# Patient Record
Sex: FEMALE | Race: WHITE | NOT HISPANIC OR LATINO | Employment: UNEMPLOYED
[De-identification: names, ages, dates, MRNs, and addresses within clinical notes are randomized per-mention and may not be internally consistent; named-entity substitution may affect disease eponyms.]

---

## 2017-02-23 ENCOUNTER — CHARTING TRANS (OUTPATIENT)
Dept: OTHER | Age: 42
End: 2017-02-23

## 2021-06-28 ENCOUNTER — TELEPHONE (OUTPATIENT)
Dept: NEUROSURGERY | Age: 46
End: 2021-06-28

## 2021-06-28 PROBLEM — Z98.890 HX OF LUMBOSACRAL SPINE SURGERY: Status: ACTIVE | Noted: 2021-06-28

## 2021-06-28 PROBLEM — Z98.890 HX OF CERVICAL SPINE SURGERY: Status: ACTIVE | Noted: 2021-06-28

## 2021-06-28 NOTE — PROGRESS NOTES
New patient here to establish care. SOB/lethargic x months. Patient has had multiple surgeries. Needs back and dental specialist referrals.

## 2021-06-28 NOTE — PROGRESS NOTES
[facility] Family Medicine Office Note  Chief Complaint:   Patient presents with:  New Patient: establish care      HPI:   This is a 50year old female coming in for  Lived in [place] for 4 years now switching PMD because she moved back to [place] Weight as of this encounter: 168 lb 8 oz (76.4 kg). Vital signs reviewed. Appears stated age, well groomed.     Physical Exam   GEN: Not in any acute distress, making good conversation, answering appropriately   SKIN: No pallor, no erythema, no cyanosis, w requested or ordered in this encounter       Health Maintenance:  Annual Physical Never done  Annual Depression Screen Never done  COVID-19 Vaccine(1) Never done  Pap Smear,3 Years Never done  Mammogram Never done    Patient/Caregiver Education: Patient/Ca

## 2021-07-08 ENCOUNTER — OFFICE VISIT (OUTPATIENT)
Dept: NEUROSURGERY | Age: 46
End: 2021-07-08

## 2021-07-08 ENCOUNTER — IMAGING SERVICES (OUTPATIENT)
Dept: GENERAL RADIOLOGY | Age: 46
End: 2021-07-08

## 2021-07-08 VITALS
BODY MASS INDEX: 27.49 KG/M2 | RESPIRATION RATE: 16 BRPM | WEIGHT: 165 LBS | HEIGHT: 65 IN | DIASTOLIC BLOOD PRESSURE: 43 MMHG | HEART RATE: 66 BPM | SYSTOLIC BLOOD PRESSURE: 82 MMHG

## 2021-07-08 DIAGNOSIS — G95.9 MYELOPATHY (CMD): ICD-10-CM

## 2021-07-08 DIAGNOSIS — R26.9 ABNORMALITY OF GAIT: ICD-10-CM

## 2021-07-08 DIAGNOSIS — G89.29 CHRONIC THORACIC BACK PAIN, UNSPECIFIED BACK PAIN LATERALITY: ICD-10-CM

## 2021-07-08 DIAGNOSIS — Z98.1 S/P LUMBAR FUSION: Primary | ICD-10-CM

## 2021-07-08 DIAGNOSIS — M43.22 FUSION OF SPINE OF CERVICAL REGION: ICD-10-CM

## 2021-07-08 DIAGNOSIS — M54.12 CERVICAL RADICULOPATHY: ICD-10-CM

## 2021-07-08 DIAGNOSIS — M54.6 CHRONIC THORACIC BACK PAIN, UNSPECIFIED BACK PAIN LATERALITY: ICD-10-CM

## 2021-07-08 DIAGNOSIS — Z98.1 S/P LUMBAR FUSION: ICD-10-CM

## 2021-07-08 PROCEDURE — 72110 X-RAY EXAM L-2 SPINE 4/>VWS: CPT | Performed by: NURSE PRACTITIONER

## 2021-07-08 PROCEDURE — 72050 X-RAY EXAM NECK SPINE 4/5VWS: CPT | Performed by: NURSE PRACTITIONER

## 2021-07-08 PROCEDURE — 99203 OFFICE O/P NEW LOW 30 MIN: CPT | Performed by: NURSE PRACTITIONER

## 2021-07-09 ENCOUNTER — TELEPHONE (OUTPATIENT)
Dept: NEUROSURGERY | Age: 46
End: 2021-07-09

## 2021-07-14 ENCOUNTER — TELEPHONE (OUTPATIENT)
Dept: NEUROSURGERY | Age: 46
End: 2021-07-14

## 2021-07-15 ENCOUNTER — TELEPHONE (OUTPATIENT)
Dept: FAMILY MEDICINE CLINIC | Facility: CLINIC | Age: 46
End: 2021-07-15

## 2021-07-15 ENCOUNTER — OFFICE VISIT (OUTPATIENT)
Dept: PAIN CLINIC | Facility: CLINIC | Age: 46
End: 2021-07-15
Payer: MEDICAID

## 2021-07-15 VITALS
BODY MASS INDEX: 26.66 KG/M2 | HEIGHT: 65 IN | SYSTOLIC BLOOD PRESSURE: 138 MMHG | WEIGHT: 160 LBS | DIASTOLIC BLOOD PRESSURE: 92 MMHG | HEART RATE: 91 BPM

## 2021-07-15 DIAGNOSIS — M96.1 FAILED BACK SURGICAL SYNDROME: Primary | ICD-10-CM

## 2021-07-15 PROCEDURE — 3080F DIAST BP >= 90 MM HG: CPT | Performed by: ANESTHESIOLOGY

## 2021-07-15 PROCEDURE — 3008F BODY MASS INDEX DOCD: CPT | Performed by: ANESTHESIOLOGY

## 2021-07-15 PROCEDURE — 99204 OFFICE O/P NEW MOD 45 MIN: CPT | Performed by: ANESTHESIOLOGY

## 2021-07-15 PROCEDURE — 3075F SYST BP GE 130 - 139MM HG: CPT | Performed by: ANESTHESIOLOGY

## 2021-07-15 NOTE — PROGRESS NOTES
HPI/Subjective:   Patient ID: Sallie Mustafa is a 50year old female. HPI    History/Other:   Review of Systems  No current outpatient medications on file.      Allergies:  Augmentin [Amoxicil*    ITCHING    Objective:   Physical Exam  Constitutional:

## 2021-07-15 NOTE — TELEPHONE ENCOUNTER
Pt called she went to see Joes Quintero  Today, he only wants to do back injections. Pt does not want to do anymore injections. Pt is wanting to know what Dr. Suzette Malagon recommends to do or who else she could see?  She is still miserable and not getting any relief

## 2021-07-16 ENCOUNTER — TELEPHONE (OUTPATIENT)
Dept: FAMILY MEDICINE CLINIC | Facility: CLINIC | Age: 46
End: 2021-07-16

## 2021-07-16 DIAGNOSIS — M54.9 CHRONIC BILATERAL BACK PAIN, UNSPECIFIED BACK LOCATION: ICD-10-CM

## 2021-07-16 DIAGNOSIS — G89.29 CHRONIC BILATERAL BACK PAIN, UNSPECIFIED BACK LOCATION: ICD-10-CM

## 2021-07-16 DIAGNOSIS — Z98.890 PERSONAL HISTORY OF SPINE SURGERY: Primary | ICD-10-CM

## 2021-07-16 NOTE — TELEPHONE ENCOUNTER
Injections will help with pain relief, but if she would rather not do this, she  should initiate care and follow up with neurosurgery and PT/rehab to see how this pain slowly resolves.  If pain is severe and some relief is needed, to be able to function, sh

## 2021-07-16 NOTE — TELEPHONE ENCOUNTER
Pt advised referral for Dr. Jose Tran placed and authorized. Pt reports she has their phone number. Pt verbalized understanding. Pt wanted to notify Dr. Monteiro Speak that she is supposed to get CT scans and MRI scans done 07/23, but were DENIED.  Pt reports

## 2021-07-16 NOTE — TELEPHONE ENCOUNTER
PT CALLED AND ADV WOULD LIKE A REFERRAL PLACED TO THE PAIN DR THAT SHE USE TO SEE.     DR Juanjose Qiu YOU

## 2021-07-16 NOTE — TELEPHONE ENCOUNTER
Patient advised of Doctor's note below. Patient verbalized understanding. No further questions at this time.

## 2021-07-19 ENCOUNTER — TELEPHONE (OUTPATIENT)
Dept: FAMILY MEDICINE CLINIC | Facility: CLINIC | Age: 46
End: 2021-07-19

## 2021-07-19 ENCOUNTER — TELEPHONE (OUTPATIENT)
Dept: NEUROSURGERY | Age: 46
End: 2021-07-19

## 2021-07-19 NOTE — TELEPHONE ENCOUNTER
Received fax from [facility] regarding request for office notes for Incontinence supplies order    Order signed and office note (06/28/2021) faxed back to [phone]    Copy send to scanning

## 2021-07-19 NOTE — TELEPHONE ENCOUNTER
Office visit 06/28/2021 and Pain management order 07/16/2021  Faxed to Amy Epps [facility]   Fax: [phone]

## 2021-07-19 NOTE — TELEPHONE ENCOUNTER
PT CALLED AND IS GOING TO NEW PAIN CLINIC NEEDS OFFICE NOTES AND ORDER FAXED TO:    [facility]  FAX # [phone]    PLEASE CALL PT IF ANY QUESTIONS

## 2021-07-20 ENCOUNTER — TELEPHONE (OUTPATIENT)
Dept: NEUROSURGERY | Age: 46
End: 2021-07-20

## 2021-07-20 ENCOUNTER — TELEPHONE (OUTPATIENT)
Dept: FAMILY MEDICINE CLINIC | Facility: CLINIC | Age: 46
End: 2021-07-20

## 2021-07-20 DIAGNOSIS — M54.6 CHRONIC THORACIC BACK PAIN, UNSPECIFIED BACK PAIN LATERALITY: ICD-10-CM

## 2021-07-20 DIAGNOSIS — M43.22 FUSION OF SPINE OF CERVICAL REGION: ICD-10-CM

## 2021-07-20 DIAGNOSIS — M54.50 CHRONIC BILATERAL LOW BACK PAIN, UNSPECIFIED WHETHER SCIATICA PRESENT: Primary | ICD-10-CM

## 2021-07-20 DIAGNOSIS — Z98.1 S/P LUMBAR FUSION: ICD-10-CM

## 2021-07-20 DIAGNOSIS — G89.29 CHRONIC THORACIC BACK PAIN, UNSPECIFIED BACK PAIN LATERALITY: ICD-10-CM

## 2021-07-20 DIAGNOSIS — G89.29 CHRONIC BILATERAL LOW BACK PAIN, UNSPECIFIED WHETHER SCIATICA PRESENT: Primary | ICD-10-CM

## 2021-07-20 DIAGNOSIS — M54.12 CERVICAL RADICULOPATHY: Primary | ICD-10-CM

## 2021-07-20 NOTE — TELEPHONE ENCOUNTER
RN attempted to call the pt back twice at the home number    Both times the phone rang once- sounded like it picked up and then hung up    RN  Got ahold of pt on mobile.     Pt states she stephen tto see [facility] in [facility]- neurosurgeon sent over no

## 2021-07-21 NOTE — TELEPHONE ENCOUNTER
Other pain specialists in the area ? Most of my patients with Chronic pain are referred to Dr Paty Payan.

## 2021-07-21 NOTE — TELEPHONE ENCOUNTER
Pt requesting for new referral for [facility]  For Dr. Clarke Contreras    Pt reports she confirmed that their office takes her insurance  Pt reports her neurosurgeon's office to send clinic notes to their office as well.     New order for pain m

## 2021-07-22 NOTE — TELEPHONE ENCOUNTER
Autumn Yu, [address]  [address] [address] [phone]         Patient advised of note above. Patient verbalized understanding. No further questions at this time.

## 2021-07-23 ENCOUNTER — APPOINTMENT (OUTPATIENT)
Dept: MRI IMAGING | Facility: HOSPITAL | Age: 46
End: 2021-07-23
Attending: NURSE PRACTITIONER
Payer: MEDICAID

## 2021-07-23 ENCOUNTER — IMAGING SERVICES (OUTPATIENT)
Dept: OTHER | Age: 46
End: 2021-07-23
Attending: PHYSICIAN ASSISTANT

## 2021-07-23 ENCOUNTER — HOSPITAL ENCOUNTER (OUTPATIENT)
Dept: CT IMAGING | Facility: HOSPITAL | Age: 46
Discharge: HOME | End: 2021-07-23
Attending: NURSE PRACTITIONER
Payer: MEDICAID

## 2021-07-23 DIAGNOSIS — M43.22 FUSION OF SPINE OF CERVICAL REGION: ICD-10-CM

## 2021-07-23 DIAGNOSIS — M54.12 CERVICAL RADICULOPATHY: ICD-10-CM

## 2021-07-23 DIAGNOSIS — G95.9 MYELOPATHY (HCC): ICD-10-CM

## 2021-07-23 DIAGNOSIS — R26.9 ABNORMALITY OF GAIT: ICD-10-CM

## 2021-07-23 PROCEDURE — 72125 CT NECK SPINE W/O DYE: CPT | Performed by: NURSE PRACTITIONER

## 2021-07-26 ENCOUNTER — TELEPHONE (OUTPATIENT)
Dept: PAIN CLINIC | Facility: CLINIC | Age: 46
End: 2021-07-26

## 2021-07-26 NOTE — TELEPHONE ENCOUNTER
Rec'd referral from Dr. Cheyanne Brito for cervical radiculopathy, and imaging reports from [address] 07/08/21. Placed in RN bin for MD review. Thank you.

## 2021-07-28 ENCOUNTER — OFFICE VISIT (OUTPATIENT)
Dept: FAMILY MEDICINE CLINIC | Facility: CLINIC | Age: 46
End: 2021-07-28
Payer: MEDICAID

## 2021-07-28 VITALS
DIASTOLIC BLOOD PRESSURE: 86 MMHG | OXYGEN SATURATION: 100 % | BODY MASS INDEX: 29 KG/M2 | SYSTOLIC BLOOD PRESSURE: 152 MMHG | HEART RATE: 100 BPM | WEIGHT: 175.13 LBS | RESPIRATION RATE: 16 BRPM | TEMPERATURE: 98 F

## 2021-07-28 DIAGNOSIS — M62.830 SPASM OF THORACIC BACK MUSCLE: ICD-10-CM

## 2021-07-28 DIAGNOSIS — Z13.0 SCREENING FOR DEFICIENCY ANEMIA: Primary | ICD-10-CM

## 2021-07-28 DIAGNOSIS — Z87.19 HISTORY OF LIVER FAILURE: ICD-10-CM

## 2021-07-28 DIAGNOSIS — N89.8 VAGINAL IRRITATION: ICD-10-CM

## 2021-07-28 LAB
ALBUMIN SERPL-MCNC: 3.9 G/DL (ref 3.4–5)
ALBUMIN/GLOB SERPL: 0.9 {RATIO} (ref 1–2)
ALP LIVER SERPL-CCNC: 73 U/L
ALT SERPL-CCNC: 22 U/L
ANION GAP SERPL CALC-SCNC: 5 MMOL/L (ref 0–18)
AST SERPL-CCNC: 16 U/L (ref 15–37)
BASOPHILS # BLD AUTO: 0.07 X10(3) UL (ref 0–0.2)
BASOPHILS NFR BLD AUTO: 0.8 %
BILIRUB SERPL-MCNC: 0.3 MG/DL (ref 0.1–2)
BUN BLD-MCNC: 26 MG/DL (ref 7–18)
BUN/CREAT SERPL: 32.1 (ref 10–20)
CALCIUM BLD-MCNC: 8.9 MG/DL (ref 8.5–10.1)
CHLORIDE SERPL-SCNC: 113 MMOL/L (ref 98–112)
CO2 SERPL-SCNC: 24 MMOL/L (ref 21–32)
CREAT BLD-MCNC: 0.81 MG/DL
DEPRECATED RDW RBC AUTO: 55.3 FL (ref 35.1–46.3)
EOSINOPHIL # BLD AUTO: 0.08 X10(3) UL (ref 0–0.7)
EOSINOPHIL NFR BLD AUTO: 0.9 %
ERYTHROCYTE [DISTWIDTH] IN BLOOD BY AUTOMATED COUNT: 21.2 % (ref 11–15)
GLOBULIN PLAS-MCNC: 4.3 G/DL (ref 2.8–4.4)
GLUCOSE BLD-MCNC: 119 MG/DL (ref 70–99)
HCT VFR BLD AUTO: 35.6 %
HGB BLD-MCNC: 10.9 G/DL
IMM GRANULOCYTES # BLD AUTO: 0.02 X10(3) UL (ref 0–1)
IMM GRANULOCYTES NFR BLD: 0.2 %
LYMPHOCYTES # BLD AUTO: 3.14 X10(3) UL (ref 1–4)
LYMPHOCYTES NFR BLD AUTO: 35.7 %
M PROTEIN MFR SERPL ELPH: 8.2 G/DL (ref 6.4–8.2)
MCH RBC QN AUTO: 22.6 PG (ref 26–34)
MCHC RBC AUTO-ENTMCNC: 30.6 G/DL (ref 31–37)
MCV RBC AUTO: 73.7 FL
MONOCYTES # BLD AUTO: 0.47 X10(3) UL (ref 0.1–1)
MONOCYTES NFR BLD AUTO: 5.3 %
NEUTROPHILS # BLD AUTO: 5.01 X10 (3) UL (ref 1.5–7.7)
NEUTROPHILS # BLD AUTO: 5.01 X10(3) UL (ref 1.5–7.7)
NEUTROPHILS NFR BLD AUTO: 57.1 %
OSMOLALITY SERPL CALC.SUM OF ELEC: 300 MOSM/KG (ref 275–295)
PATIENT FASTING Y/N/NP: NO
PLATELET # BLD AUTO: 372 10(3)UL (ref 150–450)
POTASSIUM SERPL-SCNC: 3.8 MMOL/L (ref 3.5–5.1)
RBC # BLD AUTO: 4.83 X10(6)UL
SODIUM SERPL-SCNC: 142 MMOL/L (ref 136–145)
WBC # BLD AUTO: 8.8 X10(3) UL (ref 4–11)

## 2021-07-28 PROCEDURE — 85025 COMPLETE CBC W/AUTO DIFF WBC: CPT | Performed by: FAMILY MEDICINE

## 2021-07-28 PROCEDURE — 99214 OFFICE O/P EST MOD 30 MIN: CPT | Performed by: FAMILY MEDICINE

## 2021-07-28 PROCEDURE — 80053 COMPREHEN METABOLIC PANEL: CPT | Performed by: FAMILY MEDICINE

## 2021-07-28 PROCEDURE — 3077F SYST BP >= 140 MM HG: CPT | Performed by: FAMILY MEDICINE

## 2021-07-28 PROCEDURE — 3079F DIAST BP 80-89 MM HG: CPT | Performed by: FAMILY MEDICINE

## 2021-07-28 RX ORDER — FLUCONAZOLE 150 MG/1
150 TABLET ORAL
Qty: 2 TABLET | Refills: 0 | Status: SHIPPED | OUTPATIENT
Start: 2021-07-28 | End: 2021-08-01

## 2021-07-28 RX ORDER — MELOXICAM 7.5 MG/1
7.5 TABLET ORAL DAILY
Qty: 30 TABLET | Refills: 0 | Status: SHIPPED | OUTPATIENT
Start: 2021-07-28 | End: 2021-08-27

## 2021-07-28 RX ORDER — CYCLOBENZAPRINE HCL 10 MG
10 TABLET ORAL 3 TIMES DAILY PRN
Qty: 21 TABLET | Refills: 0 | Status: SHIPPED | OUTPATIENT
Start: 2021-07-28 | End: 2021-08-25

## 2021-07-28 NOTE — PROGRESS NOTES
Name: Leticia Henry   : 1975   DOS: 2021     Chief complaint: Neck and mid back pain    History of present illness:  Leticia Henry is a 50year old female complaining of pain in the mid back and neck for more than 20 years.   The patient has a day    Vaping Use      Vaping Use: Never used    Alcohol use: Not Currently    Drug use: Never      Review of  other systems:  Constitutional: negative for fever, weight loss and malaise/fatigue  Cardiovascular:  Denies shortness of breath, chest pain, d palpable bilaterally. Phalen’s and Tinnel’s sign is negative. Lhermitte’s test is negative.   Motor Examination:    (R)   (L)  Deltoid:      5    5  Biceps:       5    5  Triceps:      5    5  Wrist Extension:     5    5  Wrist Flexsion:                 5 N   S3:      N                          N    Radiology diagnostic studies: I do not have any MRI to review.     Assessment:  Failed back surgical syndrome  Epidural abscess        Plan: I had long discussion with the patient about her

## 2021-07-28 NOTE — PROGRESS NOTES
Here for f/u appt. Patient states still having pain in right shoulder. Not able to find a pain clinic that takes her insurance. Also patient is having vaginal irritation due to urine incontinence.

## 2021-07-28 NOTE — PROGRESS NOTES
[facility] Family Medicine Office Note  Chief Complaint:   Patient presents with:   Follow - Up      HPI:   This is a 50year old female coming in for follow up on her pain  Pain just at the level of her shoulder blade - worse on the R side (teary Vaping Use      Vaping Use: Never used    Alcohol use: Not Currently    Drug use: Never    Family History:  No family history on file.   Allergies:    Augmentin [Amoxicil*    ITCHING  Current Meds:  Current Outpatient Medications   Medication Sig Dispense R Meloxicam 7.5 MG Oral Tab; Take 1 tablet (7.5 mg total) by mouth daily. Dispense: 30 tablet; Refill: 0  - VENIPUNCTURE    3. History of liver failure  - COMP METABOLIC PANEL (14); Future  - COMP METABOLIC PANEL (14)  - VENIPUNCTURE    4.  Vaginal irritatio symptoms. Patient is to call with any side effects or complications from the treatments as a result of today.      Problem List:  Patient Active Problem List:     Hx of lumbosacral spine surgery     Hx of cervical spine surgery

## 2021-07-30 ENCOUNTER — TELEPHONE (OUTPATIENT)
Dept: FAMILY MEDICINE CLINIC | Facility: CLINIC | Age: 46
End: 2021-07-30

## 2021-07-30 DIAGNOSIS — D64.9 ANEMIA, UNSPECIFIED TYPE: Primary | ICD-10-CM

## 2021-07-30 NOTE — TELEPHONE ENCOUNTER
She should start taking daily Iron supplement 325 mg twice daily and to avoid dairy within 2 hours of taking the Iron supplement.  Sometimes Iron will make you constipated so consider taking an OTC Colace (stool softener) with this, and we should recheck th

## 2021-07-30 NOTE — TELEPHONE ENCOUNTER
Patient called to let MM that she doesn't get menstrual periods anymore.     If you have any questions call her @ [phone]

## 2021-07-30 NOTE — TELEPHONE ENCOUNTER
[facility] sent to pt regarding doctor's note below  Routing to nurse pool for follow-up message read by pt       Recall placed.   Order per verbal.

## 2021-08-02 NOTE — TELEPHONE ENCOUNTER
Jose Jacobson MD  You 24 minutes ago (12:39 PM)     Elliot Heath, the information and she sent is not enough.  I need information from the operation done in [address] [place] [address] Rd also need MRI of thoracic, cervical and lumbar spine.     Message text

## 2021-08-02 NOTE — TELEPHONE ENCOUNTER
Patient states she is not able to get any records from the hospital in [facility] . She stated her  has been trying to get records from them and has not been successful.      Patient stated her Neurosurgeon st Katie Germain kn

## 2021-08-06 ENCOUNTER — TELEPHONE (OUTPATIENT)
Dept: NEUROSURGERY | Age: 46
End: 2021-08-06

## 2021-08-17 ENCOUNTER — OFFICE VISIT (OUTPATIENT)
Dept: PAIN CLINIC | Facility: CLINIC | Age: 46
End: 2021-08-17
Payer: MEDICAID

## 2021-08-17 VITALS
DIASTOLIC BLOOD PRESSURE: 78 MMHG | RESPIRATION RATE: 16 BRPM | WEIGHT: 165 LBS | OXYGEN SATURATION: 99 % | BODY MASS INDEX: 27 KG/M2 | SYSTOLIC BLOOD PRESSURE: 130 MMHG | HEART RATE: 86 BPM

## 2021-08-17 DIAGNOSIS — M54.16 LUMBAR RADICULITIS: Primary | ICD-10-CM

## 2021-08-17 PROCEDURE — 3078F DIAST BP <80 MM HG: CPT | Performed by: ANESTHESIOLOGY

## 2021-08-17 PROCEDURE — 3075F SYST BP GE 130 - 139MM HG: CPT | Performed by: ANESTHESIOLOGY

## 2021-08-17 PROCEDURE — 99215 OFFICE O/P EST HI 40 MIN: CPT | Performed by: ANESTHESIOLOGY

## 2021-08-17 RX ORDER — GABAPENTIN 100 MG/1
100 CAPSULE ORAL 3 TIMES DAILY
Qty: 90 CAPSULE | Refills: 0 | Status: SHIPPED | OUTPATIENT
Start: 2021-08-17 | End: 2021-09-14

## 2021-08-17 RX ORDER — METAXALONE 800 MG/1
800 TABLET ORAL 3 TIMES DAILY
Qty: 90 TABLET | Refills: 0 | Status: SHIPPED | OUTPATIENT
Start: 2021-08-17 | End: 2021-09-23

## 2021-08-17 RX ORDER — MELOXICAM 15 MG/1
15 TABLET ORAL DAILY
Qty: 30 TABLET | Refills: 0 | Status: SHIPPED | OUTPATIENT
Start: 2021-08-17 | End: 2021-09-27

## 2021-08-17 NOTE — PROGRESS NOTES
Patient presents in office today with reported pain in mid back     Current pain level reported = 8/10    Last reported dose of nothing      Narcotic Contract renewal na    Urine Drug screen na

## 2021-08-19 ENCOUNTER — TELEPHONE (OUTPATIENT)
Dept: NEUROLOGY | Facility: CLINIC | Age: 46
End: 2021-08-19

## 2021-08-19 ENCOUNTER — TELEPHONE (OUTPATIENT)
Dept: PAIN CLINIC | Facility: CLINIC | Age: 46
End: 2021-08-19

## 2021-08-19 DIAGNOSIS — M54.16 LUMBAR RADICULITIS: Primary | ICD-10-CM

## 2021-08-19 NOTE — TELEPHONE ENCOUNTER
Pt calling stating that she has not received notification from her pharmacy regarding this medication being ready for pickup. (Metaxalone 800 MG Oral Tab).      After PSR spoke with Reta Bumpers, RN, this medication is not going through the pt's insurance and d

## 2021-08-19 NOTE — TELEPHONE ENCOUNTER
Please have physician complete the [address] [place] [address] note from 08/17/21 so prior authorization can be completed for the MRI SPINE LUMBAR (CPT=72148) that was requested.   Thank you

## 2021-08-20 RX ORDER — FLUCONAZOLE 150 MG/1
150 TABLET ORAL
Qty: 2 TABLET | Refills: 2 | Status: SHIPPED | OUTPATIENT
Start: 2021-08-20 | End: 2021-09-05

## 2021-08-20 NOTE — TELEPHONE ENCOUNTER
Prior authorization request for Metaxalone 800 mg    Patient has diagnosis of: Lumbar radiculitis    Patient's current age: 50    Medication is considered high risk for patients greater than 72years of age.     Patient has tried and failed formulary altern

## 2021-08-24 DIAGNOSIS — M62.830 SPASM OF THORACIC BACK MUSCLE: ICD-10-CM

## 2021-08-24 RX ORDER — MELOXICAM 7.5 MG/1
TABLET ORAL
Qty: 30 TABLET | Refills: 0 | OUTPATIENT
Start: 2021-08-24

## 2021-08-25 ENCOUNTER — PATIENT MESSAGE (OUTPATIENT)
Dept: PAIN CLINIC | Facility: CLINIC | Age: 46
End: 2021-08-25

## 2021-08-25 ENCOUNTER — PATIENT MESSAGE (OUTPATIENT)
Dept: FAMILY MEDICINE CLINIC | Facility: CLINIC | Age: 46
End: 2021-08-25

## 2021-08-25 ENCOUNTER — IMAGING SERVICES (OUTPATIENT)
Dept: OTHER | Age: 46
End: 2021-08-25

## 2021-08-25 ENCOUNTER — HOSPITAL ENCOUNTER (OUTPATIENT)
Dept: MRI IMAGING | Facility: HOSPITAL | Age: 46
Discharge: HOME | End: 2021-08-25
Attending: NURSE PRACTITIONER
Payer: MEDICAID

## 2021-08-25 DIAGNOSIS — G89.29 CHRONIC THORACIC BACK PAIN, UNSPECIFIED BACK PAIN LATERALITY: ICD-10-CM

## 2021-08-25 DIAGNOSIS — M96.1 FAILED BACK SURGICAL SYNDROME: ICD-10-CM

## 2021-08-25 DIAGNOSIS — R26.9 ABNORMALITY OF GAIT: ICD-10-CM

## 2021-08-25 DIAGNOSIS — M54.16 LUMBAR RADICULITIS: Primary | ICD-10-CM

## 2021-08-25 DIAGNOSIS — M54.6 CHRONIC THORACIC BACK PAIN, UNSPECIFIED BACK PAIN LATERALITY: ICD-10-CM

## 2021-08-25 DIAGNOSIS — G95.9 MYELOPATHY (HCC): ICD-10-CM

## 2021-08-25 DIAGNOSIS — M43.22 FUSION OF SPINE OF CERVICAL REGION: ICD-10-CM

## 2021-08-25 DIAGNOSIS — M54.12 CERVICAL RADICULOPATHY: ICD-10-CM

## 2021-08-25 PROCEDURE — 72146 MRI CHEST SPINE W/O DYE: CPT | Performed by: NURSE PRACTITIONER

## 2021-08-25 PROCEDURE — 72141 MRI NECK SPINE W/O DYE: CPT | Performed by: NURSE PRACTITIONER

## 2021-08-25 RX ORDER — SULFAMETHOXAZOLE AND TRIMETHOPRIM 800; 160 MG/1; MG/1
1 TABLET ORAL 2 TIMES DAILY
Qty: 14 TABLET | Refills: 0 | Status: SHIPPED | OUTPATIENT
Start: 2021-08-25 | End: 2021-12-27

## 2021-08-25 RX ORDER — CYCLOBENZAPRINE HCL 10 MG
10 TABLET ORAL 3 TIMES DAILY PRN
Qty: 90 TABLET | Refills: 0 | Status: SHIPPED | OUTPATIENT
Start: 2021-08-25 | End: 2021-09-01

## 2021-08-25 NOTE — TELEPHONE ENCOUNTER
From: Thomas Agee  To: Malena Beltran MD  Sent: 8/25/2021 12:48 PM CDT  Subject: Prescription Question    I just received a denial letter for the [facility] due to not meeting the criteria based on the info giving by Dr Paty Payan so now what do I do?

## 2021-08-25 NOTE — TELEPHONE ENCOUNTER
Patient has tried cyclobenzaprine 10 MG Oral Tab TID and per Perzo message is willing to try it.      Rx pending provider approval.

## 2021-08-25 NOTE — TELEPHONE ENCOUNTER
From: Panchito Howard  To: Georgie Baugh MD  Sent: 8/25/2021 10:26 AM CDT  Subject: Prescription Question    I still think I need antibiotic for the burning plus now I have like blister forming.

## 2021-08-25 NOTE — TELEPHONE ENCOUNTER
D/W Dr Matias Primrose in Dr Ferrara Overall absence. Dr Macho Friedman recommendation is to prescribe an alternative muscle relaxer. Noted that pt has tried cyclobenzaprine in the past. Msg sent to determine efficacy.

## 2021-08-30 NOTE — PROGRESS NOTES
Name: Arnold Grijalva   : 1975   DOS: 2021     Chief complaint: Neck and mid back pain    History of present illness:  Arnold Grijalva is a 48year old female complaining of pain in the mid back and neck for more than 20 years.   Guillermina Refill   • Metaxalone 800 MG Oral Tab Take 1 tablet (800 mg total) by mouth 3 (three) times daily. 90 tablet 0   • Meloxicam 15 MG Oral Tab Take 1 tablet (15 mg total) by mouth daily.  30 tablet 0   • gabapentin 100 MG Oral Cap Take 1 capsule (100 mg total) (74.8 kg)   LMP 02/11/2014   SpO2 99%   BMI 27.46 kg/m²    The patient is awake, alert, oriented and corporative. She has a normal affect. The patient ambulates with normal gait. HEENT: No gross lesion noted. PEERL. No icterus. Neck and mid back: Supple. Flexion:    5    5   Knee Extension:   5    5   Knee Flexion:    5    5   Ant.  Tibialis:    5    5  Extensor Hallucis Longus:   5    5  Peroneals:     5    5  Gastrocsoleus:     5    5    Deep Tendon Reflexes:             (R)   (L)   Patella:    2   2   An

## 2021-08-31 NOTE — TELEPHONE ENCOUNTER
Please have provider look over his LOV as the provider ordered an MRI Lumbar CPT 72404 and his note states he  recommend the patient to go for MRI of cervical and thoracic spine.   Pleae advise

## 2021-09-07 ENCOUNTER — TELEPHONE (OUTPATIENT)
Dept: SURGERY | Facility: CLINIC | Age: 46
End: 2021-09-07

## 2021-09-07 NOTE — TELEPHONE ENCOUNTER
MRI Lumbar (76516) denial reason:    Patient has not completed six weeks of treatment directed by provider that failed to improve patients symptoms. Also there is no records of a follow-up (in person, by phone or messaging) with provider after completing tr

## 2021-09-08 NOTE — TELEPHONE ENCOUNTER
Spoke with Carmen and scheduled a peer to peer with Dr Nathalie Jay and Dr Nathan Blanchard on 9/9/21 at 9:15  Pasha Alvarado notes and denial info on providers desk for review.

## 2021-09-09 NOTE — TELEPHONE ENCOUNTER
Updated referral. Called patient and left a detailed message including centralized scheduling phone #

## 2021-09-14 DIAGNOSIS — M54.16 LUMBAR RADICULITIS: Primary | ICD-10-CM

## 2021-09-14 RX ORDER — GABAPENTIN 100 MG/1
CAPSULE ORAL
Qty: 90 CAPSULE | Refills: 0 | Status: SHIPPED | OUTPATIENT
Start: 2021-09-14 | End: 2021-09-27 | Stop reason: ALTCHOICE

## 2021-09-14 NOTE — TELEPHONE ENCOUNTER
Medication: gabapentin 100 MG Oral Cap    Date of last refill: 08/17/21      Last office visit: 08/17/21  Due back to clinic per last office note:  na  Date next office visit scheduled:  none      Last OV note recommendation: Assessment:  Failed back surgi

## 2021-09-17 ENCOUNTER — HOSPITAL ENCOUNTER (OUTPATIENT)
Dept: MRI IMAGING | Age: 46
Discharge: HOME | End: 2021-09-17
Attending: ANESTHESIOLOGY
Payer: MEDICAID

## 2021-09-17 DIAGNOSIS — M54.16 LUMBAR RADICULITIS: ICD-10-CM

## 2021-09-18 ENCOUNTER — HOSPITAL ENCOUNTER (OUTPATIENT)
Dept: MRI IMAGING | Age: 46
Discharge: HOME | End: 2021-09-18
Attending: ANESTHESIOLOGY
Payer: MEDICAID

## 2021-09-18 ENCOUNTER — IMAGING SERVICES (OUTPATIENT)
Dept: OTHER | Age: 46
End: 2021-09-18
Attending: NEUROLOGICAL SURGERY

## 2021-09-18 PROCEDURE — 72148 MRI LUMBAR SPINE W/O DYE: CPT | Performed by: ANESTHESIOLOGY

## 2021-09-20 ENCOUNTER — TELEPHONE (OUTPATIENT)
Dept: NEUROSURGERY | Age: 46
End: 2021-09-20

## 2021-09-23 ENCOUNTER — TELEPHONE (OUTPATIENT)
Dept: NEUROSURGERY | Age: 46
End: 2021-09-23

## 2021-09-23 ENCOUNTER — OFFICE VISIT (OUTPATIENT)
Dept: PAIN CLINIC | Facility: CLINIC | Age: 46
End: 2021-09-23
Payer: MEDICAID

## 2021-09-23 VITALS
WEIGHT: 170 LBS | OXYGEN SATURATION: 100 % | BODY MASS INDEX: 28 KG/M2 | SYSTOLIC BLOOD PRESSURE: 120 MMHG | RESPIRATION RATE: 16 BRPM | HEART RATE: 101 BPM | DIASTOLIC BLOOD PRESSURE: 82 MMHG

## 2021-09-23 DIAGNOSIS — M96.1 FAILED BACK SURGICAL SYNDROME: Primary | ICD-10-CM

## 2021-09-23 PROCEDURE — 99215 OFFICE O/P EST HI 40 MIN: CPT | Performed by: ANESTHESIOLOGY

## 2021-09-23 PROCEDURE — 3079F DIAST BP 80-89 MM HG: CPT | Performed by: ANESTHESIOLOGY

## 2021-09-23 PROCEDURE — 3074F SYST BP LT 130 MM HG: CPT | Performed by: ANESTHESIOLOGY

## 2021-09-23 RX ORDER — GABAPENTIN 300 MG/1
300 CAPSULE ORAL 3 TIMES DAILY
Qty: 90 CAPSULE | Refills: 0 | Status: SHIPPED | OUTPATIENT
Start: 2021-09-23 | End: 2021-10-21

## 2021-09-23 RX ORDER — BACLOFEN 10 MG/1
10 TABLET ORAL 3 TIMES DAILY
Qty: 90 TABLET | Refills: 0 | Status: SHIPPED | OUTPATIENT
Start: 2021-09-23 | End: 2021-10-21

## 2021-09-23 NOTE — PROGRESS NOTES
Patient presents in office today with reported pain in mid back and low back     Current pain level reported = 8/10    Last reported dose of nothing      Narcotic Contract renewal na    Urine Drug screen na

## 2021-09-27 ENCOUNTER — OFFICE VISIT (OUTPATIENT)
Dept: FAMILY MEDICINE CLINIC | Facility: CLINIC | Age: 46
End: 2021-09-27
Payer: MEDICAID

## 2021-09-27 VITALS
RESPIRATION RATE: 18 BRPM | BODY MASS INDEX: 31 KG/M2 | TEMPERATURE: 98 F | DIASTOLIC BLOOD PRESSURE: 90 MMHG | OXYGEN SATURATION: 100 % | WEIGHT: 188.38 LBS | HEART RATE: 102 BPM | SYSTOLIC BLOOD PRESSURE: 142 MMHG

## 2021-09-27 DIAGNOSIS — Z12.31 SCREENING MAMMOGRAM, ENCOUNTER FOR: ICD-10-CM

## 2021-09-27 DIAGNOSIS — R26.89 POOR BALANCE: Primary | ICD-10-CM

## 2021-09-27 DIAGNOSIS — M54.16 LUMBAR RADICULOPATHY: ICD-10-CM

## 2021-09-27 DIAGNOSIS — R79.9 ELEVATED BUN: ICD-10-CM

## 2021-09-27 DIAGNOSIS — D64.9 ANEMIA, UNSPECIFIED TYPE: ICD-10-CM

## 2021-09-27 DIAGNOSIS — Z13.29 SCREENING FOR THYROID DISORDER: ICD-10-CM

## 2021-09-27 DIAGNOSIS — R29.6 FREQUENT FALLS: ICD-10-CM

## 2021-09-27 DIAGNOSIS — N64.52 NIPPLE DISCHARGE IN FEMALE: ICD-10-CM

## 2021-09-27 LAB
ALBUMIN SERPL-MCNC: 3.8 G/DL (ref 3.4–5)
ALBUMIN/GLOB SERPL: 0.8 {RATIO} (ref 1–2)
ALP LIVER SERPL-CCNC: 75 U/L
ALT SERPL-CCNC: 21 U/L
ANION GAP SERPL CALC-SCNC: 6 MMOL/L (ref 0–18)
AST SERPL-CCNC: 15 U/L (ref 15–37)
BASOPHILS # BLD AUTO: 0.08 X10(3) UL (ref 0–0.2)
BASOPHILS NFR BLD AUTO: 1.3 %
BILIRUB SERPL-MCNC: 0.3 MG/DL (ref 0.1–2)
BUN BLD-MCNC: 19 MG/DL (ref 7–18)
CALCIUM BLD-MCNC: 9.2 MG/DL (ref 8.5–10.1)
CHLORIDE SERPL-SCNC: 111 MMOL/L (ref 98–112)
CO2 SERPL-SCNC: 23 MMOL/L (ref 21–32)
CREAT BLD-MCNC: 0.7 MG/DL
DEPRECATED HBV CORE AB SER IA-ACNC: 5.4 NG/ML
EOSINOPHIL # BLD AUTO: 0.15 X10(3) UL (ref 0–0.7)
EOSINOPHIL NFR BLD AUTO: 2.4 %
ERYTHROCYTE [DISTWIDTH] IN BLOOD BY AUTOMATED COUNT: 18.1 %
FOLATE SERPL-MCNC: 10.1 NG/ML (ref 8.7–?)
GLOBULIN PLAS-MCNC: 4.6 G/DL (ref 2.8–4.4)
GLUCOSE BLD-MCNC: 84 MG/DL (ref 70–99)
HCT VFR BLD AUTO: 39 %
HGB BLD-MCNC: 12.2 G/DL
IMM GRANULOCYTES # BLD AUTO: 0.01 X10(3) UL (ref 0–1)
IMM GRANULOCYTES NFR BLD: 0.2 %
IRON SATURATION: 6 %
IRON SERPL-MCNC: 40 UG/DL
LYMPHOCYTES # BLD AUTO: 2.24 X10(3) UL (ref 1–4)
LYMPHOCYTES NFR BLD AUTO: 35.7 %
MCH RBC QN AUTO: 23.8 PG (ref 26–34)
MCHC RBC AUTO-ENTMCNC: 31.3 G/DL (ref 31–37)
MCV RBC AUTO: 76.2 FL
MONOCYTES # BLD AUTO: 0.48 X10(3) UL (ref 0.1–1)
MONOCYTES NFR BLD AUTO: 7.7 %
NEUTROPHILS # BLD AUTO: 3.31 X10 (3) UL (ref 1.5–7.7)
NEUTROPHILS # BLD AUTO: 3.31 X10(3) UL (ref 1.5–7.7)
NEUTROPHILS NFR BLD AUTO: 52.7 %
OSMOLALITY SERPL CALC.SUM OF ELEC: 291 MOSM/KG (ref 275–295)
PATIENT FASTING Y/N/NP: NO
PLATELET # BLD AUTO: 368 10(3)UL (ref 150–450)
POTASSIUM SERPL-SCNC: 3.9 MMOL/L (ref 3.5–5.1)
PROLACTIN SERPL-MCNC: 6.4 NG/ML
PROT SERPL-MCNC: 8.4 G/DL (ref 6.4–8.2)
RBC # BLD AUTO: 5.12 X10(6)UL
SODIUM SERPL-SCNC: 140 MMOL/L (ref 136–145)
TOTAL IRON BINDING CAPACITY: 617 UG/DL (ref 240–450)
TRANSFERRIN SERPL-MCNC: 414 MG/DL (ref 200–360)
TSI SER-ACNC: 1.84 MIU/ML (ref 0.36–3.74)
VIT B12 SERPL-MCNC: 449 PG/ML (ref 193–986)
WBC # BLD AUTO: 6.3 X10(3) UL (ref 4–11)

## 2021-09-27 PROCEDURE — 85025 COMPLETE CBC W/AUTO DIFF WBC: CPT | Performed by: FAMILY MEDICINE

## 2021-09-27 PROCEDURE — 84443 ASSAY THYROID STIM HORMONE: CPT | Performed by: FAMILY MEDICINE

## 2021-09-27 PROCEDURE — 3080F DIAST BP >= 90 MM HG: CPT | Performed by: FAMILY MEDICINE

## 2021-09-27 PROCEDURE — 82746 ASSAY OF FOLIC ACID SERUM: CPT | Performed by: FAMILY MEDICINE

## 2021-09-27 PROCEDURE — 83540 ASSAY OF IRON: CPT | Performed by: FAMILY MEDICINE

## 2021-09-27 PROCEDURE — 82728 ASSAY OF FERRITIN: CPT | Performed by: FAMILY MEDICINE

## 2021-09-27 PROCEDURE — 99214 OFFICE O/P EST MOD 30 MIN: CPT | Performed by: FAMILY MEDICINE

## 2021-09-27 PROCEDURE — 84146 ASSAY OF PROLACTIN: CPT | Performed by: FAMILY MEDICINE

## 2021-09-27 PROCEDURE — 80053 COMPREHEN METABOLIC PANEL: CPT | Performed by: FAMILY MEDICINE

## 2021-09-27 PROCEDURE — 82607 VITAMIN B-12: CPT | Performed by: FAMILY MEDICINE

## 2021-09-27 PROCEDURE — 3077F SYST BP >= 140 MM HG: CPT | Performed by: FAMILY MEDICINE

## 2021-09-27 PROCEDURE — 83550 IRON BINDING TEST: CPT | Performed by: FAMILY MEDICINE

## 2021-09-27 RX ORDER — CYCLOBENZAPRINE HYDROCHLORIDE 7.5 MG/1
7.5 TABLET, FILM COATED ORAL 3 TIMES DAILY PRN
Qty: 45 TABLET | Refills: 0 | Status: SHIPPED | OUTPATIENT
Start: 2021-09-27 | End: 2021-10-12

## 2021-09-27 RX ORDER — MELOXICAM 15 MG/1
15 TABLET ORAL DAILY
Qty: 30 TABLET | Refills: 0 | Status: SHIPPED | OUTPATIENT
Start: 2021-09-27 | End: 2021-10-25

## 2021-09-27 RX ORDER — CYCLOBENZAPRINE HYDROCHLORIDE 7.5 MG/1
7.5 TABLET, FILM COATED ORAL 3 TIMES DAILY PRN
COMMUNITY
End: 2021-09-27

## 2021-09-27 NOTE — PROGRESS NOTES
[facility] Family Medicine Office Note  Chief Complaint:   Patient presents with: Follow - Up: Needs blood drawn. Discuss upcoming back surgery.  Also would like to discuss blood leaking from breast.       HPI:   This is a 48year old female comi Arthritis    • Back pain    • DDD (degenerative disc disease), cervical     c2-c7 fusions   • DDD (degenerative disc disease), lumbar     L4-L5   • DDD (degenerative disc disease), lumbosacral     S1-S2   • Fibromyalgia    • Urine incontinence      Past Beckham (36.5 °C) (Temporal)   Resp 18   Wt 188 lb 6 oz (85.4 kg)   LMP 02/11/2014   SpO2 100%   BMI 31.35 kg/m²  Estimated body mass index is 31.35 kg/m² as calculated from the following:    Height as of 7/15/21: 5' 5\" (1.651 m).     Weight as of this encounter: DME WALKER MISC  - DME - EXTERNAL   - Refilled Cyclobenzaprine 7.5 mg three times per day as needed for breakthrough spasms   - Meloxicam 15 mg once daily     7.  Frequent falls  - DME - EXTERNAL     8. Screening for thyroid disorder      Elevated BUN and B Never done  Pneumococcal Vaccine: Birth to 64yrs(1 of 2 - PPSV23) Never done  Tobacco Cessation Counseling 2 Years Never done  Annual Depression Screen Never done  COVID-19 Vaccine(1) Never done  Pap Smear,3 Years Never done  Mammogram Never done  Colonosc

## 2021-09-27 NOTE — PATIENT INSTRUCTIONS
Elevated BUN and BUN/Cr ratio     Retesting CMP   If still elevated BUN and BUN/Cr may considering renal consult       Poor balance / frequent falls - needs cane but this is not enough, concerned about the winter when it snows     Walker order placed   D

## 2021-09-27 NOTE — PROGRESS NOTES
Name: Jonah Brito   : 1975   DOS: 2021     Chief complaint: Neck and mid back pain    History of present illness:  Jonah Brito is a 48year old female complaining of pain in the mid back and neck for more than 20 years.   Guillermina Dispense Refill   • baclofen 10 MG Oral Tab Take 1 tablet (10 mg total) by mouth 3 (three) times daily. 90 tablet 0   • gabapentin 300 MG Oral Cap Take 1 capsule (300 mg total) by mouth 3 (three) times daily.  90 capsule 0   • GABAPENTIN 100 MG Oral Cap ONELIA better, compression of  the head makes the pain worse. Spurling’s test is negative. [place] maneuver is normal. Radial pulsation is palpable bilaterally. Phalen’s and Tinnel’s sign is negative. Lhermitte’s test is negative.   Motor Examination:    (R)   (L N     N   L3:      N               N   L4:      N                          N   L5:      N                          N    S1:      N                          N   S3:      N                          N    Radiology diagnostic studies: MRI of lumbar, thoracic a surgeon. I also recommended her to take baclofen 10 mg p.o. 3 times daily and gabapentin 300 mg p.o. 3 times daily. . Thank you very much for referring the patient to me. Aidan Ivan M.D.   Pain Management

## 2021-09-27 NOTE — TELEPHONE ENCOUNTER
LOV 09/27/2021    Last refill on 09/23/2021, for #90 tabs, with 0 refills  (by Dr. Garcia)  baclofen 10 MG Oral Tab    Last refill on 09/23/2021, for #90 caps, with 0 refills (by Dr. Garcia)  gabapentin 300 MG Oral Cap    Last refill on 08/17/2021, for #30

## 2021-09-30 ENCOUNTER — TELEPHONE (OUTPATIENT)
Dept: FAMILY MEDICINE CLINIC | Facility: CLINIC | Age: 46
End: 2021-09-30

## 2021-09-30 DIAGNOSIS — M96.1 FAILED BACK SURGICAL SYNDROME: ICD-10-CM

## 2021-09-30 DIAGNOSIS — M54.16 LUMBAR RADICULITIS: ICD-10-CM

## 2021-09-30 RX ORDER — CYCLOBENZAPRINE HCL 10 MG
TABLET ORAL
Qty: 90 TABLET | Refills: 0 | OUTPATIENT
Start: 2021-09-30

## 2021-09-30 NOTE — TELEPHONE ENCOUNTER
Medication: Cyclobenzaprine HCl 7.5 MG Oral Tab  Duplicate order Dr. Leopold Nodal has filled medication.

## 2021-09-30 NOTE — TELEPHONE ENCOUNTER
----- Message from [facility], RN sent at 7/30/2021  4:41 PM CDT -----  Regarding: recall  Due for 2-3 month CBC recheck

## 2021-10-06 ENCOUNTER — OFFICE VISIT (OUTPATIENT)
Dept: NEUROSURGERY | Age: 46
End: 2021-10-06

## 2021-10-06 ENCOUNTER — IMAGING SERVICES (OUTPATIENT)
Dept: GENERAL RADIOLOGY | Age: 46
End: 2021-10-06
Attending: NEUROLOGICAL SURGERY

## 2021-10-06 ENCOUNTER — TELEPHONE (OUTPATIENT)
Dept: NEUROSURGERY | Age: 46
End: 2021-10-06

## 2021-10-06 ENCOUNTER — TELEPHONE (OUTPATIENT)
Dept: FAMILY MEDICINE CLINIC | Facility: CLINIC | Age: 46
End: 2021-10-06

## 2021-10-06 VITALS
DIASTOLIC BLOOD PRESSURE: 107 MMHG | HEART RATE: 99 BPM | WEIGHT: 165 LBS | BODY MASS INDEX: 27.49 KG/M2 | RESPIRATION RATE: 16 BRPM | TEMPERATURE: 97.3 F | HEIGHT: 65 IN | SYSTOLIC BLOOD PRESSURE: 162 MMHG

## 2021-10-06 DIAGNOSIS — M54.16 LUMBAR RADICULOPATHY: ICD-10-CM

## 2021-10-06 DIAGNOSIS — M54.12 CERVICAL RADICULOPATHY: Primary | ICD-10-CM

## 2021-10-06 DIAGNOSIS — M54.12 CERVICAL RADICULOPATHY: ICD-10-CM

## 2021-10-06 PROCEDURE — 99213 OFFICE O/P EST LOW 20 MIN: CPT | Performed by: NEUROLOGICAL SURGERY

## 2021-10-06 PROCEDURE — 77073 BONE LENGTH STUDIES: CPT | Performed by: NEUROLOGICAL SURGERY

## 2021-10-06 PROCEDURE — 72082 X-RAY EXAM ENTIRE SPI 2/3 VW: CPT | Performed by: NEUROLOGICAL SURGERY

## 2021-10-06 RX ORDER — MELOXICAM 15 MG/1
15 TABLET ORAL DAILY
Status: ON HOLD | COMMUNITY
Start: 2021-09-27 | End: 2022-05-06 | Stop reason: HOSPADM

## 2021-10-06 RX ORDER — CYCLOBENZAPRINE HYDROCHLORIDE 7.5 MG/1
7.5 TABLET, FILM COATED ORAL
COMMUNITY
Start: 2021-09-27 | End: 2021-11-02 | Stop reason: DRUGHIGH

## 2021-10-06 RX ORDER — GABAPENTIN 300 MG/1
300 CAPSULE ORAL 3 TIMES DAILY
COMMUNITY
Start: 2021-09-23 | End: 2022-05-20 | Stop reason: SDUPTHER

## 2021-10-06 RX ORDER — BACLOFEN 10 MG/1
10 TABLET ORAL 3 TIMES DAILY
COMMUNITY
Start: 2021-09-23

## 2021-10-06 RX ORDER — PRENATAL VIT 27,CALC/IRON/FA 60 MG-1 MG
1 TABLET ORAL DAILY
COMMUNITY
Start: 2021-09-28 | End: 2022-01-05 | Stop reason: ALTCHOICE

## 2021-10-06 RX ORDER — FLUCONAZOLE 150 MG/1
TABLET ORAL
COMMUNITY
Start: 2021-08-20 | End: 2021-11-02 | Stop reason: ALTCHOICE

## 2021-10-06 NOTE — TELEPHONE ENCOUNTER
Received medical records request from [address] [place] [address].  They are requesting records from 06/25/20 to present including medial history, psychiatric history, clinical findings, laboratory findings, imaging reports, treatment prescribed a

## 2021-10-07 ENCOUNTER — TELEPHONE (OUTPATIENT)
Dept: NEUROSURGERY | Age: 46
End: 2021-10-07

## 2021-10-07 DIAGNOSIS — M43.22 FUSION OF SPINE OF CERVICAL REGION: ICD-10-CM

## 2021-10-07 DIAGNOSIS — Z98.1 S/P LUMBAR SPINAL FUSION: Primary | ICD-10-CM

## 2021-10-12 ENCOUNTER — HOSPITAL ENCOUNTER (OUTPATIENT)
Dept: CT IMAGING | Facility: HOSPITAL | Age: 46
Discharge: HOME | End: 2021-10-12
Attending: NEUROLOGICAL SURGERY
Payer: MEDICAID

## 2021-10-12 ENCOUNTER — IMAGING SERVICES (OUTPATIENT)
Dept: OTHER | Age: 46
End: 2021-10-12

## 2021-10-12 DIAGNOSIS — M54.16 LUMBAR RADICULOPATHY: ICD-10-CM

## 2021-10-12 PROCEDURE — 72131 CT LUMBAR SPINE W/O DYE: CPT | Performed by: NEUROLOGICAL SURGERY

## 2021-10-14 ENCOUNTER — TELEPHONE (OUTPATIENT)
Dept: NEUROSURGERY | Age: 46
End: 2021-10-14

## 2021-10-19 ENCOUNTER — TELEPHONE (OUTPATIENT)
Dept: FAMILY MEDICINE CLINIC | Facility: CLINIC | Age: 46
End: 2021-10-19

## 2021-10-19 ENCOUNTER — IMAGING SERVICES (OUTPATIENT)
Dept: GENERAL RADIOLOGY | Age: 46
End: 2021-10-19
Attending: NEUROLOGICAL SURGERY

## 2021-10-19 ENCOUNTER — IMAGING SERVICES (OUTPATIENT)
Dept: GENERAL RADIOLOGY | Age: 46
End: 2021-10-19
Attending: NURSE PRACTITIONER

## 2021-10-19 ENCOUNTER — PREP FOR CASE (OUTPATIENT)
Dept: NEUROSURGERY | Age: 46
End: 2021-10-19

## 2021-10-19 ENCOUNTER — OFFICE VISIT (OUTPATIENT)
Dept: NEUROSURGERY | Age: 46
End: 2021-10-19

## 2021-10-19 VITALS
HEART RATE: 94 BPM | HEIGHT: 65 IN | SYSTOLIC BLOOD PRESSURE: 136 MMHG | BODY MASS INDEX: 27.49 KG/M2 | RESPIRATION RATE: 16 BRPM | WEIGHT: 165 LBS | DIASTOLIC BLOOD PRESSURE: 91 MMHG | TEMPERATURE: 97.2 F

## 2021-10-19 DIAGNOSIS — M81.0 OSTEOPOROSIS, UNSPECIFIED OSTEOPOROSIS TYPE, UNSPECIFIED PATHOLOGICAL FRACTURE PRESENCE: ICD-10-CM

## 2021-10-19 DIAGNOSIS — G62.9 NEUROPATHY: ICD-10-CM

## 2021-10-19 DIAGNOSIS — M43.22 FUSION OF SPINE OF CERVICAL REGION: ICD-10-CM

## 2021-10-19 DIAGNOSIS — R53.1 WEAKNESS: ICD-10-CM

## 2021-10-19 DIAGNOSIS — M40.209 KYPHOSIS, UNSPECIFIED KYPHOSIS TYPE, UNSPECIFIED SPINAL REGION: Primary | ICD-10-CM

## 2021-10-19 DIAGNOSIS — Z98.1 S/P LUMBAR SPINAL FUSION: ICD-10-CM

## 2021-10-19 DIAGNOSIS — M40.209 KYPHOSIS, UNSPECIFIED KYPHOSIS TYPE, UNSPECIFIED SPINAL REGION: ICD-10-CM

## 2021-10-19 DIAGNOSIS — M40.30 FLAT BACK: ICD-10-CM

## 2021-10-19 PROCEDURE — 72082 X-RAY EXAM ENTIRE SPI 2/3 VW: CPT | Performed by: NURSE PRACTITIONER

## 2021-10-19 PROCEDURE — 99214 OFFICE O/P EST MOD 30 MIN: CPT | Performed by: NEUROLOGICAL SURGERY

## 2021-10-20 ENCOUNTER — TELEPHONE (OUTPATIENT)
Dept: NEUROSURGERY | Age: 46
End: 2021-10-20

## 2021-10-20 DIAGNOSIS — Z01.818 PRE-OP EXAM: Primary | ICD-10-CM

## 2021-10-20 NOTE — TELEPHONE ENCOUNTER
Patient advised of Doctor's note below. Patient verbalized understanding. Pt reports her Surgery scheduled for 01/14/2022 and 01/20/2022    Was advised to complete Pre-op physical between dates 12/20-27/2021    Future appt made.   Future Appointments

## 2021-10-20 NOTE — TELEPHONE ENCOUNTER
Patient is having spinal surgery at some point and patient will be under anesthesia bfor a long time and therefore patient needs cardiac clearance. Not sure of the date of surgery but want to get it done by the end of the year. TY.   Would like to see so

## 2021-10-20 NOTE — TELEPHONE ENCOUNTER
Please call patient and discuss the need for ? Preop clearance - if she is having surgery - spinal surgeons will request so within 30 days of surgery (inform her of this being the protocol).  However, if she has some cardiac history in the past, we will nee

## 2021-10-21 DIAGNOSIS — M96.1 FAILED BACK SURGICAL SYNDROME: ICD-10-CM

## 2021-10-21 DIAGNOSIS — M54.16 LUMBAR RADICULITIS: Primary | ICD-10-CM

## 2021-10-21 RX ORDER — BACLOFEN 10 MG/1
TABLET ORAL
Qty: 90 TABLET | Refills: 0 | Status: SHIPPED | OUTPATIENT
Start: 2021-10-21 | End: 2021-11-16

## 2021-10-21 RX ORDER — GABAPENTIN 300 MG/1
CAPSULE ORAL
Qty: 90 CAPSULE | Refills: 0 | Status: SHIPPED | OUTPATIENT
Start: 2021-10-21 | End: 2021-11-16

## 2021-10-21 NOTE — TELEPHONE ENCOUNTER
Medication: baclofen 10 MG Oral Tab    Date of last refill: 09/23/21        Medication: gabapentin 300 MG Oral Cap    Date of last refill: 09/23/21    Last office visit: 09/23/21  Due back to clinic per last office note:  na  Date next office visit schedul

## 2021-10-25 RX ORDER — MELOXICAM 15 MG/1
TABLET ORAL
Qty: 30 TABLET | Refills: 0 | Status: SHIPPED | OUTPATIENT
Start: 2021-10-25 | End: 2021-11-20

## 2021-10-26 ENCOUNTER — OFFICE VISIT (OUTPATIENT)
Dept: PAIN CLINIC | Facility: CLINIC | Age: 46
End: 2021-10-26
Payer: MEDICAID

## 2021-10-26 VITALS
DIASTOLIC BLOOD PRESSURE: 90 MMHG | BODY MASS INDEX: 28 KG/M2 | OXYGEN SATURATION: 97 % | WEIGHT: 170 LBS | SYSTOLIC BLOOD PRESSURE: 120 MMHG | RESPIRATION RATE: 16 BRPM | HEART RATE: 104 BPM

## 2021-10-26 DIAGNOSIS — M96.1 FAILED BACK SURGICAL SYNDROME: Primary | ICD-10-CM

## 2021-10-26 PROCEDURE — 99213 OFFICE O/P EST LOW 20 MIN: CPT | Performed by: ANESTHESIOLOGY

## 2021-10-26 PROCEDURE — 3080F DIAST BP >= 90 MM HG: CPT | Performed by: ANESTHESIOLOGY

## 2021-10-26 PROCEDURE — 3074F SYST BP LT 130 MM HG: CPT | Performed by: ANESTHESIOLOGY

## 2021-10-26 NOTE — PROGRESS NOTES
Patient presents in office today with reported pain in low back radiating down bilateral hips.     Current pain level reported = 9/10    Last reported dose of gabapentin, baclofen this AM      Narcotic Contract renewal na    Urine Drug screen na

## 2021-10-27 ENCOUNTER — E-ADVICE (OUTPATIENT)
Dept: NEUROSURGERY | Age: 46
End: 2021-10-27

## 2021-10-27 DIAGNOSIS — I82.4Y9 DEEP VEIN THROMBOSIS (DVT) OF PROXIMAL LOWER EXTREMITY, UNSPECIFIED CHRONICITY, UNSPECIFIED LATERALITY (CMD): Primary | ICD-10-CM

## 2021-10-29 ENCOUNTER — TELEPHONE (OUTPATIENT)
Dept: NEUROSURGERY | Age: 46
End: 2021-10-29

## 2021-10-29 ENCOUNTER — OFFICE VISIT (OUTPATIENT)
Dept: FAMILY MEDICINE CLINIC | Facility: CLINIC | Age: 46
End: 2021-10-29
Payer: MEDICAID

## 2021-10-29 VITALS
TEMPERATURE: 98 F | HEART RATE: 97 BPM | OXYGEN SATURATION: 99 % | RESPIRATION RATE: 16 BRPM | WEIGHT: 198.13 LBS | BODY MASS INDEX: 33 KG/M2 | SYSTOLIC BLOOD PRESSURE: 136 MMHG | DIASTOLIC BLOOD PRESSURE: 86 MMHG

## 2021-10-29 DIAGNOSIS — F41.9 ANXIETY: ICD-10-CM

## 2021-10-29 DIAGNOSIS — H91.8X1 OTHER SPECIFIED HEARING LOSS OF RIGHT EAR, UNSPECIFIED HEARING STATUS ON CONTRALATERAL SIDE: Primary | ICD-10-CM

## 2021-10-29 DIAGNOSIS — M40.209 KYPHOSIS, UNSPECIFIED KYPHOSIS TYPE, UNSPECIFIED SPINAL REGION: Primary | ICD-10-CM

## 2021-10-29 DIAGNOSIS — Z98.1 S/P LUMBAR SPINAL FUSION: ICD-10-CM

## 2021-10-29 PROCEDURE — 99213 OFFICE O/P EST LOW 20 MIN: CPT | Performed by: FAMILY MEDICINE

## 2021-10-29 PROCEDURE — 3075F SYST BP GE 130 - 139MM HG: CPT | Performed by: FAMILY MEDICINE

## 2021-10-29 PROCEDURE — 3079F DIAST BP 80-89 MM HG: CPT | Performed by: FAMILY MEDICINE

## 2021-10-29 RX ORDER — CYCLOBENZAPRINE HYDROCHLORIDE 7.5 MG/1
7.5 TABLET, FILM COATED ORAL 3 TIMES DAILY PRN
COMMUNITY
End: 2021-11-01

## 2021-10-29 RX ORDER — METHYLPREDNISOLONE 4 MG/1
TABLET ORAL
Qty: 21 EACH | Refills: 0 | Status: SHIPPED | OUTPATIENT
Start: 2021-10-29 | End: 2021-11-04

## 2021-10-29 RX ORDER — DIAZEPAM 5 MG/1
5 TABLET ORAL DAILY PRN
Qty: 2 TABLET | Refills: 0 | Status: SHIPPED | OUTPATIENT
Start: 2021-10-29 | End: 2021-10-31

## 2021-10-29 NOTE — PROGRESS NOTES
[facility] Family Medicine Office Note  Chief Complaint:   Patient presents with:  Ear Problem: Right ear hearing loss x 3 weeks. No pain. HPI:   This is a 48year old female coming in with complaints of R hearing loss X 3 weeks.  No pain or Aunt      Allergies:    Amoxicillin-Pot Cla*    ITCHING, HIVES  Augmentin [Amoclan]     SWELLING  Current Meds:  Current Outpatient Medications   Medication Sig Dispense Refill   • Cyclobenzaprine HCl 7.5 MG Oral Tab Take 7.5 mg by mouth 3 (three) times da supple  LUNGS: No tachypnea   CV:  No tachycardia   ABD: not distended  NEURO: Alert and oriented to person place and time  GAIT: Normal  EXTREMITIES: Normal ROM   PSYCH: Mood and affect normal         ASSESSMENT AND PLAN:   1.  Other specified hearing loss learning. Medical education done. Outcome: Patient verbalizes understanding. Patient is notified to call with any questions, complications, allergies, or worsening or changing symptoms.   Patient is to call with any side effects or complications from the

## 2021-11-01 ENCOUNTER — PATIENT MESSAGE (OUTPATIENT)
Dept: FAMILY MEDICINE CLINIC | Facility: CLINIC | Age: 46
End: 2021-11-01

## 2021-11-01 ENCOUNTER — HOSPITAL ENCOUNTER (OUTPATIENT)
Dept: BONE DENSITY | Age: 46
Discharge: HOME | End: 2021-11-01
Attending: NEUROLOGICAL SURGERY
Payer: MEDICAID

## 2021-11-01 ENCOUNTER — APPOINTMENT (OUTPATIENT)
Dept: MRI IMAGING | Age: 46
End: 2021-11-01
Attending: NEUROLOGICAL SURGERY
Payer: MEDICAID

## 2021-11-01 DIAGNOSIS — M81.0 OSTEOPOROSIS: ICD-10-CM

## 2021-11-01 PROCEDURE — 77080 DXA BONE DENSITY AXIAL: CPT | Performed by: NEUROLOGICAL SURGERY

## 2021-11-01 RX ORDER — CYCLOBENZAPRINE HCL 10 MG
10 TABLET ORAL 3 TIMES DAILY PRN
Qty: 90 TABLET | Refills: 0 | Status: SHIPPED | OUTPATIENT
Start: 2021-11-01 | End: 2021-12-03

## 2021-11-01 NOTE — TELEPHONE ENCOUNTER
From: Xu Valdivia  To: Georgie Orellana MD  Sent: 11/1/2021 12:39 PM CDT  Subject: Meds    I must have forgotten to bring up the cyclobenziprine 10mg. I'm suppose to take 3x daily and I was getting the 7.5 mg and only getting 45 tabs.  I'

## 2021-11-01 NOTE — TELEPHONE ENCOUNTER
Georgie Orlando MD [place], [place] Nurse 20 minutes ago (3:36 PM)     Please pend Flexeril 10 mg three times per day PRN pain / spasm  X 30 days       Patient is also on baclofen  Per MM, find out if patient is taking baclofen and if so is i

## 2021-11-02 ENCOUNTER — TELEPHONE (OUTPATIENT)
Dept: PREADMISSION TESTING | Age: 46
End: 2021-11-02

## 2021-11-02 VITALS — HEIGHT: 65 IN | WEIGHT: 190 LBS | BODY MASS INDEX: 31.65 KG/M2

## 2021-11-02 DIAGNOSIS — Z01.812 PRE-PROCEDURAL LABORATORY EXAMINATION: Primary | ICD-10-CM

## 2021-11-02 RX ORDER — DOCUSATE SODIUM 250 MG
100 CAPSULE ORAL PRN
COMMUNITY

## 2021-11-02 RX ORDER — CYCLOBENZAPRINE HCL 10 MG
10 TABLET ORAL 3 TIMES DAILY PRN
COMMUNITY

## 2021-11-02 RX ORDER — DIAZEPAM 5 MG/1
5 TABLET ORAL
COMMUNITY
Start: 2021-10-29 | End: 2022-01-05 | Stop reason: ALTCHOICE

## 2021-11-02 ASSESSMENT — ACTIVITIES OF DAILY LIVING (ADL)
SENSORY_SUPPORT_DEVICES: DENTURES
ADL_SCORE: 12
ADL_BEFORE_ADMISSION: INDEPENDENT

## 2021-11-02 NOTE — PROGRESS NOTES
Name: Sunita Merida   : 1975   DOS: 10/26/2021     Follow-up visit note:  Sunita Merida is a 48year old female complaining of pain in the mid back and neck for more than 20 years. Patient was seen by me for a consultation.   During t lumbosacral     S1-S2   • Fibromyalgia    • Urine incontinence       Current Outpatient Medications   Medication Sig Dispense Refill   • MELOXICAM 15 MG Oral Tab TAKE 1 TABLET(15 MG) BY MOUTH DAILY 30 tablet 0   • GABAPENTIN 300 MG Oral Cap TAKE 1 CAPSULE( lesion noted. PEERL. No icterus. Neck and mid back: Supple. No thyromegaly or lymphadenopathy. Severe tenderness over the cervical and thoracic paravertebral and trapezius muscles.  Flexion of the neck makes the pain better, extention and lateral rotation Reflexes:             (R)   (L)   Patella:    2   2   Ankle:    1   1  Pathological Reflexes:              (R)   (L)   Babinski:               N                          N   [place]:    N    N   Ankle Clonus:    N                          N  Sensory Examin neural foraminal stenosis. Assessment:  Failed back surgical syndrome  Epidural abscess        Plan: I had long discussion with the patient about her management. The patient is waiting to go for lumbar and cervical fusion with her spine surgeon.   For

## 2021-11-03 ENCOUNTER — TELEPHONE (OUTPATIENT)
Dept: FAMILY MEDICINE CLINIC | Facility: CLINIC | Age: 46
End: 2021-11-03

## 2021-11-03 ENCOUNTER — APPOINTMENT (OUTPATIENT)
Dept: LAB | Age: 46
End: 2021-11-03

## 2021-11-03 DIAGNOSIS — Z01.810 ENCOUNTER FOR PRE-OPERATIVE CARDIOVASCULAR CLEARANCE: Primary | ICD-10-CM

## 2021-11-03 NOTE — TELEPHONE ENCOUNTER
[facility] sent to pt regarding note below  Routing to nurse pool for follow-up message read by pt

## 2021-11-03 NOTE — TELEPHONE ENCOUNTER
Patient called requesting a call back from nurse. RE: Sooner presurgical appt and other questions about extended EKG.     Please call her back # [phone]

## 2021-11-03 NOTE — TELEPHONE ENCOUNTER
DME order placed on 09/27/2021 - for walker with seat  Attempted to call [facility] - CC#[phone] (from order) - no answer    Searched [facility] locations via [address] [employer] [facility] [place] location not found    [address]   [address],

## 2021-11-03 NOTE — TELEPHONE ENCOUNTER
Pt she has been trying to get a hold of [facility] about her walker with a seat for about 3 weeks now . She states when she did get a hold of them they said they have no orders from the dr and would not accept the # on the order that she has.  Pt is wanting to

## 2021-11-04 NOTE — TELEPHONE ENCOUNTER
Pt reports her surgery is scheduled for 01/14/2022 and 01/20/2022    Pt with future appt for pre-op on 12/20/2021 w/ Dr. Aaliyah Cameron  Pt reports she is not requesting for sooner appt    She was advised by surgeon that she needs cardiac clearance for surgery

## 2021-11-04 NOTE — TELEPHONE ENCOUNTER
[facility] sent to pt regarding cardiology referral  Routing to nurse pool for follow-up message read by pt

## 2021-11-05 ENCOUNTER — ANESTHESIA (OUTPATIENT)
Dept: GENERAL RADIOLOGY | Age: 46
End: 2021-11-05

## 2021-11-05 ENCOUNTER — HOSPITAL ENCOUNTER (OUTPATIENT)
Dept: GENERAL RADIOLOGY | Age: 46
Discharge: HOME | End: 2021-11-05
Attending: NEUROLOGICAL SURGERY

## 2021-11-05 ENCOUNTER — APPOINTMENT (OUTPATIENT)
Dept: CT IMAGING | Age: 46
End: 2021-11-05
Attending: NURSE PRACTITIONER

## 2021-11-05 ENCOUNTER — HOSPITAL ENCOUNTER (OUTPATIENT)
Dept: CT IMAGING | Age: 46
Discharge: HOME | End: 2021-11-05
Attending: NURSE PRACTITIONER

## 2021-11-05 ENCOUNTER — ANESTHESIA EVENT (OUTPATIENT)
Dept: GENERAL RADIOLOGY | Age: 46
End: 2021-11-05

## 2021-11-05 VITALS
RESPIRATION RATE: 20 BRPM | HEART RATE: 76 BPM | HEIGHT: 65 IN | BODY MASS INDEX: 32.73 KG/M2 | TEMPERATURE: 97.2 F | OXYGEN SATURATION: 98 % | DIASTOLIC BLOOD PRESSURE: 94 MMHG | WEIGHT: 196.43 LBS | SYSTOLIC BLOOD PRESSURE: 133 MMHG

## 2021-11-05 VITALS
OXYGEN SATURATION: 99 % | RESPIRATION RATE: 20 BRPM | HEART RATE: 80 BPM | SYSTOLIC BLOOD PRESSURE: 141 MMHG | DIASTOLIC BLOOD PRESSURE: 77 MMHG

## 2021-11-05 DIAGNOSIS — R53.1 WEAKNESS: ICD-10-CM

## 2021-11-05 DIAGNOSIS — M40.209 KYPHOSIS, UNSPECIFIED KYPHOSIS TYPE, UNSPECIFIED SPINAL REGION: ICD-10-CM

## 2021-11-05 DIAGNOSIS — Z98.1 S/P LUMBAR SPINAL FUSION: ICD-10-CM

## 2021-11-05 DIAGNOSIS — M54.16 LUMBAR RADICULOPATHY: ICD-10-CM

## 2021-11-05 PROCEDURE — 72129 CT CHEST SPINE W/DYE: CPT

## 2021-11-05 PROCEDURE — G1004 CDSM NDSC: HCPCS

## 2021-11-05 PROCEDURE — 10002800 HB RX 250 W HCPCS

## 2021-11-05 PROCEDURE — 10004451 HB PACU RECOVERY 1ST 30 MINUTES

## 2021-11-05 PROCEDURE — 62303 MYELOGRAPHY LUMBAR INJECTION: CPT

## 2021-11-05 PROCEDURE — 62305 MYELOGRAPHY LUMBAR INJECTION: CPT

## 2021-11-05 PROCEDURE — 10002807 HB RX 258

## 2021-11-05 PROCEDURE — 72132 CT LUMBAR SPINE W/DYE: CPT

## 2021-11-05 PROCEDURE — 13000001 HB PHASE II RECOVERY EA 30 MINUTES

## 2021-11-05 PROCEDURE — 10002805 HB CONTRAST AGENT: Performed by: NEUROLOGICAL SURGERY

## 2021-11-05 PROCEDURE — 62304 MYELOGRAPHY LUMBAR INJECTION: CPT

## 2021-11-05 PROCEDURE — 72126 CT NECK SPINE W/DYE: CPT

## 2021-11-05 PROCEDURE — 10004452 HB PACU ADDL 30 MINUTES

## 2021-11-05 RX ORDER — NALOXONE HCL 0.4 MG/ML
0.2 VIAL (ML) INJECTION EVERY 5 MIN PRN
Status: DISCONTINUED | OUTPATIENT
Start: 2021-11-05 | End: 2021-11-05 | Stop reason: HOSPADM

## 2021-11-05 RX ORDER — HYDRALAZINE HYDROCHLORIDE 20 MG/ML
5 INJECTION INTRAMUSCULAR; INTRAVENOUS EVERY 10 MIN PRN
Status: DISCONTINUED | OUTPATIENT
Start: 2021-11-05 | End: 2021-11-05 | Stop reason: HOSPADM

## 2021-11-05 RX ORDER — SODIUM CHLORIDE, SODIUM LACTATE, POTASSIUM CHLORIDE, CALCIUM CHLORIDE 600; 310; 30; 20 MG/100ML; MG/100ML; MG/100ML; MG/100ML
INJECTION, SOLUTION INTRAVENOUS CONTINUOUS
Status: DISCONTINUED | OUTPATIENT
Start: 2021-11-05 | End: 2021-11-05 | Stop reason: HOSPADM

## 2021-11-05 RX ORDER — METOCLOPRAMIDE HYDROCHLORIDE 5 MG/ML
5 INJECTION INTRAMUSCULAR; INTRAVENOUS EVERY 6 HOURS PRN
Status: DISCONTINUED | OUTPATIENT
Start: 2021-11-05 | End: 2021-11-05 | Stop reason: HOSPADM

## 2021-11-05 RX ORDER — ONDANSETRON 2 MG/ML
4 INJECTION INTRAMUSCULAR; INTRAVENOUS ONCE
Status: DISCONTINUED | OUTPATIENT
Start: 2021-11-05 | End: 2021-11-05 | Stop reason: HOSPADM

## 2021-11-05 RX ORDER — 0.9 % SODIUM CHLORIDE 0.9 %
2 VIAL (ML) INJECTION EVERY 12 HOURS SCHEDULED
Status: DISCONTINUED | OUTPATIENT
Start: 2021-11-05 | End: 2021-11-07 | Stop reason: HOSPADM

## 2021-11-05 RX ORDER — PROPOFOL 10 MG/ML
INJECTION, EMULSION INTRAVENOUS PRN
Status: DISCONTINUED | OUTPATIENT
Start: 2021-11-05 | End: 2021-11-05

## 2021-11-05 RX ORDER — DIPHENHYDRAMINE HYDROCHLORIDE 50 MG/ML
25 INJECTION INTRAMUSCULAR; INTRAVENOUS
Status: DISCONTINUED | OUTPATIENT
Start: 2021-11-05 | End: 2021-11-05 | Stop reason: HOSPADM

## 2021-11-05 RX ORDER — ALBUTEROL SULFATE 2.5 MG/3ML
5 SOLUTION RESPIRATORY (INHALATION) ONCE
Status: DISCONTINUED | OUTPATIENT
Start: 2021-11-05 | End: 2021-11-05 | Stop reason: HOSPADM

## 2021-11-05 RX ORDER — PROCHLORPERAZINE EDISYLATE 5 MG/ML
5 INJECTION INTRAMUSCULAR; INTRAVENOUS EVERY 4 HOURS PRN
Status: DISCONTINUED | OUTPATIENT
Start: 2021-11-05 | End: 2021-11-05 | Stop reason: HOSPADM

## 2021-11-05 RX ORDER — MIDAZOLAM HYDROCHLORIDE 1 MG/ML
INJECTION, SOLUTION INTRAMUSCULAR; INTRAVENOUS PRN
Status: DISCONTINUED | OUTPATIENT
Start: 2021-11-05 | End: 2021-11-05

## 2021-11-05 RX ORDER — SODIUM CHLORIDE, SODIUM LACTATE, POTASSIUM CHLORIDE, CALCIUM CHLORIDE 600; 310; 30; 20 MG/100ML; MG/100ML; MG/100ML; MG/100ML
INJECTION, SOLUTION INTRAVENOUS CONTINUOUS PRN
Status: DISCONTINUED | OUTPATIENT
Start: 2021-11-05 | End: 2021-11-05

## 2021-11-05 RX ADMIN — FENTANYL CITRATE 50 MCG: 50 INJECTION INTRAMUSCULAR; INTRAVENOUS at 12:04

## 2021-11-05 RX ADMIN — FENTANYL CITRATE 50 MCG: 50 INJECTION INTRAMUSCULAR; INTRAVENOUS at 12:29

## 2021-11-05 RX ADMIN — MIDAZOLAM HYDROCHLORIDE 2 MG: 1 INJECTION, SOLUTION INTRAMUSCULAR; INTRAVENOUS at 10:31

## 2021-11-05 RX ADMIN — SODIUM CHLORIDE, POTASSIUM CHLORIDE, SODIUM LACTATE AND CALCIUM CHLORIDE: 600; 310; 30; 20 INJECTION, SOLUTION INTRAVENOUS at 10:23

## 2021-11-05 RX ADMIN — FENTANYL CITRATE 50 MCG: 50 INJECTION INTRAMUSCULAR; INTRAVENOUS at 12:20

## 2021-11-05 RX ADMIN — MIDAZOLAM HYDROCHLORIDE 1 MG: 1 INJECTION, SOLUTION INTRAMUSCULAR; INTRAVENOUS at 10:37

## 2021-11-05 RX ADMIN — FENTANYL CITRATE 50 MCG: 50 INJECTION INTRAMUSCULAR; INTRAVENOUS at 10:36

## 2021-11-05 RX ADMIN — FENTANYL CITRATE 50 MCG: 50 INJECTION INTRAMUSCULAR; INTRAVENOUS at 11:45

## 2021-11-05 RX ADMIN — FENTANYL CITRATE 50 MCG: 50 INJECTION INTRAMUSCULAR; INTRAVENOUS at 10:31

## 2021-11-05 RX ADMIN — IOHEXOL 50 ML: 300 INJECTION, SOLUTION INTRAVENOUS at 11:24

## 2021-11-05 RX ADMIN — SODIUM CHLORIDE, SODIUM LACTATE, POTASSIUM CHLORIDE, AND CALCIUM CHLORIDE: .6; .31; .03; .02 INJECTION, SOLUTION INTRAVENOUS at 10:09

## 2021-11-05 RX ADMIN — MIDAZOLAM HYDROCHLORIDE 1 MG: 1 INJECTION, SOLUTION INTRAMUSCULAR; INTRAVENOUS at 10:41

## 2021-11-05 RX ADMIN — PROPOFOL 50 MCG/KG/MIN: 10 INJECTION, EMULSION INTRAVENOUS at 10:32

## 2021-11-05 RX ADMIN — PROPOFOL 20 MG: 10 INJECTION, EMULSION INTRAVENOUS at 10:32

## 2021-11-05 ASSESSMENT — PAIN DESCRIPTION - PAIN TYPE
TYPE: CHRONIC PAIN

## 2021-11-05 ASSESSMENT — PAIN SCALES - GENERAL
PAINLEVEL_OUTOF10: 9
PAINLEVEL_OUTOF10: 10
PAINLEVEL_OUTOF10: 8
PAINLEVEL_OUTOF10: 7
PAINLEVEL_OUTOF10: 9
PAINLEVEL_OUTOF10: 8
PAINLEVEL_OUTOF10: 7
PAINLEVEL_OUTOF10: 10
PAINLEVEL_OUTOF10: 10

## 2021-11-08 ENCOUNTER — PATIENT MESSAGE (OUTPATIENT)
Dept: FAMILY MEDICINE CLINIC | Facility: CLINIC | Age: 46
End: 2021-11-08

## 2021-11-08 NOTE — TELEPHONE ENCOUNTER
From: Geetha SIMMONS  To: Danitza Damon  Sent: 11/3/2021 11:09 AM CDT  Subject: Tarun Rivas,    I attempted to call the [facility] in [place], using the phone number provided on the order.     There was no answer, so I searched [address] location

## 2021-11-13 DIAGNOSIS — M54.16 LUMBAR RADICULITIS: ICD-10-CM

## 2021-11-15 ENCOUNTER — PATIENT MESSAGE (OUTPATIENT)
Dept: FAMILY MEDICINE CLINIC | Facility: CLINIC | Age: 46
End: 2021-11-15

## 2021-11-15 DIAGNOSIS — M54.16 LUMBAR RADICULITIS: ICD-10-CM

## 2021-11-15 DIAGNOSIS — M96.1 FAILED BACK SURGICAL SYNDROME: ICD-10-CM

## 2021-11-15 RX ORDER — GABAPENTIN 100 MG/1
CAPSULE ORAL
Qty: 90 CAPSULE | Refills: 0 | Status: SHIPPED | OUTPATIENT
Start: 2021-11-15 | End: 2021-11-15

## 2021-11-15 NOTE — TELEPHONE ENCOUNTER
Medication: GABAPENTIN 300 MG Oral Cap    Date of last refill: 10/21/21      Last office visit: 10/26/21  Due back to clinic per last office note:  na  Date next office visit scheduled:  na          Last OV note recommendation:   Assessment:  Failed back s

## 2021-11-15 NOTE — TELEPHONE ENCOUNTER
From: Mardel Market  To: Georgie Engel MD  Sent: 11/15/2021 12:25 PM CST  Subject: Medication    I think you sent refil on Gabapentin 100mg 3x daily. I take three 300mg tabs. So I cancelled the 100mg.   I also never got an answer on my 2

## 2021-11-16 DIAGNOSIS — M96.1 FAILED BACK SURGICAL SYNDROME: ICD-10-CM

## 2021-11-16 DIAGNOSIS — M54.16 LUMBAR RADICULITIS: ICD-10-CM

## 2021-11-16 RX ORDER — BACLOFEN 10 MG/1
TABLET ORAL
Qty: 90 TABLET | Refills: 0 | Status: SHIPPED | OUTPATIENT
Start: 2021-11-16 | End: 2021-12-20

## 2021-11-16 RX ORDER — GABAPENTIN 300 MG/1
300 CAPSULE ORAL 3 TIMES DAILY
Qty: 90 CAPSULE | Refills: 0 | Status: SHIPPED | OUTPATIENT
Start: 2021-11-16 | End: 2021-12-16

## 2021-11-16 NOTE — TELEPHONE ENCOUNTER
Medication: BACLOFEN 10 MG Oral Tab    Date of last refill: 10/21/21    Last office visit: 10/26/21  Due back to clinic per last office note:  na  Date next office visit scheduled:  na        Last OV note recommendation: Assessment:  Failed back surgical s

## 2021-11-17 ENCOUNTER — APPOINTMENT (OUTPATIENT)
Dept: NEUROLOGY | Age: 46
End: 2021-11-17

## 2021-11-18 NOTE — TELEPHONE ENCOUNTER
Please refer to Patient Message Encounter 11/15/2021  [facility] - Last read by Claire Velasco at 8:41 AM on 11/16/2021.

## 2021-11-19 ENCOUNTER — PREP FOR CASE (OUTPATIENT)
Dept: NEUROSURGERY | Age: 46
End: 2021-11-19

## 2021-11-19 DIAGNOSIS — M40.209 KYPHOSIS, UNSPECIFIED KYPHOSIS TYPE, UNSPECIFIED SPINAL REGION: Primary | ICD-10-CM

## 2021-11-20 RX ORDER — MELOXICAM 15 MG/1
TABLET ORAL
Qty: 30 TABLET | Refills: 0 | Status: SHIPPED | OUTPATIENT
Start: 2021-11-20 | End: 2021-12-26

## 2021-11-20 NOTE — TELEPHONE ENCOUNTER
Routing to provider per protocol. MELOXICAM 15 MG Oral Tab  Last refilled on 10/25/21 for #30  with 0 rf. Last labs 9/27/21. Last seen on 10/29/21.      Future Appointments   Date Time Provider Fitz Villanueva   12/20/2021 11:00 AM Zoya Parker

## 2021-11-29 ENCOUNTER — PATIENT MESSAGE (OUTPATIENT)
Dept: FAMILY MEDICINE CLINIC | Facility: CLINIC | Age: 46
End: 2021-11-29

## 2021-11-29 NOTE — TELEPHONE ENCOUNTER
Called [phone] - inquired fax # to send order to    09/27/2021 DME - external Order faxed to [address] [place] [address] at fax #[phone]

## 2021-11-29 NOTE — TELEPHONE ENCOUNTER
From: Sunita Merida  To: Georgie Huerta MD  Sent: 11/29/2021 11:59 AM CST  Subject: Oral [place] with a Celester Reddish Dr Paty Metzger    My insurance found a place to get a walker it's call [address] [place] [address]  [phone].    Thank You

## 2021-11-29 NOTE — TELEPHONE ENCOUNTER
DiliaGeorgie Foley MD [place], [place] Nurse 51 minutes ago (1:29 PM)     Please send Rx for walker with [address] [phone].

## 2021-11-29 NOTE — TELEPHONE ENCOUNTER
[facility] sent to pt regarding note below  Routing to nurse pool for follow-up message read by pt    Placed - Notify me if not read by: 12/04/2021

## 2021-12-02 ENCOUNTER — PATIENT MESSAGE (OUTPATIENT)
Dept: FAMILY MEDICINE CLINIC | Facility: CLINIC | Age: 46
End: 2021-12-02

## 2021-12-03 ENCOUNTER — TELEPHONE (OUTPATIENT)
Dept: NEUROLOGY | Age: 46
End: 2021-12-03

## 2021-12-03 RX ORDER — CYCLOBENZAPRINE HCL 10 MG
10 TABLET ORAL 3 TIMES DAILY PRN
Qty: 90 TABLET | Refills: 0 | Status: SHIPPED | OUTPATIENT
Start: 2021-12-03 | End: 2022-01-03

## 2021-12-03 NOTE — TELEPHONE ENCOUNTER
Georgie Kemp MD [place], [place] Nurse 2 minutes ago (8:17 AM)     Please pend / send for me ! Thank you. 30 day supply is ok.        Order per verbal

## 2021-12-03 NOTE — TELEPHONE ENCOUNTER
From: Juan J Carter  To: Georgie Caceres MD  Sent: 12/2/2021 5:48 PM CST  Subject: Meds    I need a refil on the 10mg cyclobenziprine please.  I thought it was on auto refil but is not and now I'm out and can't sleep without it due to spas

## 2021-12-09 ENCOUNTER — APPOINTMENT (OUTPATIENT)
Dept: NEUROLOGY | Age: 46
End: 2021-12-09
Attending: NURSE PRACTITIONER

## 2021-12-09 ENCOUNTER — TELEPHONE (OUTPATIENT)
Dept: NEUROSURGERY | Age: 46
End: 2021-12-09

## 2021-12-15 ENCOUNTER — TELEPHONE (OUTPATIENT)
Dept: NEUROLOGY | Age: 46
End: 2021-12-15

## 2021-12-16 ENCOUNTER — OFFICE VISIT (OUTPATIENT)
Dept: NEUROLOGY | Age: 46
End: 2021-12-16

## 2021-12-16 VITALS
HEIGHT: 65 IN | HEART RATE: 92 BPM | DIASTOLIC BLOOD PRESSURE: 93 MMHG | BODY MASS INDEX: 33.2 KG/M2 | SYSTOLIC BLOOD PRESSURE: 146 MMHG

## 2021-12-16 DIAGNOSIS — M54.16 LUMBAR RADICULITIS: ICD-10-CM

## 2021-12-16 DIAGNOSIS — M54.12 CERVICAL RADICULOPATHY: Primary | ICD-10-CM

## 2021-12-16 DIAGNOSIS — G56.21 ULNAR NEUROPATHY OF RIGHT UPPER EXTREMITY: ICD-10-CM

## 2021-12-16 DIAGNOSIS — M96.1 FAILED BACK SURGICAL SYNDROME: ICD-10-CM

## 2021-12-16 DIAGNOSIS — M54.16 LUMBAR RADICULOPATHY: ICD-10-CM

## 2021-12-16 PROCEDURE — 95913 NRV CNDJ TEST 13/> STUDIES: CPT | Performed by: PSYCHIATRY & NEUROLOGY

## 2021-12-16 PROCEDURE — 95886 MUSC TEST DONE W/N TEST COMP: CPT | Performed by: PSYCHIATRY & NEUROLOGY

## 2021-12-16 RX ORDER — GABAPENTIN 300 MG/1
CAPSULE ORAL
Qty: 90 CAPSULE | Refills: 0 | Status: SHIPPED | OUTPATIENT
Start: 2021-12-16 | End: 2022-01-14

## 2021-12-17 ENCOUNTER — TELEPHONE (OUTPATIENT)
Dept: FAMILY MEDICINE CLINIC | Facility: CLINIC | Age: 46
End: 2021-12-17

## 2021-12-17 NOTE — TELEPHONE ENCOUNTER
Pt has an apt on Monday 12/20 for pre op pt would like to double check  has received all the paperwork and lab requests before apt     Pt call back # [phone]    Thank you

## 2021-12-17 NOTE — TELEPHONE ENCOUNTER
Spoke with patient, advised patient we did not receive pre op forms or lab orders. Patient is calling Cardiologist to have pre op forms faxed to office. Also patient would like our office to fax DME order for walker to [phone]. Order faxed.

## 2021-12-18 DIAGNOSIS — M54.16 LUMBAR RADICULITIS: ICD-10-CM

## 2021-12-18 DIAGNOSIS — M96.1 FAILED BACK SURGICAL SYNDROME: ICD-10-CM

## 2021-12-20 ENCOUNTER — TELEPHONE (OUTPATIENT)
Dept: NEUROSURGERY | Age: 46
End: 2021-12-20

## 2021-12-20 ENCOUNTER — OFFICE VISIT (OUTPATIENT)
Dept: FAMILY MEDICINE CLINIC | Facility: CLINIC | Age: 46
End: 2021-12-20
Payer: MEDICAID

## 2021-12-20 ENCOUNTER — HOSPITAL ENCOUNTER (OUTPATIENT)
Dept: GENERAL RADIOLOGY | Age: 46
Discharge: HOME | End: 2021-12-20
Attending: FAMILY MEDICINE
Payer: MEDICAID

## 2021-12-20 VITALS
WEIGHT: 205 LBS | SYSTOLIC BLOOD PRESSURE: 148 MMHG | HEIGHT: 65 IN | DIASTOLIC BLOOD PRESSURE: 96 MMHG | BODY MASS INDEX: 34.16 KG/M2 | TEMPERATURE: 98 F | OXYGEN SATURATION: 98 % | RESPIRATION RATE: 16 BRPM | HEART RATE: 90 BPM

## 2021-12-20 DIAGNOSIS — F17.200 SMOKER: ICD-10-CM

## 2021-12-20 DIAGNOSIS — I10 UNCONTROLLED HYPERTENSION: ICD-10-CM

## 2021-12-20 DIAGNOSIS — Z01.818 PREOPERATIVE EXAMINATION: Primary | ICD-10-CM

## 2021-12-20 DIAGNOSIS — Z01.818 PREOPERATIVE EXAMINATION: ICD-10-CM

## 2021-12-20 PROCEDURE — 3077F SYST BP >= 140 MM HG: CPT | Performed by: FAMILY MEDICINE

## 2021-12-20 PROCEDURE — 3008F BODY MASS INDEX DOCD: CPT | Performed by: FAMILY MEDICINE

## 2021-12-20 PROCEDURE — 3080F DIAST BP >= 90 MM HG: CPT | Performed by: FAMILY MEDICINE

## 2021-12-20 PROCEDURE — 71046 X-RAY EXAM CHEST 2 VIEWS: CPT | Performed by: FAMILY MEDICINE

## 2021-12-20 PROCEDURE — 99214 OFFICE O/P EST MOD 30 MIN: CPT | Performed by: FAMILY MEDICINE

## 2021-12-20 RX ORDER — BACLOFEN 10 MG/1
TABLET ORAL
Qty: 90 TABLET | Refills: 0 | Status: SHIPPED | OUTPATIENT
Start: 2021-12-20 | End: 2022-01-24

## 2021-12-20 RX ORDER — PSEUDOEPHEDRINE HCL 30 MG
100 TABLET ORAL 2 TIMES DAILY
COMMUNITY

## 2021-12-20 NOTE — PROGRESS NOTES
Sulema Méndez is a 48year old female who presents for a pre-operative physical exam.     Patient is to have spinal surgeries (2 successive surgeries)  - cervical and lumbar fusions to be done in Jan 2022     HPI:   Reason for surgery: Degenerative space of cervical spine    • Arthritis    • Back pain    • DDD (degenerative disc disease), cervical     c2-c7 fusions   • DDD (degenerative disc disease), lumbar     L4-L5   • DDD (degenerative disc disease), lumbosacral     S1-S2   • Fibromyalgia    • Ur kg/m²   GENERAL: well developed, well nourished,in no apparent distress  SKIN: no rashes,no suspicious lesions  HEENT: atraumatic, normocephalic,ear canals clear, normal TMs, nares patent and clear, throat is clear without lesions or inflammation, denture CHEST (CPT=71046); Future  -     CBC WITH DIFFERENTIAL WITH PLATELET; Future  -     COMP METABOLIC PANEL (14);  Future  -     PROTHROMBIN TIME (PT); Future  -     VITAMIN D, 25-HYDROXY; Future  -     URINALYSIS, ROUTINE; Future  -     NICOTINE METABOLITE, U assessment.

## 2021-12-20 NOTE — TELEPHONE ENCOUNTER
Medication: BACLOFEN 10 MG Oral Tab    Date of last refill: 11/16/21      Last office visit: 10/26/21  Due back to clinic per last office note:  NA  Date next office visit scheduled:  none      Last OV note recommendation: Assessment:  Failed back surgical

## 2021-12-22 ENCOUNTER — HOSPITAL ENCOUNTER (OUTPATIENT)
Dept: MAMMOGRAPHY | Age: 46
Discharge: HOME | End: 2021-12-22
Attending: FAMILY MEDICINE
Payer: MEDICAID

## 2021-12-22 DIAGNOSIS — N64.52 NIPPLE DISCHARGE IN FEMALE: ICD-10-CM

## 2021-12-22 DIAGNOSIS — Z12.31 SCREENING MAMMOGRAM, ENCOUNTER FOR: ICD-10-CM

## 2021-12-22 PROCEDURE — 77067 SCR MAMMO BI INCL CAD: CPT | Performed by: FAMILY MEDICINE

## 2021-12-22 PROCEDURE — 77063 BREAST TOMOSYNTHESIS BI: CPT | Performed by: FAMILY MEDICINE

## 2021-12-23 ENCOUNTER — TELEPHONE (OUTPATIENT)
Dept: NEUROSURGERY | Age: 46
End: 2021-12-23

## 2021-12-23 ENCOUNTER — LAB ENCOUNTER (OUTPATIENT)
Dept: LAB | Facility: HOSPITAL | Age: 46
End: 2021-12-23
Attending: FAMILY MEDICINE
Payer: MEDICAID

## 2021-12-23 PROCEDURE — 80053 COMPREHEN METABOLIC PANEL: CPT | Performed by: FAMILY MEDICINE

## 2021-12-23 PROCEDURE — 36415 COLL VENOUS BLD VENIPUNCTURE: CPT | Performed by: FAMILY MEDICINE

## 2021-12-23 PROCEDURE — 85610 PROTHROMBIN TIME: CPT | Performed by: FAMILY MEDICINE

## 2021-12-23 PROCEDURE — 85025 COMPLETE CBC W/AUTO DIFF WBC: CPT | Performed by: FAMILY MEDICINE

## 2021-12-23 PROCEDURE — 82306 VITAMIN D 25 HYDROXY: CPT | Performed by: FAMILY MEDICINE

## 2021-12-26 RX ORDER — MELOXICAM 15 MG/1
TABLET ORAL
Qty: 30 TABLET | Refills: 0 | Status: SHIPPED | OUTPATIENT
Start: 2021-12-26 | End: 2022-01-24

## 2021-12-27 ENCOUNTER — PATIENT MESSAGE (OUTPATIENT)
Dept: FAMILY MEDICINE CLINIC | Facility: CLINIC | Age: 46
End: 2021-12-27

## 2021-12-27 ENCOUNTER — HOSPITAL ENCOUNTER (OUTPATIENT)
Dept: ULTRASOUND IMAGING | Age: 46
Discharge: HOME | End: 2021-12-27
Attending: FAMILY MEDICINE
Payer: MEDICAID

## 2021-12-27 ENCOUNTER — TELEPHONE (OUTPATIENT)
Dept: FAMILY MEDICINE CLINIC | Facility: CLINIC | Age: 46
End: 2021-12-27

## 2021-12-27 ENCOUNTER — HOSPITAL ENCOUNTER (OUTPATIENT)
Dept: MAMMOGRAPHY | Age: 46
Discharge: HOME | End: 2021-12-27
Attending: FAMILY MEDICINE
Payer: MEDICAID

## 2021-12-27 DIAGNOSIS — R92.2 INCONCLUSIVE MAMMOGRAM: ICD-10-CM

## 2021-12-27 DIAGNOSIS — N63.0 BREAST NODULE: Primary | ICD-10-CM

## 2021-12-27 DIAGNOSIS — R92.8 ABNORMAL MAMMOGRAM OF RIGHT BREAST: ICD-10-CM

## 2021-12-27 PROCEDURE — 76642 ULTRASOUND BREAST LIMITED: CPT | Performed by: FAMILY MEDICINE

## 2021-12-27 PROCEDURE — 77061 BREAST TOMOSYNTHESIS UNI: CPT | Performed by: FAMILY MEDICINE

## 2021-12-27 PROCEDURE — 77065 DX MAMMO INCL CAD UNI: CPT | Performed by: FAMILY MEDICINE

## 2021-12-27 RX ORDER — SULFAMETHOXAZOLE AND TRIMETHOPRIM 800; 160 MG/1; MG/1
1 TABLET ORAL 2 TIMES DAILY
Qty: 14 TABLET | Refills: 0 | Status: SHIPPED | OUTPATIENT
Start: 2021-12-27 | End: 2022-01-03

## 2021-12-27 RX ORDER — FLUCONAZOLE 150 MG/1
150 TABLET ORAL
Qty: 2 TABLET | Refills: 2 | Status: CANCELLED | OUTPATIENT
Start: 2021-12-27 | End: 2022-01-12

## 2021-12-27 NOTE — TELEPHONE ENCOUNTER
From: Estefany Siegel  To: Georgie Kunz MD  Sent: 12/27/2021 11:55 AM CST  Subject: Antibiotic    I need the antibiotic you gave me for rash due to the pull ups please.

## 2021-12-27 NOTE — TELEPHONE ENCOUNTER
Spoke with the pt about the lab results and the recommendations  Pt states that MM had her start a prenatal vitamin with iron because of her low iron levels.   Advised that the covering provider is recommending that she see GI for the iron- she states t

## 2021-12-27 NOTE — IMAGING NOTE
Julio César Rowell is recommended for an ultrasound guided biopsy of the right breast x 1 site  by Dr.Stromberg.     History obtained:    FINDINGS:         Mammographic findings: Tristin Guzman is a well-circumscribed nodule at the 9 o'clock position in the right breas Jovany Newman MD on 12/27/2021 at 2:01 PM           Medications and allergies reviewed: the following allergies were reported-     Amoxicillin-pot ClavulanateITCHING, HIVES   Augmentin [Amoclan]SWELLING    Current Outpatient Medications   Medication Sig Dispense

## 2021-12-27 NOTE — TELEPHONE ENCOUNTER
Spoke with Dr. Lali Cox and he gave verbal order for US guided breast biopsy of right breast for abnormal mammogram    Checked with the referral department and CPT 44184 doesn't require a PA- pt can schedule

## 2021-12-27 NOTE — TELEPHONE ENCOUNTER
Pt had additional views and the recommmendation is for     Impression   CONCLUSION: Jennifer Medina is a 9 mm tall hypoechoic nodule at the 0430 hours right retroareolar position with angular margins.  Ultrasound-guided biopsy of this nodule is recommended for furt

## 2021-12-27 NOTE — TELEPHONE ENCOUNTER
Spoke with the pt about this medication request and she tells me that it is an abx and the last time she recevied it was August 2021  It was for a rash that she gets from sitting in a pull up with urine next to her skin.  She states that sometimes it flares

## 2021-12-27 NOTE — TELEPHONE ENCOUNTER
----- Message from Serenity Nuñez MD sent at 12/27/2021 10:53 AM CST -----  Please let patient know that the sugar, electrolyte, liver, kidney, and Vit D tests were fine. Her white blood cell count is fine.  Her coagulation study is also normal.  She is a b

## 2022-01-03 ENCOUNTER — PATIENT MESSAGE (OUTPATIENT)
Dept: FAMILY MEDICINE CLINIC | Facility: CLINIC | Age: 47
End: 2022-01-03

## 2022-01-03 RX ORDER — CYCLOBENZAPRINE HCL 10 MG
10 TABLET ORAL 3 TIMES DAILY PRN
Qty: 90 TABLET | Refills: 0 | Status: SHIPPED | OUTPATIENT
Start: 2022-01-03 | End: 2022-02-02

## 2022-01-03 NOTE — TELEPHONE ENCOUNTER
From: Naomie Ramirez  To: Georgie Camacho MD  Sent: 1/3/2022 7:53 AM CST  Subject: Meds    Can you please refill my 10 mg cyclobenzaprin as I am out please?

## 2022-01-05 ENCOUNTER — TELEPHONE (OUTPATIENT)
Dept: PREADMISSION TESTING | Age: 47
End: 2022-01-05

## 2022-01-05 VITALS — BODY MASS INDEX: 34.16 KG/M2 | HEIGHT: 65 IN | WEIGHT: 205 LBS

## 2022-01-05 RX ORDER — OMEPRAZOLE 20 MG/1
20 CAPSULE, DELAYED RELEASE ORAL PRN
COMMUNITY

## 2022-01-05 ASSESSMENT — ACTIVITIES OF DAILY LIVING (ADL)
ADL_BEFORE_ADMISSION: INDEPENDENT
SENSORY_SUPPORT_DEVICES: EYEGLASSES;DENTURES
ADL_SCORE: 12
MOBILITY_ASSIST_DEVICES: CANE;FOUR WHEEL WALKER
ADL_BEFORE_ADMISSION: INDEPENDENT
MOBILITY_ASSIST_DEVICES: CANE;STANDARD WALKER
ADL_SCORE: 12
SENSORY_SUPPORT_DEVICES: EYEGLASSES;DENTURES

## 2022-01-07 ENCOUNTER — TELEPHONE (OUTPATIENT)
Dept: NEUROSURGERY | Age: 47
End: 2022-01-07

## 2022-01-07 ENCOUNTER — TELEPHONE (OUTPATIENT)
Dept: FAMILY MEDICINE CLINIC | Facility: CLINIC | Age: 47
End: 2022-01-07

## 2022-01-07 ENCOUNTER — EXTERNAL RECORD (OUTPATIENT)
Dept: HEALTH INFORMATION MANAGEMENT | Facility: OTHER | Age: 47
End: 2022-01-07

## 2022-01-07 PROBLEM — F17.200 TOBACCO DEPENDENCE SYNDROME: Status: ACTIVE | Noted: 2022-01-07

## 2022-01-07 PROBLEM — G62.9 NEUROPATHY: Status: ACTIVE | Noted: 2022-01-07

## 2022-01-07 PROBLEM — F11.20 OPIOID DEPENDENCE (HCC): Status: ACTIVE | Noted: 2022-01-07

## 2022-01-07 PROBLEM — N31.9 NEUROGENIC BLADDER: Status: ACTIVE | Noted: 2022-01-07

## 2022-01-07 PROBLEM — K21.9 GASTROESOPHAGEAL REFLUX DISEASE: Status: ACTIVE | Noted: 2022-01-07

## 2022-01-07 NOTE — TELEPHONE ENCOUNTER
Received fax from [facility] regarding incontinence order form    Dr. Oswald Tejeda to review and sign    Need to fax back to #[phone]

## 2022-01-10 ENCOUNTER — APPOINTMENT (OUTPATIENT)
Dept: INTERVENTIONAL RADIOLOGY/VASCULAR | Age: 47
End: 2022-01-10
Attending: NURSE PRACTITIONER

## 2022-01-10 ENCOUNTER — APPOINTMENT (OUTPATIENT)
Dept: PREADMISSION TESTING | Age: 47
End: 2022-01-10
Attending: NURSE PRACTITIONER

## 2022-01-10 LAB
APPEARANCE: CLEAR
BILIRUBIN: NEGATIVE
COLOR: YELLOW
COTININE, URINE: 4 NG/ML
GLUCOSE: NEGATIVE
KETONES: NEGATIVE
LEUKOCYTE ESTERASE: NEGATIVE
NICOTINE, URINE: 6 NG/ML
NITRITE: NEGATIVE
OCCULT BLOOD: NEGATIVE
PH: 7.5 (ref 5–8)
PROTEIN: NEGATIVE
SPECIFIC GRAVITY: 1.01 (ref 1–1.03)

## 2022-01-14 DIAGNOSIS — M96.1 FAILED BACK SURGICAL SYNDROME: ICD-10-CM

## 2022-01-14 DIAGNOSIS — M54.16 LUMBAR RADICULITIS: ICD-10-CM

## 2022-01-14 RX ORDER — GABAPENTIN 300 MG/1
CAPSULE ORAL
Qty: 90 CAPSULE | Refills: 0 | Status: SHIPPED | OUTPATIENT
Start: 2022-01-14

## 2022-01-14 NOTE — TELEPHONE ENCOUNTER
Last refilled 12/16/21 for #90 with 0 RF  LOV with MM 12/20/21  Future surgery date 1/20/22  Protocol: none

## 2022-01-23 ENCOUNTER — TELEPHONE (OUTPATIENT)
Dept: PAIN CLINIC | Facility: CLINIC | Age: 47
End: 2022-01-23

## 2022-01-23 DIAGNOSIS — M54.16 LUMBAR RADICULITIS: ICD-10-CM

## 2022-01-23 DIAGNOSIS — M96.1 FAILED BACK SURGICAL SYNDROME: ICD-10-CM

## 2022-01-24 ENCOUNTER — TELEPHONE (OUTPATIENT)
Dept: NEUROSURGERY | Age: 47
End: 2022-01-24

## 2022-01-24 RX ORDER — BACLOFEN 10 MG/1
TABLET ORAL
Qty: 90 TABLET | Refills: 0 | Status: SHIPPED | OUTPATIENT
Start: 2022-01-24 | End: 2022-01-26

## 2022-01-24 RX ORDER — MELOXICAM 15 MG/1
TABLET ORAL
Qty: 30 TABLET | Refills: 0 | Status: SHIPPED | OUTPATIENT
Start: 2022-01-24

## 2022-01-24 NOTE — TELEPHONE ENCOUNTER
Medication: BACLOFEN 10 MG Oral Tab    Date of last refill: 12/20/21      Last office visit: 10/26/21  Due back to clinic per last office note:  na  Date next office visit scheduled:  none      Last OV note recommendation: Assessment:  Failed back surgical

## 2022-01-24 NOTE — TELEPHONE ENCOUNTER
Have her schedule for further refills. It looks like, per last note, she was going to be following with her surgeon, but there is nothing indicating what became of this. In addition, she is also on flexeril through PCP for muscle spasming.

## 2022-01-25 ENCOUNTER — TELEPHONE (OUTPATIENT)
Dept: FAMILY MEDICINE CLINIC | Facility: CLINIC | Age: 47
End: 2022-01-25

## 2022-01-25 ENCOUNTER — TELEPHONE (OUTPATIENT)
Dept: NEUROSURGERY | Age: 47
End: 2022-01-25

## 2022-01-25 NOTE — TELEPHONE ENCOUNTER
Patient states the past couple of weeks it has becoming harder to swallow. A couple nights ago at dinner she couldn't breathe and had to throw up. She tried to eat hot dogs today and couldn't swallow.   When talked to the surgeons office yesterday, was to

## 2022-01-25 NOTE — TELEPHONE ENCOUNTER
Contacted Alfreda to cxl script for baclofen. Spoke to Rose who VU and states Rx will be deleted. Advised that pt informed our office that she is seeing a different provider, no further refills should be sent to this office.

## 2022-01-25 NOTE — TELEPHONE ENCOUNTER
Patient called requesting an appt asap. RE: Patient is having a hard time swallowing and is chocking again.     Please call her back # [phone]

## 2022-01-25 NOTE — TELEPHONE ENCOUNTER
Pt advised that she is not seeing Dr. ALANIS IRENE anymore and her refill should have went through a different doctor.

## 2022-01-26 ENCOUNTER — TELEPHONE (OUTPATIENT)
Dept: FAMILY MEDICINE CLINIC | Facility: CLINIC | Age: 47
End: 2022-01-26

## 2022-01-26 ENCOUNTER — OFFICE VISIT (OUTPATIENT)
Dept: FAMILY MEDICINE CLINIC | Facility: CLINIC | Age: 47
End: 2022-01-26
Payer: MEDICAID

## 2022-01-26 ENCOUNTER — TELEPHONE (OUTPATIENT)
Dept: NEUROSURGERY | Age: 47
End: 2022-01-26

## 2022-01-26 VITALS
OXYGEN SATURATION: 98 % | TEMPERATURE: 99 F | BODY MASS INDEX: 35 KG/M2 | SYSTOLIC BLOOD PRESSURE: 122 MMHG | HEART RATE: 94 BPM | DIASTOLIC BLOOD PRESSURE: 90 MMHG | WEIGHT: 212 LBS

## 2022-01-26 DIAGNOSIS — K21.9 GASTROESOPHAGEAL REFLUX DISEASE, UNSPECIFIED WHETHER ESOPHAGITIS PRESENT: ICD-10-CM

## 2022-01-26 DIAGNOSIS — M96.1 FAILED BACK SURGICAL SYNDROME: ICD-10-CM

## 2022-01-26 DIAGNOSIS — N31.9 NEUROGENIC BLADDER: ICD-10-CM

## 2022-01-26 DIAGNOSIS — N60.01 BREAST CYST, RIGHT: ICD-10-CM

## 2022-01-26 DIAGNOSIS — R13.10 DYSPHAGIA, UNSPECIFIED TYPE: Primary | ICD-10-CM

## 2022-01-26 DIAGNOSIS — Z76.0 MEDICATION REFILL: ICD-10-CM

## 2022-01-26 DIAGNOSIS — M54.16 LUMBAR RADICULITIS: ICD-10-CM

## 2022-01-26 PROCEDURE — 99214 OFFICE O/P EST MOD 30 MIN: CPT | Performed by: FAMILY MEDICINE

## 2022-01-26 PROCEDURE — 3080F DIAST BP >= 90 MM HG: CPT | Performed by: FAMILY MEDICINE

## 2022-01-26 PROCEDURE — 3074F SYST BP LT 130 MM HG: CPT | Performed by: FAMILY MEDICINE

## 2022-01-26 RX ORDER — BACLOFEN 10 MG/1
10 TABLET ORAL 3 TIMES DAILY
Qty: 90 TABLET | Refills: 0 | Status: SHIPPED | OUTPATIENT
Start: 2022-01-26

## 2022-01-26 NOTE — PATIENT INSTRUCTIONS
Avoid spicy foods, avoid caffeine, mints, chocolate, alcohol, tobacco, chewing gum. Avoid Ibuprofen, Advil, Motrin, Aleve, Aspirin. May use Tums or Maalox or other antacids for discomfort.     Elevate head of the bed 45°  Eat at least 2-3 hours prior

## 2022-01-26 NOTE — PROGRESS NOTES
[facility] Family [facility] Office Note  Chief Complaint:   Patient presents with:  Throat Problem: having problem swallowing  Refill Request: baclofen      HPI:   This is a 48year old female coming in for  Pills and fluids will go down easy, but ([other]) Son    • Anxiety Mother    • Anxiety Maternal Aunt    • Depression Maternal Aunt      Allergies:    Amoxicillin-Pot Cla*    ITCHING, HIVES  Augmentin [Amoclan]     SWELLING  Current Meds:  Current Outpatient Medications   Medication Sig Dispense Dysphagia, unspecified type  - GASTRO - INTERNAL    2. Gastroesophageal reflux disease, unspecified whether esophagitis present  - GASTRO - INTERNAL  No acute symptoms at this time.      Avoid spicy foods, avoid caffeine, mints, chocolate, alcohol, tobacco, 0    Patient verbalized understanding and agreed with the plan. Patient was given the opportunity to ask questions. All questions were addressed, and patient voices no further concerns. Patient is in agreement with plan.  If the patient has any questions, t unspecified     Brachial neuritis or radiculitis NOS     Tachycardia, unspecified     Displacement of cervical intervertebral disc without myelopathy     Disorders of bursae and tendons in shoulder region, unspecified     Degeneration of lumbar or lumbosac

## 2022-01-26 NOTE — TELEPHONE ENCOUNTER
PT CALLED AND ADV WAS SEEN EARLIER TODAY AND WAS GIVEN REFERRAL TO SEE DR RAFA MICHELE DR.     PT CALLED AND ADV NOT TAKING PTS INS    LOOKING FOR RECOMMENDATIONS      PLEASE ADV    THANK YOU

## 2022-01-27 ENCOUNTER — TELEPHONE (OUTPATIENT)
Dept: NEUROSURGERY | Age: 47
End: 2022-01-27

## 2022-01-27 RX ORDER — CEFDINIR 300 MG/1
300 CAPSULE ORAL 2 TIMES DAILY
Qty: 20 CAPSULE | Refills: 0 | Status: SHIPPED | OUTPATIENT
Start: 2022-01-27 | End: 2022-02-06

## 2022-01-27 NOTE — TELEPHONE ENCOUNTER
[facility] sent to pt - advised pt to call insurance for list of covered GI physicians  Routing to nurse pool for follow-up message read by pt and for pt response  (Placed - Notify me if not read by: 01/28/22)

## 2022-01-28 ENCOUNTER — TELEPHONE (OUTPATIENT)
Dept: FAMILY MEDICINE CLINIC | Facility: CLINIC | Age: 47
End: 2022-01-28

## 2022-01-28 ENCOUNTER — PATIENT MESSAGE (OUTPATIENT)
Dept: FAMILY MEDICINE CLINIC | Facility: CLINIC | Age: 47
End: 2022-01-28

## 2022-01-28 ENCOUNTER — TELEPHONE (OUTPATIENT)
Dept: NEUROSURGERY | Age: 47
End: 2022-01-28

## 2022-01-28 DIAGNOSIS — K21.9 GASTROESOPHAGEAL REFLUX DISEASE, UNSPECIFIED WHETHER ESOPHAGITIS PRESENT: ICD-10-CM

## 2022-01-28 DIAGNOSIS — Z01.818 PRE-OP TESTING: Primary | ICD-10-CM

## 2022-01-28 DIAGNOSIS — R13.10 DYSPHAGIA, UNSPECIFIED TYPE: Primary | ICD-10-CM

## 2022-01-28 NOTE — TELEPHONE ENCOUNTER
From: [facility]  Sent: 1/28/2022 3:46 PM CST  To: Claudia Aldana Clinical Staff  Subject: referral    Surgery now is scheduled for 4-25 / 4-28 so My appt for pre op has to be within 30 days so need to cancel the appt with Dr Suzan Tomas in Feb.  Brandon Sprague

## 2022-01-28 NOTE — TELEPHONE ENCOUNTER
Patient called stating that her surgery got cancel for now, still looking for a plastic surgeon.     GI doctor that takes her insurance is:    Amy Rocha MD  [address] [place] [address], [place], [address]  Phone: [phone]    Please place order and

## 2022-01-28 NOTE — TELEPHONE ENCOUNTER
Received paperwork from [employer]     \"Saarh Benjamin\"    Pt to schedule appt with Dr. Patricia Fong to discuss  Video visit okay or in person

## 2022-01-28 NOTE — TELEPHONE ENCOUNTER
Pt requesting appt for disability paperwork    Future Appointments   Date Time Provider Fitz Villanueva   2/2/2022 12:40 PM Georgie Loving MD [facility] EMG Carmin Opitz

## 2022-01-28 NOTE — TELEPHONE ENCOUNTER
Referral placed   And left message for the pt that the referral has been placed and that Dr. Linh Berrios can see the referral- advised to call back if questions

## 2022-01-28 NOTE — TELEPHONE ENCOUNTER
Pt called - reports back Surgery dates: 3/21 and 3/25    Pt reports she will need IVC filter a week before surgery dates - requesting appt mid-end of February    Pt reports she Needs blood work again - to repeat all blood work - okay to be within 90 days o

## 2022-01-29 NOTE — TELEPHONE ENCOUNTER
From: Aarti Santizo  Sent: 1/28/2022 10:14 PM CST  To: Sheng Hollis Clinical Staff  Subject: referral    I can do the labs in Feb they said my surgery is in March and I will have four appts and two other procedures in the first two weeks of March s

## 2022-01-31 ENCOUNTER — TELEPHONE (OUTPATIENT)
Dept: FAMILY MEDICINE CLINIC | Facility: CLINIC | Age: 47
End: 2022-01-31

## 2022-01-31 NOTE — TELEPHONE ENCOUNTER
Patient called requesting her GI orders be fax to:    [facility] GI department     Fax # [phone]    Her insurance doesn't cover prior GI doctors.     Please advise # [phone]

## 2022-01-31 NOTE — TELEPHONE ENCOUNTER
Per telephone message from [facility] gastro on 1/31/22. There was a question on whether she is to have a colonoscopy and EGD to be done at the same time? Also a referral to be placed for [facility]. Forward to Dr. Suzan Tomas, please advise.

## 2022-02-01 NOTE — TELEPHONE ENCOUNTER
LOV: 1/26/22   Last Refill: 1/3/22 #90 0 RF    Future Appointments   Date Time Provider Fitz Kay   2/2/2022  4:40 PM Georgie Alfaro MD [facility] EMG Joe Mejia   2/8/2022 10:30 AM PF US RM1 PF [facility]   4/4/2022 11:20 AM Howard Niño MD [facility] Joe Mejia

## 2022-02-01 NOTE — TELEPHONE ENCOUNTER
PT CALLED AND HAS VV TOMORROW.  HAS ENOUGH MEDS FOR TODAY, BUT NOT ENOUGH FOR TOMORROW - NEEDS REFILLS OF     cyclobenzaprine 10 MG Oral Tab    SPENCER MARK 34 & 52      THANK YOU

## 2022-02-02 ENCOUNTER — TELEMEDICINE (OUTPATIENT)
Dept: FAMILY MEDICINE CLINIC | Facility: CLINIC | Age: 47
End: 2022-02-02
Payer: MEDICAID

## 2022-02-02 DIAGNOSIS — G89.4 CHRONIC PAIN ASSOCIATED WITH SIGNIFICANT PSYCHOSOCIAL DYSFUNCTION: ICD-10-CM

## 2022-02-02 DIAGNOSIS — M54.9 CHRONIC NECK AND BACK PAIN: ICD-10-CM

## 2022-02-02 DIAGNOSIS — G62.9 NEUROPATHY: ICD-10-CM

## 2022-02-02 DIAGNOSIS — G89.29 CHRONIC NECK AND BACK PAIN: ICD-10-CM

## 2022-02-02 DIAGNOSIS — M54.2 CHRONIC NECK AND BACK PAIN: ICD-10-CM

## 2022-02-02 DIAGNOSIS — Z02.71 ISSUE OF MEDICAL CERTIFICATE FOR DISABILITY EXAMINATION: Primary | ICD-10-CM

## 2022-02-02 DIAGNOSIS — N31.9 NEUROGENIC BLADDER: ICD-10-CM

## 2022-02-02 PROCEDURE — 99213 OFFICE O/P EST LOW 20 MIN: CPT | Performed by: FAMILY MEDICINE

## 2022-02-02 RX ORDER — CYCLOBENZAPRINE HCL 10 MG
10 TABLET ORAL 3 TIMES DAILY PRN
Qty: 90 TABLET | Refills: 0 | Status: SHIPPED | OUTPATIENT
Start: 2022-02-02 | End: 2022-03-02

## 2022-02-03 ENCOUNTER — PATIENT MESSAGE (OUTPATIENT)
Dept: FAMILY MEDICINE CLINIC | Facility: CLINIC | Age: 47
End: 2022-02-03

## 2022-02-03 NOTE — TELEPHONE ENCOUNTER
Left message for [facility] at [facility] for nurse to call back and discuss.

## 2022-02-03 NOTE — TELEPHONE ENCOUNTER
From: Vickie Bermudez  To: Georgie Mendes MD  Sent: 2/3/2022 2:35 PM CST  Subject: Luis Munger    Dr. Patricia Fong   Can you please just mail my Devora Winters paperwork in the envelope that was provided asa please. Thank You for taking the time to complete it with me.

## 2022-02-03 NOTE — TELEPHONE ENCOUNTER
She will only need an EGD at this time. Not yet due for her colonoscopy considering she is not yet 48, also no FH of colon cancer or personal history of colon polyps (please confirm this with the patient).  Hx of neurogenic bladder, and at risk of UTIs, and she is likely not a great candidate for colon prep at this time. ~ MM

## 2022-02-04 NOTE — TELEPHONE ENCOUNTER
Linsey Briscoe from [facility] returning call states pt has not being seen in their office at all they have no records of pt     Call back # [phone]    Thank you

## 2022-02-04 NOTE — TELEPHONE ENCOUNTER
LVM for Jannie Perez at [facility] advising pt is only needing EGD done at this time and asked if pt will need a referral to be seen.

## 2022-02-04 NOTE — TELEPHONE ENCOUNTER
Jannie Perez from [facility] returning call asking  to place referral for them GI department and pt will have to call them and schedule new pt consult     Jannie Perez stated will call pt and advised her of this information     Call back # [phone]    Thank you

## 2022-02-07 ENCOUNTER — APPOINTMENT (OUTPATIENT)
Dept: NEUROSURGERY | Age: 47
End: 2022-02-07

## 2022-02-07 NOTE — TELEPHONE ENCOUNTER
[facility] sent to suzie MATIAS paperwork faxed to [phone]    Copy send to scanning

## 2022-02-08 ENCOUNTER — HOSPITAL ENCOUNTER (OUTPATIENT)
Dept: ULTRASOUND IMAGING | Age: 47
Discharge: HOME | End: 2022-02-08
Attending: FAMILY MEDICINE
Payer: MEDICAID

## 2022-02-08 ENCOUNTER — HOSPITAL ENCOUNTER (OUTPATIENT)
Dept: MAMMOGRAPHY | Age: 47
Discharge: HOME | End: 2022-02-08
Attending: FAMILY MEDICINE
Payer: MEDICAID

## 2022-02-08 ENCOUNTER — MED REC SCAN ONLY (OUTPATIENT)
Dept: FAMILY MEDICINE CLINIC | Facility: CLINIC | Age: 47
End: 2022-02-08

## 2022-02-08 DIAGNOSIS — R92.8 ABNORMAL MAMMOGRAM OF RIGHT BREAST: ICD-10-CM

## 2022-02-08 DIAGNOSIS — N63.0 BREAST NODULE: ICD-10-CM

## 2022-02-08 PROCEDURE — 77065 DX MAMMO INCL CAD UNI: CPT | Performed by: FAMILY MEDICINE

## 2022-02-08 PROCEDURE — 88305 TISSUE EXAM BY PATHOLOGIST: CPT | Performed by: FAMILY MEDICINE

## 2022-02-08 PROCEDURE — 19083 BX BREAST 1ST LESION US IMAG: CPT | Performed by: FAMILY MEDICINE

## 2022-02-08 NOTE — IMAGING NOTE
Pt arrived ambulatory with father and A&O x 4. Procedure reviewed, questions answered and consent obtained. Assisted Dr Karoline Damon with u/s guided right breast biopsy. Pressure held on the biopsy site x 10 minutes, bleeding controlled and no hematoma palpated and steri strip applied. Post mammogram completed and pt applied sports bra and ice pack placed at biopsy site. Discharge instructions reviewed and written copy provided. Pt verbalized understanding instructions and denies further questions. Pt left ambulatory from the breast imaging department.

## 2022-02-09 ENCOUNTER — TELEPHONE (OUTPATIENT)
Dept: MAMMOGRAPHY | Facility: HOSPITAL | Age: 47
End: 2022-02-09

## 2022-02-10 ENCOUNTER — TELEPHONE (OUTPATIENT)
Dept: FAMILY MEDICINE CLINIC | Facility: CLINIC | Age: 47
End: 2022-02-10

## 2022-02-10 NOTE — TELEPHONE ENCOUNTER
----- Message from Georgie Fu MD sent at 2/9/2022  6:35 PM CST -----  Breast intraductal papilloma noted, and this is benign, however considering the nipple discharge that she has been having she will benefit from seeing a breast surgeon for the same. Place referral for her to see Dr Tim Tapia.

## 2022-02-10 NOTE — IMAGING NOTE
0: Spoke with Vi Thakkar post ultrasound guided right breast biopsy. Name and date of birth verified by Ms. Isaak Connelly. Introduced myself as breast care coordinator. Reinforced post biopsy care and instruction. Ms. Isaak Connelly denies any issues with biopsy site- bleeding, drainage, redness, tenderness. Pathology results and recommendations discussed as follows:   Final Diagnosis:   A. Ultrasound-guided core biopsy, breast, right, 430:   -Portion of intraductal papilloma measuring 8 mm (0.8 cm). Electronically signed by Tiffany Nolen MD on 2/9/2022 at 1541     Concordance pending. Recommendation: surgical consult    Per EMR note 2-09-22: Georgie Garcia MD referring to Dr. eSrgio Sprague. Ms. Isaak Connelly instructed to make an appointment with Dr. Sergio Sprague. Dr. Gopal Pritchard office phone number provided. Ms. Isaak Connelly encouraged to follow-up with Dr. Oswald Tejeda with questions/concerns. Vi Thakkar verbalized understanding and agreement to the above.

## 2022-02-14 ENCOUNTER — PATIENT MESSAGE (OUTPATIENT)
Dept: FAMILY MEDICINE CLINIC | Facility: CLINIC | Age: 47
End: 2022-02-14

## 2022-02-14 RX ORDER — GABAPENTIN 300 MG/1
300 CAPSULE ORAL 3 TIMES DAILY
Qty: 270 CAPSULE | Refills: 0 | Status: SHIPPED | OUTPATIENT
Start: 2022-02-14 | End: 2022-03-10

## 2022-02-21 ENCOUNTER — TELEPHONE (OUTPATIENT)
Dept: NEUROSURGERY | Age: 47
End: 2022-02-21

## 2022-02-22 ENCOUNTER — PATIENT MESSAGE (OUTPATIENT)
Dept: FAMILY MEDICINE CLINIC | Facility: CLINIC | Age: 47
End: 2022-02-22

## 2022-02-22 RX ORDER — BACLOFEN 10 MG/1
10 TABLET ORAL 3 TIMES DAILY
Qty: 90 TABLET | Refills: 0 | Status: SHIPPED | OUTPATIENT
Start: 2022-02-22 | End: 2022-03-17

## 2022-02-22 RX ORDER — MELOXICAM 15 MG/1
15 TABLET ORAL DAILY
Qty: 30 TABLET | Refills: 0 | Status: SHIPPED | OUTPATIENT
Start: 2022-02-22 | End: 2022-03-17

## 2022-02-22 NOTE — TELEPHONE ENCOUNTER
Routing to provider per protocol. Meloxicam last refilled on 1/24/22 for # 30 with 0 rf. Baclofen last refilled on 1/26/22 for # 90 with 0 rf. Last labs 12/23/21. Last seen on 2/2/22 via Telemedicine. Future Appointments   Date Time Provider Fitz Kay   3/21/2022 10:30 AM Josafat Hawkins MD [facility] EMG Surg/Onc   4/4/2022 11:20 AM Aki Goodman MD [facility] EMG [facility]        Thank you.

## 2022-02-22 NOTE — TELEPHONE ENCOUNTER
From: Andrea Horne  To: Georgie Whitehead MD  Sent: 2/22/2022 12:13 PM CST  Subject: Meds    I need my 15mg Meloxicam refill and also 10 mg Baclofin at [address] [place] [address] please

## 2022-03-02 ENCOUNTER — PATIENT MESSAGE (OUTPATIENT)
Dept: FAMILY MEDICINE CLINIC | Facility: CLINIC | Age: 47
End: 2022-03-02

## 2022-03-02 RX ORDER — CYCLOBENZAPRINE HCL 10 MG
10 TABLET ORAL 3 TIMES DAILY PRN
Qty: 90 TABLET | Refills: 0 | Status: SHIPPED | OUTPATIENT
Start: 2022-03-02 | End: 2022-03-17

## 2022-03-02 NOTE — TELEPHONE ENCOUNTER
From: Gabrielle Goss  To: Georgie Perry MD  Sent: 3/2/2022 8:29 AM CST  Subject: Meds    Can I please get my cyclobenzaprin filled.

## 2022-03-10 RX ORDER — GABAPENTIN 300 MG/1
300 CAPSULE ORAL 3 TIMES DAILY
Qty: 270 CAPSULE | Refills: 0 | Status: SHIPPED | OUTPATIENT
Start: 2022-03-10 | End: 2022-06-08

## 2022-03-10 NOTE — TELEPHONE ENCOUNTER
LOV: 02/02/22 Telemed  Last Refill: 2/14/22  #270 0 RF    Future Appointments   Date Time Provider Fitz Kay   3/21/2022 10:30 AM Khadra Tillman MD [facility] EMG Surg/Onc   4/4/2022 11:20 AM Carlos Alvarenga MD [facility] Mela Travis

## 2022-03-14 ENCOUNTER — PATIENT MESSAGE (OUTPATIENT)
Dept: FAMILY MEDICINE CLINIC | Facility: CLINIC | Age: 47
End: 2022-03-14

## 2022-03-14 ENCOUNTER — TELEPHONE (OUTPATIENT)
Dept: NEUROSURGERY | Age: 47
End: 2022-03-14

## 2022-03-14 RX ORDER — CEFDINIR 300 MG/1
300 CAPSULE ORAL 2 TIMES DAILY
Qty: 20 CAPSULE | Refills: 0 | Status: SHIPPED | OUTPATIENT
Start: 2022-03-14 | End: 2022-03-24

## 2022-03-14 NOTE — TELEPHONE ENCOUNTER
From: Chris Cox  To: Georgie Velez MD  Sent: 3/14/2022 3:16 PM CDT  Subject: Meds    Can I please get a refill on my 300mg cefdinir.

## 2022-03-17 RX ORDER — MELOXICAM 15 MG/1
15 TABLET ORAL DAILY
Qty: 30 TABLET | Refills: 0 | Status: SHIPPED | OUTPATIENT
Start: 2022-03-17

## 2022-03-17 RX ORDER — CYCLOBENZAPRINE HCL 10 MG
10 TABLET ORAL 3 TIMES DAILY PRN
Qty: 90 TABLET | Refills: 0 | Status: SHIPPED | OUTPATIENT
Start: 2022-03-17 | End: 2022-04-16

## 2022-03-17 RX ORDER — BACLOFEN 10 MG/1
10 TABLET ORAL 3 TIMES DAILY
Qty: 90 TABLET | Refills: 0 | Status: SHIPPED | OUTPATIENT
Start: 2022-03-17

## 2022-03-17 NOTE — TELEPHONE ENCOUNTER
LOV: 02/02/22  Last Refill:   Meloxicam #30 0   Baclofen #90 0   Clycobenzaprine #90 0 RF    Future Appointments   Date Time Provider Fitz Villanueva   3/21/2022 10:30 AM Aleta Velasquez MD [facility] EMG Surg/Onc   4/4/2022 11:20 AM Edna Cruz MD [facility] EMG Sadiq Jean Baptiste

## 2022-03-30 ENCOUNTER — PATIENT MESSAGE (OUTPATIENT)
Dept: FAMILY MEDICINE CLINIC | Facility: CLINIC | Age: 47
End: 2022-03-30

## 2022-03-30 NOTE — TELEPHONE ENCOUNTER
PT EVALUATION     02/02/18 0910   Pain Assessment   Pain Assessment 0-10   Pain Score Worst Possible Pain  (R LE/hip area)   Home Living   Type of [address] Two level;Stairs to enter with rails  (7 stairs to enter and 7 stairs inside home)   [address] [facility] [employer],[address]   Additional Comments patient independent prior to fall without assistive device, patient s/p fall, given roller walker in ED and using roller walker for only day or so prior to admission   Prior Function   Level of [place] Independent with ADLs and functional mobility   Lives With [address] in the last 6 months 1 to 4   Comments patient independent prrior to fall, living alone and handling all household tasks   Restrictions/Precautions   RLE [place] Bearing Per Order WBAT   Other Precautions Chair Alarm; Bed Alarm; Fall Risk;Pain;THR   General   Additional Pertinent History chart reviewed, patient s/p fall with R hip fracture, now s/p bipolar hemiarthroplasty R Hip with precautions   Family/Caregiver Present No   Cognition   Overall Cognitive Status WFL  (poor historian at times with time frames)   Arousal/Participation Cooperative   Orientation Level Oriented to person;Oriented to place;Oriented to situation   Following Commands Follows one step commands with increased time or repetition   Comments patient distracted by situation and pain, required repetitive instructions at times for task completion   RLE Assessment   RLE Assessment (ROM limited with precautions, strength 3/3-)   LLE Assessment   LLE Assessment (ROM WFL, strength 3+/4-)   Coordination   Movements are Fluid and Coordinated 0   Bed Mobility   Supine to Sit 2  Maximal assistance   Additional items Assist x 2   Transfers   Sit to Stand 2  Maximal assistance   Additional items Assist x 2   Stand to Sit 2  Maximal assistance   Additional items Assist x 2   Ambulation/Elevation   Gait Assistance 2  Maximal assist Please schedule appointment for patient after she is back.  TY Additional items Assist x 2   Assistive Device Rolling walker   Distance 3-5 step from bed to chair with verbal cuing and assist to maintain R knee extension, knee buckling at times   Balance   Static Sitting Fair -   Dynamic Sitting Poor -   Static Standing Poor -   Dynamic Standing Poor -   Ambulatory Poor -   Activity Tolerance   Activity Tolerance Patient limited by fatigue;Patient limited by pain;Treatment limited secondary to medical complications (Comment)  (patient in ICU)   Nurse Made Aware yes   Assessment   Prognosis Good   Problem List Decreased strength;Decreased range of motion;Decreased endurance; Impaired balance;Decreased mobility; Decreased coordination;Pain;Orthopedic restrictions   Assessment Patient seen for Physical Therapy evaluation  Patient admitted with Closed displaced fracture of right femoral neck ([facility] [address] )  Comorbidities affecting patient's physical performance include: fall, gait dysfunction, pain, CHF,CKD, CABG, MI,   Personal factors affecting patient at time of initial evaluation include: lives in two story house, stairs to enter home, inability to ambulate household distances, inability to navigate community distances, inability to navigate level surfaces without external assistance, inability to perform dynamic tasks in community, limited home support, positive fall history, inability to perform physical activity, inability to perform ADLS and inability to perform IADLS   Prior to admission, patient was independent with functional mobility without assistive device, independent with ADLS, independent with IADLS, living in a multi-level home, ambulating household distance and ambulating community distances    Please find objective findings from Physical Therapy assessment regarding body systems outlined above with impairments and limitations including weakness, decreased ROM, impaired balance, decreased endurance, impaired coordination, gait deviations, pain, decreased activity tolerance, decreased functional mobility tolerance and fall risk  The Barthel Index was used as a functional outcome tool presenting with a score of 25 today indicating marked limitations of functional mobility and ADLS  Patient's clinical presentation is currently unstable/unpredictable as seen in patient's presentation of vital sign response, changing level of pain, varying levels of cognitive performance, increased fall risk, new onset of impairment of functional mobility, decreased endurance and new onset of weakness  Pt would benefit from continued Physical Therapy treatment to address deficits as defined above and maximize level of functional mobility  As demonstrated by objective findings, the assigned level of complexity for this evaluation is high  Goals   Patient Goals decrease pain   STG Expiration Date (1 to 7 days)   Short Term Goal #1 transfers and gait with roller walker with mod assist of 2   Short Term Goal #2 gait endurance to 30 feet, strength RLE 3+/5   LTG Expiration Date (8 to 14 days)   Long Term Goal #1 transfers and gait with roller walker with min assist of 1   Long Term Goal #2 gait endurance to 80 feet, strenght RLE 3+/4-   Plan   Treatment/Interventions ADL retraining;Functional transfer training;LE strengthening/ROM; Therapeutic exercise; Endurance training;Patient/family training;Equipment eval/education; Bed mobility;Gait training   PT Frequency Once a day   Recommendation   Recommendation (STR)   Barthel Index   Feeding 5   Bathing 0   Grooming Score 0   Dressing Score 0   Bladder Score 0   Bowels Score 10   Toilet Use Score 5   Transfers (Bed/Chair) Score 5   Mobility (Level Surface) Score 0   Stairs Score 0   Barthel Index Score 25

## 2022-03-30 NOTE — TELEPHONE ENCOUNTER
Future Appointments   Date Time Provider Fitz Villanueva   4/14/2022 12:40 PM Georgie Gay MD [facility]

## 2022-03-30 NOTE — TELEPHONE ENCOUNTER
From: [facility]  To: Cuauhtemoc Caceres MD  Sent: 3/30/2022 11:35 AM CDT  Subject: Pre surgical appt    I'm visiting my Aunt before my surgeries due to her having stage 4 lung cancer and my uncle has just had his leg amputated and I will not be in town til April 13th so I need another pre surgery appointment please.

## 2022-04-14 ENCOUNTER — OFFICE VISIT (OUTPATIENT)
Dept: FAMILY MEDICINE CLINIC | Facility: CLINIC | Age: 47
End: 2022-04-14
Payer: MEDICAID

## 2022-04-14 VITALS
RESPIRATION RATE: 18 BRPM | BODY MASS INDEX: 36.76 KG/M2 | WEIGHT: 218 LBS | SYSTOLIC BLOOD PRESSURE: 120 MMHG | HEART RATE: 103 BPM | HEIGHT: 64.75 IN | OXYGEN SATURATION: 99 % | DIASTOLIC BLOOD PRESSURE: 86 MMHG | TEMPERATURE: 97 F

## 2022-04-14 DIAGNOSIS — M54.16 LUMBAR RADICULOPATHY: ICD-10-CM

## 2022-04-14 DIAGNOSIS — F17.200 TOBACCO DEPENDENCE SYNDROME: ICD-10-CM

## 2022-04-14 DIAGNOSIS — M47.812 CERVICAL SPONDYLOSIS WITHOUT MYELOPATHY: ICD-10-CM

## 2022-04-14 DIAGNOSIS — Z01.818 PREOPERATIVE CLEARANCE: Primary | ICD-10-CM

## 2022-04-14 DIAGNOSIS — D64.89 ANEMIA DUE TO OTHER CAUSE, NOT CLASSIFIED: ICD-10-CM

## 2022-04-14 PROCEDURE — 99214 OFFICE O/P EST MOD 30 MIN: CPT | Performed by: FAMILY MEDICINE

## 2022-04-14 PROCEDURE — 3074F SYST BP LT 130 MM HG: CPT | Performed by: FAMILY MEDICINE

## 2022-04-14 PROCEDURE — 3008F BODY MASS INDEX DOCD: CPT | Performed by: FAMILY MEDICINE

## 2022-04-14 PROCEDURE — 3079F DIAST BP 80-89 MM HG: CPT | Performed by: FAMILY MEDICINE

## 2022-04-14 RX ORDER — POLYETHYLENE GLYCOL 3350, SODIUM CHLORIDE, SODIUM BICARBONATE, POTASSIUM CHLORIDE 420; 11.2; 5.72; 1.48 G/4L; G/4L; G/4L; G/4L
4 POWDER, FOR SOLUTION ORAL ONCE
COMMUNITY
Start: 2022-03-04

## 2022-04-14 ASSESSMENT — ACTIVITIES OF DAILY LIVING (ADL)
ADL_SCORE: 12
SENSORY_SUPPORT_DEVICES: DENTURES;EYEGLASSES
ADL_SHORT_OF_BREATH: NO
ADL_BEFORE_ADMISSION: INDEPENDENT
NEEDS_ASSIST: NO

## 2022-04-15 ENCOUNTER — TELEPHONE (OUTPATIENT)
Dept: PREADMISSION TESTING | Age: 47
End: 2022-04-15

## 2022-04-15 ENCOUNTER — ANESTHESIA EVENT (OUTPATIENT)
Dept: SURGERY | Age: 47
DRG: 303 | End: 2022-04-15

## 2022-04-15 VITALS — WEIGHT: 200 LBS | BODY MASS INDEX: 34.15 KG/M2 | HEIGHT: 64 IN

## 2022-04-15 DIAGNOSIS — Z01.812 PRE-PROCEDURAL LABORATORY EXAMINATION: Primary | ICD-10-CM

## 2022-04-15 ASSESSMENT — ACTIVITIES OF DAILY LIVING (ADL)
ADL_BEFORE_ADMISSION: INDEPENDENT
SENSORY_SUPPORT_DEVICES: EYEGLASSES;DENTURES
ADL_SCORE: 12

## 2022-04-16 ENCOUNTER — LAB SERVICES (OUTPATIENT)
Dept: LAB | Age: 47
End: 2022-04-16

## 2022-04-16 DIAGNOSIS — Z01.812 PRE-PROCEDURAL LABORATORY EXAMINATION: ICD-10-CM

## 2022-04-16 LAB
SARS-COV-2 RNA RESP QL NAA+PROBE: NOT DETECTED
SERVICE CMNT-IMP: NORMAL
SERVICE CMNT-IMP: NORMAL

## 2022-04-16 PROCEDURE — U0003 INFECTIOUS AGENT DETECTION BY NUCLEIC ACID (DNA OR RNA); SEVERE ACUTE RESPIRATORY SYNDROME CORONAVIRUS 2 (SARS-COV-2) (CORONAVIRUS DISEASE [COVID-19]), AMPLIFIED PROBE TECHNIQUE, MAKING USE OF HIGH THROUGHPUT TECHNOLOGIES AS DESCRIBED BY CMS-2020-01-R: HCPCS | Performed by: RADIOLOGY

## 2022-04-16 PROCEDURE — U0005 INFEC AGEN DETEC AMPLI PROBE: HCPCS | Performed by: RADIOLOGY

## 2022-04-19 ENCOUNTER — HOSPITAL ENCOUNTER (OUTPATIENT)
Dept: PREADMISSION TESTING | Age: 47
Discharge: HOME | End: 2022-04-19
Attending: NEUROLOGICAL SURGERY

## 2022-04-19 ENCOUNTER — HOSPITAL ENCOUNTER (OUTPATIENT)
Dept: INTERVENTIONAL RADIOLOGY/VASCULAR | Age: 47
Discharge: HOME | End: 2022-04-19
Attending: NURSE PRACTITIONER

## 2022-04-19 VITALS
BODY MASS INDEX: 36.19 KG/M2 | SYSTOLIC BLOOD PRESSURE: 118 MMHG | WEIGHT: 212 LBS | OXYGEN SATURATION: 100 % | HEIGHT: 64 IN | TEMPERATURE: 97 F | RESPIRATION RATE: 18 BRPM | HEART RATE: 82 BPM | DIASTOLIC BLOOD PRESSURE: 65 MMHG

## 2022-04-19 DIAGNOSIS — I82.A19 ACUTE DEEP VEIN THROMBOSIS (DVT) OF AXILLARY VEIN, UNSPECIFIED LATERALITY (CMD): ICD-10-CM

## 2022-04-19 LAB
ANION GAP SERPL CALC-SCNC: 9 MMOL/L (ref 10–20)
APTT PPP: 27 SEC (ref 22–30)
BUN SERPL-MCNC: 20 MG/DL (ref 6–20)
BUN/CREAT SERPL: 29 (ref 7–25)
CALCIUM SERPL-MCNC: 9.1 MG/DL (ref 8.4–10.2)
CHLORIDE SERPL-SCNC: 106 MMOL/L (ref 98–107)
CO2 SERPL-SCNC: 27 MMOL/L (ref 21–32)
CREAT SERPL-MCNC: 0.7 MG/DL (ref 0.51–0.95)
FASTING DURATION TIME PATIENT: ABNORMAL H
GFR SERPLBLD BASED ON 1.73 SQ M-ARVRAT: >90 ML/MIN
GLUCOSE SERPL-MCNC: 91 MG/DL (ref 70–99)
INR PPP: 1
POTASSIUM SERPL-SCNC: 4 MMOL/L (ref 3.4–5.1)
PROTHROMBIN TIME: 10.4 SEC (ref 9.7–11.8)
SODIUM SERPL-SCNC: 138 MMOL/L (ref 135–145)

## 2022-04-19 PROCEDURE — C1769 GUIDE WIRE: HCPCS

## 2022-04-19 PROCEDURE — 85610 PROTHROMBIN TIME: CPT | Performed by: RADIOLOGY

## 2022-04-19 PROCEDURE — 80048 BASIC METABOLIC PNL TOTAL CA: CPT | Performed by: RADIOLOGY

## 2022-04-19 PROCEDURE — 99152 MOD SED SAME PHYS/QHP 5/>YRS: CPT

## 2022-04-19 PROCEDURE — 37191 INS ENDOVAS VENA CAVA FILTR: CPT

## 2022-04-19 PROCEDURE — C1894 INTRO/SHEATH, NON-LASER: HCPCS

## 2022-04-19 PROCEDURE — 10002801 HB RX 250 W/O HCPCS: Performed by: RADIOLOGY

## 2022-04-19 PROCEDURE — 10002800 HB RX 250 W HCPCS: Performed by: RADIOLOGY

## 2022-04-19 PROCEDURE — 10006027 HB SUPPLY 278

## 2022-04-19 PROCEDURE — 13000001 HB PHASE II RECOVERY EA 30 MINUTES

## 2022-04-19 PROCEDURE — C1880 VENA CAVA FILTER: HCPCS

## 2022-04-19 PROCEDURE — 85730 THROMBOPLASTIN TIME PARTIAL: CPT | Performed by: RADIOLOGY

## 2022-04-19 PROCEDURE — 10006023 HB SUPPLY 272

## 2022-04-19 RX ORDER — 0.9 % SODIUM CHLORIDE 0.9 %
2 VIAL (ML) INJECTION EVERY 12 HOURS SCHEDULED
Status: DISCONTINUED | OUTPATIENT
Start: 2022-04-19 | End: 2022-04-22 | Stop reason: HOSPADM

## 2022-04-19 RX ORDER — MIDAZOLAM HYDROCHLORIDE 1 MG/ML
INJECTION, SOLUTION INTRAMUSCULAR; INTRAVENOUS PRN
Status: COMPLETED | OUTPATIENT
Start: 2022-04-19 | End: 2022-04-19

## 2022-04-19 RX ADMIN — MIDAZOLAM HYDROCHLORIDE 1 MG: 1 INJECTION, SOLUTION INTRAMUSCULAR; INTRAVENOUS at 12:04

## 2022-04-19 RX ADMIN — FENTANYL CITRATE 50 MCG: 50 INJECTION, SOLUTION INTRAMUSCULAR; INTRAVENOUS at 11:56

## 2022-04-19 RX ADMIN — MIDAZOLAM HYDROCHLORIDE 1 MG: 1 INJECTION, SOLUTION INTRAMUSCULAR; INTRAVENOUS at 11:57

## 2022-04-19 RX ADMIN — FENTANYL CITRATE 50 MCG: 50 INJECTION, SOLUTION INTRAMUSCULAR; INTRAVENOUS at 12:04

## 2022-04-19 ASSESSMENT — PAIN SCALES - GENERAL
PAINLEVEL_OUTOF10: 0

## 2022-04-20 ENCOUNTER — MED REC SCAN ONLY (OUTPATIENT)
Dept: FAMILY MEDICINE CLINIC | Facility: CLINIC | Age: 47
End: 2022-04-20

## 2022-04-20 DIAGNOSIS — M40.209 KYPHOSIS, UNSPECIFIED KYPHOSIS TYPE, UNSPECIFIED SPINAL REGION: Primary | ICD-10-CM

## 2022-04-21 ENCOUNTER — TELEPHONE (OUTPATIENT)
Dept: NEUROSURGERY | Age: 47
End: 2022-04-21

## 2022-04-21 LAB
ABSOLUTE BASOPHILS: 78 CELLS/UL (ref 0–200)
ABSOLUTE EOSINOPHILS: 98 CELLS/UL (ref 15–500)
ABSOLUTE LYMPHOCYTES: 1021 CELLS/UL (ref 850–3900)
ABSOLUTE MONOCYTES: 566 CELLS/UL (ref 200–950)
ABSOLUTE NEUTROPHILS: 4739 CELLS/UL (ref 1500–7800)
ALBUMIN/GLOBULIN RATIO: 1.4 (CALC) (ref 1–2.5)
ALBUMIN: 4.3 G/DL (ref 3.6–5.1)
ALKALINE PHOSPHATASE: 75 U/L (ref 31–125)
ALT: 15 U/L (ref 6–29)
AST: 16 U/L (ref 10–35)
BASOPHILS: 1.2 %
BILIRUBIN, TOTAL: 0.5 MG/DL (ref 0.2–1.2)
BILIRUBIN: NEGATIVE
BUN: 22 MG/DL (ref 7–25)
CALCIUM: 9.7 MG/DL (ref 8.6–10.2)
CARBON DIOXIDE: 23 MMOL/L (ref 20–32)
CHLORIDE: 106 MMOL/L (ref 98–110)
COLOR: YELLOW
CREATININE: 0.71 MG/DL (ref 0.5–1.1)
EGFR IF AFRICN AM: 117 ML/MIN/1.73M2
EGFR IF NONAFRICN AM: 101 ML/MIN/1.73M2
EOSINOPHILS: 1.5 %
GLOBULIN: 3.1 G/DL (CALC) (ref 1.9–3.7)
GLUCOSE: 98 MG/DL (ref 65–99)
GLUCOSE: NEGATIVE
HEMATOCRIT: 33.5 % (ref 35–45)
HEMOGLOBIN: 10.4 G/DL (ref 11.7–15.5)
INR: 1
KETONES: NEGATIVE
LEUKOCYTE ESTERASE: NEGATIVE
LYMPHOCYTES: 15.7 %
MCH: 22 PG (ref 27–33)
MCHC: 31 G/DL (ref 32–36)
MCV: 70.8 FL (ref 80–100)
MONOCYTES: 8.7 %
MPV: 10.1 FL (ref 7.5–12.5)
NEUTROPHILS: 72.9 %
NITRITE: NEGATIVE
OCCULT BLOOD: NEGATIVE
PLATELET COUNT: 293 THOUSAND/UL (ref 140–400)
POTASSIUM: 4.1 MMOL/L (ref 3.5–5.3)
PROTEIN, TOTAL: 7.4 G/DL (ref 6.1–8.1)
PROTEIN: NEGATIVE
PT: 9.8 SEC (ref 9–11.5)
RDW: 17.2 % (ref 11–15)
RED BLOOD CELL COUNT: 4.73 MILLION/UL (ref 3.8–5.1)
SODIUM: 138 MMOL/L (ref 135–146)
SPECIFIC GRAVITY: 1.01 (ref 1–1.03)
WHITE BLOOD CELL COUNT: 6.5 THOUSAND/UL (ref 3.8–10.8)

## 2022-04-23 ENCOUNTER — LAB SERVICES (OUTPATIENT)
Dept: LAB | Age: 47
End: 2022-04-23

## 2022-04-23 DIAGNOSIS — Z01.812 PRE-PROCEDURAL LABORATORY EXAMINATION: ICD-10-CM

## 2022-04-23 LAB
SARS-COV-2 RNA RESP QL NAA+PROBE: NOT DETECTED
SERVICE CMNT-IMP: NORMAL
SERVICE CMNT-IMP: NORMAL

## 2022-04-23 PROCEDURE — U0003 INFECTIOUS AGENT DETECTION BY NUCLEIC ACID (DNA OR RNA); SEVERE ACUTE RESPIRATORY SYNDROME CORONAVIRUS 2 (SARS-COV-2) (CORONAVIRUS DISEASE [COVID-19]), AMPLIFIED PROBE TECHNIQUE, MAKING USE OF HIGH THROUGHPUT TECHNOLOGIES AS DESCRIBED BY CMS-2020-01-R: HCPCS | Performed by: RADIOLOGY

## 2022-04-23 PROCEDURE — U0005 INFEC AGEN DETEC AMPLI PROBE: HCPCS | Performed by: RADIOLOGY

## 2022-04-25 ENCOUNTER — APPOINTMENT (OUTPATIENT)
Dept: GENERAL RADIOLOGY | Age: 47
DRG: 303 | End: 2022-04-25
Attending: ANESTHESIOLOGY

## 2022-04-25 ENCOUNTER — APPOINTMENT (OUTPATIENT)
Dept: GENERAL RADIOLOGY | Age: 47
DRG: 303 | End: 2022-04-25
Attending: NEUROLOGICAL SURGERY

## 2022-04-25 ENCOUNTER — ANESTHESIA (OUTPATIENT)
Dept: SURGERY | Age: 47
DRG: 303 | End: 2022-04-25

## 2022-04-25 ENCOUNTER — HOSPITAL ENCOUNTER (INPATIENT)
Age: 47
LOS: 12 days | Discharge: NURSING, REHAB OR LONG-TERM CARE | DRG: 303 | End: 2022-05-07
Attending: NEUROLOGICAL SURGERY | Admitting: INTERNAL MEDICINE

## 2022-04-25 ENCOUNTER — TELEPHONE (OUTPATIENT)
Dept: FAMILY MEDICINE CLINIC | Facility: CLINIC | Age: 47
End: 2022-04-25

## 2022-04-25 DIAGNOSIS — Z74.09 IMPAIRED MOBILITY AND ACTIVITIES OF DAILY LIVING: ICD-10-CM

## 2022-04-25 DIAGNOSIS — M54.16 LUMBAR RADICULOPATHY: ICD-10-CM

## 2022-04-25 DIAGNOSIS — G89.18 ACUTE POST-OPERATIVE PAIN: ICD-10-CM

## 2022-04-25 DIAGNOSIS — Z78.9 IMPAIRED MOBILITY AND ACTIVITIES OF DAILY LIVING: ICD-10-CM

## 2022-04-25 DIAGNOSIS — M40.209 KYPHOSIS, UNSPECIFIED KYPHOSIS TYPE, UNSPECIFIED SPINAL REGION: ICD-10-CM

## 2022-04-25 DIAGNOSIS — M54.9 MECHANICAL BACK PAIN: ICD-10-CM

## 2022-04-25 DIAGNOSIS — M48.061 SPINAL STENOSIS, LUMBAR REGION, WITHOUT NEUROGENIC CLAUDICATION: ICD-10-CM

## 2022-04-25 DIAGNOSIS — M43.9 ACQUIRED DEFORMITY OF SPINE: ICD-10-CM

## 2022-04-25 LAB
ABO + RH BLD: NORMAL
ANION GAP SERPL CALC-SCNC: 9 MMOL/L (ref 10–20)
APTT PPP: 25 SEC (ref 22–30)
BASE EXCESS / DEFICIT, ARTERIAL - RESPIRATORY: -7 MMOL/L (ref -2–3)
BASE EXCESS / DEFICIT, ARTERIAL - RESPIRATORY: -7 MMOL/L (ref -2–3)
BASE EXCESS / DEFICIT, ARTERIAL - RESPIRATORY: -9 MMOL/L (ref -2–3)
BASOPHILS # BLD: 0 K/MCL (ref 0–0.3)
BASOPHILS NFR BLD: 0 %
BDY SITE: ABNORMAL
BLD GP AB SCN SERPL QL GEL: NEGATIVE
BLOOD EXPIRATION DATE: NORMAL
BODY TEMPERATURE: 35.4 DEGREES
BODY TEMPERATURE: 35.5 DEGREES
BODY TEMPERATURE: 35.7 DEGREES
BUN SERPL-MCNC: 18 MG/DL (ref 6–20)
BUN/CREAT SERPL: 29 (ref 7–25)
CA-I BLD-SCNC: 1.04 MMOL/L (ref 1.15–1.29)
CA-I BLD-SCNC: 1.05 MMOL/L (ref 1.15–1.29)
CA-I BLD-SCNC: 1.09 MMOL/L (ref 1.15–1.29)
CALCIUM SERPL-MCNC: 7.1 MG/DL (ref 8.4–10.2)
CHLORIDE BLD-SCNC: 112 MMOL/L (ref 98–107)
CHLORIDE BLD-SCNC: 112 MMOL/L (ref 98–107)
CHLORIDE BLD-SCNC: 115 MMOL/L (ref 98–107)
CHLORIDE SERPL-SCNC: 113 MMOL/L (ref 98–107)
CO2 SERPL-SCNC: 19 MMOL/L (ref 21–32)
COHGB MFR BLDV: 1.9 % (ref 1.5–15)
COHGB MFR BLDV: 2.6 % (ref 1.5–15)
COHGB MFR BLDV: 2.7 % (ref 1.5–15)
CREAT SERPL-MCNC: 0.62 MG/DL (ref 0.51–0.95)
CROSSMATCH RESULT: NORMAL
DEPRECATED RDW RBC: 49 FL (ref 39–50)
DISPENSE STATUS: NORMAL
EOSINOPHIL # BLD: 0 K/MCL (ref 0–0.5)
EOSINOPHIL NFR BLD: 0 %
ERYTHROCYTE [DISTWIDTH] IN BLOOD: 18 % (ref 11–15)
FASTING DURATION TIME PATIENT: ABNORMAL H
GFR SERPLBLD BASED ON 1.73 SQ M-ARVRAT: >90 ML/MIN
GLUCOSE BLD-MCNC: 106 MG/DL (ref 65–99)
GLUCOSE BLD-MCNC: 114 MG/DL (ref 65–99)
GLUCOSE BLD-MCNC: 140 MG/DL (ref 65–99)
GLUCOSE BLDC GLUCOMTR-MCNC: 152 MG/DL (ref 70–99)
GLUCOSE SERPL-MCNC: 184 MG/DL (ref 70–99)
HCO3 BLDA-SCNC: 17 MMOL/L (ref 22–28)
HCO3 BLDA-SCNC: 19 MMOL/L (ref 22–28)
HCO3 BLDA-SCNC: 20 MMOL/L (ref 22–28)
HCT VFR BLD CALC: 23 % (ref 36–46.5)
HCT VFR BLD CALC: 25 % (ref 36–46.5)
HCT VFR BLD CALC: 26 % (ref 36–46.5)
HCT VFR BLD CALC: 28.2 % (ref 36–46.5)
HGB BLD-MCNC: 7.7 G/DL (ref 12–15.5)
HGB BLD-MCNC: 8.3 G/DL (ref 12–15.5)
HGB BLD-MCNC: 8.6 G/DL (ref 12–15.5)
HGB BLD-MCNC: 8.9 G/DL (ref 12–15.5)
IMM GRANULOCYTES # BLD AUTO: 0.2 K/MCL (ref 0–0.2)
IMM GRANULOCYTES # BLD: 1 %
INR PPP: 1
ISBT BLOOD TYPE: 600
ISSUE DATE/TIME: NORMAL
LYMPHOCYTES # BLD: 1 K/MCL (ref 1–4.8)
LYMPHOCYTES NFR BLD: 8 %
MCH RBC QN AUTO: 23.6 PG (ref 26–34)
MCHC RBC AUTO-ENTMCNC: 31.6 G/DL (ref 32–36.5)
MCV RBC AUTO: 74.8 FL (ref 78–100)
METHGB MFR BLDMV: 0 %
METHGB MFR BLDMV: 0 %
METHGB MFR BLDMV: 0.2 %
MONOCYTES # BLD: 0.3 K/MCL (ref 0.3–0.9)
MONOCYTES NFR BLD: 2 %
NEUTROPHILS # BLD: 10.9 K/MCL (ref 1.8–7.7)
NEUTROPHILS NFR BLD: 89 %
NRBC BLD MANUAL-RTO: 0 /100 WBC
OXYHGB MFR BLDA: 96.3 % (ref 94–98)
OXYHGB MFR BLDA: 96.9 % (ref 94–98)
OXYHGB MFR BLDA: 97 % (ref 94–98)
PCO2 BLDA: 33 MM HG (ref 32–45)
PCO2 BLDA: 37 MM HG (ref 32–45)
PCO2 BLDA: 41 MM HG (ref 32–45)
PH BLDA: 7.28 UNITS (ref 7.35–7.45)
PH BLDA: 7.31 UNITS (ref 7.35–7.45)
PH BLDA: 7.32 UNITS (ref 7.35–7.45)
PLATELET # BLD AUTO: 185 K/MCL (ref 140–450)
PO2 BLDA: 152 MM HG (ref 83–108)
PO2 BLDA: 154 MM HG (ref 83–108)
PO2 BLDA: 167 MM HG (ref 83–108)
POTASSIUM BLD-SCNC: 4 MMOL/L (ref 3.4–5.1)
POTASSIUM BLD-SCNC: 4.2 MMOL/L (ref 3.4–5.1)
POTASSIUM BLD-SCNC: 4.3 MMOL/L (ref 3.4–5.1)
POTASSIUM SERPL-SCNC: 4.4 MMOL/L (ref 3.4–5.1)
PRODUCT CODE: NORMAL
PRODUCT DESCRIPTION: NORMAL
PRODUCT ID: NORMAL
PROTHROMBIN TIME: 11.1 SEC (ref 9.7–11.8)
RBC # BLD: 3.77 MIL/MCL (ref 4–5.2)
SAO2 % BLDA: 100 % (ref 95–99)
SAO2 % BLDA: 100 % (ref 95–99)
SAO2 % BLDA: 11 % (ref 15–23)
SAO2 % BLDA: 12 % (ref 15–23)
SAO2 % BLDA: 12 % (ref 15–23)
SAO2 % BLDA: 98 % (ref 95–99)
SERVICE CMNT-IMP: 177 MG/DL (ref 190–425)
SODIUM BLD-SCNC: 136 MMOL/L (ref 135–145)
SODIUM BLD-SCNC: 136 MMOL/L (ref 135–145)
SODIUM BLD-SCNC: 137 MMOL/L (ref 135–145)
SODIUM SERPL-SCNC: 137 MMOL/L (ref 135–145)
TYPE AND SCREEN EXPIRATION DATE: NORMAL
UNIT BLOOD TYPE: NORMAL
UNIT NUMBER: NORMAL
WBC # BLD: 12.3 K/MCL (ref 4.2–11)

## 2022-04-25 PROCEDURE — 22849 REINSERT SPINAL FIXATION: CPT | Performed by: NEUROLOGICAL SURGERY

## 2022-04-25 PROCEDURE — 10002803 HB RX 637: Performed by: NURSE PRACTITIONER

## 2022-04-25 PROCEDURE — 22844 INSERT SPINE FIXATION DEVICE: CPT | Performed by: NEUROLOGICAL SURGERY

## 2022-04-25 PROCEDURE — 10004451 HB PACU RECOVERY 1ST 30 MINUTES: Performed by: NEUROLOGICAL SURGERY

## 2022-04-25 PROCEDURE — 00NY0ZZ RELEASE LUMBAR SPINAL CORD, OPEN APPROACH: ICD-10-PCS | Performed by: NEUROLOGICAL SURGERY

## 2022-04-25 PROCEDURE — 10002807 HB RX 258: Performed by: NURSE PRACTITIONER

## 2022-04-25 PROCEDURE — 72192 CT PELVIS W/O DYE: CPT

## 2022-04-25 PROCEDURE — 83050 HGB METHEMOGLOBIN QUAN: CPT

## 2022-04-25 PROCEDURE — 13000113 HB NEURO MAJOR COMPLEX CASE EA ADD MINUTE: Performed by: NEUROLOGICAL SURGERY

## 2022-04-25 PROCEDURE — 13000112 HB NEURO MAJOR COMPLEX CASE S/U + 1ST 15 MIN: Performed by: NEUROLOGICAL SURGERY

## 2022-04-25 PROCEDURE — 13000003 HB ANESTHESIA  GENERAL EA ADD MINUTE: Performed by: NEUROLOGICAL SURGERY

## 2022-04-25 PROCEDURE — P9045 ALBUMIN (HUMAN), 5%, 250 ML: HCPCS | Performed by: ANESTHESIOLOGY

## 2022-04-25 PROCEDURE — 22216 INCIS ADDL SPINE SEGMENT: CPT | Performed by: NEUROLOGICAL SURGERY

## 2022-04-25 PROCEDURE — 85610 PROTHROMBIN TIME: CPT | Performed by: NURSE PRACTITIONER

## 2022-04-25 PROCEDURE — 82330 ASSAY OF CALCIUM: CPT

## 2022-04-25 PROCEDURE — 85384 FIBRINOGEN ACTIVITY: CPT | Performed by: NURSE PRACTITIONER

## 2022-04-25 PROCEDURE — 10002801 HB RX 250 W/O HCPCS: Performed by: NURSE PRACTITIONER

## 2022-04-25 PROCEDURE — 88300 SURGICAL PATH GROSS: CPT | Performed by: NEUROLOGICAL SURGERY

## 2022-04-25 PROCEDURE — 63048 LAM FACETEC &FORAMOT EA ADDL: CPT | Performed by: NEUROLOGICAL SURGERY

## 2022-04-25 PROCEDURE — 72125 CT NECK SPINE W/O DYE: CPT

## 2022-04-25 PROCEDURE — 72100 X-RAY EXAM L-S SPINE 2/3 VWS: CPT

## 2022-04-25 PROCEDURE — 10004651 HB RX, NO CHARGE ITEM: Performed by: NURSE PRACTITIONER

## 2022-04-25 PROCEDURE — 84295 ASSAY OF SERUM SODIUM: CPT

## 2022-04-25 PROCEDURE — 0PH304Z INSERTION OF INTERNAL FIXATION DEVICE INTO CERVICAL VERTEBRA, OPEN APPROACH: ICD-10-PCS | Performed by: NEUROLOGICAL SURGERY

## 2022-04-25 PROCEDURE — 10002800 HB RX 250 W HCPCS: Performed by: ANESTHESIOLOGY

## 2022-04-25 PROCEDURE — 10002807 HB RX 258: Performed by: ANESTHESIOLOGY

## 2022-04-25 PROCEDURE — 72070 X-RAY EXAM THORAC SPINE 2VWS: CPT

## 2022-04-25 PROCEDURE — 10004452 HB PACU ADDL 30 MINUTES: Performed by: NEUROLOGICAL SURGERY

## 2022-04-25 PROCEDURE — 10002800 HB RX 250 W HCPCS: Performed by: NURSE PRACTITIONER

## 2022-04-25 PROCEDURE — 72040 X-RAY EXAM NECK SPINE 2-3 VW: CPT

## 2022-04-25 PROCEDURE — C1713 ANCHOR/SCREW BN/BN,TIS/BN: HCPCS | Performed by: NEUROLOGICAL SURGERY

## 2022-04-25 PROCEDURE — 85730 THROMBOPLASTIN TIME PARTIAL: CPT | Performed by: NURSE PRACTITIONER

## 2022-04-25 PROCEDURE — 22214 INCIS 1 VERTEBRAL SEG LUMBAR: CPT | Performed by: NEUROLOGICAL SURGERY

## 2022-04-25 PROCEDURE — 61783 SCAN PROC SPINAL: CPT | Performed by: NEUROLOGICAL SURGERY

## 2022-04-25 PROCEDURE — 10002803 HB RX 637

## 2022-04-25 PROCEDURE — 10002801 HB RX 250 W/O HCPCS: Performed by: ANESTHESIOLOGY

## 2022-04-25 PROCEDURE — 85018 HEMOGLOBIN: CPT

## 2022-04-25 PROCEDURE — 0QH104Z INSERTION OF INTERNAL FIXATION DEVICE INTO SACRUM, OPEN APPROACH: ICD-10-PCS | Performed by: NEUROLOGICAL SURGERY

## 2022-04-25 PROCEDURE — 22212 INCIS 1 VERTEBRAL SEG THORAC: CPT | Performed by: NEUROLOGICAL SURGERY

## 2022-04-25 PROCEDURE — 0QP004Z REMOVAL OF INTERNAL FIXATION DEVICE FROM LUMBAR VERTEBRA, OPEN APPROACH: ICD-10-PCS | Performed by: NEUROLOGICAL SURGERY

## 2022-04-25 PROCEDURE — 71045 X-RAY EXAM CHEST 1 VIEW: CPT

## 2022-04-25 PROCEDURE — 10006023 HB SUPPLY 272: Performed by: NEUROLOGICAL SURGERY

## 2022-04-25 PROCEDURE — 0PS404Z REPOSITION THORACIC VERTEBRA WITH INTERNAL FIXATION DEVICE, OPEN APPROACH: ICD-10-PCS | Performed by: NEUROLOGICAL SURGERY

## 2022-04-25 PROCEDURE — 82947 ASSAY GLUCOSE BLOOD QUANT: CPT

## 2022-04-25 PROCEDURE — 72170 X-RAY EXAM OF PELVIS: CPT

## 2022-04-25 PROCEDURE — 22848 INSERT PELV FIXATION DEVICE: CPT | Performed by: NEUROLOGICAL SURGERY

## 2022-04-25 PROCEDURE — 63046 LAM FACETEC & FORAMOT THRC: CPT | Performed by: NEUROLOGICAL SURGERY

## 2022-04-25 PROCEDURE — 76000 FLUOROSCOPY <1 HR PHYS/QHP: CPT

## 2022-04-25 PROCEDURE — 10002800 HB RX 250 W HCPCS: Performed by: NEUROLOGICAL SURGERY

## 2022-04-25 PROCEDURE — 0PH404Z INSERTION OF INTERNAL FIXATION DEVICE INTO THORACIC VERTEBRA, OPEN APPROACH: ICD-10-PCS | Performed by: NEUROLOGICAL SURGERY

## 2022-04-25 PROCEDURE — 0QP104Z REMOVAL OF INTERNAL FIXATION DEVICE FROM SACRUM, OPEN APPROACH: ICD-10-PCS | Performed by: NEUROLOGICAL SURGERY

## 2022-04-25 PROCEDURE — 0QS004Z REPOSITION LUMBAR VERTEBRA WITH INTERNAL FIXATION DEVICE, OPEN APPROACH: ICD-10-PCS | Performed by: NEUROLOGICAL SURGERY

## 2022-04-25 PROCEDURE — 86923 COMPATIBILITY TEST ELECTRIC: CPT

## 2022-04-25 PROCEDURE — 13000059 HB CELL SAVER

## 2022-04-25 PROCEDURE — 22830 EXPLORATION OF SPINAL FUSION: CPT | Performed by: NEUROLOGICAL SURGERY

## 2022-04-25 PROCEDURE — 36415 COLL VENOUS BLD VENIPUNCTURE: CPT | Performed by: NEUROLOGICAL SURGERY

## 2022-04-25 PROCEDURE — 13000002 HB ANESTHESIA  GENERAL  S/U + 1ST 15 MIN: Performed by: NEUROLOGICAL SURGERY

## 2022-04-25 PROCEDURE — 86901 BLOOD TYPING SEROLOGIC RH(D): CPT | Performed by: NURSE PRACTITIONER

## 2022-04-25 PROCEDURE — 99291 CRITICAL CARE FIRST HOUR: CPT

## 2022-04-25 PROCEDURE — 72131 CT LUMBAR SPINE W/O DYE: CPT

## 2022-04-25 PROCEDURE — 72128 CT CHEST SPINE W/O DYE: CPT

## 2022-04-25 PROCEDURE — 13003289 HB OXYGEN THERAPY DAILY

## 2022-04-25 PROCEDURE — 10006027 HB SUPPLY 278: Performed by: NEUROLOGICAL SURGERY

## 2022-04-25 PROCEDURE — 00NX0ZZ RELEASE THORACIC SPINAL CORD, OPEN APPROACH: ICD-10-PCS | Performed by: NEUROLOGICAL SURGERY

## 2022-04-25 PROCEDURE — 82435 ASSAY OF BLOOD CHLORIDE: CPT

## 2022-04-25 PROCEDURE — 80048 BASIC METABOLIC PNL TOTAL CA: CPT | Performed by: NURSE PRACTITIONER

## 2022-04-25 PROCEDURE — 10000008 HB ROOM CHARGE ICU OR CCU

## 2022-04-25 PROCEDURE — P9016 RBC LEUKOCYTES REDUCED: HCPCS

## 2022-04-25 PROCEDURE — 85025 COMPLETE CBC W/AUTO DIFF WBC: CPT | Performed by: NURSE PRACTITIONER

## 2022-04-25 PROCEDURE — 0QH004Z INSERTION OF INTERNAL FIXATION DEVICE INTO LUMBAR VERTEBRA, OPEN APPROACH: ICD-10-PCS | Performed by: NEUROLOGICAL SURGERY

## 2022-04-25 PROCEDURE — 10002801 HB RX 250 W/O HCPCS: Performed by: NEUROLOGICAL SURGERY

## 2022-04-25 DEVICE — SCREW SET BRK OFF REDUCTION TI NS SPINE 5.56 MM ROD: Type: IMPLANTABLE DEVICE | Site: BACK | Status: FUNCTIONAL

## 2022-04-25 DEVICE — IMPLANTABLE DEVICE: Type: IMPLANTABLE DEVICE | Site: BACK | Status: FUNCTIONAL

## 2022-04-25 DEVICE — SCREW SET INFINITY M6 NS SPINE OCCIPITOCERVICAL UPR THOR LF: Type: IMPLANTABLE DEVICE | Site: BACK | Status: FUNCTIONAL

## 2022-04-25 DEVICE — SCREW BN 4MM 30MM INFINITY MULTIAXIAL NS SPINE LF: Type: IMPLANTABLE DEVICE | Site: BACK | Status: FUNCTIONAL

## 2022-04-25 DEVICE — SCREW BN 6.5MM 45MM MULTIAXIAL STRL SPINE 5.56MM ROD: Type: IMPLANTABLE DEVICE | Site: BACK | Status: FUNCTIONAL

## 2022-04-25 DEVICE — SCREW BN 5.5MM 40MM SOLERA CD HZN MULTIAXIAL COCR NS SPINE: Type: IMPLANTABLE DEVICE | Site: BACK | Status: FUNCTIONAL

## 2022-04-25 DEVICE — SCREW BN 7.5MM 45MM MULTIAXIAL REDUCTION SPINE 5.56MM ROD: Type: IMPLANTABLE DEVICE | Site: BACK | Status: FUNCTIONAL

## 2022-04-25 DEVICE — SCREW BN 6.5MM 45MM CD HZN SOLERA MULTIAXIAL TI COCR NS: Type: IMPLANTABLE DEVICE | Site: BACK | Status: FUNCTIONAL

## 2022-04-25 DEVICE — SCREW BN 4MM 12MM INFINITY MULTIAXIAL NS SPINE LF: Type: IMPLANTABLE DEVICE | Site: BACK | Status: FUNCTIONAL

## 2022-04-25 DEVICE — SCREW SET CD HZN BRK OFF TI NS SPINE 5.5 MM ROD: Type: IMPLANTABLE DEVICE | Site: BACK | Status: FUNCTIONAL

## 2022-04-25 DEVICE — SCREW BN 4MM 14MM INFINITY MULTIAXIAL NS SPINE LF: Type: IMPLANTABLE DEVICE | Site: BACK | Status: FUNCTIONAL

## 2022-04-25 RX ORDER — ASCORBIC ACID 500 MG
1000 TABLET ORAL
Status: DISCONTINUED | OUTPATIENT
Start: 2022-04-26 | End: 2022-05-07 | Stop reason: HOSPADM

## 2022-04-25 RX ORDER — HYDROMORPHONE HCL IN 0.9% NACL 0.2 MG/ML
PLASTIC BAG, INJECTION (ML) INTRAVENOUS CONTINUOUS
Status: DISCONTINUED | OUTPATIENT
Start: 2022-04-25 | End: 2022-05-02 | Stop reason: ALTCHOICE

## 2022-04-25 RX ORDER — ACETAMINOPHEN 500 MG
1000 TABLET ORAL
Status: COMPLETED | OUTPATIENT
Start: 2022-04-25 | End: 2022-04-25

## 2022-04-25 RX ORDER — VANCOMYCIN HYDROCHLORIDE 1 G/20ML
INJECTION, POWDER, LYOPHILIZED, FOR SOLUTION INTRAVENOUS PRN
Status: DISCONTINUED | OUTPATIENT
Start: 2022-04-25 | End: 2022-04-25 | Stop reason: HOSPADM

## 2022-04-25 RX ORDER — PROPOFOL 10 MG/ML
INJECTION, EMULSION INTRAVENOUS PRN
Status: DISCONTINUED | OUTPATIENT
Start: 2022-04-25 | End: 2022-04-25

## 2022-04-25 RX ORDER — BUPIVACAINE HYDROCHLORIDE AND EPINEPHRINE 2.5; 5 MG/ML; UG/ML
INJECTION, SOLUTION EPIDURAL; INFILTRATION; INTRACAUDAL; PERINEURAL PRN
Status: DISCONTINUED | OUTPATIENT
Start: 2022-04-25 | End: 2022-04-25 | Stop reason: HOSPADM

## 2022-04-25 RX ORDER — MIDAZOLAM HYDROCHLORIDE 2 MG/2ML
INJECTION, SOLUTION INTRAMUSCULAR; INTRAVENOUS
Status: DISPENSED
Start: 2022-04-25 | End: 2022-04-25

## 2022-04-25 RX ORDER — LIDOCAINE HYDROCHLORIDE 10 MG/ML
5-10 INJECTION, SOLUTION INFILTRATION; PERINEURAL PRN
Status: DISCONTINUED | OUTPATIENT
Start: 2022-04-25 | End: 2022-04-25 | Stop reason: HOSPADM

## 2022-04-25 RX ORDER — AMOXICILLIN 250 MG
2 CAPSULE ORAL 2 TIMES DAILY
Status: DISCONTINUED | OUTPATIENT
Start: 2022-04-25 | End: 2022-05-07 | Stop reason: HOSPADM

## 2022-04-25 RX ORDER — HYDRALAZINE HYDROCHLORIDE 20 MG/ML
5 INJECTION INTRAMUSCULAR; INTRAVENOUS EVERY 10 MIN PRN
Status: DISCONTINUED | OUTPATIENT
Start: 2022-04-25 | End: 2022-04-25 | Stop reason: HOSPADM

## 2022-04-25 RX ORDER — ONDANSETRON 2 MG/ML
4 INJECTION INTRAMUSCULAR; INTRAVENOUS EVERY 12 HOURS PRN
Status: DISCONTINUED | OUTPATIENT
Start: 2022-04-25 | End: 2022-05-07 | Stop reason: HOSPADM

## 2022-04-25 RX ORDER — NOREPINEPHRINE BITARTRATE 0.03 MG/ML
0-30 INJECTION, SOLUTION INTRAVENOUS CONTINUOUS
Status: DISCONTINUED | OUTPATIENT
Start: 2022-04-25 | End: 2022-04-29

## 2022-04-25 RX ORDER — GABAPENTIN 300 MG/1
300 CAPSULE ORAL EVERY 8 HOURS SCHEDULED
Status: DISCONTINUED | OUTPATIENT
Start: 2022-04-25 | End: 2022-05-07 | Stop reason: HOSPADM

## 2022-04-25 RX ORDER — SODIUM CHLORIDE 9 MG/ML
INJECTION, SOLUTION INTRAVENOUS CONTINUOUS
Status: DISCONTINUED | OUTPATIENT
Start: 2022-04-25 | End: 2022-04-25 | Stop reason: HOSPADM

## 2022-04-25 RX ORDER — DIAZEPAM 5 MG/1
5 TABLET ORAL EVERY 8 HOURS PRN
Status: DISCONTINUED | OUTPATIENT
Start: 2022-04-25 | End: 2022-04-28

## 2022-04-25 RX ORDER — DIPHENHYDRAMINE HYDROCHLORIDE 50 MG/ML
25 INJECTION INTRAMUSCULAR; INTRAVENOUS PRN
Status: DISCONTINUED | OUTPATIENT
Start: 2022-04-25 | End: 2022-04-25 | Stop reason: HOSPADM

## 2022-04-25 RX ORDER — NALOXONE HCL 0.4 MG/ML
0.1 VIAL (ML) INJECTION PRN
Status: DISCONTINUED | OUTPATIENT
Start: 2022-04-25 | End: 2022-05-07 | Stop reason: HOSPADM

## 2022-04-25 RX ORDER — FAMOTIDINE 20 MG/1
20 TABLET, FILM COATED ORAL
Status: COMPLETED | OUTPATIENT
Start: 2022-04-25 | End: 2022-04-25

## 2022-04-25 RX ORDER — HYDROCODONE BITARTRATE AND ACETAMINOPHEN 5; 325 MG/1; MG/1
1 TABLET ORAL EVERY 4 HOURS PRN
Status: DISCONTINUED | OUTPATIENT
Start: 2022-04-25 | End: 2022-04-25

## 2022-04-25 RX ORDER — NALBUPHINE HYDROCHLORIDE 10 MG/ML
2.5 INJECTION, SOLUTION INTRAMUSCULAR; INTRAVENOUS; SUBCUTANEOUS EVERY 8 HOURS PRN
Status: DISCONTINUED | OUTPATIENT
Start: 2022-04-25 | End: 2022-05-07 | Stop reason: HOSPADM

## 2022-04-25 RX ORDER — SODIUM CHLORIDE, SODIUM LACTATE, POTASSIUM CHLORIDE, CALCIUM CHLORIDE 600; 310; 30; 20 MG/100ML; MG/100ML; MG/100ML; MG/100ML
INJECTION, SOLUTION INTRAVENOUS CONTINUOUS
Status: DISCONTINUED | OUTPATIENT
Start: 2022-04-25 | End: 2022-04-25 | Stop reason: HOSPADM

## 2022-04-25 RX ORDER — BACITRACIN ZINC 500 [USP'U]/G
OINTMENT TOPICAL DAILY
Status: DISCONTINUED | OUTPATIENT
Start: 2022-04-25 | End: 2022-04-28

## 2022-04-25 RX ORDER — ALBUMIN, HUMAN INJ 5% 5 %
SOLUTION INTRAVENOUS CONTINUOUS PRN
Status: DISCONTINUED | OUTPATIENT
Start: 2022-04-25 | End: 2022-04-25

## 2022-04-25 RX ORDER — MIDAZOLAM HYDROCHLORIDE 1 MG/ML
INJECTION, SOLUTION INTRAMUSCULAR; INTRAVENOUS PRN
Status: DISCONTINUED | OUTPATIENT
Start: 2022-04-25 | End: 2022-04-25

## 2022-04-25 RX ORDER — 0.9 % SODIUM CHLORIDE 0.9 %
2 VIAL (ML) INJECTION EVERY 12 HOURS SCHEDULED
Status: DISCONTINUED | OUTPATIENT
Start: 2022-04-25 | End: 2022-04-25 | Stop reason: HOSPADM

## 2022-04-25 RX ORDER — SODIUM CHLORIDE, SODIUM LACTATE, POTASSIUM CHLORIDE, CALCIUM CHLORIDE 600; 310; 30; 20 MG/100ML; MG/100ML; MG/100ML; MG/100ML
INJECTION, SOLUTION INTRAVENOUS CONTINUOUS PRN
Status: DISCONTINUED | OUTPATIENT
Start: 2022-04-25 | End: 2022-04-25

## 2022-04-25 RX ORDER — ONDANSETRON 2 MG/ML
INJECTION INTRAMUSCULAR; INTRAVENOUS PRN
Status: DISCONTINUED | OUTPATIENT
Start: 2022-04-25 | End: 2022-04-25

## 2022-04-25 RX ORDER — SODIUM CHLORIDE 9 MG/ML
INJECTION, SOLUTION INTRAVENOUS CONTINUOUS
Status: DISCONTINUED | OUTPATIENT
Start: 2022-04-25 | End: 2022-04-30

## 2022-04-25 RX ORDER — ALBUTEROL SULFATE 2.5 MG/3ML
5 SOLUTION RESPIRATORY (INHALATION) ONCE
Status: DISCONTINUED | OUTPATIENT
Start: 2022-04-25 | End: 2022-04-25 | Stop reason: HOSPADM

## 2022-04-25 RX ORDER — ENOXAPARIN SODIUM 100 MG/ML
40 INJECTION SUBCUTANEOUS DAILY
Status: COMPLETED | OUTPATIENT
Start: 2022-04-26 | End: 2022-04-27

## 2022-04-25 RX ORDER — DEXTROSE MONOHYDRATE 25 G/50ML
25 INJECTION, SOLUTION INTRAVENOUS PRN
Status: DISCONTINUED | OUTPATIENT
Start: 2022-04-25 | End: 2022-04-25 | Stop reason: HOSPADM

## 2022-04-25 RX ORDER — SODIUM CHLORIDE 9 MG/ML
INJECTION, SOLUTION INTRAVENOUS CONTINUOUS PRN
Status: DISCONTINUED | OUTPATIENT
Start: 2022-04-25 | End: 2022-04-25

## 2022-04-25 RX ORDER — MAGNESIUM HYDROXIDE/ALUMINUM HYDROXICE/SIMETHICONE 120; 1200; 1200 MG/30ML; MG/30ML; MG/30ML
30 SUSPENSION ORAL EVERY 4 HOURS PRN
Status: DISCONTINUED | OUTPATIENT
Start: 2022-04-25 | End: 2022-05-07 | Stop reason: HOSPADM

## 2022-04-25 RX ORDER — GLYCOPYRROLATE 0.2 MG/ML
INJECTION, SOLUTION INTRAMUSCULAR; INTRAVENOUS PRN
Status: DISCONTINUED | OUTPATIENT
Start: 2022-04-25 | End: 2022-04-25

## 2022-04-25 RX ORDER — ACETAMINOPHEN 325 MG/1
650 TABLET ORAL EVERY 8 HOURS PRN
Status: DISCONTINUED | OUTPATIENT
Start: 2022-04-25 | End: 2022-05-07 | Stop reason: HOSPADM

## 2022-04-25 RX ORDER — HYDROCODONE BITARTRATE AND ACETAMINOPHEN 10; 325 MG/1; MG/1
1 TABLET ORAL EVERY 4 HOURS PRN
Status: DISCONTINUED | OUTPATIENT
Start: 2022-04-25 | End: 2022-04-25

## 2022-04-25 RX ORDER — 0.9 % SODIUM CHLORIDE 0.9 %
2 VIAL (ML) INJECTION EVERY 12 HOURS SCHEDULED
Status: DISCONTINUED | OUTPATIENT
Start: 2022-04-25 | End: 2022-05-07 | Stop reason: HOSPADM

## 2022-04-25 RX ORDER — HUMAN INSULIN 100 [IU]/ML
INJECTION, SOLUTION SUBCUTANEOUS
Status: DISCONTINUED | OUTPATIENT
Start: 2022-04-25 | End: 2022-04-25 | Stop reason: HOSPADM

## 2022-04-25 RX ORDER — BISACODYL 10 MG
10 SUPPOSITORY, RECTAL RECTAL DAILY PRN
Status: DISCONTINUED | OUTPATIENT
Start: 2022-04-25 | End: 2022-05-04

## 2022-04-25 RX ORDER — CELECOXIB 200 MG/1
200 CAPSULE ORAL
Status: COMPLETED | OUTPATIENT
Start: 2022-04-25 | End: 2022-04-25

## 2022-04-25 RX ORDER — KETAMINE HYDROCHLORIDE 50 MG/ML
INJECTION, SOLUTION, CONCENTRATE INTRAMUSCULAR; INTRAVENOUS PRN
Status: DISCONTINUED | OUTPATIENT
Start: 2022-04-25 | End: 2022-04-25

## 2022-04-25 RX ORDER — ROCURONIUM BROMIDE 10 MG/ML
INJECTION, SOLUTION INTRAVENOUS PRN
Status: DISCONTINUED | OUTPATIENT
Start: 2022-04-25 | End: 2022-04-25

## 2022-04-25 RX ORDER — DEXTROSE MONOHYDRATE 50 MG/ML
INJECTION, SOLUTION INTRAVENOUS CONTINUOUS PRN
Status: DISCONTINUED | OUTPATIENT
Start: 2022-04-25 | End: 2022-04-28

## 2022-04-25 RX ORDER — NEOSTIGMINE METHYLSULFATE 4 MG/4 ML
SYRINGE (ML) INTRAVENOUS PRN
Status: DISCONTINUED | OUTPATIENT
Start: 2022-04-25 | End: 2022-04-25

## 2022-04-25 RX ORDER — GABAPENTIN 300 MG/1
300 CAPSULE ORAL
Status: DISCONTINUED | OUTPATIENT
Start: 2022-04-25 | End: 2022-04-25 | Stop reason: HOSPADM

## 2022-04-25 RX ORDER — CHLORHEXIDINE GLUCONATE ORAL RINSE 1.2 MG/ML
15 SOLUTION DENTAL
Status: COMPLETED | OUTPATIENT
Start: 2022-04-25 | End: 2022-04-25

## 2022-04-25 RX ORDER — KETAMINE HCL IN NACL, ISO-OSM 100MG/10ML
SYRINGE (ML) INJECTION PRN
Status: DISCONTINUED | OUTPATIENT
Start: 2022-04-25 | End: 2022-04-25

## 2022-04-25 RX ORDER — PROCHLORPERAZINE EDISYLATE 5 MG/ML
5 INJECTION INTRAMUSCULAR; INTRAVENOUS
Status: DISCONTINUED | OUTPATIENT
Start: 2022-04-25 | End: 2022-04-25 | Stop reason: HOSPADM

## 2022-04-25 RX ORDER — NALOXONE HCL 0.4 MG/ML
0.2 VIAL (ML) INJECTION EVERY 5 MIN PRN
Status: DISCONTINUED | OUTPATIENT
Start: 2022-04-25 | End: 2022-04-25 | Stop reason: HOSPADM

## 2022-04-25 RX ORDER — ONDANSETRON 2 MG/ML
4 INJECTION INTRAMUSCULAR; INTRAVENOUS PRN
Status: DISCONTINUED | OUTPATIENT
Start: 2022-04-25 | End: 2022-04-25 | Stop reason: HOSPADM

## 2022-04-25 RX ORDER — ZINC SULFATE 50(220)MG
220 CAPSULE ORAL
Status: DISCONTINUED | OUTPATIENT
Start: 2022-04-25 | End: 2022-05-07 | Stop reason: HOSPADM

## 2022-04-25 RX ORDER — DEXAMETHASONE SODIUM PHOSPHATE 4 MG/ML
INJECTION, SOLUTION INTRA-ARTICULAR; INTRALESIONAL; INTRAMUSCULAR; INTRAVENOUS; SOFT TISSUE PRN
Status: DISCONTINUED | OUTPATIENT
Start: 2022-04-25 | End: 2022-04-25

## 2022-04-25 RX ADMIN — SODIUM CHLORIDE: 9 INJECTION, SOLUTION INTRAVENOUS at 10:31

## 2022-04-25 RX ADMIN — SENNOSIDES AND DOCUSATE SODIUM 2 TABLET: 50; 8.6 TABLET ORAL at 22:05

## 2022-04-25 RX ADMIN — TRANEXAMIC ACID 3000 MG: 1 INJECTION, SOLUTION INTRAVENOUS at 08:49

## 2022-04-25 RX ADMIN — CHLORHEXIDINE GLUCONATE 15 ML: 1.2 RINSE ORAL at 06:00

## 2022-04-25 RX ADMIN — Medication 10 MG: at 13:25

## 2022-04-25 RX ADMIN — REMIFENTANIL HYDROCHLORIDE 0.2 MCG/KG/MIN: 1 INJECTION, POWDER, LYOPHILIZED, FOR SOLUTION INTRAVENOUS at 07:51

## 2022-04-25 RX ADMIN — CELECOXIB 200 MG: 200 CAPSULE ORAL at 06:00

## 2022-04-25 RX ADMIN — ALBUMIN HUMAN: 0.05 INJECTION, SOLUTION INTRAVENOUS at 12:11

## 2022-04-25 RX ADMIN — GLYCOPYRROLATE 0.6 MG: 0.2 INJECTION, SOLUTION INTRAMUSCULAR; INTRAVENOUS at 11:40

## 2022-04-25 RX ADMIN — PROPOFOL 75 MCG/KG/MIN: 10 INJECTION, EMULSION INTRAVENOUS at 08:01

## 2022-04-25 RX ADMIN — FENTANYL CITRATE 100 MCG: 50 INJECTION, SOLUTION INTRAMUSCULAR; INTRAVENOUS at 07:00

## 2022-04-25 RX ADMIN — DEXAMETHASONE SODIUM PHOSPHATE 10 MG: 4 INJECTION, SOLUTION INTRAMUSCULAR; INTRAVENOUS at 12:32

## 2022-04-25 RX ADMIN — VANCOMYCIN HYDROCHLORIDE 1000 MG: 1 INJECTION, POWDER, LYOPHILIZED, FOR SOLUTION INTRAVENOUS at 22:59

## 2022-04-25 RX ADMIN — Medication 10 MG: at 11:45

## 2022-04-25 RX ADMIN — ROCURONIUM BROMIDE 50 MG: 10 INJECTION INTRAVENOUS at 09:27

## 2022-04-25 RX ADMIN — PROPOFOL 100 MG: 10 INJECTION, EMULSION INTRAVENOUS at 17:24

## 2022-04-25 RX ADMIN — SODIUM CHLORIDE: 9 INJECTION, SOLUTION INTRAVENOUS at 18:55

## 2022-04-25 RX ADMIN — HYDROMORPHONE HYDROCHLORIDE 1 MG: 1 INJECTION, SOLUTION INTRAMUSCULAR; INTRAVENOUS; SUBCUTANEOUS at 16:39

## 2022-04-25 RX ADMIN — HYDROMORPHONE HYDROCHLORIDE 1 MG: 1 INJECTION, SOLUTION INTRAMUSCULAR; INTRAVENOUS; SUBCUTANEOUS at 16:52

## 2022-04-25 RX ADMIN — FAMOTIDINE 20 MG: 20 TABLET, FILM COATED ORAL at 06:00

## 2022-04-25 RX ADMIN — HYDROMORPHONE HYDROCHLORIDE 0.2 MG: 1 INJECTION, SOLUTION INTRAMUSCULAR; INTRAVENOUS; SUBCUTANEOUS at 20:32

## 2022-04-25 RX ADMIN — BACITRACIN ZINC: 500 OINTMENT TOPICAL at 22:04

## 2022-04-25 RX ADMIN — ACETAMINOPHEN 1000 MG: 500 TABLET ORAL at 06:00

## 2022-04-25 RX ADMIN — ALBUMIN HUMAN: 0.05 INJECTION, SOLUTION INTRAVENOUS at 15:29

## 2022-04-25 RX ADMIN — Medication 10 MG: at 12:45

## 2022-04-25 RX ADMIN — Medication 4 MG: at 11:40

## 2022-04-25 RX ADMIN — PROPOFOL 200 MG: 10 INJECTION, EMULSION INTRAVENOUS at 08:01

## 2022-04-25 RX ADMIN — TRANEXAMIC ACID 10 MG/KG/HR: 1 INJECTION, SOLUTION INTRAVENOUS at 09:03

## 2022-04-25 RX ADMIN — Medication 10 MG: at 10:11

## 2022-04-25 RX ADMIN — Medication 50 MG: at 09:20

## 2022-04-25 RX ADMIN — ONDANSETRON 4 MG: 2 INJECTION INTRAMUSCULAR; INTRAVENOUS at 17:16

## 2022-04-25 RX ADMIN — Medication 25 MCG: at 22:04

## 2022-04-25 RX ADMIN — Medication 10 MG: at 15:00

## 2022-04-25 RX ADMIN — ZINC SULFATE 220 MG (50 MG) CAPSULE 220 MG: CAPSULE at 22:04

## 2022-04-25 RX ADMIN — ROCURONIUM BROMIDE 20 MG: 10 INJECTION INTRAVENOUS at 10:21

## 2022-04-25 RX ADMIN — SODIUM CHLORIDE, PRESERVATIVE FREE 2 ML: 5 INJECTION INTRAVENOUS at 22:51

## 2022-04-25 RX ADMIN — Medication: at 22:25

## 2022-04-25 RX ADMIN — VANCOMYCIN HYDROCHLORIDE 1500 MG: 10 INJECTION, POWDER, LYOPHILIZED, FOR SOLUTION INTRAVENOUS at 06:47

## 2022-04-25 RX ADMIN — HYDROCODONE BITARTRATE AND ACETAMINOPHEN 1 TABLET: 10; 325 TABLET ORAL at 20:26

## 2022-04-25 RX ADMIN — GABAPENTIN 300 MG: 300 CAPSULE ORAL at 22:04

## 2022-04-25 RX ADMIN — PHENYLEPHRINE HYDROCHLORIDE 20 MCG/MIN: 10 INJECTION, SOLUTION INTRAMUSCULAR; INTRAVENOUS; SUBCUTANEOUS at 08:06

## 2022-04-25 RX ADMIN — DEXAMETHASONE SODIUM PHOSPHATE 8 MG: 4 INJECTION, SOLUTION INTRAMUSCULAR; INTRAVENOUS at 09:01

## 2022-04-25 RX ADMIN — SODIUM CHLORIDE: 9 INJECTION, SOLUTION INTRAVENOUS at 22:52

## 2022-04-25 RX ADMIN — SODIUM CHLORIDE, POTASSIUM CHLORIDE, SODIUM LACTATE AND CALCIUM CHLORIDE: 600; 310; 30; 20 INJECTION, SOLUTION INTRAVENOUS at 07:51

## 2022-04-25 RX ADMIN — ALBUMIN HUMAN: 0.05 INJECTION, SOLUTION INTRAVENOUS at 11:58

## 2022-04-25 RX ADMIN — MIDAZOLAM HYDROCHLORIDE 2 MG: 1 INJECTION, SOLUTION INTRAMUSCULAR; INTRAVENOUS at 07:00

## 2022-04-25 SDOH — SOCIAL STABILITY: SOCIAL INSECURITY: RISK FACTORS: NEUROLOGICAL DISEASE

## 2022-04-25 ASSESSMENT — PAIN SCALES - GENERAL
PAINLEVEL_OUTOF10: 7
PAINLEVEL_OUTOF10: 10
PAINLEVEL_OUTOF10: 0
PAINLEVEL_OUTOF10: 10
PAINLEVEL_OUTOF10: 8
PAINLEVEL_OUTOF10: 0
PAINLEVEL_OUTOF10: 8

## 2022-04-25 NOTE — TELEPHONE ENCOUNTER
----- Message from Georgie Whitehead MD sent at 4/25/2022 11:13 AM CDT -----  Labs and urine reviewed - Preop note addended - please fax over preop to surgeon office.  Thank you ~ MM

## 2022-04-25 NOTE — TELEPHONE ENCOUNTER
Labs completed 04/20/2022 and pre-op office visit note 04/14/2022    Faxed to Dr. Steve Morelos office [facility]#[phone]    [facility] sent to pt regarding note above

## 2022-04-26 ENCOUNTER — APPOINTMENT (OUTPATIENT)
Dept: GENERAL RADIOLOGY | Age: 47
DRG: 303 | End: 2022-04-26
Attending: FAMILY MEDICINE

## 2022-04-26 LAB
ANION GAP SERPL CALC-SCNC: 10 MMOL/L (ref 10–20)
ASR DISCLAIMER: NORMAL
BASOPHILS # BLD: 0 K/MCL (ref 0–0.3)
BASOPHILS NFR BLD: 0 %
BUN SERPL-MCNC: 13 MG/DL (ref 6–20)
BUN/CREAT SERPL: 23 (ref 7–25)
CALCIUM SERPL-MCNC: 7.8 MG/DL (ref 8.4–10.2)
CASE RPRT: NORMAL
CHLORIDE SERPL-SCNC: 113 MMOL/L (ref 98–107)
CLINICAL INFO: NORMAL
CO2 SERPL-SCNC: 21 MMOL/L (ref 21–32)
CREAT SERPL-MCNC: 0.56 MG/DL (ref 0.51–0.95)
DEPRECATED RDW RBC: 47.4 FL (ref 39–50)
EOSINOPHIL # BLD: 0 K/MCL (ref 0–0.5)
EOSINOPHIL NFR BLD: 0 %
ERYTHROCYTE [DISTWIDTH] IN BLOOD: 17.7 % (ref 11–15)
FASTING DURATION TIME PATIENT: ABNORMAL H
GFR SERPLBLD BASED ON 1.73 SQ M-ARVRAT: >90 ML/MIN
GLUCOSE SERPL-MCNC: 128 MG/DL (ref 70–99)
HCT VFR BLD CALC: 28.3 % (ref 36–46.5)
HGB BLD-MCNC: 8.9 G/DL (ref 12–15.5)
IMM GRANULOCYTES # BLD AUTO: 0.1 K/MCL (ref 0–0.2)
IMM GRANULOCYTES # BLD: 1 %
LYMPHOCYTES # BLD: 0.9 K/MCL (ref 1–4.8)
LYMPHOCYTES NFR BLD: 6 %
MCH RBC QN AUTO: 23.5 PG (ref 26–34)
MCHC RBC AUTO-ENTMCNC: 31.4 G/DL (ref 32–36.5)
MCV RBC AUTO: 74.7 FL (ref 78–100)
MONOCYTES # BLD: 0.9 K/MCL (ref 0.3–0.9)
MONOCYTES NFR BLD: 6 %
NEUTROPHILS # BLD: 13.8 K/MCL (ref 1.8–7.7)
NEUTROPHILS NFR BLD: 87 %
NRBC BLD MANUAL-RTO: 0 /100 WBC
PATH REPORT.FINAL DX SPEC: NORMAL
PATH REPORT.GROSS SPEC: NORMAL
PLATELET # BLD AUTO: 178 K/MCL (ref 140–450)
POTASSIUM SERPL-SCNC: 4.1 MMOL/L (ref 3.4–5.1)
RBC # BLD: 3.79 MIL/MCL (ref 4–5.2)
SODIUM SERPL-SCNC: 140 MMOL/L (ref 135–145)
WBC # BLD: 15.8 K/MCL (ref 4.2–11)

## 2022-04-26 PROCEDURE — 10004651 HB RX, NO CHARGE ITEM: Performed by: NURSE PRACTITIONER

## 2022-04-26 PROCEDURE — 10000008 HB ROOM CHARGE ICU OR CCU

## 2022-04-26 PROCEDURE — 13003289 HB OXYGEN THERAPY DAILY

## 2022-04-26 PROCEDURE — 97530 THERAPEUTIC ACTIVITIES: CPT

## 2022-04-26 PROCEDURE — 10002800 HB RX 250 W HCPCS: Performed by: NURSE PRACTITIONER

## 2022-04-26 PROCEDURE — 74018 RADEX ABDOMEN 1 VIEW: CPT

## 2022-04-26 PROCEDURE — 10002803 HB RX 637: Performed by: NURSE PRACTITIONER

## 2022-04-26 PROCEDURE — 85025 COMPLETE CBC W/AUTO DIFF WBC: CPT | Performed by: NURSE PRACTITIONER

## 2022-04-26 PROCEDURE — 80048 BASIC METABOLIC PNL TOTAL CA: CPT | Performed by: NURSE PRACTITIONER

## 2022-04-26 PROCEDURE — 97162 PT EVAL MOD COMPLEX 30 MIN: CPT

## 2022-04-26 PROCEDURE — 99254 IP/OBS CNSLTJ NEW/EST MOD 60: CPT | Performed by: FAMILY MEDICINE

## 2022-04-26 PROCEDURE — 99024 POSTOP FOLLOW-UP VISIT: CPT | Performed by: NURSE PRACTITIONER

## 2022-04-26 PROCEDURE — 10002803 HB RX 637: Performed by: FAMILY MEDICINE

## 2022-04-26 PROCEDURE — 99291 CRITICAL CARE FIRST HOUR: CPT | Performed by: SURGERY

## 2022-04-26 PROCEDURE — 10002016 HB COUNTER INCENTIVE SPIROMETRY

## 2022-04-26 PROCEDURE — 97166 OT EVAL MOD COMPLEX 45 MIN: CPT

## 2022-04-26 PROCEDURE — 10002807 HB RX 258: Performed by: NURSE PRACTITIONER

## 2022-04-26 PROCEDURE — 97535 SELF CARE MNGMENT TRAINING: CPT

## 2022-04-26 PROCEDURE — 10002801 HB RX 250 W/O HCPCS: Performed by: ANESTHESIOLOGY

## 2022-04-26 RX ORDER — POLYETHYLENE GLYCOL 3350 17 G/17G
17 POWDER, FOR SOLUTION ORAL DAILY
Status: DISCONTINUED | OUTPATIENT
Start: 2022-04-26 | End: 2022-05-02

## 2022-04-26 RX ADMIN — ACETAMINOPHEN 650 MG: 325 TABLET ORAL at 09:56

## 2022-04-26 RX ADMIN — Medication 1 MCG/MIN: at 23:42

## 2022-04-26 RX ADMIN — GABAPENTIN 300 MG: 300 CAPSULE ORAL at 05:17

## 2022-04-26 RX ADMIN — SODIUM CHLORIDE, PRESERVATIVE FREE 2 ML: 5 INJECTION INTRAVENOUS at 20:29

## 2022-04-26 RX ADMIN — SENNOSIDES AND DOCUSATE SODIUM 2 TABLET: 50; 8.6 TABLET ORAL at 20:27

## 2022-04-26 RX ADMIN — OXYCODONE HYDROCHLORIDE AND ACETAMINOPHEN 1000 MG: 500 TABLET ORAL at 12:45

## 2022-04-26 RX ADMIN — OXYCODONE HYDROCHLORIDE AND ACETAMINOPHEN 1000 MG: 500 TABLET ORAL at 08:28

## 2022-04-26 RX ADMIN — Medication: at 17:51

## 2022-04-26 RX ADMIN — GABAPENTIN 300 MG: 300 CAPSULE ORAL at 20:27

## 2022-04-26 RX ADMIN — Medication: at 06:38

## 2022-04-26 RX ADMIN — POLYETHYLENE GLYCOL (3350) 17 G: 17 POWDER, FOR SOLUTION ORAL at 12:45

## 2022-04-26 RX ADMIN — DIAZEPAM 5 MG: 5 TABLET ORAL at 23:39

## 2022-04-26 RX ADMIN — ZINC SULFATE 220 MG (50 MG) CAPSULE 220 MG: CAPSULE at 08:28

## 2022-04-26 RX ADMIN — DIAZEPAM 5 MG: 5 TABLET ORAL at 14:32

## 2022-04-26 RX ADMIN — SODIUM CHLORIDE: 9 INJECTION, SOLUTION INTRAVENOUS at 22:15

## 2022-04-26 RX ADMIN — SENNOSIDES AND DOCUSATE SODIUM 2 TABLET: 50; 8.6 TABLET ORAL at 08:28

## 2022-04-26 RX ADMIN — ACETAMINOPHEN 650 MG: 325 TABLET ORAL at 01:13

## 2022-04-26 RX ADMIN — GABAPENTIN 300 MG: 300 CAPSULE ORAL at 12:45

## 2022-04-26 RX ADMIN — SODIUM CHLORIDE, PRESERVATIVE FREE 2 ML: 5 INJECTION INTRAVENOUS at 08:30

## 2022-04-26 RX ADMIN — OXYCODONE HYDROCHLORIDE AND ACETAMINOPHEN 1000 MG: 500 TABLET ORAL at 17:00

## 2022-04-26 RX ADMIN — Medication 25 MCG: at 08:28

## 2022-04-26 RX ADMIN — BACITRACIN ZINC: 500 OINTMENT TOPICAL at 08:29

## 2022-04-26 RX ADMIN — ENOXAPARIN SODIUM 40 MG: 40 INJECTION SUBCUTANEOUS at 05:18

## 2022-04-26 ASSESSMENT — ACTIVITIES OF DAILY LIVING (ADL)
CHRONIC_PAIN_PRESENT: YES, CHRONIC
EATING: INDEPENDENT
ADL_BEFORE_ADMISSION: INDEPENDENT
ADL_SCORE: 12
HOME_MANAGEMENT_TIME_ENTRY: 15
RECENT_DECLINE_ADL: NO
ADL_SHORT_OF_BREATH: NO
PRIOR_ADL: INDEPENDENT
PRIOR_ADL: MODIFIED INDEPENDENT
DESCRIBE HOW PAIN IMPACTS YOUR LIFE: MOBILITY;PHYSICAL ACTIVITY

## 2022-04-26 ASSESSMENT — LIFESTYLE VARIABLES
ALCOHOL_USE_STATUS: NO OR LOW RISK WITH VALIDATED TOOL
HOW OFTEN DO YOU HAVE A DRINK CONTAINING ALCOHOL: NEVER
HOW OFTEN DO YOU HAVE 6 OR MORE DRINKS ON ONE OCCASION: NEVER

## 2022-04-26 ASSESSMENT — PAIN SCALES - GENERAL
PAINLEVEL_OUTOF10: 5
PAINLEVEL_OUTOF10: 7
PAINLEVEL_OUTOF10: 8
PAINLEVEL_OUTOF10: 7
PAINLEVEL_OUTOF10: 7
PAINLEVEL_OUTOF10: 4
PAINLEVEL_OUTOF10: 8
PAINLEVEL_OUTOF10: 7
PAINLEVEL_OUTOF10: 8
PAINLEVEL_OUTOF10: 6
PAINLEVEL_OUTOF10: 5
PAINLEVEL_OUTOF10: 6
PAINLEVEL_OUTOF10: 6
PAINLEVEL_OUTOF10: 4

## 2022-04-26 ASSESSMENT — COGNITIVE AND FUNCTIONAL STATUS - GENERAL
DAILY_ACTIVITY_RAW_SCORE: 14
REMEMBERING TO TAKE MEDICATION: A LITTLE
DAILY_ACTIVITY_CONVERTED_SCORE: 33.39
ARE YOU BLIND OR DO YOU HAVE SERIOUS DIFFICULTY SEEING, EVEN WHEN WEARING GLASSES: NO
HELP NEEDED FOR PERSONAL GROOMING: A LITTLE
APPLIED_COGNITIVE_RAW_SCORE: 18
HELP NEEDED FOR TOILETING: A LOT
HELP NEEDED DRESSING REGULAR LOWER BODY CLOTHING: A LOT
BASIC_MOBILITY_RAW_SCORE: 14
REMEMBERING WHERE THINGS ARE: A LITTLE
APPLIED_COGNITIVE_CONVERTED_SCORE: 38.07
TAKING CARE OF COMPLICATED TASKS: A LOT
REMEMBERING 5 ERRANDS WITH NO LIST: A LOT
HELP NEEDED DRESSING REGULAR UPPER BODY CLOTHING: A LOT
ARE YOU DEAF OR DO YOU HAVE SERIOUS DIFFICULTY  HEARING: NO
HELP NEEDED FOR BATHING: A LOT
BASIC_MOBILITY_CONVERTED_SCORE: 35.55

## 2022-04-26 ASSESSMENT — ENCOUNTER SYMPTOMS: PAIN SEVERITY NOW: 3

## 2022-04-27 ENCOUNTER — ANESTHESIA EVENT (OUTPATIENT)
Dept: SURGERY | Age: 47
DRG: 303 | End: 2022-04-27

## 2022-04-27 LAB
ANION GAP SERPL CALC-SCNC: 8 MMOL/L (ref 10–20)
BASOPHILS # BLD: 0.1 K/MCL (ref 0–0.3)
BASOPHILS NFR BLD: 0 %
BLOOD EXPIRATION DATE: NORMAL
BUN SERPL-MCNC: 13 MG/DL (ref 6–20)
BUN/CREAT SERPL: 25 (ref 7–25)
CALCIUM SERPL-MCNC: 7.7 MG/DL (ref 8.4–10.2)
CHLORIDE SERPL-SCNC: 111 MMOL/L (ref 98–107)
CO2 SERPL-SCNC: 24 MMOL/L (ref 21–32)
CREAT SERPL-MCNC: 0.52 MG/DL (ref 0.51–0.95)
CROSSMATCH RESULT: NORMAL
CRP SERPL-MCNC: 14.7 MG/DL
DEPRECATED RDW RBC: 50.7 FL (ref 39–50)
DISPENSE STATUS: NORMAL
EOSINOPHIL # BLD: 0 K/MCL (ref 0–0.5)
EOSINOPHIL NFR BLD: 0 %
ERYTHROCYTE [DISTWIDTH] IN BLOOD: 18.6 % (ref 11–15)
FASTING DURATION TIME PATIENT: ABNORMAL H
GFR SERPLBLD BASED ON 1.73 SQ M-ARVRAT: >90 ML/MIN
GLUCOSE SERPL-MCNC: 124 MG/DL (ref 70–99)
HCT VFR BLD CALC: 25.7 % (ref 36–46.5)
HCT VFR BLD CALC: 29.2 % (ref 36–46.5)
HGB BLD-MCNC: 7.8 G/DL (ref 12–15.5)
HGB BLD-MCNC: 9 G/DL (ref 12–15.5)
IMM GRANULOCYTES # BLD AUTO: 0.1 K/MCL (ref 0–0.2)
IMM GRANULOCYTES # BLD: 1 %
ISBT BLOOD TYPE: 600
ISSUE DATE/TIME: NORMAL
LYMPHOCYTES # BLD: 2.3 K/MCL (ref 1–4.8)
LYMPHOCYTES NFR BLD: 15 %
MCH RBC QN AUTO: 23.1 PG (ref 26–34)
MCHC RBC AUTO-ENTMCNC: 30.4 G/DL (ref 32–36.5)
MCV RBC AUTO: 76 FL (ref 78–100)
MONOCYTES # BLD: 1.1 K/MCL (ref 0.3–0.9)
MONOCYTES NFR BLD: 7 %
MRSA DNA SPEC QL NAA+PROBE: NOT DETECTED
NEUTROPHILS # BLD: 12.1 K/MCL (ref 1.8–7.7)
NEUTROPHILS NFR BLD: 77 %
NRBC BLD MANUAL-RTO: 0 /100 WBC
PLATELET # BLD AUTO: 194 K/MCL (ref 140–450)
POTASSIUM SERPL-SCNC: 3.7 MMOL/L (ref 3.4–5.1)
PROCALCITONIN SERPL IA-MCNC: <0.05 NG/ML
PRODUCT CODE: NORMAL
PRODUCT DESCRIPTION: NORMAL
PRODUCT ID: NORMAL
RBC # BLD: 3.38 MIL/MCL (ref 4–5.2)
S AUREUS DNA SPEC QL NAA+PROBE: NOT DETECTED
SARS-COV-2 RNA RESP QL NAA+PROBE: NOT DETECTED
SERVICE CMNT-IMP: NORMAL
SERVICE CMNT-IMP: NORMAL
SODIUM SERPL-SCNC: 139 MMOL/L (ref 135–145)
UNIT BLOOD TYPE: NORMAL
UNIT NUMBER: NORMAL
WBC # BLD: 15.7 K/MCL (ref 4.2–11)

## 2022-04-27 PROCEDURE — 85025 COMPLETE CBC W/AUTO DIFF WBC: CPT | Performed by: NURSE PRACTITIONER

## 2022-04-27 PROCEDURE — 10002807 HB RX 258: Performed by: NURSE PRACTITIONER

## 2022-04-27 PROCEDURE — 10004651 HB RX, NO CHARGE ITEM: Performed by: NURSE PRACTITIONER

## 2022-04-27 PROCEDURE — 10002800 HB RX 250 W HCPCS: Performed by: NURSE PRACTITIONER

## 2022-04-27 PROCEDURE — 99233 SBSQ HOSP IP/OBS HIGH 50: CPT | Performed by: FAMILY MEDICINE

## 2022-04-27 PROCEDURE — 10002803 HB RX 637: Performed by: FAMILY MEDICINE

## 2022-04-27 PROCEDURE — 87635 SARS-COV-2 COVID-19 AMP PRB: CPT | Performed by: NEUROLOGICAL SURGERY

## 2022-04-27 PROCEDURE — 10002803 HB RX 637: Performed by: NURSE PRACTITIONER

## 2022-04-27 PROCEDURE — 10000008 HB ROOM CHARGE ICU OR CCU

## 2022-04-27 PROCEDURE — 84145 PROCALCITONIN (PCT): CPT | Performed by: FAMILY MEDICINE

## 2022-04-27 PROCEDURE — 85018 HEMOGLOBIN: CPT | Performed by: PHYSICIAN ASSISTANT

## 2022-04-27 PROCEDURE — 80048 BASIC METABOLIC PNL TOTAL CA: CPT | Performed by: SURGERY

## 2022-04-27 PROCEDURE — 87640 STAPH A DNA AMP PROBE: CPT | Performed by: NURSE PRACTITIONER

## 2022-04-27 PROCEDURE — 99291 CRITICAL CARE FIRST HOUR: CPT

## 2022-04-27 PROCEDURE — P9016 RBC LEUKOCYTES REDUCED: HCPCS

## 2022-04-27 PROCEDURE — 10002803 HB RX 637: Performed by: PHYSICIAN ASSISTANT

## 2022-04-27 PROCEDURE — 86140 C-REACTIVE PROTEIN: CPT | Performed by: FAMILY MEDICINE

## 2022-04-27 PROCEDURE — 10002803 HB RX 637

## 2022-04-27 RX ORDER — DIPHENHYDRAMINE HCL 25 MG
50 CAPSULE ORAL EVERY 6 HOURS PRN
Status: DISCONTINUED | OUTPATIENT
Start: 2022-04-27 | End: 2022-05-07 | Stop reason: HOSPADM

## 2022-04-27 RX ORDER — CELECOXIB 200 MG/1
200 CAPSULE ORAL
Status: COMPLETED | OUTPATIENT
Start: 2022-04-27 | End: 2022-04-28

## 2022-04-27 RX ORDER — DICYCLOMINE HYDROCHLORIDE 10 MG/1
10 CAPSULE ORAL 3 TIMES DAILY PRN
Status: DISCONTINUED | OUTPATIENT
Start: 2022-04-27 | End: 2022-04-30

## 2022-04-27 RX ORDER — ACETAMINOPHEN 500 MG
1000 TABLET ORAL
Status: COMPLETED | OUTPATIENT
Start: 2022-04-27 | End: 2022-04-28

## 2022-04-27 RX ORDER — HYDROCODONE BITARTRATE AND ACETAMINOPHEN 10; 325 MG/1; MG/1
1 TABLET ORAL EVERY 4 HOURS PRN
Status: DISPENSED | OUTPATIENT
Start: 2022-04-27 | End: 2022-04-28

## 2022-04-27 RX ORDER — SODIUM CHLORIDE 9 MG/ML
INJECTION, SOLUTION INTRAVENOUS CONTINUOUS PRN
Status: DISCONTINUED | OUTPATIENT
Start: 2022-04-27 | End: 2022-05-07 | Stop reason: HOSPADM

## 2022-04-27 RX ORDER — CHLORHEXIDINE GLUCONATE ORAL RINSE 1.2 MG/ML
15 SOLUTION DENTAL
Status: DISCONTINUED | OUTPATIENT
Start: 2022-04-27 | End: 2022-04-28 | Stop reason: HOSPADM

## 2022-04-27 RX ORDER — POTASSIUM CHLORIDE 20 MEQ/1
40 TABLET, EXTENDED RELEASE ORAL ONCE
Status: COMPLETED | OUTPATIENT
Start: 2022-04-27 | End: 2022-04-27

## 2022-04-27 RX ADMIN — SODIUM CHLORIDE, PRESERVATIVE FREE 2 ML: 5 INJECTION INTRAVENOUS at 09:56

## 2022-04-27 RX ADMIN — SENNOSIDES AND DOCUSATE SODIUM 2 TABLET: 50; 8.6 TABLET ORAL at 20:01

## 2022-04-27 RX ADMIN — GABAPENTIN 300 MG: 300 CAPSULE ORAL at 20:01

## 2022-04-27 RX ADMIN — DIAZEPAM 5 MG: 5 TABLET ORAL at 07:38

## 2022-04-27 RX ADMIN — GABAPENTIN 300 MG: 300 CAPSULE ORAL at 14:00

## 2022-04-27 RX ADMIN — OXYCODONE HYDROCHLORIDE AND ACETAMINOPHEN 1000 MG: 500 TABLET ORAL at 09:05

## 2022-04-27 RX ADMIN — BACITRACIN ZINC 1 EACH: 500 OINTMENT TOPICAL at 09:06

## 2022-04-27 RX ADMIN — DIAZEPAM 5 MG: 5 TABLET ORAL at 15:38

## 2022-04-27 RX ADMIN — ENOXAPARIN SODIUM 40 MG: 40 INJECTION SUBCUTANEOUS at 10:44

## 2022-04-27 RX ADMIN — OXYCODONE HYDROCHLORIDE AND ACETAMINOPHEN 1000 MG: 500 TABLET ORAL at 18:10

## 2022-04-27 RX ADMIN — HYDROCODONE BITARTRATE AND ACETAMINOPHEN 1 TABLET: 10; 325 TABLET ORAL at 10:48

## 2022-04-27 RX ADMIN — SODIUM CHLORIDE 500 ML: 9 INJECTION, SOLUTION INTRAVENOUS at 03:45

## 2022-04-27 RX ADMIN — SODIUM CHLORIDE 80 ML/HR: 9 INJECTION, SOLUTION INTRAVENOUS at 18:55

## 2022-04-27 RX ADMIN — DIAZEPAM 5 MG: 5 TABLET ORAL at 22:31

## 2022-04-27 RX ADMIN — Medication: at 18:39

## 2022-04-27 RX ADMIN — Medication 25 MCG: at 09:05

## 2022-04-27 RX ADMIN — Medication: at 05:59

## 2022-04-27 RX ADMIN — GABAPENTIN 300 MG: 300 CAPSULE ORAL at 05:14

## 2022-04-27 RX ADMIN — ACETAMINOPHEN 650 MG: 325 TABLET ORAL at 03:55

## 2022-04-27 RX ADMIN — DICYCLOMINE HYDROCHLORIDE 10 MG: 10 CAPSULE ORAL at 18:54

## 2022-04-27 RX ADMIN — HYDROCODONE BITARTRATE AND ACETAMINOPHEN 1 TABLET: 10; 325 TABLET ORAL at 20:01

## 2022-04-27 RX ADMIN — ZINC SULFATE 220 MG (50 MG) CAPSULE 220 MG: CAPSULE at 09:04

## 2022-04-27 RX ADMIN — DIPHENHYDRAMINE HCL 50 MG: 25 CAPSULE ORAL at 23:21

## 2022-04-27 RX ADMIN — Medication: at 12:12

## 2022-04-27 RX ADMIN — POLYETHYLENE GLYCOL (3350) 17 G: 17 POWDER, FOR SOLUTION ORAL at 09:05

## 2022-04-27 RX ADMIN — OXYCODONE HYDROCHLORIDE AND ACETAMINOPHEN 1000 MG: 500 TABLET ORAL at 12:27

## 2022-04-27 RX ADMIN — HYDROCODONE BITARTRATE AND ACETAMINOPHEN 1 TABLET: 10; 325 TABLET ORAL at 23:21

## 2022-04-27 RX ADMIN — HYDROCODONE BITARTRATE AND ACETAMINOPHEN 1 TABLET: 10; 325 TABLET ORAL at 15:38

## 2022-04-27 RX ADMIN — POTASSIUM CHLORIDE 40 MEQ: 1500 TABLET, EXTENDED RELEASE ORAL at 09:05

## 2022-04-27 RX ADMIN — SENNOSIDES AND DOCUSATE SODIUM 2 TABLET: 50; 8.6 TABLET ORAL at 09:04

## 2022-04-27 ASSESSMENT — PAIN SCALES - GENERAL
PAINLEVEL_OUTOF10: 6
PAINLEVEL_OUTOF10: 8
PAINLEVEL_OUTOF10: 10
PAINLEVEL_OUTOF10: 8
PAINLEVEL_OUTOF10: 6
PAINLEVEL_OUTOF10: 8
PAINLEVEL_OUTOF10: 10
PAINLEVEL_OUTOF10: 7
PAINLEVEL_OUTOF10: 8
PAINLEVEL_OUTOF10: 8

## 2022-04-27 ASSESSMENT — PAIN DESCRIPTION - PAIN TYPE: TYPE: ACUTE PAIN

## 2022-04-28 ENCOUNTER — ANESTHESIA (OUTPATIENT)
Dept: SURGERY | Age: 47
DRG: 303 | End: 2022-04-28

## 2022-04-28 ENCOUNTER — APPOINTMENT (OUTPATIENT)
Dept: GENERAL RADIOLOGY | Age: 47
DRG: 303 | End: 2022-04-28
Attending: NURSE PRACTITIONER

## 2022-04-28 ENCOUNTER — APPOINTMENT (OUTPATIENT)
Dept: GENERAL RADIOLOGY | Age: 47
DRG: 303 | End: 2022-04-28
Attending: NEUROLOGICAL SURGERY

## 2022-04-28 LAB
ANION GAP SERPL CALC-SCNC: 8 MMOL/L (ref 10–20)
ANION GAP SERPL CALC-SCNC: 9 MMOL/L (ref 10–20)
APTT PPP: 26 SEC (ref 22–30)
BASE EXCESS / DEFICIT, ARTERIAL - RESPIRATORY: -2 MMOL/L (ref -2–3)
BASE EXCESS / DEFICIT, ARTERIAL - RESPIRATORY: -3 MMOL/L (ref -2–3)
BASE EXCESS / DEFICIT, ARTERIAL - RESPIRATORY: -4 MMOL/L (ref -2–3)
BASOPHILS # BLD: 0 K/MCL (ref 0–0.3)
BASOPHILS # BLD: 0 K/MCL (ref 0–0.3)
BASOPHILS NFR BLD: 0 %
BASOPHILS NFR BLD: 0 %
BDY SITE: ABNORMAL
BLOOD EXPIRATION DATE: NORMAL
BODY TEMPERATURE: 36 DEGREES
BODY TEMPERATURE: 36.2 DEGREES
BODY TEMPERATURE: 37.4 DEGREES
BUN SERPL-MCNC: 9 MG/DL (ref 6–20)
BUN SERPL-MCNC: 9 MG/DL (ref 6–20)
BUN/CREAT SERPL: 18 (ref 7–25)
BUN/CREAT SERPL: 19 (ref 7–25)
CA-I BLD-SCNC: 1.06 MMOL/L (ref 1.15–1.29)
CA-I BLD-SCNC: 1.15 MMOL/L (ref 1.15–1.29)
CA-I BLD-SCNC: 1.15 MMOL/L (ref 1.15–1.29)
CALCIUM SERPL-MCNC: 7.7 MG/DL (ref 8.4–10.2)
CALCIUM SERPL-MCNC: 7.9 MG/DL (ref 8.4–10.2)
CHLORIDE BLD-SCNC: 109 MMOL/L (ref 98–107)
CHLORIDE BLD-SCNC: 112 MMOL/L (ref 98–107)
CHLORIDE BLD-SCNC: 114 MMOL/L (ref 98–107)
CHLORIDE SERPL-SCNC: 108 MMOL/L (ref 98–107)
CHLORIDE SERPL-SCNC: 111 MMOL/L (ref 98–107)
CO2 SERPL-SCNC: 24 MMOL/L (ref 21–32)
CO2 SERPL-SCNC: 25 MMOL/L (ref 21–32)
COHGB MFR BLDV: 1 % (ref 1.5–15)
COHGB MFR BLDV: 1.4 % (ref 1.5–15)
COHGB MFR BLDV: 1.4 % (ref 1.5–15)
CREAT SERPL-MCNC: 0.47 MG/DL (ref 0.51–0.95)
CREAT SERPL-MCNC: 0.49 MG/DL (ref 0.51–0.95)
CROSSMATCH RESULT: NORMAL
CROSSMATCH RESULT: NORMAL
DEPRECATED RDW RBC: 48.5 FL (ref 39–50)
DEPRECATED RDW RBC: 48.9 FL (ref 39–50)
DEPRECATED RDW RBC: 54.2 FL (ref 39–50)
DISPENSE STATUS: NORMAL
EOSINOPHIL # BLD: 0 K/MCL (ref 0–0.5)
EOSINOPHIL # BLD: 0.1 K/MCL (ref 0–0.5)
EOSINOPHIL NFR BLD: 0 %
EOSINOPHIL NFR BLD: 1 %
ERYTHROCYTE [DISTWIDTH] IN BLOOD: 18 % (ref 11–15)
ERYTHROCYTE [DISTWIDTH] IN BLOOD: 18.1 % (ref 11–15)
ERYTHROCYTE [DISTWIDTH] IN BLOOD: 18.7 % (ref 11–15)
FASTING DURATION TIME PATIENT: ABNORMAL H
FASTING DURATION TIME PATIENT: ABNORMAL H
GFR SERPLBLD BASED ON 1.73 SQ M-ARVRAT: >90 ML/MIN
GFR SERPLBLD BASED ON 1.73 SQ M-ARVRAT: >90 ML/MIN
GLUCOSE BLD-MCNC: 101 MG/DL (ref 65–99)
GLUCOSE BLD-MCNC: 119 MG/DL (ref 65–99)
GLUCOSE BLD-MCNC: 121 MG/DL (ref 65–99)
GLUCOSE SERPL-MCNC: 118 MG/DL (ref 70–99)
GLUCOSE SERPL-MCNC: 148 MG/DL (ref 70–99)
HCO3 BLDA-SCNC: 22 MMOL/L (ref 22–28)
HCO3 BLDA-SCNC: 23 MMOL/L (ref 22–28)
HCO3 BLDA-SCNC: 24 MMOL/L (ref 22–28)
HCT VFR BLD CALC: 23.6 % (ref 36–46.5)
HCT VFR BLD CALC: 24 % (ref 36–46.5)
HCT VFR BLD CALC: 27 % (ref 36–46.5)
HCT VFR BLD CALC: 27 % (ref 36–46.5)
HCT VFR BLD CALC: 29 % (ref 36–46.5)
HCT VFR BLD CALC: 32.2 % (ref 36–46.5)
HGB BLD-MCNC: 10.4 G/DL (ref 12–15.5)
HGB BLD-MCNC: 7.5 G/DL (ref 12–15.5)
HGB BLD-MCNC: 8.1 G/DL (ref 12–15.5)
HGB BLD-MCNC: 8.5 G/DL (ref 12–15.5)
HGB BLD-MCNC: 9.1 G/DL (ref 12–15.5)
HGB BLD-MCNC: 9.5 G/DL (ref 12–15.5)
IMM GRANULOCYTES # BLD AUTO: 0.1 K/MCL (ref 0–0.2)
IMM GRANULOCYTES # BLD AUTO: 0.2 K/MCL (ref 0–0.2)
IMM GRANULOCYTES # BLD: 1 %
IMM GRANULOCYTES # BLD: 2 %
INR PPP: 1
INR PPP: 1
ISBT BLOOD TYPE: 2800
ISBT BLOOD TYPE: 6200
ISBT BLOOD TYPE: 6200
ISSUE DATE/TIME: NORMAL
LYMPHOCYTES # BLD: 0.4 K/MCL (ref 1–4.8)
LYMPHOCYTES # BLD: 1.6 K/MCL (ref 1–4.8)
LYMPHOCYTES NFR BLD: 13 %
LYMPHOCYTES NFR BLD: 3 %
MAGNESIUM SERPL-MCNC: 2 MG/DL (ref 1.7–2.4)
MCH RBC QN AUTO: 24 PG (ref 26–34)
MCH RBC QN AUTO: 24.1 PG (ref 26–34)
MCH RBC QN AUTO: 25.8 PG (ref 26–34)
MCHC RBC AUTO-ENTMCNC: 31.5 G/DL (ref 32–36.5)
MCHC RBC AUTO-ENTMCNC: 31.8 G/DL (ref 32–36.5)
MCHC RBC AUTO-ENTMCNC: 32.3 G/DL (ref 32–36.5)
MCV RBC AUTO: 75.6 FL (ref 78–100)
MCV RBC AUTO: 76.5 FL (ref 78–100)
MCV RBC AUTO: 79.9 FL (ref 78–100)
METHGB MFR BLDMV: 1 %
METHGB MFR BLDMV: 1.3 %
METHGB MFR BLDMV: 1.5 %
MONOCYTES # BLD: 0.4 K/MCL (ref 0.3–0.9)
MONOCYTES # BLD: 1 K/MCL (ref 0.3–0.9)
MONOCYTES NFR BLD: 4 %
MONOCYTES NFR BLD: 8 %
NEUTROPHILS # BLD: 10.4 K/MCL (ref 1.8–7.7)
NEUTROPHILS # BLD: 9.3 K/MCL (ref 1.8–7.7)
NEUTROPHILS NFR BLD: 77 %
NEUTROPHILS NFR BLD: 91 %
NRBC BLD MANUAL-RTO: 0 /100 WBC
OXYHGB MFR BLDA: 96 % (ref 94–98)
OXYHGB MFR BLDA: 96.1 % (ref 94–98)
OXYHGB MFR BLDA: 96.7 % (ref 94–98)
PCO2 BLDA: 36 MM HG (ref 32–45)
PCO2 BLDA: 42 MM HG (ref 32–45)
PCO2 BLDA: 42 MM HG (ref 32–45)
PH BLDA: 7.35 UNITS (ref 7.35–7.45)
PH BLDA: 7.36 UNITS (ref 7.35–7.45)
PH BLDA: 7.37 UNITS (ref 7.35–7.45)
PHOSPHATE SERPL-MCNC: 3.7 MG/DL (ref 2.4–4.7)
PLATELET # BLD AUTO: 146 K/MCL (ref 140–450)
PLATELET # BLD AUTO: 193 K/MCL (ref 140–450)
PLATELET # BLD AUTO: 205 K/MCL (ref 140–450)
PO2 BLDA: 175 MM HG (ref 83–108)
PO2 BLDA: 187 MM HG (ref 83–108)
PO2 BLDA: 325 MM HG (ref 83–108)
POTASSIUM BLD-SCNC: 3.3 MMOL/L (ref 3.4–5.1)
POTASSIUM BLD-SCNC: 3.4 MMOL/L (ref 3.4–5.1)
POTASSIUM BLD-SCNC: 3.9 MMOL/L (ref 3.4–5.1)
POTASSIUM SERPL-SCNC: 3.6 MMOL/L (ref 3.4–5.1)
POTASSIUM SERPL-SCNC: 4 MMOL/L (ref 3.4–5.1)
PRODUCT CODE: NORMAL
PRODUCT DESCRIPTION: NORMAL
PRODUCT ID: NORMAL
PROTHROMBIN TIME: 10.6 SEC (ref 9.7–11.8)
PROTHROMBIN TIME: 10.9 SEC (ref 9.7–11.8)
RBC # BLD: 3.12 MIL/MCL (ref 4–5.2)
RBC # BLD: 3.53 MIL/MCL (ref 4–5.2)
RBC # BLD: 4.03 MIL/MCL (ref 4–5.2)
SAO2 % BLDA: 12 % (ref 15–23)
SAO2 % BLDA: 13 % (ref 15–23)
SAO2 % BLDA: 13 % (ref 15–23)
SAO2 % BLDA: 99 % (ref 95–99)
SERVICE CMNT-IMP: 483 MG/DL (ref 190–425)
SERVICE CMNT-IMP: 500 MG/DL (ref 190–425)
SODIUM BLD-SCNC: 132 MMOL/L (ref 135–145)
SODIUM BLD-SCNC: 136 MMOL/L (ref 135–145)
SODIUM BLD-SCNC: 137 MMOL/L (ref 135–145)
SODIUM SERPL-SCNC: 137 MMOL/L (ref 135–145)
SODIUM SERPL-SCNC: 140 MMOL/L (ref 135–145)
UNIT BLOOD TYPE: NORMAL
UNIT NUMBER: NORMAL
WBC # BLD: 10.6 K/MCL (ref 4.2–11)
WBC # BLD: 11.4 K/MCL (ref 4.2–11)
WBC # BLD: 12.1 K/MCL (ref 4.2–11)

## 2022-04-28 PROCEDURE — 86923 COMPATIBILITY TEST ELECTRIC: CPT

## 2022-04-28 PROCEDURE — 82435 ASSAY OF BLOOD CHLORIDE: CPT

## 2022-04-28 PROCEDURE — 10002807 HB RX 258: Performed by: NURSE ANESTHETIST, CERTIFIED REGISTERED

## 2022-04-28 PROCEDURE — C1762 CONN TISS, HUMAN(INC FASCIA): HCPCS | Performed by: NEUROLOGICAL SURGERY

## 2022-04-28 PROCEDURE — 36620 INSERTION CATHETER ARTERY: CPT | Performed by: ANESTHESIOLOGY

## 2022-04-28 PROCEDURE — 22848 INSERT PELV FIXATION DEVICE: CPT | Performed by: NEUROLOGICAL SURGERY

## 2022-04-28 PROCEDURE — 76000 FLUOROSCOPY <1 HR PHYS/QHP: CPT

## 2022-04-28 PROCEDURE — 10002801 HB RX 250 W/O HCPCS: Performed by: NURSE ANESTHETIST, CERTIFIED REGISTERED

## 2022-04-28 PROCEDURE — 22206 INCIS SPINE 3 COLUMN THORAC: CPT | Performed by: NEUROLOGICAL SURGERY

## 2022-04-28 PROCEDURE — 10004451 HB PACU RECOVERY 1ST 30 MINUTES: Performed by: NEUROLOGICAL SURGERY

## 2022-04-28 PROCEDURE — 84295 ASSAY OF SERUM SODIUM: CPT

## 2022-04-28 PROCEDURE — 0PP404Z REMOVAL OF INTERNAL FIXATION DEVICE FROM THORACIC VERTEBRA, OPEN APPROACH: ICD-10-PCS | Performed by: NEUROLOGICAL SURGERY

## 2022-04-28 PROCEDURE — 10002807 HB RX 258: Performed by: NURSE PRACTITIONER

## 2022-04-28 PROCEDURE — 72100 X-RAY EXAM L-S SPINE 2/3 VWS: CPT

## 2022-04-28 PROCEDURE — 99233 SBSQ HOSP IP/OBS HIGH 50: CPT | Performed by: FAMILY MEDICINE

## 2022-04-28 PROCEDURE — 0SG3071 FUSION OF LUMBOSACRAL JOINT WITH AUTOLOGOUS TISSUE SUBSTITUTE, POSTERIOR APPROACH, POSTERIOR COLUMN, OPEN APPROACH: ICD-10-PCS | Performed by: NEUROLOGICAL SURGERY

## 2022-04-28 PROCEDURE — 85027 COMPLETE CBC AUTOMATED: CPT | Performed by: NEUROLOGICAL SURGERY

## 2022-04-28 PROCEDURE — 0QP104Z REMOVAL OF INTERNAL FIXATION DEVICE FROM SACRUM, OPEN APPROACH: ICD-10-PCS | Performed by: NEUROLOGICAL SURGERY

## 2022-04-28 PROCEDURE — 10000008 HB ROOM CHARGE ICU OR CCU

## 2022-04-28 PROCEDURE — 72070 X-RAY EXAM THORAC SPINE 2VWS: CPT

## 2022-04-28 PROCEDURE — P9059 PLASMA, FRZ BETWEEN 8-24HOUR: HCPCS

## 2022-04-28 PROCEDURE — 13000002 HB ANESTHESIA  GENERAL  S/U + 1ST 15 MIN: Performed by: NEUROLOGICAL SURGERY

## 2022-04-28 PROCEDURE — 99291 CRITICAL CARE FIRST HOUR: CPT | Performed by: SURGERY

## 2022-04-28 PROCEDURE — 0PS404Z REPOSITION THORACIC VERTEBRA WITH INTERNAL FIXATION DEVICE, OPEN APPROACH: ICD-10-PCS | Performed by: NEUROLOGICAL SURGERY

## 2022-04-28 PROCEDURE — 85610 PROTHROMBIN TIME: CPT | Performed by: NURSE PRACTITIONER

## 2022-04-28 PROCEDURE — 10002801 HB RX 250 W/O HCPCS: Performed by: ANESTHESIOLOGY

## 2022-04-28 PROCEDURE — 85384 FIBRINOGEN ACTIVITY: CPT | Performed by: NURSE PRACTITIONER

## 2022-04-28 PROCEDURE — 85610 PROTHROMBIN TIME: CPT | Performed by: NEUROLOGICAL SURGERY

## 2022-04-28 PROCEDURE — 82947 ASSAY GLUCOSE BLOOD QUANT: CPT

## 2022-04-28 PROCEDURE — 10002803 HB RX 637: Performed by: NURSE PRACTITIONER

## 2022-04-28 PROCEDURE — 22842 INSERT SPINE FIXATION DEVICE: CPT | Performed by: NEUROLOGICAL SURGERY

## 2022-04-28 PROCEDURE — 82330 ASSAY OF CALCIUM: CPT

## 2022-04-28 PROCEDURE — 72125 CT NECK SPINE W/O DYE: CPT

## 2022-04-28 PROCEDURE — 83735 ASSAY OF MAGNESIUM: CPT | Performed by: NURSE PRACTITIONER

## 2022-04-28 PROCEDURE — 84100 ASSAY OF PHOSPHORUS: CPT | Performed by: NURSE PRACTITIONER

## 2022-04-28 PROCEDURE — P9016 RBC LEUKOCYTES REDUCED: HCPCS

## 2022-04-28 PROCEDURE — 10002800 HB RX 250 W HCPCS: Performed by: PHYSICIAN ASSISTANT

## 2022-04-28 PROCEDURE — 10004651 HB RX, NO CHARGE ITEM: Performed by: NURSE PRACTITIONER

## 2022-04-28 PROCEDURE — 10004452 HB PACU ADDL 30 MINUTES: Performed by: NEUROLOGICAL SURGERY

## 2022-04-28 PROCEDURE — 85730 THROMBOPLASTIN TIME PARTIAL: CPT | Performed by: NURSE PRACTITIONER

## 2022-04-28 PROCEDURE — 85018 HEMOGLOBIN: CPT

## 2022-04-28 PROCEDURE — 99233 SBSQ HOSP IP/OBS HIGH 50: CPT | Performed by: NURSE PRACTITIONER

## 2022-04-28 PROCEDURE — 85384 FIBRINOGEN ACTIVITY: CPT | Performed by: NURSE ANESTHETIST, CERTIFIED REGISTERED

## 2022-04-28 PROCEDURE — 85025 COMPLETE CBC W/AUTO DIFF WBC: CPT | Performed by: NURSE PRACTITIONER

## 2022-04-28 PROCEDURE — 10002800 HB RX 250 W HCPCS: Performed by: NURSE PRACTITIONER

## 2022-04-28 PROCEDURE — 13003289 HB OXYGEN THERAPY DAILY

## 2022-04-28 PROCEDURE — 10006027 HB SUPPLY 278: Performed by: NEUROLOGICAL SURGERY

## 2022-04-28 PROCEDURE — 13000112 HB NEURO MAJOR COMPLEX CASE S/U + 1ST 15 MIN: Performed by: NEUROLOGICAL SURGERY

## 2022-04-28 PROCEDURE — 0RG8071 FUSION OF 8 OR MORE THORACIC VERTEBRAL JOINTS WITH AUTOLOGOUS TISSUE SUBSTITUTE, POSTERIOR APPROACH, POSTERIOR COLUMN, OPEN APPROACH: ICD-10-PCS | Performed by: NEUROLOGICAL SURGERY

## 2022-04-28 PROCEDURE — 72082 X-RAY EXAM ENTIRE SPI 2/3 VW: CPT

## 2022-04-28 PROCEDURE — 10004651 HB RX, NO CHARGE ITEM: Performed by: PHYSICIAN ASSISTANT

## 2022-04-28 PROCEDURE — 13000113 HB NEURO MAJOR COMPLEX CASE EA ADD MINUTE: Performed by: NEUROLOGICAL SURGERY

## 2022-04-28 PROCEDURE — 10002800 HB RX 250 W HCPCS: Performed by: NURSE ANESTHETIST, CERTIFIED REGISTERED

## 2022-04-28 PROCEDURE — 10002803 HB RX 637: Performed by: PHYSICIAN ASSISTANT

## 2022-04-28 PROCEDURE — 72131 CT LUMBAR SPINE W/O DYE: CPT

## 2022-04-28 PROCEDURE — 71045 X-RAY EXAM CHEST 1 VIEW: CPT

## 2022-04-28 PROCEDURE — 72128 CT CHEST SPINE W/O DYE: CPT

## 2022-04-28 PROCEDURE — C1713 ANCHOR/SCREW BN/BN,TIS/BN: HCPCS | Performed by: NEUROLOGICAL SURGERY

## 2022-04-28 PROCEDURE — 80048 BASIC METABOLIC PNL TOTAL CA: CPT | Performed by: NURSE PRACTITIONER

## 2022-04-28 PROCEDURE — 0QP004Z REMOVAL OF INTERNAL FIXATION DEVICE FROM LUMBAR VERTEBRA, OPEN APPROACH: ICD-10-PCS | Performed by: NEUROLOGICAL SURGERY

## 2022-04-28 PROCEDURE — 10006023 HB SUPPLY 272: Performed by: NEUROLOGICAL SURGERY

## 2022-04-28 PROCEDURE — 0RG2071 FUSION OF 2 OR MORE CERVICAL VERTEBRAL JOINTS WITH AUTOLOGOUS TISSUE SUBSTITUTE, POSTERIOR APPROACH, POSTERIOR COLUMN, OPEN APPROACH: ICD-10-PCS | Performed by: NEUROLOGICAL SURGERY

## 2022-04-28 PROCEDURE — 80048 BASIC METABOLIC PNL TOTAL CA: CPT

## 2022-04-28 PROCEDURE — 10002800 HB RX 250 W HCPCS: Performed by: NEUROLOGICAL SURGERY

## 2022-04-28 PROCEDURE — 83050 HGB METHEMOGLOBIN QUAN: CPT

## 2022-04-28 PROCEDURE — 10002800 HB RX 250 W HCPCS: Performed by: ANESTHESIOLOGY

## 2022-04-28 PROCEDURE — 72040 X-RAY EXAM NECK SPINE 2-3 VW: CPT

## 2022-04-28 PROCEDURE — 0RG4071 FUSION OF CERVICOTHORACIC VERTEBRAL JOINT WITH AUTOLOGOUS TISSUE SUBSTITUTE, POSTERIOR APPROACH, POSTERIOR COLUMN, OPEN APPROACH: ICD-10-PCS | Performed by: NEUROLOGICAL SURGERY

## 2022-04-28 PROCEDURE — 0RB90ZZ EXCISION OF THORACIC VERTEBRAL DISC, OPEN APPROACH: ICD-10-PCS | Performed by: NEUROLOGICAL SURGERY

## 2022-04-28 PROCEDURE — 0PP304Z REMOVAL OF INTERNAL FIXATION DEVICE FROM CERVICAL VERTEBRA, OPEN APPROACH: ICD-10-PCS | Performed by: NEUROLOGICAL SURGERY

## 2022-04-28 PROCEDURE — 61783 SCAN PROC SPINAL: CPT | Performed by: NEUROLOGICAL SURGERY

## 2022-04-28 PROCEDURE — 13000003 HB ANESTHESIA  GENERAL EA ADD MINUTE: Performed by: NEUROLOGICAL SURGERY

## 2022-04-28 PROCEDURE — 0SG1071 FUSION OF 2 OR MORE LUMBAR VERTEBRAL JOINTS WITH AUTOLOGOUS TISSUE SUBSTITUTE, POSTERIOR APPROACH, POSTERIOR COLUMN, OPEN APPROACH: ICD-10-PCS | Performed by: NEUROLOGICAL SURGERY

## 2022-04-28 PROCEDURE — 22804 ARTHRD PST DFRM 13+ VRT SGM: CPT | Performed by: NEUROLOGICAL SURGERY

## 2022-04-28 PROCEDURE — 13000059 HB CELL SAVER

## 2022-04-28 PROCEDURE — 10002801 HB RX 250 W/O HCPCS: Performed by: NEUROLOGICAL SURGERY

## 2022-04-28 PROCEDURE — 99292 CRITICAL CARE ADDL 30 MIN: CPT | Performed by: SURGERY

## 2022-04-28 DEVICE — GRAFT READIGRAFT 30CC 1-4MM CANC ALLOGRAFT FRZN CHIP CRSH BN: Type: IMPLANTABLE DEVICE | Site: BACK | Status: FUNCTIONAL

## 2022-04-28 DEVICE — SCREW BN 8.5MM 80MM CD HZN MULTIAXIAL CANNULATED NS SPINE LF: Type: IMPLANTABLE DEVICE | Site: BACK | Status: FUNCTIONAL

## 2022-04-28 DEVICE — GRAFT 30ML 1-4MM CANC ALLOGRAFT FRZDR CHP BN: Type: IMPLANTABLE DEVICE | Site: BACK | Status: FUNCTIONAL

## 2022-04-28 DEVICE — SET TISS CLSR DUPLOJECT TISSEEL PREFL SYRINGE 10ML: Type: IMPLANTABLE DEVICE | Site: BACK | Status: FUNCTIONAL

## 2022-04-28 DEVICE — ROD SPNL 5.5MM 500MM STRGT LONGITUDE 2 COCRMO NS: Type: IMPLANTABLE DEVICE | Site: BACK | Status: FUNCTIONAL

## 2022-04-28 DEVICE — IMPLANTABLE DEVICE: Type: IMPLANTABLE DEVICE | Site: BACK | Status: FUNCTIONAL

## 2022-04-28 DEVICE — SCREW SET .25IN CD HZN STD 32 THRD BRK OFF CAP TI SPINE: Type: IMPLANTABLE DEVICE | Site: BACK | Status: FUNCTIONAL

## 2022-04-28 DEVICE — GRAFT 30ML 1-4MM CANC FRZN CHP BN: Type: IMPLANTABLE DEVICE | Site: BACK | Status: FUNCTIONAL

## 2022-04-28 DEVICE — GRAFT INFS 8ML 26MM LG 18MM RHBMP-2 BOVINE CLGN ABS VIAL: Type: IMPLANTABLE DEVICE | Site: BACK | Status: FUNCTIONAL

## 2022-04-28 DEVICE — CONNECTOR ROD 20MM 5MM 6-6MM CD HZN SPINE LAT CLSD NS: Type: IMPLANTABLE DEVICE | Site: BACK | Status: FUNCTIONAL

## 2022-04-28 DEVICE — SCREW BN 6.5MM 45MM CD HZN SOLERA MULTIAXIAL REDUCTION TI: Type: IMPLANTABLE DEVICE | Site: BACK | Status: FUNCTIONAL

## 2022-04-28 DEVICE — CONNECTOR ROD 25MM 5MM 6-6MM CD HZN SPINE LAT CLSD NS: Type: IMPLANTABLE DEVICE | Site: BACK | Status: FUNCTIONAL

## 2022-04-28 DEVICE — SCREW BN 6.5MM 50MM SOLERA CD HZN REDUCTION MULTIAXIAL COCR: Type: IMPLANTABLE DEVICE | Site: BACK | Status: FUNCTIONAL

## 2022-04-28 DEVICE — SCREW BN 7.5MM 50MM MULTIAXIAL REDUCTION COCR SPINE 5.56MM: Type: IMPLANTABLE DEVICE | Site: BACK | Status: FUNCTIONAL

## 2022-04-28 DEVICE — DBM T45010 10CC PASTE GRAFTON PLUS
Type: IMPLANTABLE DEVICE | Site: BACK | Status: FUNCTIONAL
Brand: GRAFTON®AND GRAFTON PLUS®DEMINERALIZED BONE MATRIX (DBM)

## 2022-04-28 RX ORDER — MIDAZOLAM HYDROCHLORIDE 1 MG/ML
INJECTION, SOLUTION INTRAMUSCULAR; INTRAVENOUS PRN
Status: DISCONTINUED | OUTPATIENT
Start: 2022-04-28 | End: 2022-04-28

## 2022-04-28 RX ORDER — HYDROCODONE BITARTRATE AND ACETAMINOPHEN 10; 325 MG/1; MG/1
1 TABLET ORAL EVERY 4 HOURS PRN
Status: DISCONTINUED | OUTPATIENT
Start: 2022-04-28 | End: 2022-04-29

## 2022-04-28 RX ORDER — DIAZEPAM 5 MG/1
5 TABLET ORAL EVERY 6 HOURS PRN
Status: DISCONTINUED | OUTPATIENT
Start: 2022-04-29 | End: 2022-04-29

## 2022-04-28 RX ORDER — BUPIVACAINE HYDROCHLORIDE AND EPINEPHRINE 2.5; 5 MG/ML; UG/ML
INJECTION, SOLUTION EPIDURAL; INFILTRATION; INTRACAUDAL; PERINEURAL PRN
Status: DISCONTINUED | OUTPATIENT
Start: 2022-04-28 | End: 2022-04-28 | Stop reason: HOSPADM

## 2022-04-28 RX ORDER — BISACODYL 10 MG
10 SUPPOSITORY, RECTAL RECTAL DAILY PRN
Status: DISCONTINUED | OUTPATIENT
Start: 2022-04-28 | End: 2022-04-30

## 2022-04-28 RX ORDER — HYDROCODONE BITARTRATE AND ACETAMINOPHEN 10; 325 MG/1; MG/1
1 TABLET ORAL EVERY 4 HOURS PRN
Status: DISCONTINUED | OUTPATIENT
Start: 2022-04-28 | End: 2022-05-01

## 2022-04-28 RX ORDER — HYDROCODONE BITARTRATE AND ACETAMINOPHEN 5; 325 MG/1; MG/1
1 TABLET ORAL EVERY 4 HOURS PRN
Status: DISCONTINUED | OUTPATIENT
Start: 2022-04-28 | End: 2022-05-01

## 2022-04-28 RX ORDER — ONDANSETRON 2 MG/ML
4 INJECTION INTRAMUSCULAR; INTRAVENOUS 2 TIMES DAILY PRN
Status: DISCONTINUED | OUTPATIENT
Start: 2022-04-28 | End: 2022-04-28 | Stop reason: HOSPADM

## 2022-04-28 RX ORDER — METOCLOPRAMIDE HYDROCHLORIDE 5 MG/ML
5 INJECTION INTRAMUSCULAR; INTRAVENOUS EVERY 6 HOURS PRN
Status: DISCONTINUED | OUTPATIENT
Start: 2022-04-28 | End: 2022-04-28 | Stop reason: HOSPADM

## 2022-04-28 RX ORDER — ENOXAPARIN SODIUM 100 MG/ML
30 INJECTION SUBCUTANEOUS EVERY 12 HOURS SCHEDULED
Status: DISCONTINUED | OUTPATIENT
Start: 2022-04-29 | End: 2022-05-07 | Stop reason: HOSPADM

## 2022-04-28 RX ORDER — LIDOCAINE HYDROCHLORIDE 20 MG/ML
INJECTION, SOLUTION INFILTRATION; PERINEURAL PRN
Status: DISCONTINUED | OUTPATIENT
Start: 2022-04-28 | End: 2022-04-28

## 2022-04-28 RX ORDER — HYDRALAZINE HYDROCHLORIDE 20 MG/ML
5 INJECTION INTRAMUSCULAR; INTRAVENOUS EVERY 10 MIN PRN
Status: DISCONTINUED | OUTPATIENT
Start: 2022-04-28 | End: 2022-04-28 | Stop reason: HOSPADM

## 2022-04-28 RX ORDER — ONDANSETRON 2 MG/ML
INJECTION INTRAMUSCULAR; INTRAVENOUS PRN
Status: DISCONTINUED | OUTPATIENT
Start: 2022-04-28 | End: 2022-04-28

## 2022-04-28 RX ORDER — AMOXICILLIN 250 MG
2 CAPSULE ORAL 2 TIMES DAILY PRN
Status: DISCONTINUED | OUTPATIENT
Start: 2022-04-28 | End: 2022-04-30

## 2022-04-28 RX ORDER — PROPOFOL 10 MG/ML
INJECTION, EMULSION INTRAVENOUS PRN
Status: DISCONTINUED | OUTPATIENT
Start: 2022-04-28 | End: 2022-04-28

## 2022-04-28 RX ORDER — DEXAMETHASONE SODIUM PHOSPHATE 4 MG/ML
INJECTION, SOLUTION INTRA-ARTICULAR; INTRALESIONAL; INTRAMUSCULAR; INTRAVENOUS; SOFT TISSUE PRN
Status: DISCONTINUED | OUTPATIENT
Start: 2022-04-28 | End: 2022-04-28

## 2022-04-28 RX ORDER — ONDANSETRON 2 MG/ML
4 INJECTION INTRAMUSCULAR; INTRAVENOUS EVERY 12 HOURS PRN
Status: DISCONTINUED | OUTPATIENT
Start: 2022-04-28 | End: 2022-05-07 | Stop reason: HOSPADM

## 2022-04-28 RX ORDER — VANCOMYCIN HYDROCHLORIDE 1 G/20ML
INJECTION, POWDER, LYOPHILIZED, FOR SOLUTION INTRAVENOUS PRN
Status: DISCONTINUED | OUTPATIENT
Start: 2022-04-28 | End: 2022-04-28 | Stop reason: HOSPADM

## 2022-04-28 RX ORDER — CALCIUM CHLORIDE 100 MG/ML
INJECTION INTRAVENOUS; INTRAVENTRICULAR PRN
Status: DISCONTINUED | OUTPATIENT
Start: 2022-04-28 | End: 2022-04-28

## 2022-04-28 RX ORDER — ONDANSETRON 4 MG/1
4 TABLET, ORALLY DISINTEGRATING ORAL EVERY 12 HOURS PRN
Status: DISCONTINUED | OUTPATIENT
Start: 2022-04-28 | End: 2022-04-29

## 2022-04-28 RX ORDER — FAMOTIDINE 20 MG/1
20 TABLET, FILM COATED ORAL EVERY 12 HOURS SCHEDULED
Status: DISCONTINUED | OUTPATIENT
Start: 2022-04-28 | End: 2022-04-29

## 2022-04-28 RX ORDER — PROCHLORPERAZINE EDISYLATE 5 MG/ML
5 INJECTION INTRAMUSCULAR; INTRAVENOUS EVERY 4 HOURS PRN
Status: DISCONTINUED | OUTPATIENT
Start: 2022-04-28 | End: 2022-04-28 | Stop reason: HOSPADM

## 2022-04-28 RX ORDER — SODIUM CHLORIDE, SODIUM LACTATE, POTASSIUM CHLORIDE, CALCIUM CHLORIDE 600; 310; 30; 20 MG/100ML; MG/100ML; MG/100ML; MG/100ML
INJECTION, SOLUTION INTRAVENOUS CONTINUOUS PRN
Status: DISCONTINUED | OUTPATIENT
Start: 2022-04-28 | End: 2022-04-28

## 2022-04-28 RX ORDER — SODIUM CHLORIDE, SODIUM LACTATE, POTASSIUM CHLORIDE, CALCIUM CHLORIDE 600; 310; 30; 20 MG/100ML; MG/100ML; MG/100ML; MG/100ML
INJECTION, SOLUTION INTRAVENOUS CONTINUOUS
Status: DISCONTINUED | OUTPATIENT
Start: 2022-04-28 | End: 2022-04-30

## 2022-04-28 RX ORDER — NOREPINEPHRINE BITARTRATE 0.03 MG/ML
5 INJECTION, SOLUTION INTRAVENOUS CONTINUOUS
Status: DISCONTINUED | OUTPATIENT
Start: 2022-04-28 | End: 2022-04-28

## 2022-04-28 RX ORDER — POLYETHYLENE GLYCOL 3350 17 G/17G
17 POWDER, FOR SOLUTION ORAL DAILY PRN
Status: DISCONTINUED | OUTPATIENT
Start: 2022-04-28 | End: 2022-04-30

## 2022-04-28 RX ORDER — ACETAMINOPHEN 500 MG
1000 TABLET ORAL EVERY 8 HOURS PRN
Status: DISCONTINUED | OUTPATIENT
Start: 2022-04-28 | End: 2022-04-30

## 2022-04-28 RX ORDER — ACETAMINOPHEN 10 MG/ML
1000 INJECTION, SOLUTION INTRAVENOUS ONCE
Status: DISCONTINUED | OUTPATIENT
Start: 2022-04-28 | End: 2022-04-28 | Stop reason: HOSPADM

## 2022-04-28 RX ADMIN — VANCOMYCIN HYDROCHLORIDE 1500 MG: 10 INJECTION, POWDER, LYOPHILIZED, FOR SOLUTION INTRAVENOUS at 07:50

## 2022-04-28 RX ADMIN — PHENYLEPHRINE HYDROCHLORIDE 15 MCG/MIN: 10 INJECTION INTRAVENOUS at 07:42

## 2022-04-28 RX ADMIN — SODIUM CHLORIDE, PRESERVATIVE FREE 2 ML: 5 INJECTION INTRAVENOUS at 19:49

## 2022-04-28 RX ADMIN — FAMOTIDINE 20 MG: 20 TABLET ORAL at 19:48

## 2022-04-28 RX ADMIN — MIDAZOLAM HYDROCHLORIDE 1 MG: 1 INJECTION, SOLUTION INTRAMUSCULAR; INTRAVENOUS at 07:34

## 2022-04-28 RX ADMIN — GABAPENTIN 300 MG: 300 CAPSULE ORAL at 05:41

## 2022-04-28 RX ADMIN — REMIFENTANIL HYDROCHLORIDE 0.08 MCG/KG/MIN: 1 INJECTION, POWDER, LYOPHILIZED, FOR SOLUTION INTRAVENOUS at 08:01

## 2022-04-28 RX ADMIN — PROPOFOL 100 MCG/KG/MIN: 10 INJECTION, EMULSION INTRAVENOUS at 08:34

## 2022-04-28 RX ADMIN — PROPOFOL 50 MCG/KG/MIN: 10 INJECTION, EMULSION INTRAVENOUS at 08:08

## 2022-04-28 RX ADMIN — LIDOCAINE HYDROCHLORIDE 5 ML: 20 INJECTION, SOLUTION INFILTRATION; PERINEURAL at 07:36

## 2022-04-28 RX ADMIN — ACETAMINOPHEN 1000 MG: 500 TABLET ORAL at 05:41

## 2022-04-28 RX ADMIN — FENTANYL CITRATE 50 MCG: 50 INJECTION INTRAMUSCULAR; INTRAVENOUS at 18:44

## 2022-04-28 RX ADMIN — GABAPENTIN 300 MG: 300 CAPSULE ORAL at 19:49

## 2022-04-28 RX ADMIN — PROPOFOL 75 MCG/KG/MIN: 10 INJECTION, EMULSION INTRAVENOUS at 08:01

## 2022-04-28 RX ADMIN — HYDROCODONE BITARTRATE AND ACETAMINOPHEN 1 TABLET: 10; 325 TABLET ORAL at 04:04

## 2022-04-28 RX ADMIN — SODIUM CHLORIDE, POTASSIUM CHLORIDE, SODIUM LACTATE AND CALCIUM CHLORIDE: 600; 310; 30; 20 INJECTION, SOLUTION INTRAVENOUS at 07:20

## 2022-04-28 RX ADMIN — VANCOMYCIN HYDROCHLORIDE 1000 MG: 1 INJECTION, POWDER, LYOPHILIZED, FOR SOLUTION INTRAVENOUS at 21:19

## 2022-04-28 RX ADMIN — REMIFENTANIL HYDROCHLORIDE 0.08 MCG/KG/MIN: 1 INJECTION, POWDER, LYOPHILIZED, FOR SOLUTION INTRAVENOUS at 07:58

## 2022-04-28 RX ADMIN — FENTANYL CITRATE 50 MCG: 50 INJECTION INTRAMUSCULAR; INTRAVENOUS at 18:13

## 2022-04-28 RX ADMIN — Medication 5 MCG/MIN: at 07:55

## 2022-04-28 RX ADMIN — DEXAMETHASONE SODIUM PHOSPHATE 6 MG: 4 INJECTION, SOLUTION INTRAMUSCULAR; INTRAVENOUS at 12:31

## 2022-04-28 RX ADMIN — Medication 100 MG: at 07:36

## 2022-04-28 RX ADMIN — PROPOFOL 150 MG: 10 INJECTION, EMULSION INTRAVENOUS at 07:36

## 2022-04-28 RX ADMIN — REMIFENTANIL HYDROCHLORIDE 0.18 MCG/KG/MIN: 1 INJECTION, POWDER, LYOPHILIZED, FOR SOLUTION INTRAVENOUS at 08:34

## 2022-04-28 RX ADMIN — SODIUM CHLORIDE, POTASSIUM CHLORIDE, SODIUM LACTATE AND CALCIUM CHLORIDE: 600; 310; 30; 20 INJECTION, SOLUTION INTRAVENOUS at 18:29

## 2022-04-28 RX ADMIN — Medication 1 MCG/MIN: at 20:34

## 2022-04-28 RX ADMIN — HYDROMORPHONE HYDROCHLORIDE 1 MG: 1 INJECTION, SOLUTION INTRAMUSCULAR; INTRAVENOUS; SUBCUTANEOUS at 15:33

## 2022-04-28 RX ADMIN — ONDANSETRON 4 MG: 2 INJECTION INTRAMUSCULAR; INTRAVENOUS at 19:44

## 2022-04-28 RX ADMIN — SENNOSIDES AND DOCUSATE SODIUM 2 TABLET: 50; 8.6 TABLET ORAL at 19:48

## 2022-04-28 RX ADMIN — MIDAZOLAM HYDROCHLORIDE 1 MG: 1 INJECTION, SOLUTION INTRAMUSCULAR; INTRAVENOUS at 08:32

## 2022-04-28 RX ADMIN — HYDROCODONE BITARTRATE AND ACETAMINOPHEN 1 TABLET: 10; 325 TABLET ORAL at 23:38

## 2022-04-28 RX ADMIN — DIAZEPAM 5 MG: 5 TABLET ORAL at 19:49

## 2022-04-28 RX ADMIN — ONDANSETRON 4 MG: 2 INJECTION INTRAMUSCULAR; INTRAVENOUS at 15:00

## 2022-04-28 RX ADMIN — FENTANYL CITRATE 50 MCG: 50 INJECTION, SOLUTION INTRAMUSCULAR; INTRAVENOUS at 07:36

## 2022-04-28 RX ADMIN — CALCIUM CHLORIDE 0.5 G: 100 INJECTION INTRAVENOUS; INTRAVENTRICULAR at 14:15

## 2022-04-28 RX ADMIN — REMIFENTANIL HYDROCHLORIDE 0.05 MCG/KG/MIN: 1 INJECTION, POWDER, LYOPHILIZED, FOR SOLUTION INTRAVENOUS at 08:08

## 2022-04-28 RX ADMIN — DEXAMETHASONE SODIUM PHOSPHATE 4 MG: 4 INJECTION, SOLUTION INTRAMUSCULAR; INTRAVENOUS at 08:49

## 2022-04-28 RX ADMIN — PROPOFOL 75 MCG/KG/MIN: 10 INJECTION, EMULSION INTRAVENOUS at 07:58

## 2022-04-28 RX ADMIN — CELECOXIB 200 MG: 200 CAPSULE ORAL at 05:41

## 2022-04-28 RX ADMIN — Medication: at 19:54

## 2022-04-28 RX ADMIN — PROPOFOL 50 MG: 10 INJECTION, EMULSION INTRAVENOUS at 07:50

## 2022-04-28 RX ADMIN — HYDROCODONE BITARTRATE AND ACETAMINOPHEN 1 TABLET: 10; 325 TABLET ORAL at 19:48

## 2022-04-28 RX ADMIN — Medication: at 02:24

## 2022-04-28 ASSESSMENT — COGNITIVE AND FUNCTIONAL STATUS - GENERAL
BECAUSE OF A PHYSICAL, MENTAL, OR EMOTIONAL CONDITION, DO YOU HAVE DIFFICULTY DOING ERRANDS ALONE: NO
BECAUSE OF A PHYSICAL, MENTAL, OR EMOTIONAL CONDITION, DO YOU HAVE SERIOUS DIFFICULTY CONCENTRATING, REMEMBERING OR MAKING DECISIONS: NO
DO YOU HAVE SERIOUS DIFFICULTY WALKING OR CLIMBING STAIRS: YES
DO YOU HAVE DIFFICULTY DRESSING OR BATHING: YES

## 2022-04-28 ASSESSMENT — PAIN SCALES - GENERAL
PAINLEVEL_OUTOF10: 0
PAINLEVEL_OUTOF10: 6
PAINLEVEL_OUTOF10: 7
PAINLEVEL_OUTOF10: 7
PAINLEVEL_OUTOF10: 8
PAINLEVEL_OUTOF10: 4

## 2022-04-29 LAB
ANION GAP SERPL CALC-SCNC: 8 MMOL/L (ref 10–20)
BASOPHILS # BLD: 0 K/MCL (ref 0–0.3)
BASOPHILS NFR BLD: 0 %
BUN SERPL-MCNC: 6 MG/DL (ref 6–20)
BUN/CREAT SERPL: 12 (ref 7–25)
CALCIUM SERPL-MCNC: 7.8 MG/DL (ref 8.4–10.2)
CHLORIDE SERPL-SCNC: 108 MMOL/L (ref 98–107)
CO2 SERPL-SCNC: 28 MMOL/L (ref 21–32)
CREAT SERPL-MCNC: 0.51 MG/DL (ref 0.51–0.95)
DEPRECATED RDW RBC: 51.2 FL (ref 39–50)
EOSINOPHIL # BLD: 0 K/MCL (ref 0–0.5)
EOSINOPHIL NFR BLD: 0 %
ERYTHROCYTE [DISTWIDTH] IN BLOOD: 18.2 % (ref 11–15)
FASTING DURATION TIME PATIENT: ABNORMAL H
GFR SERPLBLD BASED ON 1.73 SQ M-ARVRAT: >90 ML/MIN
GLUCOSE SERPL-MCNC: 136 MG/DL (ref 70–99)
HCT VFR BLD CALC: 28.4 % (ref 36–46.5)
HGB BLD-MCNC: 9.3 G/DL (ref 12–15.5)
IMM GRANULOCYTES # BLD AUTO: 0.2 K/MCL (ref 0–0.2)
IMM GRANULOCYTES # BLD: 2 %
LYMPHOCYTES # BLD: 0.7 K/MCL (ref 1–4.8)
LYMPHOCYTES NFR BLD: 5 %
MAGNESIUM SERPL-MCNC: 2.1 MG/DL (ref 1.7–2.4)
MCH RBC QN AUTO: 25.8 PG (ref 26–34)
MCHC RBC AUTO-ENTMCNC: 32.7 G/DL (ref 32–36.5)
MCV RBC AUTO: 78.7 FL (ref 78–100)
MONOCYTES # BLD: 0.9 K/MCL (ref 0.3–0.9)
MONOCYTES NFR BLD: 7 %
NEUTROPHILS # BLD: 11.3 K/MCL (ref 1.8–7.7)
NEUTROPHILS NFR BLD: 86 %
NRBC BLD MANUAL-RTO: 0 /100 WBC
PHOSPHATE SERPL-MCNC: 3.8 MG/DL (ref 2.4–4.7)
PLATELET # BLD AUTO: 179 K/MCL (ref 140–450)
POTASSIUM SERPL-SCNC: 4 MMOL/L (ref 3.4–5.1)
RBC # BLD: 3.61 MIL/MCL (ref 4–5.2)
SODIUM SERPL-SCNC: 140 MMOL/L (ref 135–145)
WBC # BLD: 13.1 K/MCL (ref 4.2–11)

## 2022-04-29 PROCEDURE — 10002807 HB RX 258: Performed by: NURSE PRACTITIONER

## 2022-04-29 PROCEDURE — 83735 ASSAY OF MAGNESIUM: CPT | Performed by: NURSE PRACTITIONER

## 2022-04-29 PROCEDURE — 13003289 HB OXYGEN THERAPY DAILY

## 2022-04-29 PROCEDURE — 10002803 HB RX 637: Performed by: FAMILY MEDICINE

## 2022-04-29 PROCEDURE — 80048 BASIC METABOLIC PNL TOTAL CA: CPT | Performed by: NURSE PRACTITIONER

## 2022-04-29 PROCEDURE — 10002803 HB RX 637: Performed by: NURSE PRACTITIONER

## 2022-04-29 PROCEDURE — 99291 CRITICAL CARE FIRST HOUR: CPT | Performed by: SURGERY

## 2022-04-29 PROCEDURE — 10004651 HB RX, NO CHARGE ITEM: Performed by: NURSE PRACTITIONER

## 2022-04-29 PROCEDURE — 10002800 HB RX 250 W HCPCS: Performed by: NEUROLOGICAL SURGERY

## 2022-04-29 PROCEDURE — 99291 CRITICAL CARE FIRST HOUR: CPT | Performed by: FAMILY MEDICINE

## 2022-04-29 PROCEDURE — 99024 POSTOP FOLLOW-UP VISIT: CPT | Performed by: PHYSICIAN ASSISTANT

## 2022-04-29 PROCEDURE — 10002807 HB RX 258: Performed by: NEUROLOGICAL SURGERY

## 2022-04-29 PROCEDURE — 10002803 HB RX 637: Performed by: SURGERY

## 2022-04-29 PROCEDURE — 10002800 HB RX 250 W HCPCS: Performed by: NURSE PRACTITIONER

## 2022-04-29 PROCEDURE — 10002803 HB RX 637: Performed by: PHYSICIAN ASSISTANT

## 2022-04-29 PROCEDURE — 10006031 HB ROOM CHARGE TELEMETRY

## 2022-04-29 PROCEDURE — 85025 COMPLETE CBC W/AUTO DIFF WBC: CPT | Performed by: NURSE PRACTITIONER

## 2022-04-29 PROCEDURE — 84100 ASSAY OF PHOSPHORUS: CPT | Performed by: NURSE PRACTITIONER

## 2022-04-29 RX ORDER — CYCLOBENZAPRINE HCL 5 MG
10 TABLET ORAL 3 TIMES DAILY PRN
Status: DISCONTINUED | OUTPATIENT
Start: 2022-04-29 | End: 2022-05-07

## 2022-04-29 RX ORDER — PANTOPRAZOLE SODIUM 40 MG/1
40 TABLET, DELAYED RELEASE ORAL
Status: DISCONTINUED | OUTPATIENT
Start: 2022-04-29 | End: 2022-05-01

## 2022-04-29 RX ORDER — BACLOFEN 10 MG/1
10 TABLET ORAL 3 TIMES DAILY
Status: DISCONTINUED | OUTPATIENT
Start: 2022-04-29 | End: 2022-05-07 | Stop reason: HOSPADM

## 2022-04-29 RX ADMIN — ZINC SULFATE 220 MG (50 MG) CAPSULE 220 MG: CAPSULE at 08:32

## 2022-04-29 RX ADMIN — GABAPENTIN 300 MG: 300 CAPSULE ORAL at 21:07

## 2022-04-29 RX ADMIN — PANTOPRAZOLE SODIUM 40 MG: 40 TABLET, DELAYED RELEASE ORAL at 11:33

## 2022-04-29 RX ADMIN — OXYCODONE HYDROCHLORIDE AND ACETAMINOPHEN 1000 MG: 500 TABLET ORAL at 08:31

## 2022-04-29 RX ADMIN — FAMOTIDINE 20 MG: 20 TABLET ORAL at 08:32

## 2022-04-29 RX ADMIN — Medication: at 01:42

## 2022-04-29 RX ADMIN — SENNOSIDES AND DOCUSATE SODIUM 2 TABLET: 50; 8.6 TABLET ORAL at 08:31

## 2022-04-29 RX ADMIN — SODIUM CHLORIDE, PRESERVATIVE FREE 2 ML: 5 INJECTION INTRAVENOUS at 20:23

## 2022-04-29 RX ADMIN — VANCOMYCIN HYDROCHLORIDE 1000 MG: 1 INJECTION, POWDER, LYOPHILIZED, FOR SOLUTION INTRAVENOUS at 11:31

## 2022-04-29 RX ADMIN — BACLOFEN 10 MG: 10 TABLET ORAL at 20:13

## 2022-04-29 RX ADMIN — BACLOFEN 10 MG: 10 TABLET ORAL at 14:09

## 2022-04-29 RX ADMIN — DIAZEPAM 5 MG: 5 TABLET ORAL at 00:06

## 2022-04-29 RX ADMIN — VANCOMYCIN HYDROCHLORIDE 1000 MG: 1 INJECTION, POWDER, LYOPHILIZED, FOR SOLUTION INTRAVENOUS at 21:38

## 2022-04-29 RX ADMIN — GABAPENTIN 300 MG: 300 CAPSULE ORAL at 14:10

## 2022-04-29 RX ADMIN — Medication: at 21:03

## 2022-04-29 RX ADMIN — DIAZEPAM 5 MG: 5 TABLET ORAL at 06:00

## 2022-04-29 RX ADMIN — HYDROCODONE BITARTRATE AND ACETAMINOPHEN 1 TABLET: 10; 325 TABLET ORAL at 04:21

## 2022-04-29 RX ADMIN — Medication: at 11:35

## 2022-04-29 RX ADMIN — CYCLOBENZAPRINE HYDROCHLORIDE 10 MG: 5 TABLET, FILM COATED ORAL at 20:19

## 2022-04-29 RX ADMIN — OXYCODONE HYDROCHLORIDE AND ACETAMINOPHEN 1000 MG: 500 TABLET ORAL at 14:10

## 2022-04-29 RX ADMIN — HYDROCODONE BITARTRATE AND ACETAMINOPHEN 1 TABLET: 10; 325 TABLET ORAL at 09:39

## 2022-04-29 RX ADMIN — OXYCODONE HYDROCHLORIDE AND ACETAMINOPHEN 1000 MG: 500 TABLET ORAL at 15:22

## 2022-04-29 RX ADMIN — HYDROCODONE BITARTRATE AND ACETAMINOPHEN 1 TABLET: 10; 325 TABLET ORAL at 20:19

## 2022-04-29 RX ADMIN — SODIUM CHLORIDE, POTASSIUM CHLORIDE, SODIUM LACTATE AND CALCIUM CHLORIDE: 600; 310; 30; 20 INJECTION, SOLUTION INTRAVENOUS at 15:21

## 2022-04-29 RX ADMIN — SODIUM CHLORIDE, PRESERVATIVE FREE 2 ML: 5 INJECTION INTRAVENOUS at 08:33

## 2022-04-29 RX ADMIN — GABAPENTIN 300 MG: 300 CAPSULE ORAL at 05:35

## 2022-04-29 RX ADMIN — Medication 25 MCG: at 08:31

## 2022-04-29 RX ADMIN — DICYCLOMINE HYDROCHLORIDE 10 MG: 10 CAPSULE ORAL at 04:21

## 2022-04-29 RX ADMIN — SENNOSIDES AND DOCUSATE SODIUM 2 TABLET: 50; 8.6 TABLET ORAL at 20:13

## 2022-04-29 RX ADMIN — ENOXAPARIN SODIUM 30 MG: 30 INJECTION SUBCUTANEOUS at 15:23

## 2022-04-29 ASSESSMENT — PAIN SCALES - GENERAL
PAINLEVEL_OUTOF10: 9
PAINLEVEL_OUTOF10: 8
PAINLEVEL_OUTOF10: 8
PAINLEVEL_OUTOF10: 10
PAINLEVEL_OUTOF10: 8
PAINLEVEL_OUTOF10: 7
PAINLEVEL_OUTOF10: 9

## 2022-04-30 PROBLEM — Z78.9 IMPAIRED MOBILITY AND ACTIVITIES OF DAILY LIVING: Status: ACTIVE | Noted: 2022-04-30

## 2022-04-30 PROBLEM — G89.18 ACUTE POST-OPERATIVE PAIN: Status: ACTIVE | Noted: 2022-04-30

## 2022-04-30 PROBLEM — D62 ACUTE BLOOD LOSS ANEMIA: Status: ACTIVE | Noted: 2022-04-30

## 2022-04-30 PROBLEM — Z74.09 IMPAIRED MOBILITY AND ACTIVITIES OF DAILY LIVING: Status: ACTIVE | Noted: 2022-04-30

## 2022-04-30 LAB
BASOPHILS # BLD: 0 K/MCL (ref 0–0.3)
BASOPHILS NFR BLD: 0 %
BUN SERPL-MCNC: 13 MG/DL (ref 6–20)
BUN/CREAT SERPL: 27 (ref 7–25)
CREAT SERPL-MCNC: 0.48 MG/DL (ref 0.51–0.95)
DEPRECATED RDW RBC: 54.6 FL (ref 39–50)
EOSINOPHIL # BLD: 0.1 K/MCL (ref 0–0.5)
EOSINOPHIL NFR BLD: 1 %
ERYTHROCYTE [DISTWIDTH] IN BLOOD: 19.9 % (ref 11–15)
GFR SERPLBLD BASED ON 1.73 SQ M-ARVRAT: >90 ML/MIN
GLUCOSE BLDC GLUCOMTR-MCNC: 112 MG/DL (ref 70–99)
GLUCOSE BLDC GLUCOMTR-MCNC: 113 MG/DL (ref 70–99)
GLUCOSE BLDC GLUCOMTR-MCNC: 133 MG/DL (ref 70–99)
HCT VFR BLD CALC: 24.6 % (ref 36–46.5)
HCT VFR BLD CALC: 26.6 % (ref 36–46.5)
HCT VFR BLD CALC: 27 % (ref 36–46.5)
HGB BLD-MCNC: 8 G/DL (ref 12–15.5)
HGB BLD-MCNC: 8.4 G/DL (ref 12–15.5)
HGB BLD-MCNC: 8.6 G/DL (ref 12–15.5)
LYMPHOCYTES # BLD: 2.7 K/MCL (ref 1–4.8)
LYMPHOCYTES NFR BLD: 25 %
MCH RBC QN AUTO: 25.4 PG (ref 26–34)
MCHC RBC AUTO-ENTMCNC: 31.9 G/DL (ref 32–36.5)
MCV RBC AUTO: 79.9 FL (ref 78–100)
MONOCYTES # BLD: 0.9 K/MCL (ref 0.3–0.9)
MONOCYTES NFR BLD: 8 %
NEUTROPHILS # BLD: 7.1 K/MCL (ref 1.8–7.7)
NEUTS SEG NFR BLD: 66 %
NRBC BLD MANUAL-RTO: 1 /100 WBC
OVALOCYTES BLD QL SMEAR: NORMAL
PLAT MORPH BLD: NORMAL
PLATELET # BLD AUTO: 213 K/MCL (ref 140–450)
POLYCHROMASIA BLD QL SMEAR: NORMAL
RBC # BLD: 3.38 MIL/MCL (ref 4–5.2)
VANCOMYCIN TROUGH SERPL-MCNC: 5.8 MCG/ML (ref 10–20)
WBC # BLD: 10.7 K/MCL (ref 4.2–11)
WBC MORPH BLD: NORMAL

## 2022-04-30 PROCEDURE — 84520 ASSAY OF UREA NITROGEN: CPT | Performed by: NEUROLOGICAL SURGERY

## 2022-04-30 PROCEDURE — 97535 SELF CARE MNGMENT TRAINING: CPT | Performed by: OCCUPATIONAL THERAPIST

## 2022-04-30 PROCEDURE — 85018 HEMOGLOBIN: CPT | Performed by: PHYSICIAN ASSISTANT

## 2022-04-30 PROCEDURE — 97168 OT RE-EVAL EST PLAN CARE: CPT | Performed by: OCCUPATIONAL THERAPIST

## 2022-04-30 PROCEDURE — 85027 COMPLETE CBC AUTOMATED: CPT | Performed by: NURSE PRACTITIONER

## 2022-04-30 PROCEDURE — 99024 POSTOP FOLLOW-UP VISIT: CPT | Performed by: PHYSICIAN ASSISTANT

## 2022-04-30 PROCEDURE — 10002807 HB RX 258: Performed by: PHYSICIAN ASSISTANT

## 2022-04-30 PROCEDURE — 10006031 HB ROOM CHARGE TELEMETRY

## 2022-04-30 PROCEDURE — 10002803 HB RX 637: Performed by: FAMILY MEDICINE

## 2022-04-30 PROCEDURE — 10002807 HB RX 258: Performed by: NURSE PRACTITIONER

## 2022-04-30 PROCEDURE — 80202 ASSAY OF VANCOMYCIN: CPT | Performed by: NEUROLOGICAL SURGERY

## 2022-04-30 PROCEDURE — 10002800 HB RX 250 W HCPCS: Performed by: NEUROLOGICAL SURGERY

## 2022-04-30 PROCEDURE — 10002800 HB RX 250 W HCPCS: Performed by: PHYSICIAN ASSISTANT

## 2022-04-30 PROCEDURE — 10004651 HB RX, NO CHARGE ITEM: Performed by: NURSE PRACTITIONER

## 2022-04-30 PROCEDURE — 10002800 HB RX 250 W HCPCS: Performed by: NURSE PRACTITIONER

## 2022-04-30 PROCEDURE — 10002803 HB RX 637: Performed by: NURSE PRACTITIONER

## 2022-04-30 PROCEDURE — 10002803 HB RX 637: Performed by: SURGERY

## 2022-04-30 PROCEDURE — 97162 PT EVAL MOD COMPLEX 30 MIN: CPT

## 2022-04-30 PROCEDURE — 99233 SBSQ HOSP IP/OBS HIGH 50: CPT | Performed by: FAMILY MEDICINE

## 2022-04-30 PROCEDURE — 97530 THERAPEUTIC ACTIVITIES: CPT | Performed by: OCCUPATIONAL THERAPIST

## 2022-04-30 PROCEDURE — 10002807 HB RX 258: Performed by: NEUROLOGICAL SURGERY

## 2022-04-30 PROCEDURE — 97530 THERAPEUTIC ACTIVITIES: CPT

## 2022-04-30 PROCEDURE — 13003289 HB OXYGEN THERAPY DAILY

## 2022-04-30 RX ORDER — DICYCLOMINE HYDROCHLORIDE 10 MG/1
20 CAPSULE ORAL
Status: DISCONTINUED | OUTPATIENT
Start: 2022-04-30 | End: 2022-05-07 | Stop reason: HOSPADM

## 2022-04-30 RX ADMIN — POLYETHYLENE GLYCOL (3350) 17 G: 17 POWDER, FOR SOLUTION ORAL at 10:07

## 2022-04-30 RX ADMIN — Medication: at 08:15

## 2022-04-30 RX ADMIN — GABAPENTIN 300 MG: 300 CAPSULE ORAL at 13:01

## 2022-04-30 RX ADMIN — BACLOFEN 10 MG: 10 TABLET ORAL at 20:34

## 2022-04-30 RX ADMIN — HYDROCODONE BITARTRATE AND ACETAMINOPHEN 1 TABLET: 10; 325 TABLET ORAL at 13:02

## 2022-04-30 RX ADMIN — ENOXAPARIN SODIUM 30 MG: 30 INJECTION SUBCUTANEOUS at 20:35

## 2022-04-30 RX ADMIN — Medication: at 19:27

## 2022-04-30 RX ADMIN — HYDROCODONE BITARTRATE AND ACETAMINOPHEN 1 TABLET: 10; 325 TABLET ORAL at 08:22

## 2022-04-30 RX ADMIN — HYDROCODONE BITARTRATE AND ACETAMINOPHEN 1 TABLET: 5; 325 TABLET ORAL at 04:40

## 2022-04-30 RX ADMIN — SODIUM CHLORIDE, PRESERVATIVE FREE 2 ML: 5 INJECTION INTRAVENOUS at 20:37

## 2022-04-30 RX ADMIN — OXYCODONE HYDROCHLORIDE AND ACETAMINOPHEN 1000 MG: 500 TABLET ORAL at 19:10

## 2022-04-30 RX ADMIN — SENNOSIDES AND DOCUSATE SODIUM 2 TABLET: 50; 8.6 TABLET ORAL at 10:07

## 2022-04-30 RX ADMIN — ZINC SULFATE 220 MG (50 MG) CAPSULE 220 MG: CAPSULE at 08:22

## 2022-04-30 RX ADMIN — Medication 25 MCG: at 08:21

## 2022-04-30 RX ADMIN — SODIUM CHLORIDE, PRESERVATIVE FREE 2 ML: 5 INJECTION INTRAVENOUS at 07:54

## 2022-04-30 RX ADMIN — PANTOPRAZOLE SODIUM 40 MG: 40 TABLET, DELAYED RELEASE ORAL at 06:34

## 2022-04-30 RX ADMIN — OXYCODONE HYDROCHLORIDE AND ACETAMINOPHEN 1000 MG: 500 TABLET ORAL at 13:05

## 2022-04-30 RX ADMIN — BACLOFEN 10 MG: 10 TABLET ORAL at 13:01

## 2022-04-30 RX ADMIN — OXYCODONE HYDROCHLORIDE AND ACETAMINOPHEN 1000 MG: 500 TABLET ORAL at 08:21

## 2022-04-30 RX ADMIN — VANCOMYCIN HYDROCHLORIDE 1000 MG: 1 INJECTION, POWDER, LYOPHILIZED, FOR SOLUTION INTRAVENOUS at 14:28

## 2022-04-30 RX ADMIN — GABAPENTIN 300 MG: 300 CAPSULE ORAL at 06:34

## 2022-04-30 RX ADMIN — VANCOMYCIN HYDROCHLORIDE 1000 MG: 1 INJECTION, POWDER, LYOPHILIZED, FOR SOLUTION INTRAVENOUS at 22:13

## 2022-04-30 RX ADMIN — SODIUM CHLORIDE 25 ML: 9 INJECTION, SOLUTION INTRAVENOUS at 10:38

## 2022-04-30 RX ADMIN — BACLOFEN 10 MG: 10 TABLET ORAL at 08:21

## 2022-04-30 RX ADMIN — SODIUM CHLORIDE 200 ML: 9 INJECTION, SOLUTION INTRAVENOUS at 14:26

## 2022-04-30 RX ADMIN — ENOXAPARIN SODIUM 30 MG: 30 INJECTION SUBCUTANEOUS at 08:21

## 2022-04-30 RX ADMIN — SENNOSIDES AND DOCUSATE SODIUM 2 TABLET: 50; 8.6 TABLET ORAL at 20:34

## 2022-04-30 RX ADMIN — CYCLOBENZAPRINE HYDROCHLORIDE 10 MG: 5 TABLET, FILM COATED ORAL at 10:06

## 2022-04-30 RX ADMIN — CYCLOBENZAPRINE HYDROCHLORIDE 10 MG: 5 TABLET, FILM COATED ORAL at 23:36

## 2022-04-30 RX ADMIN — HYDROCODONE BITARTRATE AND ACETAMINOPHEN 1 TABLET: 10; 325 TABLET ORAL at 19:10

## 2022-04-30 RX ADMIN — DICYCLOMINE HYDROCHLORIDE 20 MG: 10 CAPSULE ORAL at 19:10

## 2022-04-30 ASSESSMENT — PAIN SCALES - GENERAL
PAINLEVEL_OUTOF10: 9
PAINLEVEL_OUTOF10: 10
PAINLEVEL_OUTOF10: 9
PAINLEVEL_OUTOF10: 9
PAINLEVEL_OUTOF10: 8
PAINLEVEL_OUTOF10: 10
PAINLEVEL_OUTOF10: 9
PAINLEVEL_OUTOF10: 10
PAINLEVEL_OUTOF10: 9
PAINLEVEL_OUTOF10: 10

## 2022-04-30 ASSESSMENT — COGNITIVE AND FUNCTIONAL STATUS - GENERAL
APPLIED_COGNITIVE_CONVERTED_SCORE: 38.07
HELP NEEDED DRESSING REGULAR UPPER BODY CLOTHING: A LOT
BASIC_MOBILITY_RAW_SCORE: 6
HELP NEEDED DRESSING REGULAR LOWER BODY CLOTHING: TOTAL
REMEMBERING TO TAKE MEDICATION: A LITTLE
TAKING CARE OF COMPLICATED TASKS: A LOT
HELP NEEDED FOR BATHING: TOTAL
APPLIED_COGNITIVE_RAW_SCORE: 18
DAILY_ACTIVITY_CONVERTED_SCORE: 29.04
DAILY_ACTIVITY_RAW_SCORE: 11
HELP NEEDED FOR TOILETING: TOTAL
HELP NEEDED FOR PERSONAL GROOMING: A LITTLE
BASIC_MOBILITY_CONVERTED_SCORE: 16.59
REMEMBERING WHERE THINGS ARE: A LITTLE
REMEMBERING 5 ERRANDS WITH NO LIST: A LOT

## 2022-04-30 ASSESSMENT — ENCOUNTER SYMPTOMS: PAIN SEVERITY NOW: 9

## 2022-04-30 ASSESSMENT — ACTIVITIES OF DAILY LIVING (ADL)
HOME_MANAGEMENT_TIME_ENTRY: 8
PRIOR_ADL: INDEPENDENT
EATING: INDEPENDENT

## 2022-05-01 ENCOUNTER — APPOINTMENT (OUTPATIENT)
Dept: CT IMAGING | Age: 47
DRG: 303 | End: 2022-05-01
Attending: FAMILY MEDICINE

## 2022-05-01 PROBLEM — K59.03 THERAPEUTIC OPIOID INDUCED CONSTIPATION: Status: ACTIVE | Noted: 2022-05-01

## 2022-05-01 PROBLEM — Z91.89 AT RISK FOR INADEQUATE PAIN CONTROL: Status: ACTIVE | Noted: 2022-05-01

## 2022-05-01 PROBLEM — Z91.89 AT HIGH RISK FOR CONSTIPATION: Status: ACTIVE | Noted: 2022-05-01

## 2022-05-01 PROBLEM — T40.2X5A THERAPEUTIC OPIOID INDUCED CONSTIPATION: Status: ACTIVE | Noted: 2022-05-01

## 2022-05-01 LAB
BASOPHILS # BLD: 0.1 K/MCL (ref 0–0.3)
BASOPHILS NFR BLD: 1 %
DEPRECATED RDW RBC: 58.3 FL (ref 39–50)
EOSINOPHIL # BLD: 0.3 K/MCL (ref 0–0.5)
EOSINOPHIL NFR BLD: 3 %
ERYTHROCYTE [DISTWIDTH] IN BLOOD: 20.2 % (ref 11–15)
HCT VFR BLD CALC: 27.3 % (ref 36–46.5)
HCT VFR BLD CALC: 27.5 % (ref 36–46.5)
HGB BLD-MCNC: 8.7 G/DL (ref 12–15.5)
HGB BLD-MCNC: 8.8 G/DL (ref 12–15.5)
IMM GRANULOCYTES # BLD AUTO: 0.3 K/MCL (ref 0–0.2)
IMM GRANULOCYTES # BLD: 3 %
LYMPHOCYTES # BLD: 1.2 K/MCL (ref 1–4.8)
LYMPHOCYTES NFR BLD: 13 %
MCH RBC QN AUTO: 25.7 PG (ref 26–34)
MCHC RBC AUTO-ENTMCNC: 31.9 G/DL (ref 32–36.5)
MCV RBC AUTO: 80.5 FL (ref 78–100)
MONOCYTES # BLD: 0.7 K/MCL (ref 0.3–0.9)
MONOCYTES NFR BLD: 8 %
NEUTROPHILS # BLD: 7 K/MCL (ref 1.8–7.7)
NEUTROPHILS NFR BLD: 72 %
NRBC BLD MANUAL-RTO: 1 /100 WBC
PLATELET # BLD AUTO: 207 K/MCL (ref 140–450)
RBC # BLD: 3.39 MIL/MCL (ref 4–5.2)
WBC # BLD: 9.5 K/MCL (ref 4.2–11)

## 2022-05-01 PROCEDURE — 10002803 HB RX 637: Performed by: NURSE PRACTITIONER

## 2022-05-01 PROCEDURE — 10002800 HB RX 250 W HCPCS: Performed by: NURSE PRACTITIONER

## 2022-05-01 PROCEDURE — 10002800 HB RX 250 W HCPCS: Performed by: PHYSICIAN ASSISTANT

## 2022-05-01 PROCEDURE — 10002807 HB RX 258: Performed by: PHYSICIAN ASSISTANT

## 2022-05-01 PROCEDURE — 85025 COMPLETE CBC W/AUTO DIFF WBC: CPT | Performed by: NURSE PRACTITIONER

## 2022-05-01 PROCEDURE — 74176 CT ABD & PELVIS W/O CONTRAST: CPT

## 2022-05-01 PROCEDURE — 10002803 HB RX 637: Performed by: FAMILY MEDICINE

## 2022-05-01 PROCEDURE — 99233 SBSQ HOSP IP/OBS HIGH 50: CPT | Performed by: FAMILY MEDICINE

## 2022-05-01 PROCEDURE — 13003289 HB OXYGEN THERAPY DAILY

## 2022-05-01 PROCEDURE — 99024 POSTOP FOLLOW-UP VISIT: CPT | Performed by: PHYSICIAN ASSISTANT

## 2022-05-01 PROCEDURE — G1004 CDSM NDSC: HCPCS

## 2022-05-01 PROCEDURE — 10002805 HB CONTRAST AGENT: Performed by: FAMILY MEDICINE

## 2022-05-01 PROCEDURE — 99223 1ST HOSP IP/OBS HIGH 75: CPT | Performed by: SPECIALIST

## 2022-05-01 PROCEDURE — 10004651 HB RX, NO CHARGE ITEM: Performed by: NURSE PRACTITIONER

## 2022-05-01 PROCEDURE — 10002803 HB RX 637: Performed by: PHYSICIAN ASSISTANT

## 2022-05-01 PROCEDURE — 10002803 HB RX 637: Performed by: SURGERY

## 2022-05-01 PROCEDURE — 85018 HEMOGLOBIN: CPT | Performed by: PHYSICIAN ASSISTANT

## 2022-05-01 PROCEDURE — 10006031 HB ROOM CHARGE TELEMETRY

## 2022-05-01 RX ORDER — PANTOPRAZOLE SODIUM 40 MG/1
40 TABLET, DELAYED RELEASE ORAL
Status: DISCONTINUED | OUTPATIENT
Start: 2022-05-01 | End: 2022-05-07 | Stop reason: HOSPADM

## 2022-05-01 RX ORDER — OXYCODONE HYDROCHLORIDE 5 MG/1
5 TABLET ORAL EVERY 4 HOURS PRN
Status: DISCONTINUED | OUTPATIENT
Start: 2022-05-01 | End: 2022-05-02 | Stop reason: ALTCHOICE

## 2022-05-01 RX ORDER — OXYCODONE HCL 10 MG/1
10 TABLET, FILM COATED, EXTENDED RELEASE ORAL EVERY 12 HOURS SCHEDULED
Status: DISCONTINUED | OUTPATIENT
Start: 2022-05-01 | End: 2022-05-07 | Stop reason: HOSPADM

## 2022-05-01 RX ADMIN — Medication 25 MCG: at 08:47

## 2022-05-01 RX ADMIN — PANTOPRAZOLE SODIUM 40 MG: 40 TABLET, DELAYED RELEASE ORAL at 06:07

## 2022-05-01 RX ADMIN — ALUMINUM HYDROXIDE, MAGNESIUM HYDROXIDE, SIMETHICONE 30 ML: 400; 400; 40 SUSPENSION ORAL at 12:48

## 2022-05-01 RX ADMIN — BACLOFEN 10 MG: 10 TABLET ORAL at 20:44

## 2022-05-01 RX ADMIN — SODIUM CHLORIDE, PRESERVATIVE FREE 2 ML: 5 INJECTION INTRAVENOUS at 08:24

## 2022-05-01 RX ADMIN — ZINC SULFATE 220 MG (50 MG) CAPSULE 220 MG: CAPSULE at 08:46

## 2022-05-01 RX ADMIN — ENOXAPARIN SODIUM 30 MG: 30 INJECTION SUBCUTANEOUS at 08:46

## 2022-05-01 RX ADMIN — DICYCLOMINE HYDROCHLORIDE 20 MG: 10 CAPSULE ORAL at 10:57

## 2022-05-01 RX ADMIN — VANCOMYCIN HYDROCHLORIDE 1000 MG: 1 INJECTION, POWDER, LYOPHILIZED, FOR SOLUTION INTRAVENOUS at 13:45

## 2022-05-01 RX ADMIN — CYCLOBENZAPRINE HYDROCHLORIDE 10 MG: 5 TABLET, FILM COATED ORAL at 06:43

## 2022-05-01 RX ADMIN — VANCOMYCIN HYDROCHLORIDE 1000 MG: 1 INJECTION, POWDER, LYOPHILIZED, FOR SOLUTION INTRAVENOUS at 21:39

## 2022-05-01 RX ADMIN — SENNOSIDES AND DOCUSATE SODIUM 2 TABLET: 50; 8.6 TABLET ORAL at 20:44

## 2022-05-01 RX ADMIN — DICYCLOMINE HYDROCHLORIDE 20 MG: 10 CAPSULE ORAL at 06:50

## 2022-05-01 RX ADMIN — HYDROMORPHONE HYDROCHLORIDE 0.2 MG: 1 INJECTION, SOLUTION INTRAMUSCULAR; INTRAVENOUS; SUBCUTANEOUS at 19:03

## 2022-05-01 RX ADMIN — GABAPENTIN 300 MG: 300 CAPSULE ORAL at 13:29

## 2022-05-01 RX ADMIN — OXYCODONE HYDROCHLORIDE AND ACETAMINOPHEN 1000 MG: 500 TABLET ORAL at 16:51

## 2022-05-01 RX ADMIN — OXYCODONE HYDROCHLORIDE 10 MG: 10 TABLET, FILM COATED, EXTENDED RELEASE ORAL at 20:44

## 2022-05-01 RX ADMIN — SODIUM CHLORIDE, PRESERVATIVE FREE 2 ML: 5 INJECTION INTRAVENOUS at 20:47

## 2022-05-01 RX ADMIN — OXYCODONE HYDROCHLORIDE 10 MG: 10 TABLET, FILM COATED, EXTENDED RELEASE ORAL at 08:47

## 2022-05-01 RX ADMIN — HYDROCODONE BITARTRATE AND ACETAMINOPHEN 1 TABLET: 10; 325 TABLET ORAL at 12:48

## 2022-05-01 RX ADMIN — PANTOPRAZOLE SODIUM 40 MG: 40 TABLET, DELAYED RELEASE ORAL at 16:51

## 2022-05-01 RX ADMIN — OXYCODONE HYDROCHLORIDE AND ACETAMINOPHEN 1000 MG: 500 TABLET ORAL at 06:50

## 2022-05-01 RX ADMIN — IOHEXOL 12.5 ML: 350 INJECTION, SOLUTION INTRAVENOUS at 15:23

## 2022-05-01 RX ADMIN — GABAPENTIN 300 MG: 300 CAPSULE ORAL at 06:07

## 2022-05-01 RX ADMIN — POLYETHYLENE GLYCOL (3350) 17 G: 17 POWDER, FOR SOLUTION ORAL at 08:30

## 2022-05-01 RX ADMIN — BACLOFEN 10 MG: 10 TABLET ORAL at 13:29

## 2022-05-01 RX ADMIN — HYDROMORPHONE HYDROCHLORIDE 0.2 MG: 1 INJECTION, SOLUTION INTRAMUSCULAR; INTRAVENOUS; SUBCUTANEOUS at 20:44

## 2022-05-01 RX ADMIN — VANCOMYCIN HYDROCHLORIDE 1000 MG: 1 INJECTION, POWDER, LYOPHILIZED, FOR SOLUTION INTRAVENOUS at 06:09

## 2022-05-01 RX ADMIN — Medication: at 02:48

## 2022-05-01 RX ADMIN — ONDANSETRON 4 MG: 2 INJECTION INTRAMUSCULAR; INTRAVENOUS at 08:31

## 2022-05-01 RX ADMIN — OXYCODONE HYDROCHLORIDE 5 MG: 5 TABLET ORAL at 23:48

## 2022-05-01 RX ADMIN — HYDROCODONE BITARTRATE AND ACETAMINOPHEN 1 TABLET: 10; 325 TABLET ORAL at 08:47

## 2022-05-01 RX ADMIN — IOHEXOL 12.5 ML: 350 INJECTION, SOLUTION INTRAVENOUS at 14:27

## 2022-05-01 RX ADMIN — OXYCODONE HYDROCHLORIDE AND ACETAMINOPHEN 1000 MG: 500 TABLET ORAL at 10:57

## 2022-05-01 RX ADMIN — Medication: at 09:25

## 2022-05-01 RX ADMIN — OXYCODONE HYDROCHLORIDE 5 MG: 5 TABLET ORAL at 16:51

## 2022-05-01 RX ADMIN — GABAPENTIN 300 MG: 300 CAPSULE ORAL at 21:40

## 2022-05-01 RX ADMIN — HYDROCODONE BITARTRATE AND ACETAMINOPHEN 1 TABLET: 5; 325 TABLET ORAL at 04:27

## 2022-05-01 RX ADMIN — DICYCLOMINE HYDROCHLORIDE 20 MG: 10 CAPSULE ORAL at 16:51

## 2022-05-01 RX ADMIN — ENOXAPARIN SODIUM 30 MG: 30 INJECTION SUBCUTANEOUS at 20:44

## 2022-05-01 RX ADMIN — BACLOFEN 10 MG: 10 TABLET ORAL at 08:47

## 2022-05-01 RX ADMIN — CYCLOBENZAPRINE HYDROCHLORIDE 10 MG: 5 TABLET, FILM COATED ORAL at 16:50

## 2022-05-01 RX ADMIN — DICYCLOMINE HYDROCHLORIDE 20 MG: 10 CAPSULE ORAL at 20:44

## 2022-05-01 ASSESSMENT — ENCOUNTER SYMPTOMS
CHILLS: 0
ROS GI COMMENTS: BLOATING
SENSORY CHANGE: 1
NAUSEA: 0
DOUBLE VISION: 0
FEVER: 0
SHORTNESS OF BREATH: 1
ORTHOPNEA: 0
ABDOMINAL PAIN: 1
VOMITING: 0
COUGH: 0
WEIGHT LOSS: 0
WEAKNESS: 1
DIZZINESS: 0

## 2022-05-01 ASSESSMENT — PAIN SCALES - GENERAL
PAINLEVEL_OUTOF10: 9
PAINLEVEL_OUTOF10: 9
PAINLEVEL_OUTOF10: 10
PAINLEVEL_OUTOF10: 8
PAINLEVEL_OUTOF10: 7
PAINLEVEL_OUTOF10: 9
PAINLEVEL_OUTOF10: 8
PAINLEVEL_OUTOF10: 10
PAINLEVEL_OUTOF10: 10

## 2022-05-01 ASSESSMENT — LIFESTYLE VARIABLES: SUBSTANCE_ABUSE: 0

## 2022-05-02 LAB
ALBUMIN SERPL-MCNC: 1.9 G/DL (ref 3.6–5.1)
ALBUMIN/GLOB SERPL: 0.5 {RATIO} (ref 1–2.4)
ALP SERPL-CCNC: 60 UNITS/L (ref 45–117)
ALT SERPL-CCNC: 21 UNITS/L
ANION GAP SERPL CALC-SCNC: 11 MMOL/L (ref 10–20)
AST SERPL-CCNC: 18 UNITS/L
BASOPHILS # BLD: 0.1 K/MCL (ref 0–0.3)
BASOPHILS NFR BLD: 1 %
BILIRUB SERPL-MCNC: 0.4 MG/DL (ref 0.2–1)
BUN SERPL-MCNC: 8 MG/DL (ref 6–20)
BUN SERPL-MCNC: 9 MG/DL (ref 6–20)
BUN/CREAT SERPL: 21 (ref 7–25)
BUN/CREAT SERPL: 23 (ref 7–25)
CALCIUM SERPL-MCNC: 7.9 MG/DL (ref 8.4–10.2)
CHLORIDE SERPL-SCNC: 103 MMOL/L (ref 98–107)
CO2 SERPL-SCNC: 28 MMOL/L (ref 21–32)
CREAT SERPL-MCNC: 0.39 MG/DL (ref 0.51–0.95)
CREAT SERPL-MCNC: 0.39 MG/DL (ref 0.51–0.95)
DEPRECATED RDW RBC: 56.1 FL (ref 39–50)
EOSINOPHIL # BLD: 0.2 K/MCL (ref 0–0.5)
EOSINOPHIL NFR BLD: 2 %
ERYTHROCYTE [DISTWIDTH] IN BLOOD: 19.9 % (ref 11–15)
FASTING DURATION TIME PATIENT: ABNORMAL H
GFR SERPLBLD BASED ON 1.73 SQ M-ARVRAT: >90 ML/MIN
GFR SERPLBLD BASED ON 1.73 SQ M-ARVRAT: >90 ML/MIN
GLOBULIN SER-MCNC: 3.7 G/DL (ref 2–4)
GLUCOSE SERPL-MCNC: 102 MG/DL (ref 70–99)
HCT VFR BLD CALC: 27.7 % (ref 36–46.5)
HCT VFR BLD CALC: 28 % (ref 36–46.5)
HGB BLD-MCNC: 8.9 G/DL (ref 12–15.5)
HGB BLD-MCNC: 8.9 G/DL (ref 12–15.5)
IMM GRANULOCYTES # BLD AUTO: 0.2 K/MCL (ref 0–0.2)
IMM GRANULOCYTES # BLD: 2 %
LYMPHOCYTES # BLD: 1 K/MCL (ref 1–4.8)
LYMPHOCYTES NFR BLD: 10 %
MCH RBC QN AUTO: 25.4 PG (ref 26–34)
MCHC RBC AUTO-ENTMCNC: 31.8 G/DL (ref 32–36.5)
MCV RBC AUTO: 80 FL (ref 78–100)
MONOCYTES # BLD: 0.6 K/MCL (ref 0.3–0.9)
MONOCYTES NFR BLD: 6 %
NEUTROPHILS # BLD: 8.2 K/MCL (ref 1.8–7.7)
NEUTROPHILS NFR BLD: 79 %
NRBC BLD MANUAL-RTO: 1 /100 WBC
PLATELET # BLD AUTO: 247 K/MCL (ref 140–450)
POTASSIUM SERPL-SCNC: 3.5 MMOL/L (ref 3.4–5.1)
PROT SERPL-MCNC: 5.6 G/DL (ref 6.4–8.2)
RBC # BLD: 3.5 MIL/MCL (ref 4–5.2)
SODIUM SERPL-SCNC: 138 MMOL/L (ref 135–145)
VANCOMYCIN TROUGH SERPL-MCNC: 8.5 MCG/ML (ref 10–20)
WBC # BLD: 10.2 K/MCL (ref 4.2–11)

## 2022-05-02 PROCEDURE — 10002803 HB RX 637: Performed by: NURSE PRACTITIONER

## 2022-05-02 PROCEDURE — 80053 COMPREHEN METABOLIC PANEL: CPT | Performed by: FAMILY MEDICINE

## 2022-05-02 PROCEDURE — 80202 ASSAY OF VANCOMYCIN: CPT | Performed by: FAMILY MEDICINE

## 2022-05-02 PROCEDURE — 10002800 HB RX 250 W HCPCS: Performed by: PHYSICIAN ASSISTANT

## 2022-05-02 PROCEDURE — 10002803 HB RX 637: Performed by: PHYSICIAN ASSISTANT

## 2022-05-02 PROCEDURE — 10002803 HB RX 637: Performed by: SURGERY

## 2022-05-02 PROCEDURE — 84520 ASSAY OF UREA NITROGEN: CPT | Performed by: PHYSICIAN ASSISTANT

## 2022-05-02 PROCEDURE — 10002800 HB RX 250 W HCPCS: Performed by: NURSE PRACTITIONER

## 2022-05-02 PROCEDURE — 10002807 HB RX 258: Performed by: FAMILY MEDICINE

## 2022-05-02 PROCEDURE — 10006031 HB ROOM CHARGE TELEMETRY

## 2022-05-02 PROCEDURE — 10002807 HB RX 258: Performed by: PHYSICIAN ASSISTANT

## 2022-05-02 PROCEDURE — 85018 HEMOGLOBIN: CPT | Performed by: PHYSICIAN ASSISTANT

## 2022-05-02 PROCEDURE — 10002800 HB RX 250 W HCPCS: Performed by: FAMILY MEDICINE

## 2022-05-02 PROCEDURE — 10004651 HB RX, NO CHARGE ITEM: Performed by: NURSE PRACTITIONER

## 2022-05-02 PROCEDURE — 99233 SBSQ HOSP IP/OBS HIGH 50: CPT | Performed by: FAMILY MEDICINE

## 2022-05-02 PROCEDURE — 85025 COMPLETE CBC W/AUTO DIFF WBC: CPT | Performed by: NURSE PRACTITIONER

## 2022-05-02 PROCEDURE — 10002803 HB RX 637: Performed by: FAMILY MEDICINE

## 2022-05-02 RX ORDER — BISACODYL 10 MG
10 SUPPOSITORY, RECTAL RECTAL ONCE
Status: DISCONTINUED | OUTPATIENT
Start: 2022-05-02 | End: 2022-05-07 | Stop reason: HOSPADM

## 2022-05-02 RX ORDER — OXYCODONE AND ACETAMINOPHEN 10; 325 MG/1; MG/1
1 TABLET ORAL EVERY 4 HOURS PRN
Status: DISCONTINUED | OUTPATIENT
Start: 2022-05-02 | End: 2022-05-07 | Stop reason: HOSPADM

## 2022-05-02 RX ORDER — POLYETHYLENE GLYCOL 3350 17 G/17G
17 POWDER, FOR SOLUTION ORAL 2 TIMES DAILY
Status: DISCONTINUED | OUTPATIENT
Start: 2022-05-02 | End: 2022-05-07 | Stop reason: HOSPADM

## 2022-05-02 RX ORDER — BISACODYL 10 MG
10 SUPPOSITORY, RECTAL RECTAL DAILY PRN
Status: DISCONTINUED | OUTPATIENT
Start: 2022-05-02 | End: 2022-05-02

## 2022-05-02 RX ADMIN — VANCOMYCIN HYDROCHLORIDE 1250 MG: 10 INJECTION, POWDER, LYOPHILIZED, FOR SOLUTION INTRAVENOUS at 21:09

## 2022-05-02 RX ADMIN — HYDROMORPHONE HYDROCHLORIDE 0.2 MG: 1 INJECTION, SOLUTION INTRAMUSCULAR; INTRAVENOUS; SUBCUTANEOUS at 22:36

## 2022-05-02 RX ADMIN — DICYCLOMINE HYDROCHLORIDE 20 MG: 10 CAPSULE ORAL at 06:44

## 2022-05-02 RX ADMIN — SODIUM CHLORIDE, PRESERVATIVE FREE 2 ML: 5 INJECTION INTRAVENOUS at 08:09

## 2022-05-02 RX ADMIN — GABAPENTIN 300 MG: 300 CAPSULE ORAL at 13:08

## 2022-05-02 RX ADMIN — OXYCODONE HYDROCHLORIDE 10 MG: 10 TABLET, FILM COATED, EXTENDED RELEASE ORAL at 08:04

## 2022-05-02 RX ADMIN — ZINC SULFATE 220 MG (50 MG) CAPSULE 220 MG: CAPSULE at 08:03

## 2022-05-02 RX ADMIN — GABAPENTIN 300 MG: 300 CAPSULE ORAL at 06:44

## 2022-05-02 RX ADMIN — OXYCODONE HYDROCHLORIDE 10 MG: 10 TABLET, FILM COATED, EXTENDED RELEASE ORAL at 20:57

## 2022-05-02 RX ADMIN — OXYCODONE HYDROCHLORIDE 5 MG: 5 TABLET ORAL at 17:06

## 2022-05-02 RX ADMIN — BACLOFEN 10 MG: 10 TABLET ORAL at 16:18

## 2022-05-02 RX ADMIN — HYDROMORPHONE HYDROCHLORIDE 0.2 MG: 1 INJECTION, SOLUTION INTRAMUSCULAR; INTRAVENOUS; SUBCUTANEOUS at 13:56

## 2022-05-02 RX ADMIN — OXYCODONE HYDROCHLORIDE 5 MG: 5 TABLET ORAL at 08:15

## 2022-05-02 RX ADMIN — BACLOFEN 10 MG: 10 TABLET ORAL at 08:03

## 2022-05-02 RX ADMIN — OXYCODONE HYDROCHLORIDE 5 MG: 5 TABLET ORAL at 13:08

## 2022-05-02 RX ADMIN — DICYCLOMINE HYDROCHLORIDE 20 MG: 10 CAPSULE ORAL at 15:17

## 2022-05-02 RX ADMIN — ENOXAPARIN SODIUM 30 MG: 30 INJECTION SUBCUTANEOUS at 21:03

## 2022-05-02 RX ADMIN — OXYCODONE HYDROCHLORIDE AND ACETAMINOPHEN 1000 MG: 500 TABLET ORAL at 12:40

## 2022-05-02 RX ADMIN — OXYCODONE HYDROCHLORIDE 5 MG: 5 TABLET ORAL at 03:53

## 2022-05-02 RX ADMIN — HYDROMORPHONE HYDROCHLORIDE 0.2 MG: 1 INJECTION, SOLUTION INTRAMUSCULAR; INTRAVENOUS; SUBCUTANEOUS at 06:44

## 2022-05-02 RX ADMIN — HYDROMORPHONE HYDROCHLORIDE 0.2 MG: 1 INJECTION, SOLUTION INTRAMUSCULAR; INTRAVENOUS; SUBCUTANEOUS at 10:49

## 2022-05-02 RX ADMIN — SENNOSIDES AND DOCUSATE SODIUM 2 TABLET: 50; 8.6 TABLET ORAL at 20:57

## 2022-05-02 RX ADMIN — VANCOMYCIN HYDROCHLORIDE 1000 MG: 1 INJECTION, POWDER, LYOPHILIZED, FOR SOLUTION INTRAVENOUS at 06:49

## 2022-05-02 RX ADMIN — PANTOPRAZOLE SODIUM 40 MG: 40 TABLET, DELAYED RELEASE ORAL at 06:44

## 2022-05-02 RX ADMIN — OXYCODONE HYDROCHLORIDE AND ACETAMINOPHEN 1000 MG: 500 TABLET ORAL at 16:18

## 2022-05-02 RX ADMIN — PANTOPRAZOLE SODIUM 40 MG: 40 TABLET, DELAYED RELEASE ORAL at 15:17

## 2022-05-02 RX ADMIN — HYDROMORPHONE HYDROCHLORIDE 0.2 MG: 1 INJECTION, SOLUTION INTRAMUSCULAR; INTRAVENOUS; SUBCUTANEOUS at 01:57

## 2022-05-02 RX ADMIN — VANCOMYCIN HYDROCHLORIDE 1000 MG: 1 INJECTION, POWDER, LYOPHILIZED, FOR SOLUTION INTRAVENOUS at 15:16

## 2022-05-02 RX ADMIN — DICYCLOMINE HYDROCHLORIDE 20 MG: 10 CAPSULE ORAL at 10:50

## 2022-05-02 RX ADMIN — Medication 25 MCG: at 08:04

## 2022-05-02 RX ADMIN — SENNOSIDES AND DOCUSATE SODIUM 2 TABLET: 50; 8.6 TABLET ORAL at 08:04

## 2022-05-02 RX ADMIN — OXYCODONE HYDROCHLORIDE AND ACETAMINOPHEN 1000 MG: 500 TABLET ORAL at 08:04

## 2022-05-02 RX ADMIN — HYDROMORPHONE HYDROCHLORIDE 0.2 MG: 1 INJECTION, SOLUTION INTRAMUSCULAR; INTRAVENOUS; SUBCUTANEOUS at 19:02

## 2022-05-02 RX ADMIN — BACLOFEN 10 MG: 10 TABLET ORAL at 20:57

## 2022-05-02 RX ADMIN — METHYLNALTREXONE BROMIDE 12 MG: 12 INJECTION, SOLUTION SUBCUTANEOUS at 12:40

## 2022-05-02 RX ADMIN — SODIUM CHLORIDE, PRESERVATIVE FREE 2 ML: 5 INJECTION INTRAVENOUS at 21:03

## 2022-05-02 RX ADMIN — ENOXAPARIN SODIUM 30 MG: 30 INJECTION SUBCUTANEOUS at 08:05

## 2022-05-02 RX ADMIN — DICYCLOMINE HYDROCHLORIDE 20 MG: 10 CAPSULE ORAL at 20:57

## 2022-05-02 ASSESSMENT — PAIN SCALES - GENERAL
PAINLEVEL_OUTOF10: 9
PAINLEVEL_OUTOF10: 9
PAINLEVEL_OUTOF10: 10
PAINLEVEL_OUTOF10: 9
PAINLEVEL_OUTOF10: 10
PAINLEVEL_OUTOF10: 9
PAINLEVEL_OUTOF10: 10
PAINLEVEL_OUTOF10: 10

## 2022-05-02 ASSESSMENT — ENCOUNTER SYMPTOMS
CONSTITUTIONAL NEGATIVE: 1
HEARTBURN: 1
PSYCHIATRIC NEGATIVE: 1
BACK PAIN: 1
NEUROLOGICAL NEGATIVE: 1
NAUSEA: 1
EYES NEGATIVE: 1
RESPIRATORY NEGATIVE: 1
CONSTIPATION: 1

## 2022-05-03 LAB
BASOPHILS # BLD: 0 K/MCL (ref 0–0.3)
BASOPHILS NFR BLD: 0 %
DEPRECATED RDW RBC: 57 FL (ref 39–50)
EOSINOPHIL # BLD: 0.1 K/MCL (ref 0–0.5)
EOSINOPHIL NFR BLD: 1 %
ERYTHROCYTE [DISTWIDTH] IN BLOOD: 19.9 % (ref 11–15)
HCT VFR BLD CALC: 27.7 % (ref 36–46.5)
HCT VFR BLD CALC: 27.9 % (ref 36–46.5)
HGB BLD-MCNC: 8.7 G/DL (ref 12–15.5)
HGB BLD-MCNC: 8.8 G/DL (ref 12–15.5)
LYMPHOCYTES # BLD: 0.7 K/MCL (ref 1–4.8)
LYMPHOCYTES NFR BLD: 8 %
MCH RBC QN AUTO: 25.2 PG (ref 26–34)
MCHC RBC AUTO-ENTMCNC: 31.4 G/DL (ref 32–36.5)
MCV RBC AUTO: 80.3 FL (ref 78–100)
METAMYELOCYTES NFR BLD: 1 % (ref 0–2)
MONOCYTES # BLD: 0.3 K/MCL (ref 0.3–0.9)
MONOCYTES NFR BLD: 3 %
NEUTROPHILS # BLD: 7.9 K/MCL (ref 1.8–7.7)
NEUTS BAND NFR BLD: 1 % (ref 0–10)
NEUTS SEG NFR BLD: 86 %
NRBC BLD MANUAL-RTO: 1 /100 WBC
OVALOCYTES BLD QL SMEAR: ABNORMAL
PLAT MORPH BLD: NORMAL
PLATELET # BLD AUTO: 294 K/MCL (ref 140–450)
POLYCHROMASIA BLD QL SMEAR: ABNORMAL
RBC # BLD: 3.45 MIL/MCL (ref 4–5.2)
VANCOMYCIN TROUGH SERPL-MCNC: 13.5 MCG/ML (ref 10–20)
WBC # BLD: 9.1 K/MCL (ref 4.2–11)
WBC MORPH BLD: NORMAL

## 2022-05-03 PROCEDURE — 10002800 HB RX 250 W HCPCS: Performed by: FAMILY MEDICINE

## 2022-05-03 PROCEDURE — 97530 THERAPEUTIC ACTIVITIES: CPT

## 2022-05-03 PROCEDURE — 10002800 HB RX 250 W HCPCS: Performed by: NURSE PRACTITIONER

## 2022-05-03 PROCEDURE — 80202 ASSAY OF VANCOMYCIN: CPT | Performed by: FAMILY MEDICINE

## 2022-05-03 PROCEDURE — 10002803 HB RX 637: Performed by: SURGERY

## 2022-05-03 PROCEDURE — 85027 COMPLETE CBC AUTOMATED: CPT | Performed by: NURSE PRACTITIONER

## 2022-05-03 PROCEDURE — 10002803 HB RX 637: Performed by: FAMILY MEDICINE

## 2022-05-03 PROCEDURE — 10004651 HB RX, NO CHARGE ITEM: Performed by: NURSE PRACTITIONER

## 2022-05-03 PROCEDURE — 85018 HEMOGLOBIN: CPT | Performed by: PHYSICIAN ASSISTANT

## 2022-05-03 PROCEDURE — 10002803 HB RX 637: Performed by: PHYSICIAN ASSISTANT

## 2022-05-03 PROCEDURE — 99233 SBSQ HOSP IP/OBS HIGH 50: CPT | Performed by: FAMILY MEDICINE

## 2022-05-03 PROCEDURE — 10002800 HB RX 250 W HCPCS: Performed by: PHYSICIAN ASSISTANT

## 2022-05-03 PROCEDURE — 10002807 HB RX 258: Performed by: FAMILY MEDICINE

## 2022-05-03 PROCEDURE — 10002803 HB RX 637: Performed by: NURSE PRACTITIONER

## 2022-05-03 PROCEDURE — 99024 POSTOP FOLLOW-UP VISIT: CPT | Performed by: PHYSICIAN ASSISTANT

## 2022-05-03 PROCEDURE — 97116 GAIT TRAINING THERAPY: CPT

## 2022-05-03 PROCEDURE — 99232 SBSQ HOSP IP/OBS MODERATE 35: CPT | Performed by: PHYSICAL MEDICINE & REHABILITATION

## 2022-05-03 PROCEDURE — 97535 SELF CARE MNGMENT TRAINING: CPT

## 2022-05-03 PROCEDURE — 10002801 HB RX 250 W/O HCPCS: Performed by: FAMILY MEDICINE

## 2022-05-03 PROCEDURE — 10006031 HB ROOM CHARGE TELEMETRY

## 2022-05-03 RX ADMIN — OXYCODONE HYDROCHLORIDE AND ACETAMINOPHEN 1 TABLET: 10; 325 TABLET ORAL at 05:58

## 2022-05-03 RX ADMIN — HYDROMORPHONE HYDROCHLORIDE 0.2 MG: 1 INJECTION, SOLUTION INTRAMUSCULAR; INTRAVENOUS; SUBCUTANEOUS at 22:00

## 2022-05-03 RX ADMIN — ENOXAPARIN SODIUM 30 MG: 30 INJECTION SUBCUTANEOUS at 08:03

## 2022-05-03 RX ADMIN — Medication 25 MCG: at 08:02

## 2022-05-03 RX ADMIN — OXYCODONE HYDROCHLORIDE 10 MG: 10 TABLET, FILM COATED, EXTENDED RELEASE ORAL at 20:50

## 2022-05-03 RX ADMIN — HYDROMORPHONE HYDROCHLORIDE 0.2 MG: 1 INJECTION, SOLUTION INTRAMUSCULAR; INTRAVENOUS; SUBCUTANEOUS at 03:42

## 2022-05-03 RX ADMIN — CYCLOBENZAPRINE HYDROCHLORIDE 10 MG: 5 TABLET, FILM COATED ORAL at 05:58

## 2022-05-03 RX ADMIN — SODIUM CHLORIDE, PRESERVATIVE FREE 2 ML: 5 INJECTION INTRAVENOUS at 23:18

## 2022-05-03 RX ADMIN — HYDROMORPHONE HYDROCHLORIDE 0.2 MG: 1 INJECTION, SOLUTION INTRAMUSCULAR; INTRAVENOUS; SUBCUTANEOUS at 13:52

## 2022-05-03 RX ADMIN — OXYCODONE HYDROCHLORIDE AND ACETAMINOPHEN 1 TABLET: 10; 325 TABLET ORAL at 16:51

## 2022-05-03 RX ADMIN — OXYCODONE HYDROCHLORIDE AND ACETAMINOPHEN 1000 MG: 500 TABLET ORAL at 06:59

## 2022-05-03 RX ADMIN — SENNOSIDES AND DOCUSATE SODIUM 2 TABLET: 50; 8.6 TABLET ORAL at 20:49

## 2022-05-03 RX ADMIN — SENNOSIDES AND DOCUSATE SODIUM 2 TABLET: 50; 8.6 TABLET ORAL at 08:03

## 2022-05-03 RX ADMIN — DICYCLOMINE HYDROCHLORIDE 20 MG: 10 CAPSULE ORAL at 20:49

## 2022-05-03 RX ADMIN — OXYCODONE HYDROCHLORIDE AND ACETAMINOPHEN 1 TABLET: 10; 325 TABLET ORAL at 11:21

## 2022-05-03 RX ADMIN — PANTOPRAZOLE SODIUM 40 MG: 40 TABLET, DELAYED RELEASE ORAL at 06:59

## 2022-05-03 RX ADMIN — OXYCODONE HYDROCHLORIDE AND ACETAMINOPHEN 1000 MG: 500 TABLET ORAL at 16:51

## 2022-05-03 RX ADMIN — OXYCODONE HYDROCHLORIDE AND ACETAMINOPHEN 1 TABLET: 10; 325 TABLET ORAL at 01:34

## 2022-05-03 RX ADMIN — OXYCODONE HYDROCHLORIDE AND ACETAMINOPHEN 1 TABLET: 10; 325 TABLET ORAL at 23:55

## 2022-05-03 RX ADMIN — SODIUM CHLORIDE, PRESERVATIVE FREE 2 ML: 5 INJECTION INTRAVENOUS at 08:05

## 2022-05-03 RX ADMIN — METHYLNALTREXONE BROMIDE 12 MG: 12 INJECTION, SOLUTION SUBCUTANEOUS at 14:31

## 2022-05-03 RX ADMIN — ZINC SULFATE 220 MG (50 MG) CAPSULE 220 MG: CAPSULE at 08:02

## 2022-05-03 RX ADMIN — HYDROMORPHONE HYDROCHLORIDE 0.2 MG: 1 INJECTION, SOLUTION INTRAMUSCULAR; INTRAVENOUS; SUBCUTANEOUS at 09:12

## 2022-05-03 RX ADMIN — GABAPENTIN 300 MG: 300 CAPSULE ORAL at 13:52

## 2022-05-03 RX ADMIN — ENOXAPARIN SODIUM 30 MG: 30 INJECTION SUBCUTANEOUS at 21:00

## 2022-05-03 RX ADMIN — OXYCODONE HYDROCHLORIDE AND ACETAMINOPHEN 1000 MG: 500 TABLET ORAL at 11:21

## 2022-05-03 RX ADMIN — DICYCLOMINE HYDROCHLORIDE 20 MG: 10 CAPSULE ORAL at 11:21

## 2022-05-03 RX ADMIN — BACLOFEN 10 MG: 10 TABLET ORAL at 13:52

## 2022-05-03 RX ADMIN — VANCOMYCIN HYDROCHLORIDE 1250 MG: 10 INJECTION, POWDER, LYOPHILIZED, FOR SOLUTION INTRAVENOUS at 05:56

## 2022-05-03 RX ADMIN — OXYCODONE HYDROCHLORIDE 10 MG: 10 TABLET, FILM COATED, EXTENDED RELEASE ORAL at 08:02

## 2022-05-03 RX ADMIN — BACLOFEN 10 MG: 10 TABLET ORAL at 20:49

## 2022-05-03 RX ADMIN — GABAPENTIN 300 MG: 300 CAPSULE ORAL at 05:58

## 2022-05-03 RX ADMIN — ONDANSETRON 4 MG: 2 INJECTION INTRAMUSCULAR; INTRAVENOUS at 23:55

## 2022-05-03 RX ADMIN — DICYCLOMINE HYDROCHLORIDE 20 MG: 10 CAPSULE ORAL at 06:59

## 2022-05-03 RX ADMIN — DICYCLOMINE HYDROCHLORIDE 20 MG: 10 CAPSULE ORAL at 16:51

## 2022-05-03 RX ADMIN — GABAPENTIN 300 MG: 300 CAPSULE ORAL at 21:00

## 2022-05-03 RX ADMIN — VANCOMYCIN HYDROCHLORIDE 1250 MG: 10 INJECTION, POWDER, LYOPHILIZED, FOR SOLUTION INTRAVENOUS at 12:42

## 2022-05-03 RX ADMIN — PANTOPRAZOLE SODIUM 40 MG: 40 TABLET, DELAYED RELEASE ORAL at 16:51

## 2022-05-03 RX ADMIN — BACLOFEN 10 MG: 10 TABLET ORAL at 08:02

## 2022-05-03 ASSESSMENT — PAIN SCALES - GENERAL
PAINLEVEL_OUTOF10: 10
PAINLEVEL_OUTOF10: 8
PAINLEVEL_OUTOF10: 10

## 2022-05-03 ASSESSMENT — LIFESTYLE VARIABLES: SUBSTANCE_ABUSE: 0

## 2022-05-03 ASSESSMENT — COGNITIVE AND FUNCTIONAL STATUS - GENERAL
DAILY_ACTIVITY_RAW_SCORE: 15
HELP NEEDED FOR TOILETING: A LOT
HELP NEEDED FOR PERSONAL GROOMING: A LITTLE
APPLIED_COGNITIVE_RAW_SCORE: 22
HELP NEEDED DRESSING REGULAR LOWER BODY CLOTHING: A LOT
BASIC_MOBILITY_RAW_SCORE: 15
HELP NEEDED FOR BATHING: A LOT
APPLIED_COGNITIVE_CONVERTED_SCORE: 47.83
BASIC_MOBILITY_CONVERTED_SCORE: 36.97
REMEMBERING 5 ERRANDS WITH NO LIST: A LITTLE
DAILY_ACTIVITY_CONVERTED_SCORE: 34.69
HELP NEEDED DRESSING REGULAR UPPER BODY CLOTHING: A LOT
TAKING CARE OF COMPLICATED TASKS: A LITTLE

## 2022-05-03 ASSESSMENT — ENCOUNTER SYMPTOMS
CHILLS: 0
NAUSEA: 0
DIZZINESS: 0
SENSORY CHANGE: 1
BACK PAIN: 1
VOMITING: 0
FEVER: 0
WEIGHT LOSS: 0
ROS GI COMMENTS: BLOATING
ORTHOPNEA: 0
COUGH: 0
WHEEZING: 0
SHORTNESS OF BREATH: 0
PAIN SEVERITY NOW: 8
SPUTUM PRODUCTION: 0
WEAKNESS: 1
DIARRHEA: 0
CONSTIPATION: 0
ROS SKIN COMMENTS: SPINAL SURGICAL SITE

## 2022-05-03 ASSESSMENT — ACTIVITIES OF DAILY LIVING (ADL): HOME_MANAGEMENT_TIME_ENTRY: 15

## 2022-05-04 LAB
ANION GAP SERPL CALC-SCNC: 9 MMOL/L (ref 10–20)
BASOPHILS # BLD: 0.1 K/MCL (ref 0–0.3)
BASOPHILS NFR BLD: 1 %
BUN SERPL-MCNC: 8 MG/DL (ref 6–20)
BUN/CREAT SERPL: 17 (ref 7–25)
CALCIUM SERPL-MCNC: 8 MG/DL (ref 8.4–10.2)
CHLORIDE SERPL-SCNC: 102 MMOL/L (ref 98–107)
CO2 SERPL-SCNC: 29 MMOL/L (ref 21–32)
CREAT SERPL-MCNC: 0.47 MG/DL (ref 0.51–0.95)
DEPRECATED RDW RBC: 55.9 FL (ref 39–50)
EOSINOPHIL # BLD: 0.3 K/MCL (ref 0–0.5)
EOSINOPHIL NFR BLD: 3 %
ERYTHROCYTE [DISTWIDTH] IN BLOOD: 19.9 % (ref 11–15)
FASTING DURATION TIME PATIENT: ABNORMAL H
GFR SERPLBLD BASED ON 1.73 SQ M-ARVRAT: >90 ML/MIN
GLUCOSE SERPL-MCNC: 112 MG/DL (ref 70–99)
HCT VFR BLD CALC: 26.6 % (ref 36–46.5)
HCT VFR BLD CALC: 27.1 % (ref 36–46.5)
HGB BLD-MCNC: 8.2 G/DL (ref 12–15.5)
HGB BLD-MCNC: 8.6 G/DL (ref 12–15.5)
IMM GRANULOCYTES # BLD AUTO: 0.3 K/MCL (ref 0–0.2)
IMM GRANULOCYTES # BLD: 3 %
LYMPHOCYTES # BLD: 1.4 K/MCL (ref 1–4.8)
LYMPHOCYTES NFR BLD: 15 %
MCH RBC QN AUTO: 25.4 PG (ref 26–34)
MCHC RBC AUTO-ENTMCNC: 31.7 G/DL (ref 32–36.5)
MCV RBC AUTO: 80.2 FL (ref 78–100)
MONOCYTES # BLD: 0.6 K/MCL (ref 0.3–0.9)
MONOCYTES NFR BLD: 7 %
NEUTROPHILS # BLD: 6.6 K/MCL (ref 1.8–7.7)
NEUTROPHILS NFR BLD: 71 %
NRBC BLD MANUAL-RTO: 0 /100 WBC
PLATELET # BLD AUTO: 323 K/MCL (ref 140–450)
POTASSIUM SERPL-SCNC: 3 MMOL/L (ref 3.4–5.1)
RBC # BLD: 3.38 MIL/MCL (ref 4–5.2)
SODIUM SERPL-SCNC: 137 MMOL/L (ref 135–145)
WBC # BLD: 9.3 K/MCL (ref 4.2–11)

## 2022-05-04 PROCEDURE — 10002801 HB RX 250 W/O HCPCS: Performed by: FAMILY MEDICINE

## 2022-05-04 PROCEDURE — 99233 SBSQ HOSP IP/OBS HIGH 50: CPT | Performed by: FAMILY MEDICINE

## 2022-05-04 PROCEDURE — 10002807 HB RX 258: Performed by: FAMILY MEDICINE

## 2022-05-04 PROCEDURE — 10006031 HB ROOM CHARGE TELEMETRY

## 2022-05-04 PROCEDURE — 10002800 HB RX 250 W HCPCS: Performed by: PHYSICIAN ASSISTANT

## 2022-05-04 PROCEDURE — 80048 BASIC METABOLIC PNL TOTAL CA: CPT | Performed by: FAMILY MEDICINE

## 2022-05-04 PROCEDURE — 10002803 HB RX 637: Performed by: NURSE PRACTITIONER

## 2022-05-04 PROCEDURE — 10002803 HB RX 637: Performed by: SURGERY

## 2022-05-04 PROCEDURE — 99024 POSTOP FOLLOW-UP VISIT: CPT | Performed by: NURSE PRACTITIONER

## 2022-05-04 PROCEDURE — 10004651 HB RX, NO CHARGE ITEM: Performed by: NURSE PRACTITIONER

## 2022-05-04 PROCEDURE — 97116 GAIT TRAINING THERAPY: CPT

## 2022-05-04 PROCEDURE — 10002800 HB RX 250 W HCPCS: Performed by: NURSE PRACTITIONER

## 2022-05-04 PROCEDURE — 10002803 HB RX 637: Performed by: FAMILY MEDICINE

## 2022-05-04 PROCEDURE — 10002803 HB RX 637: Performed by: PHYSICIAN ASSISTANT

## 2022-05-04 PROCEDURE — 10002800 HB RX 250 W HCPCS: Performed by: FAMILY MEDICINE

## 2022-05-04 PROCEDURE — 97530 THERAPEUTIC ACTIVITIES: CPT

## 2022-05-04 PROCEDURE — 85025 COMPLETE CBC W/AUTO DIFF WBC: CPT | Performed by: NURSE PRACTITIONER

## 2022-05-04 PROCEDURE — 85018 HEMOGLOBIN: CPT | Performed by: PHYSICIAN ASSISTANT

## 2022-05-04 RX ORDER — BISACODYL 10 MG
10 SUPPOSITORY, RECTAL RECTAL DAILY PRN
Status: DISCONTINUED | OUTPATIENT
Start: 2022-05-04 | End: 2022-05-07 | Stop reason: HOSPADM

## 2022-05-04 RX ADMIN — Medication 25 MCG: at 09:23

## 2022-05-04 RX ADMIN — GABAPENTIN 300 MG: 300 CAPSULE ORAL at 05:39

## 2022-05-04 RX ADMIN — BACLOFEN 10 MG: 10 TABLET ORAL at 20:51

## 2022-05-04 RX ADMIN — HYDROMORPHONE HYDROCHLORIDE 0.2 MG: 1 INJECTION, SOLUTION INTRAMUSCULAR; INTRAVENOUS; SUBCUTANEOUS at 09:40

## 2022-05-04 RX ADMIN — BISACODYL 10 MG: 10 SUPPOSITORY RECTAL at 23:24

## 2022-05-04 RX ADMIN — BACLOFEN 10 MG: 10 TABLET ORAL at 09:23

## 2022-05-04 RX ADMIN — BACLOFEN 10 MG: 10 TABLET ORAL at 14:38

## 2022-05-04 RX ADMIN — DICYCLOMINE HYDROCHLORIDE 20 MG: 10 CAPSULE ORAL at 16:31

## 2022-05-04 RX ADMIN — SENNOSIDES AND DOCUSATE SODIUM 2 TABLET: 50; 8.6 TABLET ORAL at 20:51

## 2022-05-04 RX ADMIN — DICYCLOMINE HYDROCHLORIDE 20 MG: 10 CAPSULE ORAL at 12:29

## 2022-05-04 RX ADMIN — OXYCODONE HYDROCHLORIDE AND ACETAMINOPHEN 1 TABLET: 10; 325 TABLET ORAL at 21:15

## 2022-05-04 RX ADMIN — VANCOMYCIN HYDROCHLORIDE 1250 MG: 10 INJECTION, POWDER, LYOPHILIZED, FOR SOLUTION INTRAVENOUS at 12:41

## 2022-05-04 RX ADMIN — DICYCLOMINE HYDROCHLORIDE 20 MG: 10 CAPSULE ORAL at 06:00

## 2022-05-04 RX ADMIN — DICYCLOMINE HYDROCHLORIDE 20 MG: 10 CAPSULE ORAL at 20:51

## 2022-05-04 RX ADMIN — OXYCODONE HYDROCHLORIDE AND ACETAMINOPHEN 1 TABLET: 10; 325 TABLET ORAL at 11:36

## 2022-05-04 RX ADMIN — OXYCODONE HYDROCHLORIDE 10 MG: 10 TABLET, FILM COATED, EXTENDED RELEASE ORAL at 09:23

## 2022-05-04 RX ADMIN — ENOXAPARIN SODIUM 30 MG: 30 INJECTION SUBCUTANEOUS at 21:00

## 2022-05-04 RX ADMIN — PANTOPRAZOLE SODIUM 40 MG: 40 TABLET, DELAYED RELEASE ORAL at 06:00

## 2022-05-04 RX ADMIN — GABAPENTIN 300 MG: 300 CAPSULE ORAL at 14:38

## 2022-05-04 RX ADMIN — VANCOMYCIN HYDROCHLORIDE 1250 MG: 10 INJECTION, POWDER, LYOPHILIZED, FOR SOLUTION INTRAVENOUS at 21:16

## 2022-05-04 RX ADMIN — OXYCODONE HYDROCHLORIDE AND ACETAMINOPHEN 1 TABLET: 10; 325 TABLET ORAL at 05:42

## 2022-05-04 RX ADMIN — POLYETHYLENE GLYCOL (3350) 17 G: 17 POWDER, FOR SOLUTION ORAL at 21:00

## 2022-05-04 RX ADMIN — VANCOMYCIN HYDROCHLORIDE 1250 MG: 10 INJECTION, POWDER, LYOPHILIZED, FOR SOLUTION INTRAVENOUS at 00:01

## 2022-05-04 RX ADMIN — SENNOSIDES AND DOCUSATE SODIUM 2 TABLET: 50; 8.6 TABLET ORAL at 09:23

## 2022-05-04 RX ADMIN — OXYCODONE HYDROCHLORIDE 10 MG: 10 TABLET, FILM COATED, EXTENDED RELEASE ORAL at 20:51

## 2022-05-04 RX ADMIN — OXYCODONE HYDROCHLORIDE AND ACETAMINOPHEN 1000 MG: 500 TABLET ORAL at 16:31

## 2022-05-04 RX ADMIN — VANCOMYCIN HYDROCHLORIDE 1250 MG: 10 INJECTION, POWDER, LYOPHILIZED, FOR SOLUTION INTRAVENOUS at 05:39

## 2022-05-04 RX ADMIN — ENOXAPARIN SODIUM 30 MG: 30 INJECTION SUBCUTANEOUS at 09:24

## 2022-05-04 RX ADMIN — HYDROMORPHONE HYDROCHLORIDE 0.2 MG: 1 INJECTION, SOLUTION INTRAMUSCULAR; INTRAVENOUS; SUBCUTANEOUS at 04:09

## 2022-05-04 RX ADMIN — ZINC SULFATE 220 MG (50 MG) CAPSULE 220 MG: CAPSULE at 09:39

## 2022-05-04 RX ADMIN — PANTOPRAZOLE SODIUM 40 MG: 40 TABLET, DELAYED RELEASE ORAL at 16:31

## 2022-05-04 RX ADMIN — HYDROMORPHONE HYDROCHLORIDE 0.2 MG: 1 INJECTION, SOLUTION INTRAMUSCULAR; INTRAVENOUS; SUBCUTANEOUS at 16:31

## 2022-05-04 RX ADMIN — OXYCODONE HYDROCHLORIDE AND ACETAMINOPHEN 1000 MG: 500 TABLET ORAL at 09:23

## 2022-05-04 RX ADMIN — CYCLOBENZAPRINE HYDROCHLORIDE 10 MG: 5 TABLET, FILM COATED ORAL at 23:23

## 2022-05-04 RX ADMIN — CYCLOBENZAPRINE HYDROCHLORIDE 10 MG: 5 TABLET, FILM COATED ORAL at 02:14

## 2022-05-04 RX ADMIN — CYCLOBENZAPRINE HYDROCHLORIDE 10 MG: 5 TABLET, FILM COATED ORAL at 14:38

## 2022-05-04 RX ADMIN — GABAPENTIN 300 MG: 300 CAPSULE ORAL at 21:00

## 2022-05-04 RX ADMIN — SODIUM CHLORIDE, PRESERVATIVE FREE 2 ML: 5 INJECTION INTRAVENOUS at 22:04

## 2022-05-04 RX ADMIN — OXYCODONE HYDROCHLORIDE AND ACETAMINOPHEN 1000 MG: 500 TABLET ORAL at 12:29

## 2022-05-04 ASSESSMENT — PAIN SCALES - GENERAL
PAINLEVEL_OUTOF10: 8
PAINLEVEL_OUTOF10: 10
PAINLEVEL_OUTOF10: 10
PAINLEVEL_OUTOF10: 8
PAINLEVEL_OUTOF10: 10
PAINLEVEL_OUTOF10: 8
PAINLEVEL_OUTOF10: 8

## 2022-05-04 ASSESSMENT — COGNITIVE AND FUNCTIONAL STATUS - GENERAL
BASIC_MOBILITY_RAW_SCORE: 15
BASIC_MOBILITY_CONVERTED_SCORE: 36.97

## 2022-05-05 LAB
ANION GAP SERPL CALC-SCNC: 9 MMOL/L (ref 10–20)
BASOPHILS # BLD: 0 K/MCL (ref 0–0.3)
BASOPHILS NFR BLD: 0 %
BUN SERPL-MCNC: 8 MG/DL (ref 6–20)
BUN/CREAT SERPL: 17 (ref 7–25)
CALCIUM SERPL-MCNC: 8.1 MG/DL (ref 8.4–10.2)
CHLORIDE SERPL-SCNC: 103 MMOL/L (ref 98–107)
CO2 SERPL-SCNC: 29 MMOL/L (ref 21–32)
CREAT SERPL-MCNC: 0.46 MG/DL (ref 0.51–0.95)
DEPRECATED RDW RBC: 59.5 FL (ref 39–50)
EOSINOPHIL # BLD: 0.2 K/MCL (ref 0–0.5)
EOSINOPHIL NFR BLD: 2 %
ERYTHROCYTE [DISTWIDTH] IN BLOOD: 20.2 % (ref 11–15)
FASTING DURATION TIME PATIENT: ABNORMAL H
GFR SERPLBLD BASED ON 1.73 SQ M-ARVRAT: >90 ML/MIN
GLUCOSE SERPL-MCNC: 105 MG/DL (ref 70–99)
HCT VFR BLD CALC: 26.3 % (ref 36–46.5)
HGB BLD-MCNC: 8.1 G/DL (ref 12–15.5)
LYMPHOCYTES # BLD: 1.4 K/MCL (ref 1–4.8)
LYMPHOCYTES NFR BLD: 14 %
MCH RBC QN AUTO: 25.3 PG (ref 26–34)
MCHC RBC AUTO-ENTMCNC: 30.8 G/DL (ref 32–36.5)
MCV RBC AUTO: 82.2 FL (ref 78–100)
METAMYELOCYTES NFR BLD: 1 % (ref 0–2)
MONOCYTES # BLD: 0.5 K/MCL (ref 0.3–0.9)
MONOCYTES NFR BLD: 5 %
NEUTROPHILS # BLD: 7.6 K/MCL (ref 1.8–7.7)
NEUTS BAND NFR BLD: 1 % (ref 0–10)
NEUTS SEG NFR BLD: 77 %
NRBC BLD MANUAL-RTO: 1 /100 WBC
PLAT MORPH BLD: NORMAL
PLATELET # BLD AUTO: 322 K/MCL (ref 140–450)
POLYCHROMASIA BLD QL SMEAR: NORMAL
POTASSIUM SERPL-SCNC: 3 MMOL/L (ref 3.4–5.1)
POTASSIUM SERPL-SCNC: 3.9 MMOL/L (ref 3.4–5.1)
RBC # BLD: 3.2 MIL/MCL (ref 4–5.2)
SARS-COV-2 RNA RESP QL NAA+PROBE: NOT DETECTED
SERVICE CMNT-IMP: NORMAL
SERVICE CMNT-IMP: NORMAL
SODIUM SERPL-SCNC: 138 MMOL/L (ref 135–145)
WBC # BLD: 9.7 K/MCL (ref 4.2–11)
WBC MORPH BLD: NORMAL

## 2022-05-05 PROCEDURE — 10002803 HB RX 637: Performed by: SURGERY

## 2022-05-05 PROCEDURE — 84132 ASSAY OF SERUM POTASSIUM: CPT | Performed by: FAMILY MEDICINE

## 2022-05-05 PROCEDURE — 99233 SBSQ HOSP IP/OBS HIGH 50: CPT | Performed by: FAMILY MEDICINE

## 2022-05-05 PROCEDURE — 87635 SARS-COV-2 COVID-19 AMP PRB: CPT | Performed by: FAMILY MEDICINE

## 2022-05-05 PROCEDURE — 10002803 HB RX 637: Performed by: FAMILY MEDICINE

## 2022-05-05 PROCEDURE — 10002803 HB RX 637: Performed by: NURSE PRACTITIONER

## 2022-05-05 PROCEDURE — 10002800 HB RX 250 W HCPCS: Performed by: PHYSICIAN ASSISTANT

## 2022-05-05 PROCEDURE — 97530 THERAPEUTIC ACTIVITIES: CPT

## 2022-05-05 PROCEDURE — 36415 COLL VENOUS BLD VENIPUNCTURE: CPT | Performed by: FAMILY MEDICINE

## 2022-05-05 PROCEDURE — 10002801 HB RX 250 W/O HCPCS: Performed by: FAMILY MEDICINE

## 2022-05-05 PROCEDURE — 80048 BASIC METABOLIC PNL TOTAL CA: CPT | Performed by: FAMILY MEDICINE

## 2022-05-05 PROCEDURE — 10006031 HB ROOM CHARGE TELEMETRY

## 2022-05-05 PROCEDURE — 10002803 HB RX 637: Performed by: PHYSICIAN ASSISTANT

## 2022-05-05 PROCEDURE — 10004651 HB RX, NO CHARGE ITEM: Performed by: NURSE PRACTITIONER

## 2022-05-05 PROCEDURE — 10002807 HB RX 258: Performed by: FAMILY MEDICINE

## 2022-05-05 PROCEDURE — 10002800 HB RX 250 W HCPCS: Performed by: FAMILY MEDICINE

## 2022-05-05 PROCEDURE — 85027 COMPLETE CBC AUTOMATED: CPT | Performed by: NURSE PRACTITIONER

## 2022-05-05 PROCEDURE — 10002800 HB RX 250 W HCPCS: Performed by: NURSE PRACTITIONER

## 2022-05-05 PROCEDURE — 99024 POSTOP FOLLOW-UP VISIT: CPT | Performed by: PHYSICIAN ASSISTANT

## 2022-05-05 RX ORDER — POTASSIUM CHLORIDE 20 MEQ/1
40 TABLET, EXTENDED RELEASE ORAL ONCE
Status: DISCONTINUED | OUTPATIENT
Start: 2022-05-05 | End: 2022-05-05

## 2022-05-05 RX ORDER — POTASSIUM CHLORIDE 14.9 MG/ML
20 INJECTION INTRAVENOUS ONCE
Status: DISCONTINUED | OUTPATIENT
Start: 2022-05-05 | End: 2022-05-05

## 2022-05-05 RX ORDER — POTASSIUM CHLORIDE 1.5 G/1.58G
40 POWDER, FOR SOLUTION ORAL ONCE
Status: COMPLETED | OUTPATIENT
Start: 2022-05-05 | End: 2022-05-05

## 2022-05-05 RX ADMIN — CYCLOBENZAPRINE HYDROCHLORIDE 10 MG: 5 TABLET, FILM COATED ORAL at 13:05

## 2022-05-05 RX ADMIN — BACLOFEN 10 MG: 10 TABLET ORAL at 13:06

## 2022-05-05 RX ADMIN — DICYCLOMINE HYDROCHLORIDE 20 MG: 10 CAPSULE ORAL at 11:56

## 2022-05-05 RX ADMIN — CYCLOBENZAPRINE HYDROCHLORIDE 10 MG: 5 TABLET, FILM COATED ORAL at 03:50

## 2022-05-05 RX ADMIN — PANTOPRAZOLE SODIUM 40 MG: 40 TABLET, DELAYED RELEASE ORAL at 06:00

## 2022-05-05 RX ADMIN — OXYCODONE HYDROCHLORIDE AND ACETAMINOPHEN 1000 MG: 500 TABLET ORAL at 16:11

## 2022-05-05 RX ADMIN — VANCOMYCIN HYDROCHLORIDE 1250 MG: 10 INJECTION, POWDER, LYOPHILIZED, FOR SOLUTION INTRAVENOUS at 13:07

## 2022-05-05 RX ADMIN — DICYCLOMINE HYDROCHLORIDE 20 MG: 10 CAPSULE ORAL at 16:11

## 2022-05-05 RX ADMIN — SENNOSIDES AND DOCUSATE SODIUM 2 TABLET: 50; 8.6 TABLET ORAL at 08:53

## 2022-05-05 RX ADMIN — POTASSIUM CHLORIDE 40 MEQ: 1.5 POWDER, FOR SOLUTION ORAL at 11:56

## 2022-05-05 RX ADMIN — ENOXAPARIN SODIUM 30 MG: 30 INJECTION SUBCUTANEOUS at 08:56

## 2022-05-05 RX ADMIN — BACLOFEN 10 MG: 10 TABLET ORAL at 21:00

## 2022-05-05 RX ADMIN — DICYCLOMINE HYDROCHLORIDE 20 MG: 10 CAPSULE ORAL at 06:00

## 2022-05-05 RX ADMIN — HYDROMORPHONE HYDROCHLORIDE 0.2 MG: 1 INJECTION, SOLUTION INTRAMUSCULAR; INTRAVENOUS; SUBCUTANEOUS at 03:49

## 2022-05-05 RX ADMIN — OXYCODONE HYDROCHLORIDE AND ACETAMINOPHEN 1 TABLET: 10; 325 TABLET ORAL at 16:11

## 2022-05-05 RX ADMIN — Medication 25 MCG: at 08:54

## 2022-05-05 RX ADMIN — BACLOFEN 10 MG: 10 TABLET ORAL at 08:54

## 2022-05-05 RX ADMIN — OXYCODONE HYDROCHLORIDE AND ACETAMINOPHEN 1000 MG: 500 TABLET ORAL at 11:56

## 2022-05-05 RX ADMIN — ZINC SULFATE 220 MG (50 MG) CAPSULE 220 MG: CAPSULE at 08:53

## 2022-05-05 RX ADMIN — PANTOPRAZOLE SODIUM 40 MG: 40 TABLET, DELAYED RELEASE ORAL at 16:11

## 2022-05-05 RX ADMIN — OXYCODONE HYDROCHLORIDE 10 MG: 10 TABLET, FILM COATED, EXTENDED RELEASE ORAL at 21:17

## 2022-05-05 RX ADMIN — OXYCODONE HYDROCHLORIDE AND ACETAMINOPHEN 1 TABLET: 10; 325 TABLET ORAL at 23:46

## 2022-05-05 RX ADMIN — DICYCLOMINE HYDROCHLORIDE 20 MG: 10 CAPSULE ORAL at 21:00

## 2022-05-05 RX ADMIN — OXYCODONE HYDROCHLORIDE AND ACETAMINOPHEN 1000 MG: 500 TABLET ORAL at 08:54

## 2022-05-05 RX ADMIN — VANCOMYCIN HYDROCHLORIDE 1250 MG: 10 INJECTION, POWDER, LYOPHILIZED, FOR SOLUTION INTRAVENOUS at 06:01

## 2022-05-05 RX ADMIN — OXYCODONE HYDROCHLORIDE AND ACETAMINOPHEN 1 TABLET: 10; 325 TABLET ORAL at 11:56

## 2022-05-05 RX ADMIN — POLYETHYLENE GLYCOL (3350) 17 G: 17 POWDER, FOR SOLUTION ORAL at 21:00

## 2022-05-05 RX ADMIN — SODIUM CHLORIDE, PRESERVATIVE FREE 2 ML: 5 INJECTION INTRAVENOUS at 21:19

## 2022-05-05 RX ADMIN — SODIUM CHLORIDE, PRESERVATIVE FREE 2 ML: 5 INJECTION INTRAVENOUS at 10:00

## 2022-05-05 RX ADMIN — SENNOSIDES AND DOCUSATE SODIUM 2 TABLET: 50; 8.6 TABLET ORAL at 21:00

## 2022-05-05 RX ADMIN — HYDROMORPHONE HYDROCHLORIDE 0.2 MG: 1 INJECTION, SOLUTION INTRAMUSCULAR; INTRAVENOUS; SUBCUTANEOUS at 10:12

## 2022-05-05 RX ADMIN — GABAPENTIN 300 MG: 300 CAPSULE ORAL at 05:55

## 2022-05-05 RX ADMIN — GABAPENTIN 300 MG: 300 CAPSULE ORAL at 21:00

## 2022-05-05 RX ADMIN — GABAPENTIN 300 MG: 300 CAPSULE ORAL at 13:06

## 2022-05-05 RX ADMIN — ENOXAPARIN SODIUM 30 MG: 30 INJECTION SUBCUTANEOUS at 21:00

## 2022-05-05 RX ADMIN — OXYCODONE HYDROCHLORIDE AND ACETAMINOPHEN 1 TABLET: 10; 325 TABLET ORAL at 21:02

## 2022-05-05 RX ADMIN — OXYCODONE HYDROCHLORIDE AND ACETAMINOPHEN 1 TABLET: 10; 325 TABLET ORAL at 02:32

## 2022-05-05 RX ADMIN — OXYCODONE HYDROCHLORIDE 10 MG: 10 TABLET, FILM COATED, EXTENDED RELEASE ORAL at 08:53

## 2022-05-05 ASSESSMENT — COGNITIVE AND FUNCTIONAL STATUS - GENERAL
HELP NEEDED DRESSING REGULAR UPPER BODY CLOTHING: A LOT
APPLIED_COGNITIVE_CONVERTED_SCORE: 47.83
REMEMBERING 5 ERRANDS WITH NO LIST: A LITTLE
DAILY_ACTIVITY_CONVERTED_SCORE: 34.69
HELP NEEDED FOR PERSONAL GROOMING: A LITTLE
HELP NEEDED FOR TOILETING: A LOT
APPLIED_COGNITIVE_RAW_SCORE: 22
HELP NEEDED DRESSING REGULAR LOWER BODY CLOTHING: A LOT
TAKING CARE OF COMPLICATED TASKS: A LITTLE
DAILY_ACTIVITY_RAW_SCORE: 15
HELP NEEDED FOR BATHING: A LOT

## 2022-05-05 ASSESSMENT — PAIN SCALES - GENERAL
PAINLEVEL_OUTOF10: 9
PAINLEVEL_OUTOF10: 10
PAINLEVEL_OUTOF10: 10
PAINLEVEL_OUTOF10: 8
PAINLEVEL_OUTOF10: 10

## 2022-05-06 LAB
ANION GAP SERPL CALC-SCNC: 9 MMOL/L (ref 10–20)
BASOPHILS # BLD: 0.1 K/MCL (ref 0–0.3)
BASOPHILS NFR BLD: 1 %
BUN SERPL-MCNC: 17 MG/DL (ref 6–20)
BUN/CREAT SERPL: 27 (ref 7–25)
CALCIUM SERPL-MCNC: 8.2 MG/DL (ref 8.4–10.2)
CHLORIDE SERPL-SCNC: 106 MMOL/L (ref 98–107)
CO2 SERPL-SCNC: 28 MMOL/L (ref 21–32)
CREAT SERPL-MCNC: 0.63 MG/DL (ref 0.51–0.95)
DEPRECATED RDW RBC: 61.2 FL (ref 39–50)
EOSINOPHIL # BLD: 0.3 K/MCL (ref 0–0.5)
EOSINOPHIL NFR BLD: 3 %
ERYTHROCYTE [DISTWIDTH] IN BLOOD: 20.7 % (ref 11–15)
FASTING DURATION TIME PATIENT: ABNORMAL H
GFR SERPLBLD BASED ON 1.73 SQ M-ARVRAT: >90 ML/MIN
GLUCOSE SERPL-MCNC: 102 MG/DL (ref 70–99)
HCT VFR BLD CALC: 26.3 % (ref 36–46.5)
HGB BLD-MCNC: 7.9 G/DL (ref 12–15.5)
IMM GRANULOCYTES # BLD AUTO: 0.3 K/MCL (ref 0–0.2)
IMM GRANULOCYTES # BLD: 3 %
LYMPHOCYTES # BLD: 1.7 K/MCL (ref 1–4.8)
LYMPHOCYTES NFR BLD: 17 %
MCH RBC QN AUTO: 25 PG (ref 26–34)
MCHC RBC AUTO-ENTMCNC: 30 G/DL (ref 32–36.5)
MCV RBC AUTO: 83.2 FL (ref 78–100)
MONOCYTES # BLD: 0.7 K/MCL (ref 0.3–0.9)
MONOCYTES NFR BLD: 7 %
NEUTROPHILS # BLD: 6.9 K/MCL (ref 1.8–7.7)
NEUTROPHILS NFR BLD: 69 %
NRBC BLD MANUAL-RTO: 0 /100 WBC
PLATELET # BLD AUTO: 342 K/MCL (ref 140–450)
POTASSIUM SERPL-SCNC: 3.2 MMOL/L (ref 3.4–5.1)
RAINBOW EXTRA TUBES HOLD SPECIMEN: NORMAL
RBC # BLD: 3.16 MIL/MCL (ref 4–5.2)
SODIUM SERPL-SCNC: 140 MMOL/L (ref 135–145)
WBC # BLD: 9.9 K/MCL (ref 4.2–11)

## 2022-05-06 PROCEDURE — 10002803 HB RX 637: Performed by: SURGERY

## 2022-05-06 PROCEDURE — 99239 HOSP IP/OBS DSCHRG MGMT >30: CPT | Performed by: FAMILY MEDICINE

## 2022-05-06 PROCEDURE — 10002801 HB RX 250 W/O HCPCS: Performed by: FAMILY MEDICINE

## 2022-05-06 PROCEDURE — 99024 POSTOP FOLLOW-UP VISIT: CPT | Performed by: PHYSICIAN ASSISTANT

## 2022-05-06 PROCEDURE — 10002803 HB RX 637: Performed by: PHYSICIAN ASSISTANT

## 2022-05-06 PROCEDURE — 10002803 HB RX 637: Performed by: FAMILY MEDICINE

## 2022-05-06 PROCEDURE — 36415 COLL VENOUS BLD VENIPUNCTURE: CPT | Performed by: NURSE PRACTITIONER

## 2022-05-06 PROCEDURE — 10004651 HB RX, NO CHARGE ITEM: Performed by: NURSE PRACTITIONER

## 2022-05-06 PROCEDURE — 10002800 HB RX 250 W HCPCS: Performed by: FAMILY MEDICINE

## 2022-05-06 PROCEDURE — 80048 BASIC METABOLIC PNL TOTAL CA: CPT | Performed by: FAMILY MEDICINE

## 2022-05-06 PROCEDURE — 97530 THERAPEUTIC ACTIVITIES: CPT

## 2022-05-06 PROCEDURE — 10002807 HB RX 258: Performed by: FAMILY MEDICINE

## 2022-05-06 PROCEDURE — 10006031 HB ROOM CHARGE TELEMETRY

## 2022-05-06 PROCEDURE — 97116 GAIT TRAINING THERAPY: CPT

## 2022-05-06 PROCEDURE — 85025 COMPLETE CBC W/AUTO DIFF WBC: CPT | Performed by: NURSE PRACTITIONER

## 2022-05-06 PROCEDURE — 10002800 HB RX 250 W HCPCS: Performed by: NURSE PRACTITIONER

## 2022-05-06 PROCEDURE — 10002803 HB RX 637: Performed by: NURSE PRACTITIONER

## 2022-05-06 RX ORDER — ACETAMINOPHEN 325 MG/1
650 TABLET ORAL EVERY 8 HOURS PRN
Status: SHIPPED | COMMUNITY
Start: 2022-05-06

## 2022-05-06 RX ORDER — OXYCODONE AND ACETAMINOPHEN 10; 325 MG/1; MG/1
1 TABLET ORAL EVERY 4 HOURS PRN
Qty: 7 TABLET | Refills: 0 | Status: SHIPPED
Start: 2022-05-06 | End: 2022-05-20 | Stop reason: SDUPTHER

## 2022-05-06 RX ORDER — POTASSIUM CHLORIDE 20 MEQ/1
40 TABLET, EXTENDED RELEASE ORAL ONCE
Status: DISCONTINUED | OUTPATIENT
Start: 2022-05-06 | End: 2022-05-07 | Stop reason: HOSPADM

## 2022-05-06 RX ORDER — OXYCODONE HCL 10 MG/1
10 TABLET, FILM COATED, EXTENDED RELEASE ORAL EVERY 12 HOURS SCHEDULED
Qty: 20 TABLET | Refills: 0 | Status: SHIPPED
Start: 2022-05-06 | End: 2022-08-01 | Stop reason: ALTCHOICE

## 2022-05-06 RX ORDER — NALOXONE HYDROCHLORIDE 4 MG/.1ML
SPRAY NASAL
Qty: 2 EACH | Refills: 1 | Status: SHIPPED | OUTPATIENT
Start: 2022-05-06

## 2022-05-06 RX ORDER — DICYCLOMINE HYDROCHLORIDE 10 MG/1
20 CAPSULE ORAL
Status: SHIPPED | COMMUNITY
Start: 2022-05-06

## 2022-05-06 RX ADMIN — SODIUM CHLORIDE, PRESERVATIVE FREE 2 ML: 5 INJECTION INTRAVENOUS at 10:52

## 2022-05-06 RX ADMIN — VANCOMYCIN HYDROCHLORIDE 1250 MG: 10 INJECTION, POWDER, LYOPHILIZED, FOR SOLUTION INTRAVENOUS at 10:46

## 2022-05-06 RX ADMIN — BACLOFEN 10 MG: 10 TABLET ORAL at 14:53

## 2022-05-06 RX ADMIN — VANCOMYCIN HYDROCHLORIDE 1250 MG: 10 INJECTION, POWDER, LYOPHILIZED, FOR SOLUTION INTRAVENOUS at 18:18

## 2022-05-06 RX ADMIN — SENNOSIDES AND DOCUSATE SODIUM 2 TABLET: 50; 8.6 TABLET ORAL at 10:33

## 2022-05-06 RX ADMIN — VANCOMYCIN HYDROCHLORIDE 1250 MG: 10 INJECTION, POWDER, LYOPHILIZED, FOR SOLUTION INTRAVENOUS at 02:51

## 2022-05-06 RX ADMIN — OXYCODONE HYDROCHLORIDE AND ACETAMINOPHEN 1 TABLET: 10; 325 TABLET ORAL at 21:37

## 2022-05-06 RX ADMIN — GABAPENTIN 300 MG: 300 CAPSULE ORAL at 14:53

## 2022-05-06 RX ADMIN — GABAPENTIN 300 MG: 300 CAPSULE ORAL at 05:06

## 2022-05-06 RX ADMIN — DICYCLOMINE HYDROCHLORIDE 20 MG: 10 CAPSULE ORAL at 14:53

## 2022-05-06 RX ADMIN — OXYCODONE HYDROCHLORIDE AND ACETAMINOPHEN 1 TABLET: 10; 325 TABLET ORAL at 04:23

## 2022-05-06 RX ADMIN — CYCLOBENZAPRINE HYDROCHLORIDE 10 MG: 5 TABLET, FILM COATED ORAL at 03:28

## 2022-05-06 RX ADMIN — SODIUM CHLORIDE, PRESERVATIVE FREE 2 ML: 5 INJECTION INTRAVENOUS at 21:30

## 2022-05-06 RX ADMIN — SODIUM CHLORIDE 25 ML: 9 INJECTION, SOLUTION INTRAVENOUS at 18:17

## 2022-05-06 RX ADMIN — ZINC SULFATE 220 MG (50 MG) CAPSULE 220 MG: CAPSULE at 10:33

## 2022-05-06 RX ADMIN — GABAPENTIN 300 MG: 300 CAPSULE ORAL at 21:37

## 2022-05-06 RX ADMIN — DICYCLOMINE HYDROCHLORIDE 20 MG: 10 CAPSULE ORAL at 06:40

## 2022-05-06 RX ADMIN — Medication 25 MCG: at 10:33

## 2022-05-06 RX ADMIN — PANTOPRAZOLE SODIUM 40 MG: 40 TABLET, DELAYED RELEASE ORAL at 06:40

## 2022-05-06 RX ADMIN — DICYCLOMINE HYDROCHLORIDE 20 MG: 10 CAPSULE ORAL at 21:38

## 2022-05-06 RX ADMIN — ENOXAPARIN SODIUM 30 MG: 30 INJECTION SUBCUTANEOUS at 21:39

## 2022-05-06 RX ADMIN — OXYCODONE HYDROCHLORIDE 10 MG: 10 TABLET, FILM COATED, EXTENDED RELEASE ORAL at 10:33

## 2022-05-06 RX ADMIN — OXYCODONE HYDROCHLORIDE 10 MG: 10 TABLET, FILM COATED, EXTENDED RELEASE ORAL at 22:34

## 2022-05-06 RX ADMIN — SENNOSIDES AND DOCUSATE SODIUM 2 TABLET: 50; 8.6 TABLET ORAL at 21:38

## 2022-05-06 RX ADMIN — CYCLOBENZAPRINE HYDROCHLORIDE 10 MG: 5 TABLET, FILM COATED ORAL at 22:34

## 2022-05-06 RX ADMIN — ENOXAPARIN SODIUM 30 MG: 30 INJECTION SUBCUTANEOUS at 10:34

## 2022-05-06 RX ADMIN — BACLOFEN 10 MG: 10 TABLET ORAL at 10:33

## 2022-05-06 RX ADMIN — OXYCODONE HYDROCHLORIDE AND ACETAMINOPHEN 1 TABLET: 10; 325 TABLET ORAL at 10:33

## 2022-05-06 RX ADMIN — DICYCLOMINE HYDROCHLORIDE 20 MG: 10 CAPSULE ORAL at 10:33

## 2022-05-06 RX ADMIN — OXYCODONE HYDROCHLORIDE AND ACETAMINOPHEN 1000 MG: 500 TABLET ORAL at 10:33

## 2022-05-06 RX ADMIN — PANTOPRAZOLE SODIUM 40 MG: 40 TABLET, DELAYED RELEASE ORAL at 14:53

## 2022-05-06 RX ADMIN — OXYCODONE HYDROCHLORIDE AND ACETAMINOPHEN 1 TABLET: 10; 325 TABLET ORAL at 14:53

## 2022-05-06 RX ADMIN — BACLOFEN 10 MG: 10 TABLET ORAL at 21:38

## 2022-05-06 RX ADMIN — SODIUM CHLORIDE 25 ML: 9 INJECTION, SOLUTION INTRAVENOUS at 10:45

## 2022-05-06 ASSESSMENT — PAIN SCALES - GENERAL
PAINLEVEL_OUTOF10: 10
PAINLEVEL_OUTOF10: 8
PAINLEVEL_OUTOF10: 7
PAINLEVEL_OUTOF10: 7
PAINLEVEL_OUTOF10: 10

## 2022-05-06 ASSESSMENT — PAIN SCALES - WONG BAKER
WONGBAKER_NUMERICALRESPONSE: 10
WONGBAKER_NUMERICALRESPONSE: 10

## 2022-05-06 ASSESSMENT — COGNITIVE AND FUNCTIONAL STATUS - GENERAL
BASIC_MOBILITY_RAW_SCORE: 18
BASIC_MOBILITY_CONVERTED_SCORE: 41.05

## 2022-05-07 VITALS
WEIGHT: 233.03 LBS | SYSTOLIC BLOOD PRESSURE: 119 MMHG | HEART RATE: 92 BPM | HEIGHT: 64 IN | TEMPERATURE: 97 F | BODY MASS INDEX: 39.78 KG/M2 | OXYGEN SATURATION: 95 % | DIASTOLIC BLOOD PRESSURE: 75 MMHG | RESPIRATION RATE: 18 BRPM

## 2022-05-07 LAB
ANION GAP SERPL CALC-SCNC: 12 MMOL/L (ref 10–20)
BASOPHILS # BLD: 0.1 K/MCL (ref 0–0.3)
BASOPHILS NFR BLD: 1 %
BUN SERPL-MCNC: 19 MG/DL (ref 6–20)
BUN/CREAT SERPL: 35 (ref 7–25)
CALCIUM SERPL-MCNC: 8.3 MG/DL (ref 8.4–10.2)
CHLORIDE SERPL-SCNC: 109 MMOL/L (ref 98–107)
CO2 SERPL-SCNC: 24 MMOL/L (ref 21–32)
CREAT SERPL-MCNC: 0.55 MG/DL (ref 0.51–0.95)
DEPRECATED RDW RBC: 60.3 FL (ref 39–50)
EOSINOPHIL # BLD: 0.1 K/MCL (ref 0–0.5)
EOSINOPHIL NFR BLD: 1 %
ERYTHROCYTE [DISTWIDTH] IN BLOOD: 20.7 % (ref 11–15)
FASTING DURATION TIME PATIENT: ABNORMAL H
GFR SERPLBLD BASED ON 1.73 SQ M-ARVRAT: >90 ML/MIN
GLUCOSE SERPL-MCNC: 89 MG/DL (ref 70–99)
HCT VFR BLD CALC: 27.7 % (ref 36–46.5)
HGB BLD-MCNC: 8.7 G/DL (ref 12–15.5)
LYMPHOCYTES # BLD: 2.5 K/MCL (ref 1–4.8)
LYMPHOCYTES NFR BLD: 23 %
MCH RBC QN AUTO: 25.7 PG (ref 26–34)
MCHC RBC AUTO-ENTMCNC: 31.4 G/DL (ref 32–36.5)
MCV RBC AUTO: 81.7 FL (ref 78–100)
MONOCYTES # BLD: 0.3 K/MCL (ref 0.3–0.9)
MONOCYTES NFR BLD: 3 %
NEUTROPHILS # BLD: 7.8 K/MCL (ref 1.8–7.7)
NEUTS BAND NFR BLD: 1 % (ref 0–10)
NEUTS SEG NFR BLD: 71 %
NRBC BLD MANUAL-RTO: 1 /100 WBC
OVALOCYTES BLD QL SMEAR: ABNORMAL
PLAT MORPH BLD: NORMAL
PLATELET # BLD AUTO: 401 K/MCL (ref 140–450)
POLYCHROMASIA BLD QL SMEAR: ABNORMAL
POTASSIUM SERPL-SCNC: 4.8 MMOL/L (ref 3.4–5.1)
RBC # BLD: 3.39 MIL/MCL (ref 4–5.2)
SODIUM SERPL-SCNC: 140 MMOL/L (ref 135–145)
WBC # BLD: 10.8 K/MCL (ref 4.2–11)
WBC MORPH BLD: NORMAL

## 2022-05-07 PROCEDURE — 10002807 HB RX 258: Performed by: FAMILY MEDICINE

## 2022-05-07 PROCEDURE — 10002803 HB RX 637: Performed by: NURSE PRACTITIONER

## 2022-05-07 PROCEDURE — 36416 COLLJ CAPILLARY BLOOD SPEC: CPT | Performed by: FAMILY MEDICINE

## 2022-05-07 PROCEDURE — 85027 COMPLETE CBC AUTOMATED: CPT | Performed by: NURSE PRACTITIONER

## 2022-05-07 PROCEDURE — 10002800 HB RX 250 W HCPCS: Performed by: FAMILY MEDICINE

## 2022-05-07 PROCEDURE — S0310 HOSPITALIST VISIT: HCPCS | Performed by: FAMILY MEDICINE

## 2022-05-07 PROCEDURE — 10002803 HB RX 637: Performed by: FAMILY MEDICINE

## 2022-05-07 PROCEDURE — 10002803 HB RX 637: Performed by: SURGERY

## 2022-05-07 PROCEDURE — 10002801 HB RX 250 W/O HCPCS: Performed by: FAMILY MEDICINE

## 2022-05-07 PROCEDURE — 10002803 HB RX 637: Performed by: PHYSICIAN ASSISTANT

## 2022-05-07 PROCEDURE — 80048 BASIC METABOLIC PNL TOTAL CA: CPT | Performed by: FAMILY MEDICINE

## 2022-05-07 PROCEDURE — 10002800 HB RX 250 W HCPCS: Performed by: NURSE PRACTITIONER

## 2022-05-07 RX ORDER — DIAZEPAM 5 MG/1
10 TABLET ORAL EVERY 8 HOURS PRN
Status: DISCONTINUED | OUTPATIENT
Start: 2022-05-07 | End: 2022-05-07 | Stop reason: HOSPADM

## 2022-05-07 RX ADMIN — OXYCODONE HYDROCHLORIDE AND ACETAMINOPHEN 1 TABLET: 10; 325 TABLET ORAL at 10:24

## 2022-05-07 RX ADMIN — GABAPENTIN 300 MG: 300 CAPSULE ORAL at 13:42

## 2022-05-07 RX ADMIN — OXYCODONE HYDROCHLORIDE AND ACETAMINOPHEN 1000 MG: 500 TABLET ORAL at 10:25

## 2022-05-07 RX ADMIN — BACLOFEN 10 MG: 10 TABLET ORAL at 09:34

## 2022-05-07 RX ADMIN — SENNOSIDES AND DOCUSATE SODIUM 2 TABLET: 50; 8.6 TABLET ORAL at 09:33

## 2022-05-07 RX ADMIN — GABAPENTIN 300 MG: 300 CAPSULE ORAL at 05:55

## 2022-05-07 RX ADMIN — ZINC SULFATE 220 MG (50 MG) CAPSULE 220 MG: CAPSULE at 09:34

## 2022-05-07 RX ADMIN — VANCOMYCIN HYDROCHLORIDE 1250 MG: 10 INJECTION, POWDER, LYOPHILIZED, FOR SOLUTION INTRAVENOUS at 09:51

## 2022-05-07 RX ADMIN — CYCLOBENZAPRINE HYDROCHLORIDE 10 MG: 5 TABLET, FILM COATED ORAL at 05:55

## 2022-05-07 RX ADMIN — PANTOPRAZOLE SODIUM 40 MG: 40 TABLET, DELAYED RELEASE ORAL at 05:55

## 2022-05-07 RX ADMIN — VANCOMYCIN HYDROCHLORIDE 1250 MG: 10 INJECTION, POWDER, LYOPHILIZED, FOR SOLUTION INTRAVENOUS at 01:45

## 2022-05-07 RX ADMIN — BACLOFEN 10 MG: 10 TABLET ORAL at 13:42

## 2022-05-07 RX ADMIN — Medication 25 MCG: at 09:34

## 2022-05-07 RX ADMIN — ENOXAPARIN SODIUM 30 MG: 30 INJECTION SUBCUTANEOUS at 09:34

## 2022-05-07 RX ADMIN — OXYCODONE HYDROCHLORIDE AND ACETAMINOPHEN 1 TABLET: 10; 325 TABLET ORAL at 14:18

## 2022-05-07 RX ADMIN — OXYCODONE HYDROCHLORIDE AND ACETAMINOPHEN 1 TABLET: 10; 325 TABLET ORAL at 05:55

## 2022-05-07 RX ADMIN — OXYCODONE HYDROCHLORIDE 10 MG: 10 TABLET, FILM COATED, EXTENDED RELEASE ORAL at 09:34

## 2022-05-07 RX ADMIN — DICYCLOMINE HYDROCHLORIDE 20 MG: 10 CAPSULE ORAL at 10:25

## 2022-05-07 RX ADMIN — DICYCLOMINE HYDROCHLORIDE 20 MG: 10 CAPSULE ORAL at 05:54

## 2022-05-07 RX ADMIN — OXYCODONE HYDROCHLORIDE AND ACETAMINOPHEN 1 TABLET: 10; 325 TABLET ORAL at 01:42

## 2022-05-07 RX ADMIN — DIAZEPAM 10 MG: 5 TABLET ORAL at 11:16

## 2022-05-07 ASSESSMENT — PAIN SCALES - GENERAL
PAINLEVEL_OUTOF10: 10
PAINLEVEL_OUTOF10: 0
PAINLEVEL_OUTOF10: 9
PAINLEVEL_OUTOF10: 9
PAINLEVEL_OUTOF10: 10
PAINLEVEL_OUTOF10: 6

## 2022-05-10 ENCOUNTER — TELEPHONE (OUTPATIENT)
Dept: NEUROSURGERY | Age: 47
End: 2022-05-10

## 2022-05-11 ENCOUNTER — ADVANCED DIRECTIVES (OUTPATIENT)
Dept: HEALTH INFORMATION MANAGEMENT | Age: 47
End: 2022-05-11

## 2022-05-12 ENCOUNTER — TELEPHONE (OUTPATIENT)
Dept: NEUROSURGERY | Age: 47
End: 2022-05-12

## 2022-05-18 ENCOUNTER — APPOINTMENT (OUTPATIENT)
Dept: NEUROSURGERY | Age: 47
End: 2022-05-18

## 2022-05-20 ENCOUNTER — TELEPHONE (OUTPATIENT)
Dept: NEUROSURGERY | Age: 47
End: 2022-05-20

## 2022-05-20 ENCOUNTER — TELEPHONE (OUTPATIENT)
Dept: FAMILY MEDICINE CLINIC | Facility: CLINIC | Age: 47
End: 2022-05-20

## 2022-05-20 DIAGNOSIS — F11.90 NARCOTIC DRUG USE: ICD-10-CM

## 2022-05-20 DIAGNOSIS — Z98.1 S/P SPINAL FUSION: Primary | ICD-10-CM

## 2022-05-20 DIAGNOSIS — G89.18 ACUTE POST-OPERATIVE PAIN: ICD-10-CM

## 2022-05-20 RX ORDER — GABAPENTIN 300 MG/1
600 CAPSULE ORAL 3 TIMES DAILY
Qty: 180 CAPSULE | Refills: 0 | Status: SHIPPED | OUTPATIENT
Start: 2022-05-20

## 2022-05-20 RX ORDER — OXYCODONE AND ACETAMINOPHEN 10; 325 MG/1; MG/1
1 TABLET ORAL EVERY 4 HOURS PRN
Qty: 84 TABLET | Refills: 0 | Status: SHIPPED | OUTPATIENT
Start: 2022-05-22 | End: 2022-06-13 | Stop reason: SDUPTHER

## 2022-05-20 RX ORDER — CELECOXIB 200 MG/1
200 CAPSULE ORAL 2 TIMES DAILY
Qty: 60 CAPSULE | Refills: 0 | Status: SHIPPED | OUTPATIENT
Start: 2022-05-20

## 2022-05-20 RX ORDER — DIAZEPAM 5 MG/1
5 TABLET ORAL EVERY 8 HOURS PRN
Qty: 60 TABLET | Refills: 0 | Status: SHIPPED | OUTPATIENT
Start: 2022-05-22 | End: 2022-06-13 | Stop reason: SDUPTHER

## 2022-05-20 RX ORDER — TRAMADOL HYDROCHLORIDE 50 MG/1
50 TABLET ORAL EVERY 6 HOURS PRN
Qty: 60 TABLET | Refills: 0 | Status: SHIPPED | OUTPATIENT
Start: 2022-05-20 | End: 2022-06-13 | Stop reason: SDUPTHER

## 2022-05-20 NOTE — TELEPHONE ENCOUNTER
Please review note below  Will provide urine collection supplies for     Called and spoke with pt - Needs appt to follow-up on all surgical incisions / skin wounds; discuss all new and discontinued medications; discuss depression, and needs increase in trazodone from 100 mg to 150 mg    Pt will need 40 mins - okay to add on Tues 05/31 at 4pm?  Or sooner?      Please advise, thank you

## 2022-05-20 NOTE — TELEPHONE ENCOUNTER
Pt out of [place] a week early due to the death of her father. Pt struggling with extra depression and needs higher dose of her meds. Pt states she is incontinent and needs to get some urine to [facility] and is asking our office to provide her with a hat to collect urine at home. States mom could come  hat. Pt had neuro appt and they told pt to make a f/u appt with MM asap. Pt has a lot of medications she needs to go over. Can I offer her the SDA appt slot on 5/31 @ 4p? Pt very emotion over her father. Please advise.

## 2022-05-20 NOTE — TELEPHONE ENCOUNTER
Please put her in for a VV on Monday 5/23 at 2:78 pm. We can certainly discuss her depression and review all her medications then. With respect to her post-op follow up on incision / wounds she has to schedule that with her surgeon ASAP.

## 2022-05-20 NOTE — TELEPHONE ENCOUNTER
Advised patient of Doctor's note below. Patient verbalized understanding.      Pt stated cannot get in with surgeon until June, but pt getting [facility] visits - advised pt to have [facility] assess incisions/wounds/skin and to call office back if needed - she v/u    Future Appointments   Date Time Provider Fitz Villanueva   5/23/2022  4:40 PM Georgie Wilson MD [facility]     No further questions at this time

## 2022-05-23 ENCOUNTER — TELEMEDICINE (OUTPATIENT)
Dept: FAMILY MEDICINE CLINIC | Facility: CLINIC | Age: 47
End: 2022-05-23
Payer: MEDICAID

## 2022-05-23 DIAGNOSIS — Z98.1 S/P SPINAL FUSION: Primary | ICD-10-CM

## 2022-05-23 DIAGNOSIS — F32.A ANXIETY AND DEPRESSION: ICD-10-CM

## 2022-05-23 DIAGNOSIS — F43.20 ANTICIPATORY GRIEVING: ICD-10-CM

## 2022-05-23 DIAGNOSIS — G62.9 NEUROPATHY: ICD-10-CM

## 2022-05-23 DIAGNOSIS — G47.01 INSOMNIA DUE TO MEDICAL CONDITION: ICD-10-CM

## 2022-05-23 DIAGNOSIS — K59.09 OTHER CONSTIPATION: ICD-10-CM

## 2022-05-23 DIAGNOSIS — F41.9 ANXIETY AND DEPRESSION: ICD-10-CM

## 2022-05-23 DIAGNOSIS — Z76.0 ENCOUNTER FOR MEDICATION REFILL: ICD-10-CM

## 2022-05-23 RX ORDER — TRAZODONE HYDROCHLORIDE 100 MG/1
150 TABLET ORAL NIGHTLY
COMMUNITY
Start: 2022-05-16

## 2022-05-23 RX ORDER — ASCORBIC ACID 100 MG
1 TABLET,CHEWABLE ORAL NIGHTLY
COMMUNITY
Start: 2022-05-16

## 2022-05-23 RX ORDER — TRAMADOL HYDROCHLORIDE 50 MG/1
50 TABLET ORAL EVERY 12 HOURS PRN
COMMUNITY
Start: 2022-05-20

## 2022-05-23 RX ORDER — SENNA PLUS 8.6 MG/1
2 TABLET ORAL 2 TIMES DAILY
COMMUNITY
Start: 2022-05-16

## 2022-05-23 RX ORDER — OXYCODONE AND ACETAMINOPHEN 10; 325 MG/1; MG/1
1 TABLET ORAL EVERY 4 HOURS
COMMUNITY
Start: 2022-04-28

## 2022-05-23 RX ORDER — FERROUS SULFATE 325(65) MG
325 TABLET ORAL
COMMUNITY
Start: 2022-05-17

## 2022-05-23 RX ORDER — MAG HYDROX/ALUMINUM HYD/SIMETH 400-400-40
1 SUSPENSION, ORAL (FINAL DOSE FORM) ORAL 2 TIMES DAILY PRN
Qty: 60 SUPPOSITORY | Refills: 0 | Status: SHIPPED | OUTPATIENT
Start: 2022-05-23 | End: 2022-06-22

## 2022-05-23 RX ORDER — CALCIUM CARBONATE/VITAMIN D3 600 MG-10
1 TABLET ORAL DAILY
COMMUNITY
Start: 2022-05-17

## 2022-05-23 RX ORDER — TRAZODONE HYDROCHLORIDE 50 MG/1
150 TABLET ORAL NIGHTLY
Qty: 90 TABLET | Refills: 0 | Status: SHIPPED | OUTPATIENT
Start: 2022-05-23 | End: 2022-06-22

## 2022-05-23 RX ORDER — CELECOXIB 100 MG/1
100 CAPSULE ORAL DAILY
COMMUNITY
Start: 2022-05-22

## 2022-05-23 RX ORDER — NICOTINE POLACRILEX 4 MG/1
20 GUM, CHEWING ORAL DAILY
Qty: 30 TABLET | Refills: 0 | COMMUNITY
Start: 2022-05-23

## 2022-05-23 RX ORDER — ACETAMINOPHEN 325 MG/1
2 TABLET ORAL EVERY 4 HOURS PRN
COMMUNITY
Start: 2022-05-16

## 2022-05-23 RX ORDER — GABAPENTIN 600 MG/1
600 TABLET ORAL 3 TIMES DAILY
Qty: 90 TABLET | Refills: 0 | COMMUNITY
Start: 2022-05-23

## 2022-05-23 RX ORDER — SERTRALINE HYDROCHLORIDE 100 MG/1
100 TABLET, FILM COATED ORAL DAILY
COMMUNITY
Start: 2022-05-16 | End: 2022-05-23

## 2022-05-23 RX ORDER — MAGNESIUM OXIDE 400 MG (241.3 MG MAGNESIUM) TABLET
1 TABLET 3 TIMES DAILY PRN
COMMUNITY
Start: 2022-05-16

## 2022-05-23 RX ORDER — SERTRALINE HYDROCHLORIDE 100 MG/1
100 TABLET, FILM COATED ORAL DAILY
Qty: 90 TABLET | Refills: 0 | Status: SHIPPED | OUTPATIENT
Start: 2022-05-23 | End: 2022-08-21

## 2022-05-23 RX ORDER — DIAZEPAM 5 MG/1
5 TABLET ORAL EVERY 12 HOURS PRN
Qty: 30 TABLET | Refills: 0 | COMMUNITY
Start: 2022-05-23

## 2022-05-26 ENCOUNTER — TELEPHONE (OUTPATIENT)
Dept: NEUROSURGERY | Age: 47
End: 2022-05-26

## 2022-06-02 ENCOUNTER — TELEPHONE (OUTPATIENT)
Dept: FAMILY MEDICINE CLINIC | Facility: CLINIC | Age: 47
End: 2022-06-02

## 2022-06-02 DIAGNOSIS — R92.8 ABNORMAL MAMMOGRAM: Primary | ICD-10-CM

## 2022-06-02 DIAGNOSIS — D24.1 INTRADUCTAL PAPILLOMA OF BREAST, RIGHT: ICD-10-CM

## 2022-06-02 NOTE — TELEPHONE ENCOUNTER
Forward to Dr. Patricia Fong, patient is due for the following test:    Bhavani Mclaughlin, RN  Georgie Villavicencio Nurse  Due for 6 month repeat - US right breast     Please place orders if appropriate and send message back to nurse pool to send pt a reminder letter.

## 2022-06-09 ENCOUNTER — TELEPHONE (OUTPATIENT)
Dept: FAMILY MEDICINE CLINIC | Facility: CLINIC | Age: 47
End: 2022-06-09

## 2022-06-09 NOTE — TELEPHONE ENCOUNTER
Dr. Sheikh Neither reviewed and signed  - with note pt \"is on medications per neurosurgery.  Please forward results to NS as well\"    Results faxed to Dr. Branden Uribe (Neurosurgery) at fax #[phone]

## 2022-06-09 NOTE — TELEPHONE ENCOUNTER
Received fax from [address] [facility] [address] regarding pt's urine drug screening results    Dr. Yannick Reinoso to review and sign    Send to scanning

## 2022-06-09 NOTE — TELEPHONE ENCOUNTER
Received fax from [address] [place] [address] regarding pt's Plan of care from pt's Neville White to review and sign    Need to fax back to #[phone]

## 2022-06-10 ENCOUNTER — TELEPHONE (OUTPATIENT)
Dept: FAMILY MEDICINE CLINIC | Facility: CLINIC | Age: 47
End: 2022-06-10

## 2022-06-10 ENCOUNTER — TELEPHONE (OUTPATIENT)
Dept: NEUROSURGERY | Age: 47
End: 2022-06-10

## 2022-06-10 NOTE — TELEPHONE ENCOUNTER
Received fax from [address] [facility] regarding pt's [address] and Plan of Care    Certification period from:  05/19/2022 to 07/17/2022    Dr. Aamir Edwards to review and sign    Need to fax back to #[phone]

## 2022-06-13 ENCOUNTER — V-VISIT (OUTPATIENT)
Dept: NEUROSURGERY | Age: 47
End: 2022-06-13

## 2022-06-13 ENCOUNTER — PATIENT MESSAGE (OUTPATIENT)
Dept: FAMILY MEDICINE CLINIC | Facility: CLINIC | Age: 47
End: 2022-06-13

## 2022-06-13 DIAGNOSIS — Z98.1 S/P SPINAL FUSION: Primary | ICD-10-CM

## 2022-06-13 DIAGNOSIS — G89.18 ACUTE POST-OPERATIVE PAIN: ICD-10-CM

## 2022-06-13 PROCEDURE — 99024 POSTOP FOLLOW-UP VISIT: CPT | Performed by: NURSE PRACTITIONER

## 2022-06-13 RX ORDER — CELECOXIB 100 MG/1
200 CAPSULE ORAL 2 TIMES DAILY
Qty: 360 CAPSULE | Refills: 0 | Status: SHIPPED | OUTPATIENT
Start: 2022-06-13 | End: 2022-09-11

## 2022-06-13 RX ORDER — DIAZEPAM 5 MG/1
5 TABLET ORAL EVERY 8 HOURS PRN
Qty: 42 TABLET | Refills: 0 | Status: SHIPPED | OUTPATIENT
Start: 2022-06-13 | End: 2022-06-29 | Stop reason: SDUPTHER

## 2022-06-13 RX ORDER — TRAMADOL HYDROCHLORIDE 50 MG/1
50 TABLET ORAL 2 TIMES DAILY PRN
Qty: 28 TABLET | Refills: 0 | Status: SHIPPED | OUTPATIENT
Start: 2022-06-13 | End: 2022-06-29 | Stop reason: SDUPTHER

## 2022-06-13 RX ORDER — PREGABALIN 50 MG/1
50 CAPSULE ORAL 2 TIMES DAILY
Qty: 60 CAPSULE | Refills: 0 | Status: SHIPPED | OUTPATIENT
Start: 2022-06-13

## 2022-06-13 RX ORDER — OXYCODONE AND ACETAMINOPHEN 10; 325 MG/1; MG/1
1 TABLET ORAL EVERY 6 HOURS PRN
Qty: 56 TABLET | Refills: 0 | Status: SHIPPED | OUTPATIENT
Start: 2022-06-13 | End: 2022-06-29 | Stop reason: SDUPTHER

## 2022-06-13 NOTE — TELEPHONE ENCOUNTER
From: Algis Blocker  To: Georgie Brooks MD  Sent: 6/13/2022 11:14 AM CDT  Subject: Med    I need a refill on my celexoxib  200mg by mouth 2x daily

## 2022-06-13 NOTE — TELEPHONE ENCOUNTER
LOV/Telemedicine 05/23/22  Last refill on (Historical), for #?, with ? refills  celecoxib (CELEBREX) 100 MG Oral Cap    No future appointments. Order(s) pending, please review. Thank you.

## 2022-06-14 ENCOUNTER — IMAGING SERVICES (OUTPATIENT)
Dept: GENERAL RADIOLOGY | Age: 47
End: 2022-06-14

## 2022-06-14 ENCOUNTER — OFFICE VISIT (OUTPATIENT)
Dept: NEUROSURGERY | Age: 47
End: 2022-06-14

## 2022-06-14 VITALS
DIASTOLIC BLOOD PRESSURE: 59 MMHG | SYSTOLIC BLOOD PRESSURE: 90 MMHG | WEIGHT: 233 LBS | RESPIRATION RATE: 18 BRPM | TEMPERATURE: 97.2 F | HEART RATE: 76 BPM | HEIGHT: 65 IN | BODY MASS INDEX: 38.82 KG/M2

## 2022-06-14 DIAGNOSIS — Z98.1 S/P SPINAL FUSION: Primary | ICD-10-CM

## 2022-06-14 DIAGNOSIS — Z98.1 S/P SPINAL FUSION: ICD-10-CM

## 2022-06-14 PROCEDURE — 99024 POSTOP FOLLOW-UP VISIT: CPT | Performed by: NEUROLOGICAL SURGERY

## 2022-06-14 PROCEDURE — 77073 BONE LENGTH STUDIES: CPT | Performed by: NEUROLOGICAL SURGERY

## 2022-06-14 PROCEDURE — 72082 X-RAY EXAM ENTIRE SPI 2/3 VW: CPT | Performed by: NEUROLOGICAL SURGERY

## 2022-06-14 RX ORDER — FERROUS SULFATE 325(65) MG
325 TABLET ORAL
COMMUNITY
Start: 2022-05-17

## 2022-06-14 RX ORDER — UREA 10 %
1 LOTION (ML) TOPICAL
COMMUNITY
Start: 2022-05-16

## 2022-06-14 RX ORDER — SENNOSIDES A AND B 8.6 MG/1
2 TABLET, FILM COATED ORAL
COMMUNITY
Start: 2022-05-16

## 2022-06-14 RX ORDER — TRAZODONE HYDROCHLORIDE 100 MG/1
150 TABLET ORAL NIGHTLY
COMMUNITY
Start: 2022-05-16

## 2022-06-14 RX ORDER — SERTRALINE HYDROCHLORIDE 100 MG/1
100 TABLET, FILM COATED ORAL
COMMUNITY
Start: 2022-05-23 | End: 2023-05-23

## 2022-06-16 ENCOUNTER — TELEPHONE (OUTPATIENT)
Dept: NEUROSURGERY | Age: 47
End: 2022-06-16

## 2022-06-16 ENCOUNTER — ORDER TRANSCRIPTION (OUTPATIENT)
Dept: PHYSICAL THERAPY | Facility: HOSPITAL | Age: 47
End: 2022-06-16

## 2022-06-16 DIAGNOSIS — Z98.1 S/P SPINAL FUSION: Primary | ICD-10-CM

## 2022-06-17 ENCOUNTER — TELEPHONE (OUTPATIENT)
Dept: FAMILY MEDICINE CLINIC | Facility: CLINIC | Age: 47
End: 2022-06-17

## 2022-06-17 NOTE — TELEPHONE ENCOUNTER
Received fax from [address] [facility] regarding pt's PT evaluation    \". .must obtain a physicians' signature on all the attached documents\"    Dr. Betsy Martin to review and sign    Need to fax #[phone]    (Send to scanning)

## 2022-06-28 ENCOUNTER — OFFICE VISIT (OUTPATIENT)
Dept: PHYSICAL THERAPY | Age: 47
End: 2022-06-28
Attending: NEUROLOGICAL SURGERY
Payer: MEDICAID

## 2022-06-28 DIAGNOSIS — F32.A DEPRESSION: Primary | ICD-10-CM

## 2022-06-28 DIAGNOSIS — Z98.1 S/P SPINAL FUSION: ICD-10-CM

## 2022-06-28 PROCEDURE — 97110 THERAPEUTIC EXERCISES: CPT

## 2022-06-28 PROCEDURE — 97163 PT EVAL HIGH COMPLEX 45 MIN: CPT

## 2022-06-29 ENCOUNTER — TELEPHONE (OUTPATIENT)
Dept: NEUROSURGERY | Age: 47
End: 2022-06-29

## 2022-06-29 DIAGNOSIS — Z98.1 S/P SPINAL FUSION: ICD-10-CM

## 2022-06-29 DIAGNOSIS — G89.18 ACUTE POST-OPERATIVE PAIN: ICD-10-CM

## 2022-06-29 RX ORDER — TRAMADOL HYDROCHLORIDE 50 MG/1
50 TABLET ORAL 2 TIMES DAILY PRN
Qty: 28 TABLET | Refills: 0 | Status: SHIPPED | OUTPATIENT
Start: 2022-06-29 | End: 2022-07-12 | Stop reason: SDUPTHER

## 2022-06-29 RX ORDER — OXYCODONE AND ACETAMINOPHEN 10; 325 MG/1; MG/1
1 TABLET ORAL EVERY 8 HOURS PRN
Qty: 42 TABLET | Refills: 0 | Status: SHIPPED | OUTPATIENT
Start: 2022-06-29 | End: 2022-06-29 | Stop reason: SDUPTHER

## 2022-06-29 RX ORDER — DIAZEPAM 5 MG/1
5 TABLET ORAL EVERY 12 HOURS PRN
Qty: 28 TABLET | Refills: 0 | Status: SHIPPED | OUTPATIENT
Start: 2022-06-29 | End: 2022-06-29 | Stop reason: SDUPTHER

## 2022-06-29 RX ORDER — DIAZEPAM 5 MG/1
5 TABLET ORAL EVERY 12 HOURS PRN
Qty: 28 TABLET | Refills: 0 | Status: SHIPPED | OUTPATIENT
Start: 2022-06-29 | End: 2022-07-12 | Stop reason: SDUPTHER

## 2022-06-29 RX ORDER — OXYCODONE AND ACETAMINOPHEN 10; 325 MG/1; MG/1
1 TABLET ORAL EVERY 8 HOURS PRN
Qty: 42 TABLET | Refills: 0 | Status: SHIPPED | OUTPATIENT
Start: 2022-06-29 | End: 2022-07-12 | Stop reason: SDUPTHER

## 2022-06-29 RX ORDER — TRAMADOL HYDROCHLORIDE 50 MG/1
50 TABLET ORAL 2 TIMES DAILY PRN
Qty: 28 TABLET | Refills: 0 | Status: SHIPPED | OUTPATIENT
Start: 2022-06-29 | End: 2022-06-29 | Stop reason: SDUPTHER

## 2022-06-30 ENCOUNTER — APPOINTMENT (OUTPATIENT)
Dept: PHYSICAL THERAPY | Age: 47
End: 2022-06-30
Attending: NEUROLOGICAL SURGERY
Payer: MEDICAID

## 2022-07-01 ENCOUNTER — OFFICE VISIT (OUTPATIENT)
Dept: PHYSICAL THERAPY | Age: 47
End: 2022-07-01
Attending: NEUROLOGICAL SURGERY
Payer: MEDICAID

## 2022-07-01 PROCEDURE — 97140 MANUAL THERAPY 1/> REGIONS: CPT

## 2022-07-01 PROCEDURE — 97110 THERAPEUTIC EXERCISES: CPT

## 2022-07-01 PROCEDURE — 97112 NEUROMUSCULAR REEDUCATION: CPT

## 2022-07-01 NOTE — PROGRESS NOTES
Insurance (Authorized # of Visits):  2/12 visits expires 12/25/2022 [facility] [address] [facility] 20 visits via plan of care. Diagnosis:   S/P spinal fusion (Z98.1)       Referring Provider: Alexandria Christian  Date of Evaluation:    6/28/2022    Precautions:  Per phone call to Dr. Alexandria Christian office on 6/29/2022: 10lbs lifting restriction and no bending or twisting of spine (cervical, thoracic and lumbar) Surgery was fusion of C2-S1 Next MD visit:   July 26, 2022  Date of Surgery: n/a          Subjective: Pain at its worst 10/10. Doing home program. Annie Whitman more aware of posture     Objective:     Tested 6/28/2022:  Flexibility:   UE/Scapular LE   Upper Trap: R not tested due to precautions/recent surgery; L not tested due to precautions/recent surgery  Levator Scap: R not tested due to precautions/recent surgery; L not tested due to precautions/recent surgery  Pec Major: R min loss; L min loss  Scalenes: R not tested due to precautions/recent surgery, L not tested due to precautions/recent surgery Hip Flexor: R severe loss*, L severe loss*   Hamstrings: R mod/severe loss; L mod/severe loss  Piriformis: R intact; L intact  Quads: R R severe loss*, L severe loss* ; L R severe loss*, L severe loss*   Gastroc-soleus: R min loss; L min loss         Gait: pt ambulates on level ground with assistive device of rolling walker, antalgia, decreased step length bilateral legs, decreased stance phase bilateral legs, decreased hip/knee flex or ext bilateral legs at hips, decreased foot clearance bilateral legs and trendelenburg/waddle.  (Uses AFO on R foot)  Balance: SLS R unable, L unable    Strength: (* denotes performed with pain)  UE/Scapular LE   Shoulder Flex: R 4-/5, L 5/5  Shoulder ABD (C5): R 4-/5, L 5/5  Biceps (C6): R 5/5, L 5/5  Wrist ext (C6): R 5/5, L 5/5  Triceps (C7): R 5/5, L 5/5  Wrist Flex (C7): R 5/5, L 5/5  Digit Flex (C8): R 4-/5, L 5/5  Thumb Ext (C8): R 4-/5, L 5/5  Interossei (T1): R 4/5, L 4-/5     Rhomboids: R 5/5, L 5/5 (tested in sitting due to recent surgery)  Mid trap: R 4/5; L 4/5 (tested in sitting due to recent surgery)  Lats: R 4-/5, L 4-/5 (tested in sitting due to recent surgery)  Low trap: R 3-/5; L 3-/5 (tested in sitting due to recent surgery)      Strength: R 17 lbs; L 41 lbs Hip flexion (L2): R 2+/5; L 3-/5  Hip abduction: R 2+/5; L 2+/5  Hip Extension: R 3-/5; L 3-/5 (tested in side lying)               Hip ER: R 3-/5; L 3-/5  Hip IR: R 3-/5; L 4-/5  Knee Flexion: R 5/5; L 5/5            Knee extension (L3): R 3-/5; L 4/5            DF (L4): R 4+/5, L 5/5  Great Toe Ext (L5): R 4+/5, L 5/5  PF (S1): R 3-/5; L 5/5 (one rep done)      Palpation: severe loss of tissue mobility along entire spine     Observation/Posture: Scar from top of cervical spine to base of tailbone. Appears well healed. Neuro Screen: Sensation: anterior thigh 50%, 5% medial/lateral ankles, 25% toes (bilateral), intact rest of lower extremity and upper extremity     Spinal AROM: (* denotes performed with pain)  Flexion: not tested due to precautions/recent surgery  Extension: not tested due to precautions/recent surgery  Sidebending: R not tested due to precautions/recent surgery; L not tested due to precautions/recent surgery  Rotation: R not tested due to precautions/recent surgery; L not tested due to precautions/recent surgery    Special Tests:  Get on/off treatment table: severe difficulty, gets back up with mod therapist assistence        Assessment: Tolerated scapula mobility training well. Benefit from posture correction exercises, and instruction on proper body mechanics.        Goals: (to be met in 20 visits)   Pt will improve FOTO score from 37/100 to 57/100 to display improved ability to get dressed  Pt will reduce pain at its worst from 10/10 to 7/10 to allow for improved ability to get in and out of bed  Pt will hip extension strength from 3-/5 in side lying to 3+/5 to allow for improved ability to ambulate  Pt will be independent with home program to allow for maintenance of goals achieved in therapy. Plan: Progress strength, review posture     Teach ideal sleeping position for lumbar and cervical spine  -Deep cervcial flexors 10' to start (Maybe do neck and lower abs at the same time?)  -Light pec stretching (supine over towel roll)  -Lower abdominal review/press (more reps, activation/endurance is really all one watnts)  -Test nerve length (3 upper extremity and straight leg raise test)    Teach to lift to avoid twisting    During manual, Tell her:  1. Limit sitting to 30 minutes (standing/walking breaks)  1.5 Avoid books, phones and tablets  1.75: limit overhead movements  2. What's the longest you've walked since surgery? (Did it go well?) -Maybe Increase walking tolerance (Goal 30 minutes x2 a day)  3. Teach self-scar massage (or Mom do this to her)    Relaxation exercises  Diaphragm breathing  Isometric shoulder extension  Walking or stationary bike 2x a day 10 minutes to start    Keep in mind:  Lumbar fusion too (stretch and strengthen these areas as well)    Next visit:  Gentle retraction and also extension ROM in pain free range. Future:  Nerve mobilization (do not reproduce symptoms)    Teach to eliminate shrug with scapular control    Cuff to measure pressure with abdominal and cervical deep flexor traning. Charges: 1 manual therapy, 1 there ex, 1 neuro-reducation     Total Timed Treatment: 40 min  Total Treatment Time: 40 min  Date: 7/1/2022  Tx#: 2 Date: Tx#: 3 Date: Tx#: 4 Date: Tx#: 5 Date: Tx#: 6   Ther-Ex:  Sitting scapular 4 way movement 20x1-2 sets each direction    Educated on home program, see below. Verbal, visual and tactile cues for there ex. Manual 8 minutes:  Soft tissue massage to paraspinals         N Re-Ed 12 minutes:  Education on ideal posture sitting, to avoid bending, how to put on AFO, told get long shoe horn to assist with dressing AFO.     Practiced ideal posture sitting to standing and supine to sitting several times

## 2022-07-05 ENCOUNTER — APPOINTMENT (OUTPATIENT)
Dept: PHYSICAL THERAPY | Age: 47
End: 2022-07-05
Payer: MEDICAID

## 2022-07-06 ENCOUNTER — OFFICE VISIT (OUTPATIENT)
Dept: PHYSICAL THERAPY | Age: 47
End: 2022-07-06
Attending: NEUROLOGICAL SURGERY
Payer: MEDICAID

## 2022-07-06 PROCEDURE — 97140 MANUAL THERAPY 1/> REGIONS: CPT

## 2022-07-06 PROCEDURE — 97112 NEUROMUSCULAR REEDUCATION: CPT

## 2022-07-06 PROCEDURE — 97110 THERAPEUTIC EXERCISES: CPT

## 2022-07-06 NOTE — PROGRESS NOTES
Insurance (Authorized # of Visits):  3/12 visits expires 12/25/2022 [facility] [address] [facility] 20 visits via plan of care. Diagnosis:   S/P spinal fusion (Z98.1)       Referring Provider: Leona Ballesteros  Date of Evaluation:    6/28/2022    Precautions:  Per phone call to Dr. Leona Ballesteros office on 6/29/2022: 10lbs lifting restriction and no bending or twisting of spine (cervical, thoracic and lumbar) Surgery was fusion of C2-S1 Next MD visit:   July 26, 2022  Date of Surgery: n/a          Subjective: Pain at its worst 10/10. Doing home program. Able to walk further, walked [place] perhaps 2 hours at the mall, took a breath and was very tired. Also, able to get off chairs easier with less use of her hands.      Objective:   Tested July 6, 2022:  Nerve length:  Straight leg raise test (feels tightness in hamstring, done supine with rolled hand towel for lumbar support and other leg in 60-90 hip and knee flexion): 150 degrees R, 158 degrees L    Upper extremity:  (feels tightness in hamstring, done supine with rolled hand towel for lumbar support and other leg in 60-90 hip and knee flexion)  Radial length: 80 degrees R, 100 L  Median length: 167 R, 160 L  Ulnar: 132 L, intact R        Tested 6/28/2022:  Flexibility:   UE/Scapular LE   Upper Trap: R not tested due to precautions/recent surgery; L not tested due to precautions/recent surgery  Levator Scap: R not tested due to precautions/recent surgery; L not tested due to precautions/recent surgery  Pec Major: R min loss; L min loss  Scalenes: R not tested due to precautions/recent surgery, L not tested due to precautions/recent surgery Hip Flexor: R severe loss*, L severe loss*   Hamstrings: R mod/severe loss; L mod/severe loss  Piriformis: R intact; L intact  Quads: R R severe loss*, L severe loss* ; L R severe loss*, L severe loss*   Gastroc-soleus: R min loss; L min loss         Gait: pt ambulates on level ground with assistive device of rolling walker, antalgia, decreased step length bilateral legs, decreased stance phase bilateral legs, decreased hip/knee flex or ext bilateral legs at hips, decreased foot clearance bilateral legs and trendelenburg/waddle. (Uses AFO on R foot)  Balance: SLS R unable, L unable    Strength: (* denotes performed with pain)  UE/Scapular LE   Shoulder Flex: R 4-/5, L 5/5  Shoulder ABD (C5): R 4-/5, L 5/5  Biceps (C6): R 5/5, L 5/5  Wrist ext (C6): R 5/5, L 5/5  Triceps (C7): R 5/5, L 5/5  Wrist Flex (C7): R 5/5, L 5/5  Digit Flex (C8): R 4-/5, L 5/5  Thumb Ext (C8): R 4-/5, L 5/5  Interossei (T1): R 4/5, L 4-/5     Rhomboids: R 5/5, L 5/5 (tested in sitting due to recent surgery)  Mid trap: R 4/5; L 4/5 (tested in sitting due to recent surgery)  Lats: R 4-/5, L 4-/5 (tested in sitting due to recent surgery)  Low trap: R 3-/5; L 3-/5 (tested in sitting due to recent surgery)      Strength: R 17 lbs; L 41 lbs Hip flexion (L2): R 2+/5; L 3-/5  Hip abduction: R 2+/5; L 2+/5  Hip Extension: R 3-/5; L 3-/5 (tested in side lying)               Hip ER: R 3-/5; L 3-/5  Hip IR: R 3-/5; L 4-/5  Knee Flexion: R 5/5; L 5/5            Knee extension (L3): R 3-/5; L 4/5            DF (L4): R 4+/5, L 5/5  Great Toe Ext (L5): R 4+/5, L 5/5  PF (S1): R 3-/5; L 5/5 (one rep done)      Palpation: severe loss of tissue mobility along entire spine     Observation/Posture: Scar from top of cervical spine to base of tailbone. Appears well healed.   Neuro Screen: Sensation: anterior thigh 50%, 5% medial/lateral ankles, 25% toes (bilateral), intact rest of lower extremity and upper extremity     Spinal AROM: (* denotes performed with pain)  Flexion: not tested due to precautions/recent surgery  Extension: not tested due to precautions/recent surgery  Sidebending: R not tested due to precautions/recent surgery; L not tested due to precautions/recent surgery  Rotation: R not tested due to precautions/recent surgery; L not tested due to precautions/recent surgery    Special Tests:  Get on/off treatment table: severe difficulty, gets back up with mod therapist assistence        Assessment: Tolerated progression of core strength well and added to home program. Benefits from instruction on proper body mechanics. Goals: (to be met in 20 visits)   Pt will improve FOTO score from 37/100 to 57/100 to display improved ability to get dressed  Pt will reduce pain at its worst from 10/10 to 7/10 to allow for improved ability to get in and out of bed  Pt will hip extension strength from 3-/5 in side lying to 3+/5 to allow for improved ability to ambulate  Pt will be independent with home program to allow for maintenance of goals achieved in therapy. Plan:     - It is vital that the fusion process has begun prior to the progression of lumbar function ROM. Fusion usually begins between 10-12 weeks and is confirmed only through X-ray. The fusion process continues for 12-24 months. It is also important to note that variations may exist in the type of lumbar fusion surgical approach used and limitations and dependent on the particular surgical approach used.     (Did she do these?)  5-10 repetitions of each exercise three times a day in bed.                  hams sets (performed hourly)  Long arc quad sitting (not full range, see hamstring stretching first)  Isometric trunk extension  Hamstring stretch (as tolerated)  Hip flexor stretch (as tolerated)  Wall slide (as tolerated)  Active active hip rotation (on back with legs straight)  -Neutral spine with arm movement  -Balance/propriorception training    -Cervical isometrcis  Walking gradually increase to 45 minutes total      Future:  Light resistance with neutral spine  Demonstrate proper scapula humeral rhythm    Wall press (single and double leg) (Once cleared to lift more)        Additional ideas:  Short arc quad  Quad set    Review ideal sleeping position for lumbar and cervical spine  -Light pec stretching (supine over towel roll)      Relaxation exercises  Diaphragm breathing  Maybe: Isometric shoulder extension  Walking or stationary bike 2x a day 10 minutes to start    Keep in mind:  Lumbar fusion too (stretch and strengthen these areas as well)    Next visit:  Gentle retraction and also extension ROM in pain free range. Future:  Nerve mobilization (do not reproduce symptoms)    Teach to eliminate shrug with scapular control    Cuff to measure pressure with abdominal and cervical deep flexor traning. Teach self-scar massage (or Mom do this to her)        Charges: 1 manual therapy, 1 there ex, 1 neuro-reducation     Total Timed Treatment: 40 min  Total Treatment Time: 40 min  Date: 7/1/2022  Tx#: 2 Date: 7/6/2022  Tx#: 3 Date: Tx#: 4 Date: Tx#: 5 Date: Tx#: 6   Ther-Ex:  Sitting scapular 4 way movement 20x1-2 sets each direction    Educated on home program, see below. Verbal, visual and tactile cues for there ex. There ex:  Straight leg raise x1: produce leg cramping discontinued (attempted in R leg)    Bridge x2: produce back pain, discontinued. Lower abdominal contraction with cervical deep flexor contraction 10' held. 2-10x3    Lower abdominal dynamic one leg to 120 degrees hip flexion 2-10x2    Lower abdominal dynamic hip external rotation bias in supine both legs 2-10x2    Sitting scapular retraction, depression, elevation and protrusion 10x2    Educated on home program, see below. Verbal, visual and tactile cues for there ex. Manual 8 minutes:  Soft tissue massage to paraspinals   Manual 15 minutes:  Soft tissue massage to paraspinals    Pec minor stretch supine therapist forces      N Re-Ed 12 minutes:  Education on ideal posture sitting, to avoid bending, how to put on AFO, told get long shoe horn to assist with dressing AFO. Practiced ideal posture sitting to standing and supine to sitting several times   N Re-Ed 12 minutes:  Education on ideal posture bending and sleeping, to avoid twisting.     Practiced ideal posture sitting and lying several times    Told have grandson walk dog or have dog out in the yard (10lbs lifting precaution now)

## 2022-07-08 ENCOUNTER — APPOINTMENT (OUTPATIENT)
Dept: PHYSICAL THERAPY | Age: 47
End: 2022-07-08
Attending: NEUROLOGICAL SURGERY
Payer: MEDICAID

## 2022-07-09 ENCOUNTER — PATIENT MESSAGE (OUTPATIENT)
Dept: FAMILY MEDICINE CLINIC | Facility: CLINIC | Age: 47
End: 2022-07-09

## 2022-07-09 NOTE — TELEPHONE ENCOUNTER
LOV/Telemedicine 05/23/2022    Last refill on 06/27/2013, for #60 caps, with 3 refills  pregabalin (LYRICA) 75 MG Oral Cap Yesika Britt MD    Future Appointments   Date Time Provider Fitz Villanueva   7/15/2022 10:40 AM Georgie Jackson MD [facility] sent to pt to clarify refill - pregabalin discontinued in 2014  Pt to respond

## 2022-07-09 NOTE — TELEPHONE ENCOUNTER
From: Ival Postal  To: Mydhili Claudetta Newton, MD  Sent: 7/9/2022 9:02 AM CDT  Subject: Meds    I need a refill on pregabalin 50mg cap 2 x daily.  Thanks

## 2022-07-11 ENCOUNTER — APPOINTMENT (OUTPATIENT)
Dept: PHYSICAL THERAPY | Age: 47
End: 2022-07-11
Attending: NEUROLOGICAL SURGERY
Payer: MEDICAID

## 2022-07-11 ENCOUNTER — TELEPHONE (OUTPATIENT)
Dept: PHYSICAL THERAPY | Facility: HOSPITAL | Age: 47
End: 2022-07-11

## 2022-07-12 ENCOUNTER — PATIENT MESSAGE (OUTPATIENT)
Dept: FAMILY MEDICINE CLINIC | Facility: CLINIC | Age: 47
End: 2022-07-12

## 2022-07-12 DIAGNOSIS — G89.18 ACUTE POST-OPERATIVE PAIN: ICD-10-CM

## 2022-07-12 DIAGNOSIS — Z98.1 S/P SPINAL FUSION: ICD-10-CM

## 2022-07-12 RX ORDER — TRAMADOL HYDROCHLORIDE 50 MG/1
50 TABLET ORAL 2 TIMES DAILY PRN
Qty: 28 TABLET | Refills: 0 | Status: SHIPPED | OUTPATIENT
Start: 2022-07-12 | End: 2022-07-26 | Stop reason: SDUPTHER

## 2022-07-12 RX ORDER — OXYCODONE AND ACETAMINOPHEN 10; 325 MG/1; MG/1
1 TABLET ORAL 2 TIMES DAILY PRN
Qty: 42 TABLET | Refills: 0 | Status: SHIPPED | OUTPATIENT
Start: 2022-07-12 | End: 2022-08-01 | Stop reason: SDUPTHER

## 2022-07-12 RX ORDER — PREGABALIN 50 MG/1
50 CAPSULE ORAL 2 TIMES DAILY
Qty: 180 CAPSULE | Refills: 0 | Status: SHIPPED | OUTPATIENT
Start: 2022-07-12 | End: 2022-07-15

## 2022-07-12 RX ORDER — DIAZEPAM 5 MG/1
5 TABLET ORAL EVERY 12 HOURS PRN
Qty: 28 TABLET | Refills: 0 | Status: SHIPPED | OUTPATIENT
Start: 2022-07-12 | End: 2022-07-26 | Stop reason: SDUPTHER

## 2022-07-12 NOTE — TELEPHONE ENCOUNTER
From: Algis Blocker  To: Mydhili Patito Brooks MD  Sent: 7/12/2022 11:50 AM CDT  Subject: Meds    Hello I need the Lyrica filled also  Need trazadone 150 mg nightly. ( 3 50mg ) works katrazyna of the size of pills. Thanks    Also have a question surgeons office wants to know if after August would Dr Jennifer Arechiga be willing to prescribe me my diazepam? I will likely be moved down to 2mg twice a day if my spasms are more controlled or the 5 mg I've been taken.  Thank you

## 2022-07-13 ENCOUNTER — OFFICE VISIT (OUTPATIENT)
Dept: PHYSICAL THERAPY | Age: 47
End: 2022-07-13
Attending: NEUROLOGICAL SURGERY
Payer: MEDICAID

## 2022-07-13 PROCEDURE — 97140 MANUAL THERAPY 1/> REGIONS: CPT

## 2022-07-13 PROCEDURE — 97110 THERAPEUTIC EXERCISES: CPT

## 2022-07-13 NOTE — TELEPHONE ENCOUNTER
Deferring all controlled substances to her neurosurgeon, considering they are actively managing her post-op.

## 2022-07-13 NOTE — PROGRESS NOTES
Progress Summary  Pt has attended 4 visits in Physical Therapy. Insurance (Authorized # of Visits):  4/12 visits expires 12/25/2022 [facility] [address] [facility], 20 visits via plan of care. Diagnosis:   S/P spinal fusion (Z98.1)       Referring Provider: Rip Bishop  Date of Evaluation:    6/28/2022    Precautions:  Per phone call to Dr. Rip Bishop office on 6/29/2022: 10lbs lifting restriction and no bending or twisting of spine (cervical, thoracic and lumbar) Surgery was fusion of C2-S1 Next MD visit:   July 26, 2022  Date of Surgery: n/a          Subjective: Pain at its worst 10/10.  Doing home program.   Strength is improving, able to do some recipicral stair navigation  Objective:   Tested July 6, 2022:  Nerve length:  Straight leg raise test (feels tightness in hamstring, done supine with rolled hand towel for lumbar support and other leg in 60-90 hip and knee flexion): 150 degrees R, 158 degrees L    Upper extremity:  (feels tightness in hamstring, done supine with rolled hand towel for lumbar support and other leg in 60-90 hip and knee flexion)  Radial length: 80 degrees R, 100 L  Median length: 167 R, 160 L  Ulnar: 132 L, intact R        Tested 6/28/2022:  Flexibility:   UE/Scapular LE   Upper Trap: R not tested due to precautions/recent surgery; L not tested due to precautions/recent surgery  Levator Scap: R not tested due to precautions/recent surgery; L not tested due to precautions/recent surgery  Pec Major: R min loss; L min loss  Scalenes: R not tested due to precautions/recent surgery, L not tested due to precautions/recent surgery Hip Flexor: R severe loss*, L severe loss*   Hamstrings: R mod/severe loss; L mod/severe loss  Piriformis: R intact; L intact  Quads: R R severe loss*, L severe loss* ; L R severe loss*, L severe loss*   Gastroc-soleus: R min loss; L min loss         Gait: pt ambulates on level ground with assistive device of rolling walker, antalgia, decreased step length bilateral legs, decreased stance phase bilateral legs, decreased hip/knee flex or ext bilateral legs at hips, decreased foot clearance bilateral legs and trendelenburg/waddle. (Uses AFO on R foot)  Balance: SLS R unable, L unable    Strength: (* denotes performed with pain)  UE/Scapular LE   Shoulder Flex: R 4-/5, L 5/5  Shoulder ABD (C5): R 4-/5, L 5/5  Biceps (C6): R 5/5, L 5/5  Wrist ext (C6): R 5/5, L 5/5  Triceps (C7): R 5/5, L 5/5  Wrist Flex (C7): R 5/5, L 5/5  Digit Flex (C8): R 4-/5, L 5/5  Thumb Ext (C8): R 4-/5, L 5/5  Interossei (T1): R 4/5, L 4-/5     Rhomboids: R 5/5, L 5/5 (tested in sitting due to recent surgery)  Mid trap: R 4/5; L 4/5 (tested in sitting due to recent surgery)  Lats: R 4-/5, L 4-/5 (tested in sitting due to recent surgery)  Low trap: R 3-/5; L 3-/5 (tested in sitting due to recent surgery)      Strength: R 17 lbs; L 41 lbs Hip flexion (L2): R 2+/5; L 3-/5  Hip abduction: R 2+/5; L 2+/5  Hip Extension: R 3-/5; L 3-/5 (tested in side lying)               Hip ER: R 3-/5; L 3-/5  Hip IR: R 3-/5; L 4-/5  Knee Flexion: R 5/5; L 5/5            Knee extension (L3): R 3-/5; L 4/5            DF (L4): R 4+/5, L 5/5  Great Toe Ext (L5): R 4+/5, L 5/5  PF (S1): R 3-/5; L 5/5 (one rep done)      Palpation: severe loss of tissue mobility along entire spine     Observation/Posture: Scar from top of cervical spine to base of tailbone. Appears well healed.   Neuro Screen: Sensation: anterior thigh 50%, 5% medial/lateral ankles, 25% toes (bilateral), intact rest of lower extremity and upper extremity     Spinal AROM: (* denotes performed with pain)  Flexion: not tested due to precautions/recent surgery  Extension: not tested due to precautions/recent surgery  Sidebending: R not tested due to precautions/recent surgery; L not tested due to precautions/recent surgery  Rotation: R not tested due to precautions/recent surgery; L not tested due to precautions/recent surgery    Special Tests:  Get on/off treatment table: severe difficulty, gets back up with mod therapist assistence        Assessment: Still 10/10 pain, but improved strength and functional mobility. Tolerated progression of core and leg strength well and added to home program. Benefits from instruction on proper body mechanics for neck. Goals: (to be met in 20 visits)   Pt will improve FOTO score from 37/100 to 57/100 to display improved ability to get dressed (To be assessed)  Pt will reduce pain at its worst from 10/10 to 7/10 to allow for improved ability to get in and out of bed (No progress, 7/13/2022)  Pt will hip extension strength from 3-/5 in side lying to 3+/5 to allow for improved ability to ambulate (20% progress on 7/13/2022)  Pt will be independent with home program to allow for maintenance of goals achieved in therapy. (95% progress on 7/13/2022)      FOTO: 37/100 on 6/28/2022    Plan: Continue skilled Physical Therapy 2 x/week or a total of 20 visits over a 90 day period. Treatment will include: manual therapy, therapeutic exercise, therapeutic activites, neuromuscular reeducation and home program       Patient/Family/Caregiver was advised of these findings, precautions, and treatment options and has agreed to actively participate in planning and for this course of care. Thank you for your referral. If you have any questions, please contact me at Dept: [phone]. Sincerely,  Electronically signed by therapist: Marco Castillo PT     Physician's certification required:  No  Please co-sign or sign and return this letter via fax as soon as possible to [phone]. I certify the need for these services furnished under this plan of treatment and while under my care.     X___________________________________________________ Date____________________    Certification From: 0/40/7312  To:10/11/2022       Additional ideas:  Active active hip rotation (on back with legs straight)  -Cervical isometrcis    -Balance/propriorception training (dynamic)    Light resistance with neutral spine  Demonstrate proper scapula humeral rhythm  Relaxation exercises  Diaphragm breathing    Supine arms up for lower abdominals (make sure no neck strain, arms to height of head)    Pec minor stretch supine therapist forces    Walking gradually increase to 45 minutes total      - It is vital that the fusion process has begun prior to the progression of lumbar function ROM. Fusion usually begins between 10-12 weeks and is confirmed only through X-ray. The fusion process continues for 12-24 months. It is also important to note that variations may exist in the type of lumbar fusion surgical approach used and limitations and dependent on the particular surgical approach used. Future:  Wall press (single and double leg) (Once cleared to lift more)        Additional ideas:  Short arc quad  Quad set  Bridge    Review ideal sleeping position for lumbar and cervical spine  -Light pec stretching (supine over towel roll)      Maybe: Isometric shoulder extension  Walking or stationary bike 2x a day 10 minutes to start    Keep in mind:  Lumbar fusion too (stretch and strengthen these areas as well)    Next visit:  Gentle retraction and also extension ROM in pain free range. Future:  Nerve mobilization (do not reproduce symptoms)    Teach to eliminate shrug with scapular control    Cuff to measure pressure with abdominal and cervical deep flexor traning. Teach self-scar massage (or Mom do this to her)        Charges: 1 manual therapy, 2 there ex,    Total Timed Treatment: 40 min  Total Treatment Time: 40 min  Date: 7/1/2022  Tx#: 2 Date: 7/6/2022  Tx#: 3 Date: 7/13/2022  Tx#: 4 Date: Tx#: 5 Date: Tx#: 6   Ther-Ex:  Sitting scapular 4 way movement 20x1-2 sets each direction    Educated on home program, see below. Verbal, visual and tactile cues for there ex.    There ex:  Straight leg raise x1: produce leg cramping discontinued (attempted in R leg)    Bridge x2: produce back pain, discontinued. Lower abdominal contraction with cervical deep flexor contraction 10' held. 2-10x3    Lower abdominal dynamic one leg to 120 degrees hip flexion 2-10x2    Lower abdominal dynamic hip external rotation bias in supine both legs 2-10x2    Sitting scapular retraction, depression, elevation and protrusion 10x2    Educated on home program, see below. Verbal, visual and tactile cues for there ex. There ex:  Mini-wall slide x10    Hamstring stretch standing 30'    Hip flexor stretch standing 30'    Hamstring set sitting x10    Long arc quad sitting x10    Isometric trunk extension standing, into wall x12    Tandem stance 30'    Lower abdominal dynamic one leg to 120 degrees hip flexion 15x    Lower abdominal dynamic hip external rotation bias in supine both legs x15    Lower abdominal supine arms to head level x15    Educated on home program, see below. Verbal, visual and tactile cues for there ex. Manual 8 minutes:  Soft tissue massage to paraspinals   Manual 15 minutes:  Soft tissue massage to paraspinals    Pec minor stretch supine therapist forces Manual 8 minutes:  Soft tissue massage to paraspinals     N Re-Ed 12 minutes:  Education on ideal posture sitting, to avoid bending, how to put on AFO, told get long shoe horn to assist with dressing AFO. Practiced ideal posture sitting to standing and supine to sitting several times   N Re-Ed 12 minutes:  Education on ideal posture bending and sleeping, to avoid twisting.     Practiced ideal posture sitting and lying several times    Told have grandson walk dog or have dog out in the yard (10lbs lifting precaution now)

## 2022-07-14 ENCOUNTER — TELEPHONE (OUTPATIENT)
Dept: NEUROSURGERY | Age: 47
End: 2022-07-14

## 2022-07-14 RX ORDER — TRAZODONE HYDROCHLORIDE 50 MG/1
150 TABLET ORAL NIGHTLY
Qty: 90 TABLET | Refills: 0 | Status: SHIPPED | OUTPATIENT
Start: 2022-07-14 | End: 2022-08-13

## 2022-07-14 NOTE — TELEPHONE ENCOUNTER
LOV/Telemedicine 05/23/22  Please review note below    Last refill on (Historical), for #?, with ? refills  traZODone 100 MG Oral Tab    Future Appointments   Date Time Provider Fitz Villanueva   7/15/2022 10:40 AM Georgie Johnston MD [facility] Uriel Bradford     Order(s) pending, please review. Thank you.

## 2022-07-14 NOTE — TELEPHONE ENCOUNTER
Pt stated that neurosurgeon will not refill her prescription for trazodone. Pt states that she is out of medication and is having trouble sleeping. Would like a refill today even though she has an appt tomorrow. She can't sleep and anxiety is very high. [id number] - [facility] - [address], [phone]   [address] [place] [address]   Phone: [phone] Fax: [phone]     Thank you!

## 2022-07-15 ENCOUNTER — OFFICE VISIT (OUTPATIENT)
Dept: FAMILY MEDICINE CLINIC | Facility: CLINIC | Age: 47
End: 2022-07-15
Payer: MEDICAID

## 2022-07-15 VITALS
SYSTOLIC BLOOD PRESSURE: 118 MMHG | RESPIRATION RATE: 18 BRPM | WEIGHT: 205.5 LBS | OXYGEN SATURATION: 98 % | HEART RATE: 72 BPM | DIASTOLIC BLOOD PRESSURE: 80 MMHG | TEMPERATURE: 97 F | BODY MASS INDEX: 34 KG/M2

## 2022-07-15 DIAGNOSIS — Z98.890 S/P SPINAL SURGERY: Primary | ICD-10-CM

## 2022-07-15 DIAGNOSIS — G62.9 NEUROPATHY: ICD-10-CM

## 2022-07-15 DIAGNOSIS — Z76.0 MEDICATION REFILL: ICD-10-CM

## 2022-07-15 PROCEDURE — 3074F SYST BP LT 130 MM HG: CPT | Performed by: FAMILY MEDICINE

## 2022-07-15 PROCEDURE — 3079F DIAST BP 80-89 MM HG: CPT | Performed by: FAMILY MEDICINE

## 2022-07-15 PROCEDURE — 99213 OFFICE O/P EST LOW 20 MIN: CPT | Performed by: FAMILY MEDICINE

## 2022-07-15 RX ORDER — NALOXONE HYDROCHLORIDE 4 MG/.1ML
SPRAY, METERED NASAL
COMMUNITY
Start: 2022-05-06

## 2022-07-15 RX ORDER — PREGABALIN 100 MG/1
CAPSULE ORAL
COMMUNITY
Start: 2022-06-13

## 2022-07-15 RX ORDER — CELECOXIB 200 MG/1
200 CAPSULE ORAL 2 TIMES DAILY
COMMUNITY
Start: 2022-06-13 | End: 2022-07-15 | Stop reason: ALTCHOICE

## 2022-07-15 RX ORDER — OXYCODONE AND ACETAMINOPHEN 10; 325 MG/1; MG/1
1 TABLET ORAL EVERY 8 HOURS PRN
Qty: 10 TABLET | Refills: 0 | COMMUNITY
Start: 2022-07-15

## 2022-07-15 RX ORDER — NYSTATIN 100000 [USP'U]/G
1 POWDER TOPICAL 2 TIMES DAILY
COMMUNITY
Start: 2022-05-16

## 2022-07-18 ENCOUNTER — OFFICE VISIT (OUTPATIENT)
Dept: PHYSICAL THERAPY | Age: 47
End: 2022-07-18
Attending: NEUROLOGICAL SURGERY
Payer: MEDICAID

## 2022-07-18 PROCEDURE — 97110 THERAPEUTIC EXERCISES: CPT

## 2022-07-18 PROCEDURE — 97140 MANUAL THERAPY 1/> REGIONS: CPT

## 2022-07-19 NOTE — PROGRESS NOTES
Progress Summary  Pt has attended 5 visits in Physical Therapy. Insurance (Authorized # of Visits):  5/12 visits expires 12/25/2022 [facility] [address] [facility], 20 visits via plan of care. Diagnosis:   S/P spinal fusion (Z98.1)       Referring Provider: Genevieve Adasm  Date of Evaluation:    6/28/2022    Precautions:  Per phone call to Dr. Genevieve Adams office on 6/29/2022: 10lbs lifting restriction and no bending or twisting of spine (cervical, thoracic and lumbar) Surgery was fusion of C2-S1 Next MD visit:   July 26, 2022  Date of Surgery: n/a          Subjective: Pain at its worst 10/10. Doing home program.   Strength is improving, able to walk for an hour with rolling walker, was very tired after, but glad she could do it. Using cane more to ambulate.    Objective:   Tested 7/18/2022:  Flexibility:  Hip Flexor: R min loss loss*, L min loss (no pain on L, but not taken to end range)  Quads:  R min loss loss*, L min loss (no pain on L, but not taken to end range)    Strength:  Hip Extension: R 4+/5; L 4+/5 (tested in side lying)            Tested July 6, 2022:  Nerve length:  Straight leg raise test (feels tightness in hamstring, done supine with rolled hand towel for lumbar support and other leg in 60-90 hip and knee flexion): 150 degrees R, 158 degrees L    Upper extremity:  (feels tightness in hamstring, done supine with rolled hand towel for lumbar support and other leg in 60-90 hip and knee flexion)  Radial length: 80 degrees R, 100 L  Median length: 167 R, 160 L  Ulnar: 132 L, intact R        Tested 6/28/2022:  Flexibility:   UE/Scapular LE   Upper Trap: R not tested due to precautions/recent surgery; L not tested due to precautions/recent surgery  Levator Scap: R not tested due to precautions/recent surgery; L not tested due to precautions/recent surgery  Pec Major: R min loss; L min loss  Scalenes: R not tested due to precautions/recent surgery, L not tested due to precautions/recent surgery  Hamstrings: R mod/severe loss; L mod/severe loss  Piriformis: R intact; L intact  Gastroc-soleus: R min loss; L min loss         Gait: pt ambulates on level ground with assistive device of rolling walker, antalgia, decreased step length bilateral legs, decreased stance phase bilateral legs, decreased hip/knee flex or ext bilateral legs at hips, decreased foot clearance bilateral legs and trendelenburg/waddle. (Uses AFO on R foot)  Balance: SLS R unable, L unable    Strength: (* denotes performed with pain)  UE/Scapular LE   Shoulder Flex: R 4-/5, L 5/5  Shoulder ABD (C5): R 4-/5, L 5/5  Biceps (C6): R 5/5, L 5/5  Wrist ext (C6): R 5/5, L 5/5  Triceps (C7): R 5/5, L 5/5  Wrist Flex (C7): R 5/5, L 5/5  Digit Flex (C8): R 4-/5, L 5/5  Thumb Ext (C8): R 4-/5, L 5/5  Interossei (T1): R 4/5, L 4-/5     Rhomboids: R 5/5, L 5/5 (tested in sitting due to recent surgery)  Mid trap: R 4/5; L 4/5 (tested in sitting due to recent surgery)  Lats: R 4-/5, L 4-/5 (tested in sitting due to recent surgery)  Low trap: R 3-/5; L 3-/5 (tested in sitting due to recent surgery)      Strength: R 17 lbs; L 41 lbs Hip flexion (L2): R 2+/5; L 3-/5  Hip abduction: R 2+/5; L 2+/5           Hip ER: R 3-/5; L 3-/5  Hip IR: R 3-/5; L 4-/5  Knee Flexion: R 5/5; L 5/5            Knee extension (L3): R 3-/5; L 4/5            DF (L4): R 4+/5, L 5/5  Great Toe Ext (L5): R 4+/5, L 5/5  PF (S1): R 3-/5; L 5/5 (one rep done)      Palpation: severe loss of tissue mobility along entire spine     Observation/Posture: Scar from top of cervical spine to base of tailbone. Appears well healed.   Neuro Screen: Sensation: anterior thigh 50%, 5% medial/lateral ankles, 25% toes (bilateral), intact rest of lower extremity and upper extremity     Spinal AROM: (* denotes performed with pain)  Flexion: not tested due to precautions/recent surgery  Extension: not tested due to precautions/recent surgery  Sidebending: R not tested due to precautions/recent surgery; L not tested due to precautions/recent surgery  Rotation: R not tested due to precautions/recent surgery; L not tested due to precautions/recent surgery    Special Tests:  Get on/off treatment table: severe difficulty, gets back up with mod therapist assistence        Assessment: Still 10/10 pain at times with first waking up in low back and with leg spasms, but functional mobility has improved, able to walk further and do light housework easier. Goals: (to be met in 20 visits)   Pt will improve FOTO score from 37/100 to 57/100 to display improved ability to get dressed (40% progress on 7/18/2022)  Pt will reduce pain at its worst from 10/10 to 7/10 to allow for improved ability to get in and out of bed (No progress, 7/18/2022)  Pt will hip extension strength from 3-/5 in side lying to 3+/5 to allow for improved ability to ambulate (Goal met on 7/18/2022)  Pt will be independent with home program to allow for maintenance of goals achieved in therapy. (95% progress on 7/13/2022)    New Goal on 7/18/2022:  Pt will improve right knee extension strength from 3-/5 to 3+/5 to improve ability to ambulate. FOTO: 45/100     Plan: Continue skilled Physical Therapy 2 x/week or a total of 20 visits over a 90 day period. Treatment will include: manual therapy, therapeutic exercise, therapeutic activites, neuromuscular reeducation and home program       Patient/Family/Caregiver was advised of these findings, precautions, and treatment options and has agreed to actively participate in planning and for this course of care. Thank you for your referral. If you have any questions, please contact me at Dept: [phone]. Sincerely,  Electronically signed by therapist: Angeles Piedra, PT     Physician's certification required:  No  Please co-sign or sign and return this letter via fax as soon as possible to [phone].    I certify the need for these services furnished under this plan of treatment and while under my care.    X___________________________________________________ Date____________________    Certification From: 8/57/0357  To:10/11/2022       Additional ideas:  Active active hip rotation (on back with legs straight)  -Cervical isometrcis    -Balance/propriorception training (dynamic)    Light resistance with neutral spine  Demonstrate proper scapula humeral rhythm  Relaxation exercises    Supine arms up for lower abdominals (make sure no neck strain, arms to height of head)    Pec minor stretch supine therapist forces    Walking gradually increase to 45 minutes total      - It is vital that the fusion process has begun prior to the progression of lumbar function ROM. Fusion usually begins between 10-12 weeks and is confirmed only through X-ray. The fusion process continues for 12-24 months. It is also important to note that variations may exist in the type of lumbar fusion surgical approach used and limitations and dependent on the particular surgical approach used. Future:  Wall press (single and double leg) (Once cleared to lift more)        Additional ideas:  Short arc quad  Quad set  Bridge    Review ideal sleeping position for lumbar and cervical spine  -Light pec stretching (supine over towel roll)      Maybe: Isometric shoulder extension  Walking or stationary bike 2x a day 10 minutes to start    Keep in mind:  Lumbar fusion too (stretch and strengthen these areas as well)    Next visit:  Gentle retraction and also extension ROM in pain free range. Future:  Nerve mobilization (do not reproduce symptoms)    Teach to eliminate shrug with scapular control    Cuff to measure pressure with abdominal and cervical deep flexor traning. Teach self-scar massage (or Mom do this to her)        Charges: 1 manual therapy, 2 there ex,    Total Timed Treatment: 40 min  Total Treatment Time: 40 min  Date: 7/1/2022  Tx#: 2 Date: 7/6/2022  Tx#: 3 Date: 7/13/2022  Tx#: 4 Date: 7/18/2022  Tx#: 5 Date:   Tx#: 6 Ther-Ex:  Sitting scapular 4 way movement 20x1-2 sets each direction    Educated on home program, see below. Verbal, visual and tactile cues for there ex. There ex:  Straight leg raise x1: produce leg cramping discontinued (attempted in R leg)    Bridge x2: produce back pain, discontinued. Lower abdominal contraction with cervical deep flexor contraction 10' held. 2-10x3    Lower abdominal dynamic one leg to 120 degrees hip flexion 2-10x2    Lower abdominal dynamic hip external rotation bias in supine both legs 2-10x2    Sitting scapular retraction, depression, elevation and protrusion 10x2    Educated on home program, see below. Verbal, visual and tactile cues for there ex. There ex:  Mini-wall slide x10    Hamstring stretch standing 30'    Hip flexor stretch standing 30'    Hamstring set sitting x10    Long arc quad sitting x10    Isometric trunk extension standing, into wall x12    Tandem stance 30'    Lower abdominal dynamic one leg to 120 degrees hip flexion 15x    Lower abdominal dynamic hip external rotation bias in supine both legs x15    Lower abdominal supine arms to head level x15    Educated on home program, see below. Verbal, visual and tactile cues for there ex. There ex:  Diaphragm breathing x25    Mini-wall slide x2    Isometric trunk extension standing, into wall x2    Tandem stance 30'    Single leg step forward and back in doorway x6    Lower abdominal dynamic one leg to 120 degrees hip flexion 15x    Lower abdominal dynamic hip external rotation bias in supine both legs x20    Lower abdominal supine arms to head level x20    Reviewed plan of care and precautions. Reviewed progress and posture, cues for upright sitting posture. Educated on home program, see below. Verbal, visual and tactile cues for there ex.     Manual 8 minutes:  Soft tissue massage to paraspinals   Manual 15 minutes:  Soft tissue massage to paraspinals    Pec minor stretch supine therapist forces Manual 8 minutes:  Soft tissue massage to paraspinals Manual 15 minutes:  Soft tissue massage to paraspinals    N Re-Ed 12 minutes:  Education on ideal posture sitting, to avoid bending, how to put on AFO, told get long shoe horn to assist with dressing AFO. Practiced ideal posture sitting to standing and supine to sitting several times   N Re-Ed 12 minutes:  Education on ideal posture bending and sleeping, to avoid twisting.     Practiced ideal posture sitting and lying several times    Told have grandson walk dog or have dog out in the yard (10lbs lifting precaution now)

## 2022-07-20 ENCOUNTER — OFFICE VISIT (OUTPATIENT)
Dept: PHYSICAL THERAPY | Age: 47
End: 2022-07-20
Attending: NEUROLOGICAL SURGERY
Payer: MEDICAID

## 2022-07-20 PROCEDURE — 97110 THERAPEUTIC EXERCISES: CPT

## 2022-07-20 NOTE — PROGRESS NOTES
Insurance (Authorized # of Visits):  5/12 visits expires 12/25/2022 [facility] [address] [facility],[address], 20 visits via plan of care. Diagnosis:   S/P spinal fusion (Z98.1)       Referring Provider: Ellen Sliveira  Date of Evaluation:    6/28/2022    Precautions:  Per phone call to Dr. Ellen Silveira office on 6/29/2022: 10lbs lifting restriction and no bending or twisting of spine (cervical, thoracic and lumbar) Surgery was fusion of C2-S1 Next MD visit:   July 26, 2022  Date of Surgery: n/a          Subjective: Pain at its worst 10/10. Doing home program.   Can tell she is improving, able to walk more with cane  Using cane more to ambulate.    Objective:   Tested 7/18/2022:  Flexibility:  Hip Flexor: R min loss loss*, L min loss (no pain on L, but not taken to end range)  Quads:  R min loss loss*, L min loss (no pain on L, but not taken to end range)    Strength:  Hip Extension: R 4+/5; L 4+/5 (tested in side lying)            Tested July 6, 2022:  Nerve length:  Straight leg raise test (feels tightness in hamstring, done supine with rolled hand towel for lumbar support and other leg in 60-90 hip and knee flexion): 150 degrees R, 158 degrees L    Upper extremity:  (feels tightness in hamstring, done supine with rolled hand towel for lumbar support and other leg in 60-90 hip and knee flexion)  Radial length: 80 degrees R, 100 L  Median length: 167 R, 160 L  Ulnar: 132 L, intact R        Tested 6/28/2022:  Flexibility:   UE/Scapular LE   Upper Trap: R not tested due to precautions/recent surgery; L not tested due to precautions/recent surgery  Levator Scap: R not tested due to precautions/recent surgery; L not tested due to precautions/recent surgery  Pec Major: R min loss; L min loss  Scalenes: R not tested due to precautions/recent surgery, L not tested due to precautions/recent surgery  Hamstrings: R mod/severe loss; L mod/severe loss  Piriformis: R intact; L intact  Gastroc-soleus: R min loss; L min loss         Gait: pt ambulates on level ground with assistive device of rolling walker, antalgia, decreased step length bilateral legs, decreased stance phase bilateral legs, decreased hip/knee flex or ext bilateral legs at hips, decreased foot clearance bilateral legs and trendelenburg/waddle. (Uses AFO on R foot)  Balance: SLS R unable, L unable    Strength: (* denotes performed with pain)  UE/Scapular LE   Shoulder Flex: R 4-/5, L 5/5  Shoulder ABD (C5): R 4-/5, L 5/5  Biceps (C6): R 5/5, L 5/5  Wrist ext (C6): R 5/5, L 5/5  Triceps (C7): R 5/5, L 5/5  Wrist Flex (C7): R 5/5, L 5/5  Digit Flex (C8): R 4-/5, L 5/5  Thumb Ext (C8): R 4-/5, L 5/5  Interossei (T1): R 4/5, L 4-/5     Rhomboids: R 5/5, L 5/5 (tested in sitting due to recent surgery)  Mid trap: R 4/5; L 4/5 (tested in sitting due to recent surgery)  Lats: R 4-/5, L 4-/5 (tested in sitting due to recent surgery)  Low trap: R 3-/5; L 3-/5 (tested in sitting due to recent surgery)      Strength: R 17 lbs; L 41 lbs Hip flexion (L2): R 2+/5; L 3-/5  Hip abduction: R 2+/5; L 2+/5           Hip ER: R 3-/5; L 3-/5  Hip IR: R 3-/5; L 4-/5  Knee Flexion: R 5/5; L 5/5            Knee extension (L3): R 3-/5; L 4/5            DF (L4): R 4+/5, L 5/5  Great Toe Ext (L5): R 4+/5, L 5/5  PF (S1): R 3-/5; L 5/5 (one rep done)      Palpation: severe loss of tissue mobility along entire spine     Observation/Posture: Scar from top of cervical spine to base of tailbone. Appears well healed.   Neuro Screen: Sensation: anterior thigh 50%, 5% medial/lateral ankles, 25% toes (bilateral), intact rest of lower extremity and upper extremity     Spinal AROM: (* denotes performed with pain)  Flexion: not tested due to precautions/recent surgery  Extension: not tested due to precautions/recent surgery  Sidebending: R not tested due to precautions/recent surgery; L not tested due to precautions/recent surgery  Rotation: R not tested due to precautions/recent surgery; L not tested due to precautions/recent surgery    Special Tests:  Get on/off treatment table: severe difficulty, gets back up with mod therapist assistence        Assessment: Tolerated progression of light resistance, cervical isometrics and dynamic balance exercises with no pain and were able to add to home program.       Goals: (to be met in 20 visits)   Pt will improve FOTO score from 37/100 to 57/100 to display improved ability to get dressed (40% progress on 7/18/2022)  Pt will reduce pain at its worst from 10/10 to 7/10 to allow for improved ability to get in and out of bed (No progress, 7/18/2022)  Pt will hip extension strength from 3-/5 in side lying to 3+/5 to allow for improved ability to ambulate (Goal met on 7/18/2022)  Pt will be independent with home program to allow for maintenance of goals achieved in therapy. (95% progress on 7/13/2022)    New Goal on 7/18/2022:  Pt will improve right knee extension strength from 3-/5 to 3+/5 to improve ability to ambulate. FOTO: 45/100     Plan: Active active hip rotation (on back with legs straight)    Relaxation exercises    Pec minor stretch supine therapist forces      - It is vital that the fusion process has begun prior to the progression of lumbar function ROM. Fusion usually begins between 10-12 weeks and is confirmed only through X-ray. The fusion process continues for 12-24 months. It is also important to note that variations may exist in the type of lumbar fusion surgical approach used and limitations and dependent on the particular surgical approach used.             Future:  Wall press (single and double leg) (Once cleared to lift more)        Additional ideas:  Short arc quad  Quad set  Bridge    Review ideal sleeping position for lumbar and cervical spine  -Light pec stretching (supine over towel roll)      Maybe: Isometric shoulder extension  Walking or stationary bike 2x a day 10 minutes to start    Keep in mind:  Lumbar fusion too (stretch and strengthen these areas as well)    Next visit:  Gentle retraction and also extension ROM in pain free range. Future:  Nerve mobilization (do not reproduce symptoms)    Teach to eliminate shrug with scapular control    Cuff to measure pressure with abdominal and cervical deep flexor traning. Teach self-scar massage (or Mom do this to her)        Charges: 3 there ex,    Total Timed Treatment: 40 min  Total Treatment Time: 40 min  Date: 7/1/2022  Tx#: 2 Date: 7/6/2022  Tx#: 3 Date: 7/13/2022  Tx#: 4 Date: 7/18/2022  Tx#: 5 Date: 7/20/2022  Tx#: 6       Ther-Ex:  Sitting scapular 4 way movement 20x1-2 sets each direction    Educated on home program, see below. Verbal, visual and tactile cues for there ex. There ex:  Straight leg raise x1: produce leg cramping discontinued (attempted in R leg)    Bridge x2: produce back pain, discontinued. Lower abdominal contraction with cervical deep flexor contraction 10' held. 2-10x3    Lower abdominal dynamic one leg to 120 degrees hip flexion 2-10x2    Lower abdominal dynamic hip external rotation bias in supine both legs 2-10x2    Sitting scapular retraction, depression, elevation and protrusion 10x2    Educated on home program, see below. Verbal, visual and tactile cues for there ex. There ex:  Mini-wall slide x10    Hamstring stretch standing 30'    Hip flexor stretch standing 30'    Hamstring set sitting x10    Long arc quad sitting x10    Isometric trunk extension standing, into wall x12    Tandem stance 30'    Lower abdominal dynamic one leg to 120 degrees hip flexion 15x    Lower abdominal dynamic hip external rotation bias in supine both legs x15    Lower abdominal supine arms to head level x15    Educated on home program, see below. Verbal, visual and tactile cues for there ex.    There ex:  Diaphragm breathing x25    Mini-wall slide x2    Isometric trunk extension standing, into wall x2    Tandem stance 30'    Single leg step forward and back in doorway x6    Lower abdominal dynamic one leg to 120 degrees hip flexion 15x    Lower abdominal dynamic hip external rotation bias in supine both legs x20    Lower abdominal supine arms to head level x20    Reviewed plan of care and precautions. Reviewed progress and posture, cues for upright sitting posture. Educated on home program, see below. Verbal, visual and tactile cues for there ex. Manual 8 minutes:  Soft tissue massage to paraspinals   Manual 15 minutes:  Soft tissue massage to paraspinals    Pec minor stretch supine therapist forces Manual 8 minutes:  Soft tissue massage to paraspinals Manual 15 minutes:  Soft tissue massage to paraspinals        N Re-Ed 12 minutes:  Education on ideal posture sitting, to avoid bending, how to put on AFO, told get long shoe horn to assist with dressing AFO. Practiced ideal posture sitting to standing and supine to sitting several times   N Re-Ed 12 minutes:  Education on ideal posture bending and sleeping, to avoid twisting.     Practiced ideal posture sitting and lying several times    Told have grandson walk dog or have dog out in the yard (10lbs lifting precaution now)

## 2022-07-21 ENCOUNTER — PATIENT MESSAGE (OUTPATIENT)
Dept: FAMILY MEDICINE CLINIC | Facility: CLINIC | Age: 47
End: 2022-07-21

## 2022-07-21 RX ORDER — GABAPENTIN 600 MG/1
600 TABLET ORAL 3 TIMES DAILY
Qty: 90 TABLET | Refills: 0 | Status: SHIPPED | OUTPATIENT
Start: 2022-07-21

## 2022-07-21 NOTE — TELEPHONE ENCOUNTER
From: Farrah Hernandez  To: Georgie Putnam MD  Sent: 7/21/2022 3:17 PM CDT  Subject: Meds    I need my gabapentin refilled 1800 mg a day so I take six pills thank you

## 2022-07-26 ENCOUNTER — APPOINTMENT (OUTPATIENT)
Dept: NEUROSURGERY | Age: 47
End: 2022-07-26

## 2022-07-26 ENCOUNTER — APPOINTMENT (OUTPATIENT)
Dept: GENERAL RADIOLOGY | Age: 47
End: 2022-07-26

## 2022-07-26 DIAGNOSIS — Z98.1 S/P SPINAL FUSION: ICD-10-CM

## 2022-07-26 RX ORDER — TRAMADOL HYDROCHLORIDE 50 MG/1
50 TABLET ORAL 2 TIMES DAILY PRN
Qty: 28 TABLET | Refills: 0 | Status: SHIPPED | OUTPATIENT
Start: 2022-07-26 | End: 2022-08-10 | Stop reason: SDUPTHER

## 2022-07-26 RX ORDER — DIAZEPAM 5 MG/1
5 TABLET ORAL EVERY 12 HOURS PRN
Qty: 28 TABLET | Refills: 0 | Status: SHIPPED | OUTPATIENT
Start: 2022-07-26 | End: 2022-08-10 | Stop reason: SDUPTHER

## 2022-07-27 ENCOUNTER — OFFICE VISIT (OUTPATIENT)
Dept: PHYSICAL THERAPY | Age: 47
End: 2022-07-27
Attending: NEUROLOGICAL SURGERY
Payer: MEDICAID

## 2022-07-27 PROCEDURE — 97110 THERAPEUTIC EXERCISES: CPT

## 2022-07-27 PROCEDURE — 97140 MANUAL THERAPY 1/> REGIONS: CPT
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
## 2022-08-01 DIAGNOSIS — Z98.1 S/P SPINAL FUSION: Primary | ICD-10-CM

## 2022-08-01 RX ORDER — OXYCODONE HYDROCHLORIDE AND ACETAMINOPHEN 5; 325 MG/1; MG/1
1 TABLET ORAL 2 TIMES DAILY PRN
Qty: 42 TABLET | Refills: 0 | Status: SHIPPED | OUTPATIENT
Start: 2022-08-01 | End: 2022-08-22

## 2022-08-02 ENCOUNTER — OFFICE VISIT (OUTPATIENT)
Dept: PHYSICAL THERAPY | Age: 47
End: 2022-08-02
Attending: NEUROLOGICAL SURGERY
Payer: MEDICAID

## 2022-08-02 ENCOUNTER — TELEPHONE (OUTPATIENT)
Dept: FAMILY MEDICINE CLINIC | Facility: CLINIC | Age: 47
End: 2022-08-02

## 2022-08-02 PROCEDURE — 97140 MANUAL THERAPY 1/> REGIONS: CPT

## 2022-08-02 PROCEDURE — 97112 NEUROMUSCULAR REEDUCATION: CPT

## 2022-08-02 PROCEDURE — 97110 THERAPEUTIC EXERCISES: CPT

## 2022-08-02 NOTE — TELEPHONE ENCOUNTER
Patient stopped into office    She is requesting an increase in meds    She states she is feeling down and cries a lot for no reason    Current med:  sertraline (ZOLOFT) 100 MG Oral Tab    Please adv    Thank you

## 2022-08-02 NOTE — PROGRESS NOTES
Insurance (Authorized # of Visits):  7/12 visits expires 12/25/2022 [facility] [address] [facility],[address], 20 visits via plan of care. Diagnosis:   S/P spinal fusion (Z98.1)       Referring Provider: Sang Hannah  Date of Evaluation:    6/28/2022    Precautions:  Per phone call to Dr. Sang Hannah office on 6/29/2022: 10lbs lifting restriction and no bending or twisting of spine (cervical, thoracic and lumbar) Surgery was fusion of C2-S1 Next MD visit:   Aug 23  Date of Surgery: n/a          Subjective: Spasms in the legs still keep me up at night. Neck a little more irritated today. 7-10/10 pain daily basis. Incision still numb. Spasms R shoulder blade last visit when laying down; still there but better. HA after son hit the headrest better but ongoing on and off headache. On April 28 had surgery. (CERVIAL 2 TO SACROILIAC 1/ILIUM POSTERIOR SPINAL FUSION WITH THORACIC 2 AND LUMBAR 4 PEDICLE SUBTRACTION OSTEOTOMY). Walking some at home without cane, improving.      Objective:   Amb with SBQC    Tested 7/18/2022:  Flexibility:  Hip Flexor: R min loss loss*, L min loss (no pain on L, but not taken to end range)  Quads:  R min loss loss*, L min loss (no pain on L, but not taken to end range)    Strength:  Hip Extension: R 4+/5; L 4+/5 (tested in side lying)            Tested July 6, 2022:  Nerve length:  Straight leg raise test (feels tightness in hamstring, done supine with rolled hand towel for lumbar support and other leg in 60-90 hip and knee flexion): 150 degrees R, 158 degrees L    Upper extremity:  (feels tightness in hamstring, done supine with rolled hand towel for lumbar support and other leg in 60-90 hip and knee flexion)  Radial length: 80 degrees R, 100 L  Median length: 167 R, 160 L  Ulnar: 132 L, intact R        Tested 6/28/2022:  Flexibility:   UE/Scapular LE   Upper Trap: R not tested due to precautions/recent surgery; L not tested due to precautions/recent surgery  Levator Scap: R not tested due to precautions/recent surgery; L not tested due to precautions/recent surgery  Pec Major: R min loss; L min loss  Scalenes: R not tested due to precautions/recent surgery, L not tested due to precautions/recent surgery  Hamstrings: R mod/severe loss; L mod/severe loss  Piriformis: R intact; L intact  Gastroc-soleus: R min loss; L min loss         Gait: pt ambulates on level ground with assistive device of rolling walker, antalgia, decreased step length bilateral legs, decreased stance phase bilateral legs, decreased hip/knee flex or ext bilateral legs at hips, decreased foot clearance bilateral legs and trendelenburg/waddle. (Uses AFO on R foot)  Balance: SLS R unable, L unable    Strength: (* denotes performed with pain)  UE/Scapular LE   Shoulder Flex: R 4-/5, L 5/5  Shoulder ABD (C5): R 4-/5, L 5/5  Biceps (C6): R 5/5, L 5/5  Wrist ext (C6): R 5/5, L 5/5  Triceps (C7): R 5/5, L 5/5  Wrist Flex (C7): R 5/5, L 5/5  Digit Flex (C8): R 4-/5, L 5/5  Thumb Ext (C8): R 4-/5, L 5/5  Interossei (T1): R 4/5, L 4-/5     Rhomboids: R 5/5, L 5/5 (tested in sitting due to recent surgery)  Mid trap: R 4/5; L 4/5 (tested in sitting due to recent surgery)  Lats: R 4-/5, L 4-/5 (tested in sitting due to recent surgery)  Low trap: R 3-/5; L 3-/5 (tested in sitting due to recent surgery)      Strength: R 17 lbs; L 41 lbs Hip flexion (L2): R 2+/5; L 3-/5  Hip abduction: R 2+/5; L 2+/5           Hip ER: R 3-/5; L 3-/5  Hip IR: R 3-/5; L 4-/5  Knee Flexion: R 5/5; L 5/5            Knee extension (L3): R 3-/5; L 4/5            DF (L4): R 4+/5, L 5/5  Great Toe Ext (L5): R 4+/5, L 5/5  PF (S1): R 3-/5; L 5/5 (one rep done)      Palpation: severe loss of tissue mobility along entire spine     Observation/Posture: Scar from top of cervical spine to base of tailbone. Appears well healed.   Neuro Screen: Sensation: anterior thigh 50%, 5% medial/lateral ankles, 25% toes (bilateral), intact rest of lower extremity and upper extremity     Spinal AROM: (* denotes performed with pain)  Flexion: not tested due to precautions/recent surgery  Extension: not tested due to precautions/recent surgery  Sidebending: R not tested due to precautions/recent surgery; L not tested due to precautions/recent surgery  Rotation: R not tested due to precautions/recent surgery; L not tested due to precautions/recent surgery    Special Tests:  Get on/off treatment table: severe difficulty, gets back up with mod therapist assistence      Assessment: Able to add recumbent bike today which pt tolerated well without increased pain. Continues to use [employer] with improved gait quality. Ongoing daily high pain levels. Pt preferred sitting today vs supine for session due to recent episode of spasming with bed mobility; will progress as tolerated. Goals: (to be met in 20 visits)   Pt will improve FOTO score from 37/100 to 57/100 to display improved ability to get dressed (40% progress on 7/18/2022)  Pt will reduce pain at its worst from 10/10 to 7/10 to allow for improved ability to get in and out of bed (No progress, 7/18/2022)  Pt will hip extension strength from 3-/5 in side lying to 3+/5 to allow for improved ability to ambulate (Goal met on 7/18/2022)  Pt will be independent with home program to allow for maintenance of goals achieved in therapy. (95% progress on 7/13/2022)    New Goal on 7/18/2022:  Pt will improve right knee extension strength from 3-/5 to 3+/5 to improve ability to ambulate.     FOTO: 45/100     Plan:   Soft tissue massage to back paraspinals  Scar massage    Review ideal sleeping position for lumbar and cervical spine  -Light pec stretching (supine over towel roll)      Walking or stationary bike 5 minutes    Scapular clock, hands on wall    Mini-squat      Charges: 1 manual therapy, 1 NR 1 there ex,    Total Timed Treatment: 45 min  Total Treatment Time: 45 min  Date: 7/18/2022  Tx#: 5 Date: 7/20/2022  Tx#: 6 7/27/2022  Tx#: 7 8/2/22  8     There ex:  Diaphragm breathing x25    Mini-wall slide x2    Isometric trunk extension standing, into wall x2    Tandem stance 30'    Single leg step forward and back in doorway x6    Lower abdominal dynamic one leg to 120 degrees hip flexion 15x    Lower abdominal dynamic hip external rotation bias in supine both legs x20    Lower abdominal supine arms to head level x20    Reviewed plan of care and precautions. Reviewed progress and posture, cues for upright sitting posture. Educated on home program, see below. Verbal, visual and tactile cues for there ex. There ex:  Standing shoulder strength 2lbs 20x2:   -external rotation in neutral abduction  -shoulder extension  -shoulder abduction 1/3 of the range  -Biceps curls    Isometric cervical strengthening sitting with patient hand: forward, backwards and to each side 10-20x2    Step forward with arm and opposite leg x10    Step backwards and back to neutral for balance x10    Step sideways and back to neutral for balance x10    Scar massage 5 minutes     Diaphragm breathing x20    Reviewed ideal posture    Educated on home program, see below. Verbal, visual and tactile cues for there ex. There ex:  Double leg calf raises hands for support x15    Standing hip abduction hands for support 15x2    Standing hip extension 15x2    Diaphragm breathing x20    Isometric cervical strengthening sitting with patient hand: forward, backwards and to each side x3 each    Bicep curls 5lbs x10    Posture education sitting    Educated on home program, see below. Verbal, visual and tactile cues for there ex.    There ex:  recumb bike 5' L1    Double leg calf raises hands for support x15    Standing hip abduction hands for support 15x2    Standing hip extension   15x2    Sitting toe raises 10x     sitting cross arm stretch R      NR  Wt shifts    [place] KALEN eyes closed trials 20\"x3    Pt ed verbal Bed mobility review/log roll     Manual 15 minutes:  Soft tissue massage to paraspinals    Manual 25 minutes:  Soft tissue massage to paraspinals    Scar massage Manual 15 minutes:  Sitting Soft tissue massage to paraspinals, shilpi CASTRO

## 2022-08-04 ENCOUNTER — OFFICE VISIT (OUTPATIENT)
Dept: PHYSICAL THERAPY | Age: 47
End: 2022-08-04
Attending: NEUROLOGICAL SURGERY
Payer: MEDICAID

## 2022-08-04 ENCOUNTER — TELEPHONE (OUTPATIENT)
Dept: FAMILY MEDICINE CLINIC | Facility: CLINIC | Age: 47
End: 2022-08-04

## 2022-08-04 PROCEDURE — 97110 THERAPEUTIC EXERCISES: CPT

## 2022-08-04 PROCEDURE — 97140 MANUAL THERAPY 1/> REGIONS: CPT

## 2022-08-04 PROCEDURE — 97112 NEUROMUSCULAR REEDUCATION: CPT

## 2022-08-04 RX ORDER — SERTRALINE HYDROCHLORIDE 100 MG/1
TABLET, FILM COATED ORAL
Qty: 90 TABLET | Refills: 1 | Status: SHIPPED | OUTPATIENT
Start: 2022-08-04 | End: 2022-08-04

## 2022-08-04 RX ORDER — SERTRALINE HYDROCHLORIDE 100 MG/1
TABLET, FILM COATED ORAL
Qty: 90 TABLET | Refills: 1 | COMMUNITY
Start: 2022-08-04

## 2022-08-04 NOTE — TELEPHONE ENCOUNTER
Considering also on Trazodone the risk of Serotonin syndrome is higher with a higher dose. Dose increased to 150 mg once daily. Follow up in office / in-person regarding moods in the next 4-6 weeks. Symptoms of Serotonin syndrome are  Tremors, jerks, increase in temp, agitation, excessive sweating, tight and stiffness of muscles, and especially when more than 3-4 symptoms are present. Keep well hydrated.

## 2022-08-04 NOTE — TELEPHONE ENCOUNTER
[facility] sent to pt regarding doctor's note below  Notify me if not read by 08/11/22    Pt to schedule follow-up appt in 4-6 weeks

## 2022-08-04 NOTE — PROGRESS NOTES
Insurance (Authorized # of Visits):  7/12 visits expires 12/25/2022 [facility] [address] [facility],[address], 20 visits via plan of care. Diagnosis:   S/P spinal fusion (Z98.1)       Referring Provider: Genevieve Adams  Date of Evaluation:    6/28/2022    Precautions:  Per phone call to Dr. Genevieve Adams office on 6/29/2022: 10lbs lifting restriction and no bending or twisting of spine (cervical, thoracic and lumbar) Surgery was fusion of C2-S1 Next MD visit:   Aug 23  Date of Surgery: n/a          Subjective:  L side of scalp very sensitive still. Overall 7-10/10 pain day to day. Walking around the house every day. Doing flight steps at home one over the other now, holding on to rail. On April 28 had surgery. (CERVIAL 2 TO SACROILIAC 1/ILIUM POSTERIOR SPINAL FUSION WITH THORACIC 2 AND LUMBAR 4 PEDICLE SUBTRACTION OSTEOTOMY). Walking some at home without cane, improving.      Objective:   8/4/22  Amb with SBQC  Rhomberg eyes open 30 sec, eyes closed 30 sed with increased sway  6\" step up with UE assist    Tested 7/18/2022:  Flexibility:  Hip Flexor: R min loss loss*, L min loss (no pain on L, but not taken to end range)  Quads:  R min loss loss*, L min loss (no pain on L, but not taken to end range)    Strength:  Hip Extension: R 4+/5; L 4+/5 (tested in side lying)            Tested July 6, 2022:  Nerve length:  Straight leg raise test (feels tightness in hamstring, done supine with rolled hand towel for lumbar support and other leg in 60-90 hip and knee flexion): 150 degrees R, 158 degrees L    Upper extremity:  (feels tightness in hamstring, done supine with rolled hand towel for lumbar support and other leg in 60-90 hip and knee flexion)  Radial length: 80 degrees R, 100 L  Median length: 167 R, 160 L  Ulnar: 132 L, intact R        Tested 6/28/2022:  Flexibility:   UE/Scapular LE   Upper Trap: R not tested due to precautions/recent surgery; L not tested due to precautions/recent surgery  Levator Scap: R not tested due to precautions/recent surgery; L not tested due to precautions/recent surgery  Pec Major: R min loss; L min loss  Scalenes: R not tested due to precautions/recent surgery, L not tested due to precautions/recent surgery  Hamstrings: R mod/severe loss; L mod/severe loss  Piriformis: R intact; L intact  Gastroc-soleus: R min loss; L min loss         Gait: pt ambulates on level ground with assistive device of rolling walker, antalgia, decreased step length bilateral legs, decreased stance phase bilateral legs, decreased hip/knee flex or ext bilateral legs at hips, decreased foot clearance bilateral legs and trendelenburg/waddle. (Uses AFO on R foot)  Balance: SLS R unable, L unable    Strength: (* denotes performed with pain)  UE/Scapular LE   Shoulder Flex: R 4-/5, L 5/5  Shoulder ABD (C5): R 4-/5, L 5/5  Biceps (C6): R 5/5, L 5/5  Wrist ext (C6): R 5/5, L 5/5  Triceps (C7): R 5/5, L 5/5  Wrist Flex (C7): R 5/5, L 5/5  Digit Flex (C8): R 4-/5, L 5/5  Thumb Ext (C8): R 4-/5, L 5/5  Interossei (T1): R 4/5, L 4-/5     Rhomboids: R 5/5, L 5/5 (tested in sitting due to recent surgery)  Mid trap: R 4/5; L 4/5 (tested in sitting due to recent surgery)  Lats: R 4-/5, L 4-/5 (tested in sitting due to recent surgery)  Low trap: R 3-/5; L 3-/5 (tested in sitting due to recent surgery)      Strength: R 17 lbs; L 41 lbs Hip flexion (L2): R 2+/5; L 3-/5  Hip abduction: R 2+/5; L 2+/5           Hip ER: R 3-/5; L 3-/5  Hip IR: R 3-/5; L 4-/5  Knee Flexion: R 5/5; L 5/5            Knee extension (L3): R 3-/5; L 4/5            DF (L4): R 4+/5, L 5/5  Great Toe Ext (L5): R 4+/5, L 5/5  PF (S1): R 3-/5; L 5/5 (one rep done)      Palpation: severe loss of tissue mobility along entire spine     Observation/Posture: Scar from top of cervical spine to base of tailbone. Appears well healed.   Neuro Screen: Sensation: anterior thigh 50%, 5% medial/lateral ankles, 25% toes (bilateral), intact rest of lower extremity and upper extremity     Spinal AROM: (* denotes performed with pain)  Flexion: not tested due to precautions/recent surgery  Extension: not tested due to precautions/recent surgery  Sidebending: R not tested due to precautions/recent surgery; L not tested due to precautions/recent surgery  Rotation: R not tested due to precautions/recent surgery; L not tested due to precautions/recent surgery    Special Tests:  Get on/off treatment table: severe difficulty, gets back up with mod therapist assistence      Assessment: Improved tolerance for standing exercises today and able to progress balance challenges. Continued intermittent use of UE's needed with postural control. Goals: (to be met in 20 visits)   Pt will improve FOTO score from 37/100 to 57/100 to display improved ability to get dressed (40% progress on 7/18/2022)  Pt will reduce pain at its worst from 10/10 to 7/10 to allow for improved ability to get in and out of bed (No progress, 7/18/2022)  Pt will hip extension strength from 3-/5 in side lying to 3+/5 to allow for improved ability to ambulate (Goal met on 7/18/2022)  Pt will be independent with home program to allow for maintenance of goals achieved in therapy. (95% progress on 7/13/2022)    New Goal on 7/18/2022:  Pt will improve right knee extension strength from 3-/5 to 3+/5 to improve ability to ambulate. FOTO: 45/100     Plan: Cont with recumbent bike and balance progressions. Resume mat ex's as tolerated.      Soft tissue massage to back paraspinals  Scar massage    Review ideal sleeping position for lumbar and cervical spine  -Light pec stretching (supine over towel roll)      Walking or stationary bike 5 minutes    Scapular clock, hands on wall    Mini-squat      Charges: 1 manual therapy, 1 NR, 2 there ex,    Total Timed Treatment: 54 min  Total Treatment Time: 54 min  Date: 7/18/2022  Tx#: 5 Date: 7/20/2022  Tx#: 6 7/27/2022  Tx#: 7 8/2/22  8 8/4/22  9    There ex:  Diaphragm breathing x25    Mini-wall slide x2    Isometric trunk extension standing, into wall x2    Tandem stance 30'    Single leg step forward and back in doorway x6    Lower abdominal dynamic one leg to 120 degrees hip flexion 15x    Lower abdominal dynamic hip external rotation bias in supine both legs x20    Lower abdominal supine arms to head level x20    Reviewed plan of care and precautions. Reviewed progress and posture, cues for upright sitting posture. Educated on home program, see below. Verbal, visual and tactile cues for there ex. There ex:  Standing shoulder strength 2lbs 20x2:   -external rotation in neutral abduction  -shoulder extension  -shoulder abduction 1/3 of the range  -Biceps curls    Isometric cervical strengthening sitting with patient hand: forward, backwards and to each side 10-20x2    Step forward with arm and opposite leg x10    Step backwards and back to neutral for balance x10    Step sideways and back to neutral for balance x10    Scar massage 5 minutes     Diaphragm breathing x20    Reviewed ideal posture    Educated on home program, see below. Verbal, visual and tactile cues for there ex. There ex:  Double leg calf raises hands for support x15    Standing hip abduction hands for support 15x2    Standing hip extension 15x2    Diaphragm breathing x20    Isometric cervical strengthening sitting with patient hand: forward, backwards and to each side x3 each    Bicep curls 5lbs x10    Posture education sitting    Educated on home program, see below. Verbal, visual and tactile cues for there ex.    There ex:  recumb bike 5' L1    Double leg calf raises hands for support x15    Standing hip abduction hands for support 15x2    Standing hip extension   15x2    Sitting toe raises 10x     sitting cross arm stretch R      NR  Wt shifts    [place] KALEN eyes closed trials 20\"x3    Pt ed verbal Bed mobility review/log roll Ther Ex    recumb bike 5' L1    Mini squat on wall - 60\" hold    Double leg calf raises hands for support 2x10    Standing hip abd 10x2 each, hip ext 10x2 each    6\" forward step up 15x each with bilat UE assist    Slant board calf stretch 30\"x4    scap sets 10x    Cross arm stretch L/R      NR      Postural control  -staggered stance--> tandem trials    P bars lat walk 6 lengths    Pt ed review desensitization principles scalp    Manual 15 minutes:  Soft tissue massage to paraspinals    Manual 25 minutes:  Soft tissue massage to paraspinals    Scar massage Manual 15 minutes:  Sitting Soft tissue massage to paraspinals, bilat UT Manual 10'  sitting STM bilat UT

## 2022-08-10 ENCOUNTER — PATIENT MESSAGE (OUTPATIENT)
Dept: FAMILY MEDICINE CLINIC | Facility: CLINIC | Age: 47
End: 2022-08-10

## 2022-08-10 DIAGNOSIS — Z98.1 S/P SPINAL FUSION: ICD-10-CM

## 2022-08-10 RX ORDER — TRAMADOL HYDROCHLORIDE 50 MG/1
50 TABLET ORAL EVERY 8 HOURS PRN
Qty: 42 TABLET | Refills: 0 | Status: SHIPPED | OUTPATIENT
Start: 2022-08-10 | End: 2022-08-25 | Stop reason: SDUPTHER

## 2022-08-10 RX ORDER — TRAZODONE HYDROCHLORIDE 50 MG/1
150 TABLET ORAL NIGHTLY
Qty: 90 TABLET | Refills: 0 | Status: SHIPPED | OUTPATIENT
Start: 2022-08-10 | End: 2022-09-09

## 2022-08-10 RX ORDER — DIAZEPAM 5 MG/1
5 TABLET ORAL EVERY 12 HOURS PRN
Qty: 28 TABLET | Refills: 0 | Status: SHIPPED | OUTPATIENT
Start: 2022-08-10 | End: 2022-08-25 | Stop reason: SDUPTHER

## 2022-08-12 ENCOUNTER — OFFICE VISIT (OUTPATIENT)
Dept: PHYSICAL THERAPY | Age: 47
End: 2022-08-12
Attending: NEUROLOGICAL SURGERY
Payer: MEDICAID

## 2022-08-12 PROCEDURE — 97110 THERAPEUTIC EXERCISES: CPT

## 2022-08-12 RX ORDER — GABAPENTIN 600 MG/1
600 TABLET ORAL 3 TIMES DAILY
Qty: 90 TABLET | Refills: 0 | Status: SHIPPED | OUTPATIENT
Start: 2022-08-12

## 2022-08-12 NOTE — PROGRESS NOTES
Insurance (Authorized # of Visits):  10/12 visits expires 12/25/2022 [facility] [address] [facility],[address], 20 visits via plan of care. Diagnosis:   S/P spinal fusion (Z98.1)       Referring Provider: Aki Ray  Date of Evaluation:    6/28/2022    Precautions:  Per phone call to Dr. Aki Ray office on 6/29/2022: 10lbs lifting restriction and no bending or twisting of spine (cervical, thoracic and lumbar) Surgery was fusion of C2-S1 Next MD visit:   Aug 23  Date of Surgery: n/a          Subjective:  L side of scalp very sensitive still. Overall 7-10/10 pain day to day. Walking around the house every day, but still uses cane some for stability. Doing flight steps at home one over the other now, holding on to rail. On April 28 had surgery. (CERVIAL 2 TO SACROILIAC 1/ILIUM POSTERIOR SPINAL FUSION WITH THORACIC 2 AND LUMBAR 4 PEDICLE SUBTRACTION OSTEOTOMY).        Objective:   8/4/22  Amb with SBQC  Rhomberg eyes open 30 sec, eyes closed 30 sed with increased sway  6\" step up with UE assist    Tested 7/18/2022:  Flexibility:  Hip Flexor: R min loss loss*, L min loss (no pain on L, but not taken to end range)  Quads:  R min loss loss*, L min loss (no pain on L, but not taken to end range)    Strength:  Hip Extension: R 4+/5; L 4+/5 (tested in side lying)            Tested July 6, 2022:  Nerve length:  Straight leg raise test (feels tightness in hamstring, done supine with rolled hand towel for lumbar support and other leg in 60-90 hip and knee flexion): 150 degrees R, 158 degrees L    Upper extremity:  (feels tightness in hamstring, done supine with rolled hand towel for lumbar support and other leg in 60-90 hip and knee flexion)  Radial length: 80 degrees R, 100 L  Median length: 167 R, 160 L  Ulnar: 132 L, intact R        Tested 6/28/2022:  Flexibility:   UE/Scapular LE   Upper Trap: R not tested due to precautions/recent surgery; L not tested due to precautions/recent surgery  Levator Scap: R not tested due to precautions/recent surgery; L not tested due to precautions/recent surgery  Pec Major: R min loss; L min loss  Scalenes: R not tested due to precautions/recent surgery, L not tested due to precautions/recent surgery  Hamstrings: R mod/severe loss; L mod/severe loss  Piriformis: R intact; L intact  Gastroc-soleus: R min loss; L min loss         Gait: pt ambulates on level ground with assistive device of rolling walker, antalgia, decreased step length bilateral legs, decreased stance phase bilateral legs, decreased hip/knee flex or ext bilateral legs at hips, decreased foot clearance bilateral legs and trendelenburg/waddle. (Uses AFO on R foot)  Balance: SLS R unable, L unable    Strength: (* denotes performed with pain)  UE/Scapular LE   Shoulder Flex: R 4-/5, L 5/5  Shoulder ABD (C5): R 4-/5, L 5/5  Biceps (C6): R 5/5, L 5/5  Wrist ext (C6): R 5/5, L 5/5  Triceps (C7): R 5/5, L 5/5  Wrist Flex (C7): R 5/5, L 5/5  Digit Flex (C8): R 4-/5, L 5/5  Thumb Ext (C8): R 4-/5, L 5/5  Interossei (T1): R 4/5, L 4-/5     Rhomboids: R 5/5, L 5/5 (tested in sitting due to recent surgery)  Mid trap: R 4/5; L 4/5 (tested in sitting due to recent surgery)  Lats: R 4-/5, L 4-/5 (tested in sitting due to recent surgery)  Low trap: R 3-/5; L 3-/5 (tested in sitting due to recent surgery)      Strength: R 17 lbs; L 41 lbs Hip flexion (L2): R 2+/5; L 3-/5  Hip abduction: R 2+/5; L 2+/5           Hip ER: R 3-/5; L 3-/5  Hip IR: R 3-/5; L 4-/5  Knee Flexion: R 5/5; L 5/5            Knee extension (L3): R 3-/5; L 4/5            DF (L4): R 4+/5, L 5/5  Great Toe Ext (L5): R 4+/5, L 5/5  PF (S1): R 3-/5; L 5/5 (one rep done)      Palpation: severe loss of tissue mobility along entire spine     Observation/Posture: Scar from top of cervical spine to base of tailbone. Appears well healed.   Neuro Screen: Sensation: anterior thigh 50%, 5% medial/lateral ankles, 25% toes (bilateral), intact rest of lower extremity and upper extremity     Spinal AROM: (* denotes performed with pain)  Flexion: not tested due to precautions/recent surgery  Extension: not tested due to precautions/recent surgery  Sidebending: R not tested due to precautions/recent surgery; L not tested due to precautions/recent surgery  Rotation: R not tested due to precautions/recent surgery; L not tested due to precautions/recent surgery    Special Tests:  Get on/off treatment table: severe difficulty, gets back up with mod therapist assistence      Assessment: Significant fatigue with exercise progressions today. Improving with gait and functional mobility. Goals: (to be met in 20 visits)   Pt will improve FOTO score from 37/100 to 57/100 to display improved ability to get dressed (40% progress on 7/18/2022)  Pt will reduce pain at its worst from 10/10 to 7/10 to allow for improved ability to get in and out of bed (No progress, 7/18/2022)  Pt will hip extension strength from 3-/5 in side lying to 3+/5 to allow for improved ability to ambulate (Goal met on 7/18/2022)  Pt will be independent with home program to allow for maintenance of goals achieved in therapy. (95% progress on 7/13/2022)    New Goal on 7/18/2022:  Pt will improve right knee extension strength from 3-/5 to 3+/5 to improve ability to ambulate. FOTO: 45/100     Plan: Cont with recumbent bike and balance progressions. Nerve glides in arm and legs. Soft tissue massage to back paraspinals    Review ideal sleeping position for lumbar and cervical spine  -Light pec stretching (supine over towel roll)    Mini-squat    Future:  Resume mat ex's as tolerated.          Charges: 3 there ex,    Total Timed Treatment: 45 min  Total Treatment Time: 45 min  Date: 7/18/2022  Tx#: 5 Date: 7/20/2022  Tx#: 6 7/27/2022  Tx#: 7 8/2/22  8 8/4/22  9 8/12/2022         There ex:  Diaphragm breathing x25    Mini-wall slide x2    Isometric trunk extension standing, into wall x2    Tandem stance 30'    Single leg step forward and back in doorway x6    Lower abdominal dynamic one leg to 120 degrees hip flexion 15x    Lower abdominal dynamic hip external rotation bias in supine both legs x20    Lower abdominal supine arms to head level x20    Reviewed plan of care and precautions. Reviewed progress and posture, cues for upright sitting posture. Educated on home program, see below. Verbal, visual and tactile cues for there ex. There ex:  Standing shoulder strength 2lbs 20x2:   -external rotation in neutral abduction  -shoulder extension  -shoulder abduction 1/3 of the range  -Biceps curls    Isometric cervical strengthening sitting with patient hand: forward, backwards and to each side 10-20x2    Step forward with arm and opposite leg x10    Step backwards and back to neutral for balance x10    Step sideways and back to neutral for balance x10    Scar massage 5 minutes     Diaphragm breathing x20    Reviewed ideal posture    Educated on home program, see below. Verbal, visual and tactile cues for there ex. There ex:  Double leg calf raises hands for support x15    Standing hip abduction hands for support 15x2    Standing hip extension 15x2    Diaphragm breathing x20    Isometric cervical strengthening sitting with patient hand: forward, backwards and to each side x3 each    Bicep curls 5lbs x10    Posture education sitting    Educated on home program, see below. Verbal, visual and tactile cues for there ex.    There ex:  recumb bike 5' L1    Double leg calf raises hands for support x15    Standing hip abduction hands for support 15x2    Standing hip extension   15x2    Sitting toe raises 10x     sitting cross arm stretch R      NR  Wt shifts    Narrow KALEN eyes closed trials 20\"x3    Pt ed verbal Bed mobility review/log roll Ther Ex    recumb bike 5' L1    Mini squat on wall - 60\" hold    Double leg calf raises hands for support 2x10    Standing hip abd 10x2 each, hip ext 10x2 each    6\" forward step up 15x each with bilat UE assist    Slant board calf stretch 30\"x4    scap sets 10x    Cross arm stretch L/R      NR      Postural control  -staggered stance--> tandem trials    P bars lat walk 6 lengths    Pt ed review desensitization principles scalp There ex:  Arm bike, but only legs 6:30, L 1-2.5    Calf raise single leg x15    Tandem stance 60'    Single leg stance on blue airex pad    Scapular clock 5-20x hands on wall    Step up 9' x10    Gait with no device 30'    Curls and shoulder external rotation 5lbs x15    Sitting STM bilat UT and scar massage 6 minutes    Educated on home program, see below. Verbal, visual and tactile cues for there ex.          Manual 15 minutes:  Soft tissue massage to paraspinals    Manual 25 minutes:  Soft tissue massage to paraspinals    Scar massage Manual 15 minutes:  Sitting Soft tissue massage to paraspinals, bilat UT Manual 10'  sitting STM bilat UT

## 2022-08-12 NOTE — TELEPHONE ENCOUNTER
LOV 07/15/22    Last refill on 07/21/2022, for #90 tabs, with 0 refills  gabapentin 600 MG Oral Tab    Future Appointments   Date Time Provider Fitz Kay   8/18/2022  6:15 PM Tabatha Wilson, PT [facility] Megan Nikky   8/23/2022  4:45 PM Tabatha Wilson, PT [facility] Megan Nikky   8/25/2022  4:00 PM Tabatha Wilson, [address] [place] [address]   8/29/2022  3:15 PM Tabatha Wilson, PT [facility] Megan Nikky   9/1/2022 12:00 PM Tabatha Wilson, PT [facility] Megan Nikky   9/6/2022  2:15 PM Tabatha Wilson, PT [facility] Megan Nikky   9/8/2022  2:30 PM Tabatha Wilson, PT [facility] Megan Nikky     Order(s) pending, please review. Thank you.   Thea Diamond

## 2022-08-18 ENCOUNTER — OFFICE VISIT (OUTPATIENT)
Dept: PHYSICAL THERAPY | Age: 47
End: 2022-08-18
Attending: NEUROLOGICAL SURGERY
Payer: MEDICAID

## 2022-08-18 PROCEDURE — 97110 THERAPEUTIC EXERCISES: CPT

## 2022-08-18 NOTE — PROGRESS NOTES
Insurance (Authorized # of Visits):  11/12 visits expires 12/25/2022 [facility] [address] [facility],[address], 20 visits via plan of care. Diagnosis:   S/P spinal fusion (Z98.1)       Referring Provider: Aki Ray  Date of Evaluation:    6/28/2022    Precautions:  Per phone call to Dr. Aki Ray office on 6/29/2022: 10lbs lifting restriction and no bending or twisting of spine (cervical, thoracic and lumbar) Surgery was fusion of C2-S1. Per call 8/22: No quaudrup exercises, wall push ups are fine. No ROM Next MD visit:   Aug 23  Date of Surgery: n/a          Subjective:  Overall 7-10/10 pain at its worst. Doing home program. Pain is worst with sleeping, but occurs with walking at times as well. R knee felt like it was going to give out the other day, has not felt this since surgery. On April 28 had surgery. (CERVIAL 2 TO SACROILIAC 1/ILIUM POSTERIOR SPINAL FUSION WITH THORACIC 2 AND LUMBAR 4 PEDICLE SUBTRACTION OSTEOTOMY).        Objective:   8/18/2022:  Standing knee AROM:  Extension: +6 hyperextnesion L and 3 degrees R   Flexion: 92 L, 89 R    8/4/22  Amb with SBQC  Rhomberg eyes open 30 sec, eyes closed 30 sed with increased sway  6\" step up with UE assist    Tested 7/18/2022:  Flexibility:  Hip Flexor: R min loss loss*, L min loss (no pain on L, but not taken to end range)  Quads:  R min loss loss*, L min loss (no pain on L, but not taken to end range)    Strength:  Hip Extension: R 4+/5; L 4+/5 (tested in side lying)            Tested July 6, 2022:  Nerve length:  Straight leg raise test (feels tightness in hamstring, done supine with rolled hand towel for lumbar support and other leg in 60-90 hip and knee flexion): 150 degrees R, 158 degrees L    Upper extremity:  (feels tightness in hamstring, done supine with rolled hand towel for lumbar support and other leg in 60-90 hip and knee flexion)  Radial length: 80 degrees R, 100 L  Median length: 167 R, 160 L  Ulnar: 132 L, intact R        Tested 6/28/2022:  Flexibility:   UE/Scapular LE Upper Trap: R not tested due to precautions/recent surgery; L not tested due to precautions/recent surgery  Levator Scap: R not tested due to precautions/recent surgery; L not tested due to precautions/recent surgery  Pec Major: R min loss; L min loss  Scalenes: R not tested due to precautions/recent surgery, L not tested due to precautions/recent surgery  Hamstrings: R mod/severe loss; L mod/severe loss  Piriformis: R intact; L intact  Gastroc-soleus: R min loss; L min loss         Gait: pt ambulates on level ground with assistive device of rolling walker, antalgia, decreased step length bilateral legs, decreased stance phase bilateral legs, decreased hip/knee flex or ext bilateral legs at hips, decreased foot clearance bilateral legs and trendelenburg/waddle. (Uses AFO on R foot)  Balance: SLS R unable, L unable    Strength: (* denotes performed with pain)  UE/Scapular LE   Shoulder Flex: R 4-/5, L 5/5  Shoulder ABD (C5): R 4-/5, L 5/5  Biceps (C6): R 5/5, L 5/5  Wrist ext (C6): R 5/5, L 5/5  Triceps (C7): R 5/5, L 5/5  Wrist Flex (C7): R 5/5, L 5/5  Digit Flex (C8): R 4-/5, L 5/5  Thumb Ext (C8): R 4-/5, L 5/5  Interossei (T1): R 4/5, L 4-/5     Rhomboids: R 5/5, L 5/5 (tested in sitting due to recent surgery)  Mid trap: R 4/5; L 4/5 (tested in sitting due to recent surgery)  Lats: R 4-/5, L 4-/5 (tested in sitting due to recent surgery)  Low trap: R 3-/5; L 3-/5 (tested in sitting due to recent surgery)      Strength: R 17 lbs; L 41 lbs Hip flexion (L2): R 2+/5; L 3-/5  Hip abduction: R 2+/5; L 2+/5           Hip ER: R 3-/5; L 3-/5  Hip IR: R 3-/5; L 4-/5  Knee Flexion: R 5/5; L 5/5            Knee extension (L3): R 3-/5; L 4/5            DF (L4): R 4+/5, L 5/5  Great Toe Ext (L5): R 4+/5, L 5/5  PF (S1): R 3-/5; L 5/5 (one rep done)      Palpation: severe loss of tissue mobility along entire spine     Observation/Posture: Scar from top of cervical spine to base of tailbone. Appears well healed.   Neuro Screen: Sensation: anterior thigh 50%, 5% medial/lateral ankles, 25% toes (bilateral), intact rest of lower extremity and upper extremity     Spinal AROM: (* denotes performed with pain)  Flexion: not tested due to precautions/recent surgery  Extension: not tested due to precautions/recent surgery  Sidebending: R not tested due to precautions/recent surgery; L not tested due to precautions/recent surgery  Rotation: R not tested due to precautions/recent surgery; L not tested due to precautions/recent surgery    Special Tests:  Get on/off treatment table: severe difficulty, gets back up with mod therapist assistence      Assessment: Progressed home program for nerve glides of arms. Knee extension sitting repeated with heel on ground, improves knee ROM slightly, this added to home program which may reduce sensation of knee giving way on R knee. Goals: (to be met in 20 visits)   Pt will improve FOTO score from 37/100 to 57/100 to display improved ability to get dressed (40% progress on 7/18/2022)  Pt will reduce pain at its worst from 10/10 to 7/10 to allow for improved ability to get in and out of bed (No progress, 7/18/2022)  Pt will hip extension strength from 3-/5 in side lying to 3+/5 to allow for improved ability to ambulate (Goal met on 7/18/2022)  Pt will be independent with home program to allow for maintenance of goals achieved in therapy. (95% progress on 7/13/2022)    New Goal on 7/18/2022:  Pt will improve right knee extension strength from 3-/5 to 3+/5 to improve ability to ambulate. FOTO: 45/100     Plan: Progress note next visit. Cont with recumbent bike and balance progressions. Soft tissue massage to back paraspinals    Review ideal sleeping position for lumbar and cervical spine  -Light pec stretching (supine over towel roll)    Mini-squat    Future:  Resume mat ex's as tolerated.          Charges: 3 there ex,    Total Timed Treatment: 45 min  Total Treatment Time: 45 min  8/2/22  8 8/4/22  9 8/12/2022  10 8/18/2022  11      There ex:  recumb bike 5' L1    Double leg calf raises hands for support x15    Standing hip abduction hands for support 15x2    Standing hip extension   15x2    Sitting toe raises 10x     sitting cross arm stretch R      NR  Wt shifts    Narrow KALEN eyes closed trials 20\"x3    Pt ed verbal Bed mobility review/log roll Ther Ex    recumb bike 5' L1    Mini squat on wall - 60\" hold    Double leg calf raises hands for support 2x10    Standing hip abd 10x2 each, hip ext 10x2 each    6\" forward step up 15x each with bilat UE assist    Slant board calf stretch 30\"x4    scap sets 10x    Cross arm stretch L/R      NR      Postural control  -staggered stance--> tandem trials    P bars lat walk 6 lengths    Pt ed review desensitization principles scalp There ex:  Arm bike, but only legs 6:30, L 1-2.5    Calf raise single leg x15    Tandem stance 60'    Single leg stance on blue airex pad    Scapular clock 5-20x hands on wall    Step up 9' x10    Gait with no device 30'    Curls and shoulder external rotation 5lbs x15    Sitting STM bilat UT and scar massage 6 minutes    Educated on home program, see below. Verbal, visual and tactile cues for there ex. There ex:  Nerve glides UE: radial, median and ulnar (all with neutral spine position) 10x2    Touch thighs for desensitization 5-15x2 (told rub legs harder)     Recumbent bike L1, 8 minutes    Tandem stance 60'    Single leg stance on blue airex pad    Scapular clock 20x hands on wall (forward, backwards, up and down)    Wall slides 25x    Knee extension sitting 5-10x3: better knee extension ROM. Soft tissue massage to upper traps and scar massage 5 minutes    Educated on home program, see below. Verbal, visual and tactile cues for there ex.         Manual 15 minutes:  Sitting Soft tissue massage to paraspinals, bilat UT Manual 10'  sitting STM bilat UT

## 2022-08-23 ENCOUNTER — APPOINTMENT (OUTPATIENT)
Dept: PHYSICAL THERAPY | Age: 47
End: 2022-08-23
Attending: NEUROLOGICAL SURGERY
Payer: MEDICAID

## 2022-08-25 ENCOUNTER — TELEPHONE (OUTPATIENT)
Dept: PHYSICAL THERAPY | Age: 47
End: 2022-08-25

## 2022-08-25 ENCOUNTER — PATIENT MESSAGE (OUTPATIENT)
Dept: FAMILY MEDICINE CLINIC | Facility: CLINIC | Age: 47
End: 2022-08-25

## 2022-08-25 ENCOUNTER — APPOINTMENT (OUTPATIENT)
Dept: PHYSICAL THERAPY | Age: 47
End: 2022-08-25
Attending: NEUROLOGICAL SURGERY
Payer: MEDICAID

## 2022-08-25 DIAGNOSIS — Z98.1 S/P SPINAL FUSION: ICD-10-CM

## 2022-08-25 RX ORDER — NICOTINE POLACRILEX 4 MG/1
20 GUM, CHEWING ORAL DAILY
Qty: 30 TABLET | Refills: 0 | Status: SHIPPED | OUTPATIENT
Start: 2022-08-25

## 2022-08-25 NOTE — TELEPHONE ENCOUNTER
No show. Called to follow up/confirm next visit. Zabrina Hernandez this morning, no injury to back. Hurt shoulder and skinned knees.

## 2022-08-25 NOTE — TELEPHONE ENCOUNTER
From: Vickie Bermudez  To: Georgie Mendes MD  Sent: 8/25/2022 2:44 PM CDT  Subject: Meds    Can I get a script for Omeprazole.  The 20mg tabs plz

## 2022-08-26 ENCOUNTER — TELEPHONE (OUTPATIENT)
Dept: FAMILY MEDICINE CLINIC | Facility: CLINIC | Age: 47
End: 2022-08-26

## 2022-08-26 RX ORDER — DIAZEPAM 5 MG/1
5 TABLET ORAL EVERY 12 HOURS PRN
Qty: 28 TABLET | Refills: 0 | Status: SHIPPED | OUTPATIENT
Start: 2022-08-26 | End: 2022-09-12 | Stop reason: SDUPTHER

## 2022-08-26 RX ORDER — TRAMADOL HYDROCHLORIDE 50 MG/1
50 TABLET ORAL EVERY 8 HOURS PRN
Qty: 42 TABLET | Refills: 0 | Status: SHIPPED | OUTPATIENT
Start: 2022-08-26 | End: 2022-09-12 | Stop reason: SDUPTHER

## 2022-08-29 ENCOUNTER — APPOINTMENT (OUTPATIENT)
Dept: PHYSICAL THERAPY | Age: 47
End: 2022-08-29
Attending: NEUROLOGICAL SURGERY
Payer: MEDICAID

## 2022-09-01 ENCOUNTER — APPOINTMENT (OUTPATIENT)
Dept: PHYSICAL THERAPY | Age: 47
End: 2022-09-01
Attending: NEUROLOGICAL SURGERY
Payer: MEDICAID

## 2022-09-01 ENCOUNTER — TELEPHONE (OUTPATIENT)
Dept: PHYSICAL THERAPY | Facility: HOSPITAL | Age: 47
End: 2022-09-01

## 2022-09-06 ENCOUNTER — PATIENT MESSAGE (OUTPATIENT)
Dept: FAMILY MEDICINE CLINIC | Facility: CLINIC | Age: 47
End: 2022-09-06

## 2022-09-06 ENCOUNTER — APPOINTMENT (OUTPATIENT)
Dept: PHYSICAL THERAPY | Age: 47
End: 2022-09-06
Attending: NEUROLOGICAL SURGERY
Payer: MEDICAID

## 2022-09-06 DIAGNOSIS — R92.8 ABNORMAL MAMMOGRAM: Primary | ICD-10-CM

## 2022-09-06 NOTE — TELEPHONE ENCOUNTER
Trang pended -please advise  US ordered on 6/2/22 so order should still be good to use, expires 6/2/23

## 2022-09-06 NOTE — TELEPHONE ENCOUNTER
From: Yasmani Cope  To: Georgie Brooks MD  Sent: 9/6/2022 11:43 AM CDT  Subject: Mammogram     I need a new order for mammogram and ultrasound of right breast

## 2022-09-08 ENCOUNTER — APPOINTMENT (OUTPATIENT)
Dept: PHYSICAL THERAPY | Age: 47
End: 2022-09-08
Attending: NEUROLOGICAL SURGERY
Payer: MEDICAID

## 2022-09-12 ENCOUNTER — PATIENT MESSAGE (OUTPATIENT)
Dept: FAMILY MEDICINE CLINIC | Facility: CLINIC | Age: 47
End: 2022-09-12

## 2022-09-12 DIAGNOSIS — Z98.1 S/P SPINAL FUSION: ICD-10-CM

## 2022-09-12 NOTE — TELEPHONE ENCOUNTER
From: Veince Estrada  To: Georgie Lopez MD  Sent: 9/12/2022 8:34 AM CDT  Subject: Meds    I need a refill on trazadone 150 mg also  Celibrex 200 2x daily

## 2022-09-12 NOTE — TELEPHONE ENCOUNTER
LOV 07/15/2022    Last refill on 08/10/2022, for #90 tabs, with 0 refills  traZODone 50 MG Oral Tab     Last refill on (Historical), for #?, with ? refills  celecoxib 200 MG Oral Cap     No future appointments. Order(s) pending, please review. Thank you.

## 2022-09-13 ENCOUNTER — OFFICE VISIT (OUTPATIENT)
Dept: NEUROSURGERY | Age: 47
End: 2022-09-13

## 2022-09-13 ENCOUNTER — IMAGING SERVICES (OUTPATIENT)
Dept: GENERAL RADIOLOGY | Age: 47
End: 2022-09-13

## 2022-09-13 VITALS
DIASTOLIC BLOOD PRESSURE: 82 MMHG | HEIGHT: 65 IN | HEART RATE: 60 BPM | RESPIRATION RATE: 18 BRPM | SYSTOLIC BLOOD PRESSURE: 124 MMHG | TEMPERATURE: 97.5 F | WEIGHT: 233 LBS | BODY MASS INDEX: 38.82 KG/M2

## 2022-09-13 DIAGNOSIS — Z98.1 S/P SPINAL FUSION: ICD-10-CM

## 2022-09-13 DIAGNOSIS — M21.70 LEG LENGTH DISCREPANCY: Primary | ICD-10-CM

## 2022-09-13 PROCEDURE — 72082 X-RAY EXAM ENTIRE SPI 2/3 VW: CPT | Performed by: NEUROLOGICAL SURGERY

## 2022-09-13 PROCEDURE — 99212 OFFICE O/P EST SF 10 MIN: CPT | Performed by: NEUROLOGICAL SURGERY

## 2022-09-13 PROCEDURE — 77073 BONE LENGTH STUDIES: CPT | Performed by: NEUROLOGICAL SURGERY

## 2022-09-13 RX ORDER — TRAMADOL HYDROCHLORIDE 50 MG/1
50 TABLET ORAL 2 TIMES DAILY PRN
Qty: 28 TABLET | Refills: 0 | Status: SHIPPED | OUTPATIENT
Start: 2022-09-13

## 2022-09-13 RX ORDER — DIAZEPAM 5 MG/1
5 TABLET ORAL 2 TIMES DAILY PRN
Qty: 28 TABLET | Refills: 0 | Status: SHIPPED | OUTPATIENT
Start: 2022-09-13

## 2022-09-14 RX ORDER — CELECOXIB 200 MG/1
200 CAPSULE ORAL DAILY
Qty: 90 CAPSULE | Refills: 0 | Status: SHIPPED | OUTPATIENT
Start: 2022-09-14 | End: 2022-12-13

## 2022-09-14 RX ORDER — TRAZODONE HYDROCHLORIDE 50 MG/1
150 TABLET ORAL NIGHTLY
Qty: 90 TABLET | Refills: 0 | Status: SHIPPED | OUTPATIENT
Start: 2022-09-14 | End: 2022-10-14

## 2022-09-15 ENCOUNTER — APPOINTMENT (OUTPATIENT)
Dept: PHYSICAL THERAPY | Age: 47
End: 2022-09-15
Attending: NEUROLOGICAL SURGERY
Payer: MEDICAID

## 2022-09-21 RX ORDER — GABAPENTIN 600 MG/1
600 TABLET ORAL 3 TIMES DAILY
Qty: 90 TABLET | Refills: 0 | Status: SHIPPED | OUTPATIENT
Start: 2022-09-21

## 2022-09-30 RX ORDER — NICOTINE POLACRILEX 4 MG/1
20 GUM, CHEWING ORAL DAILY
Qty: 30 TABLET | Refills: 3 | Status: SHIPPED | OUTPATIENT
Start: 2022-09-30

## 2022-09-30 RX ORDER — DIAZEPAM 5 MG/1
5 TABLET ORAL EVERY 12 HOURS PRN
Qty: 30 TABLET | Refills: 0 | Status: SHIPPED | OUTPATIENT
Start: 2022-09-30

## 2022-10-03 ENCOUNTER — HOSPITAL ENCOUNTER (OUTPATIENT)
Dept: ULTRASOUND IMAGING | Age: 47
Discharge: HOME | End: 2022-10-03
Attending: FAMILY MEDICINE
Payer: MEDICAID

## 2022-10-03 ENCOUNTER — HOSPITAL ENCOUNTER (OUTPATIENT)
Dept: MAMMOGRAPHY | Age: 47
Discharge: HOME | End: 2022-10-03
Attending: FAMILY MEDICINE
Payer: MEDICAID

## 2022-10-03 DIAGNOSIS — R92.8 ABNORMAL MAMMOGRAM: ICD-10-CM

## 2022-10-03 PROCEDURE — 76642 ULTRASOUND BREAST LIMITED: CPT | Performed by: FAMILY MEDICINE

## 2022-10-03 PROCEDURE — 77066 DX MAMMO INCL CAD BI: CPT | Performed by: FAMILY MEDICINE

## 2022-10-03 PROCEDURE — 77062 BREAST TOMOSYNTHESIS BI: CPT | Performed by: FAMILY MEDICINE

## 2022-10-04 ENCOUNTER — TELEPHONE (OUTPATIENT)
Dept: FAMILY MEDICINE CLINIC | Facility: CLINIC | Age: 47
End: 2022-10-04

## 2022-10-05 ENCOUNTER — PATIENT MESSAGE (OUTPATIENT)
Dept: FAMILY MEDICINE CLINIC | Facility: CLINIC | Age: 47
End: 2022-10-05

## 2022-10-06 RX ORDER — PREGABALIN 50 MG/1
50 CAPSULE ORAL 2 TIMES DAILY
Qty: 180 CAPSULE | Refills: 0 | Status: SHIPPED | OUTPATIENT
Start: 2022-10-06 | End: 2023-01-04

## 2022-10-06 NOTE — TELEPHONE ENCOUNTER
LOV 07/15/2022    Last refill on (Historical), for #?, with ? refills  pregabalin 100 MG Oral Cap  Pt reports 50 MG BID    Future Appointments   Date Time Provider Fitz Villanueva   10/11/2022  9:30 AM [address] [facility] BREAST [address] [facility]     Order(s) pending, please review. Thank you.   Davy Cervantes

## 2022-10-10 RX ORDER — TRAZODONE HYDROCHLORIDE 50 MG/1
150 TABLET ORAL NIGHTLY
Qty: 90 TABLET | Refills: 0 | Status: SHIPPED | OUTPATIENT
Start: 2022-10-10 | End: 2022-11-09

## 2022-10-10 NOTE — TELEPHONE ENCOUNTER
LOV 07/15/2022    Last refill on 09/14/2022, for #90 tabs, with 0 refills  traZODone 50 MG Oral Tab    Future Appointments   Date Time Provider Fitz Villanueva   10/11/2022  9:30 AM [address] [facility] [address] [facility]     Order(s) pending, please review. Thank you.

## 2022-10-11 ENCOUNTER — HOSPITAL ENCOUNTER (OUTPATIENT)
Dept: MAMMOGRAPHY | Facility: HOSPITAL | Age: 47
Discharge: HOME | End: 2022-10-11
Attending: FAMILY MEDICINE
Payer: MEDICAID

## 2022-10-11 DIAGNOSIS — N63.0 BREAST NODULE: ICD-10-CM

## 2022-10-11 PROCEDURE — 88305 TISSUE EXAM BY PATHOLOGIST: CPT | Performed by: FAMILY MEDICINE

## 2022-10-11 PROCEDURE — 77065 DX MAMMO INCL CAD UNI: CPT | Performed by: FAMILY MEDICINE

## 2022-10-11 PROCEDURE — 19083 BX BREAST 1ST LESION US IMAG: CPT | Performed by: FAMILY MEDICINE

## 2022-10-12 ENCOUNTER — TELEPHONE (OUTPATIENT)
Dept: MAMMOGRAPHY | Facility: HOSPITAL | Age: 47
End: 2022-10-12

## 2022-10-12 NOTE — TELEPHONE ENCOUNTER
Telephoned [facility] and name,  verified with patient. Notified [facility] of right breast negative for malignancy but positive for papilloma biopsy result. Concordance verified by radiologist, Dr. Eliza Mcdonald. [facility] reports biopsy site is healing well. Radiologist recommends surgical consultation. Dr Mead's office is referring patient to Dr Jennifer Ahmadi. Patient stated that she was supposed to see Dr Jennifer Ahmadi after the last breast biopsy and that she still has the contact information and will call for an appt. . Pt verbalized understanding and had no further questions at this time.

## 2022-10-13 ENCOUNTER — TELEPHONE (OUTPATIENT)
Dept: FAMILY MEDICINE CLINIC | Facility: CLINIC | Age: 47
End: 2022-10-13

## 2022-10-13 DIAGNOSIS — D24.1 INTRADUCTAL PAPILLOMA OF BREAST, RIGHT: ICD-10-CM

## 2022-10-13 DIAGNOSIS — R92.8 ABNORMAL MAMMOGRAM: Primary | ICD-10-CM

## 2022-10-13 RX ORDER — CLINDAMYCIN HYDROCHLORIDE 300 MG/1
CAPSULE ORAL
Qty: 10 CAPSULE | Refills: 0 | Status: SHIPPED | OUTPATIENT
Start: 2022-10-13 | End: 2022-11-03

## 2022-10-13 NOTE — TELEPHONE ENCOUNTER
[facility]    Pt scheduled for - extractions, gum surgery, implant surgery  Bleeding - significant  Stress - high     - does pt need any pre meds before extraction?  - any other precaution?     Dr. Jennifer Arechiga to review and sign    LOV 07/15/2022 with Dr. Jennifer Arechiga  Please advise, thank you

## 2022-10-13 NOTE — TELEPHONE ENCOUNTER
Clearance provided. Rx 600 mg 1-2 hours prior to surgery. Rx 10 capsules provided for future procedures.

## 2022-10-13 NOTE — TELEPHONE ENCOUNTER
----- Message from Georgie Stephens MD sent at 10/13/2022  5:02 PM CDT -----  Please refer her to Dr Carolina Mazariegos. Will need surgical consultation.

## 2022-10-18 RX ORDER — DIAZEPAM 5 MG/1
5 TABLET ORAL EVERY 12 HOURS PRN
Qty: 30 TABLET | Refills: 0 | Status: SHIPPED | OUTPATIENT
Start: 2022-10-18

## 2022-10-18 RX ORDER — GABAPENTIN 600 MG/1
600 TABLET ORAL 3 TIMES DAILY
Qty: 90 TABLET | Refills: 0 | Status: SHIPPED | OUTPATIENT
Start: 2022-10-18

## 2022-10-18 NOTE — TELEPHONE ENCOUNTER
Gabapentin last refilled 9/21/22 for #90 with 0 RF  Diazepam last refilled 9/30/22 for #30 with 0 RF  LOV with MM 7/15/22  No future appt with MM  Protocol: none for both medications  Routed to CP to advise

## 2022-10-19 ENCOUNTER — TELEPHONE (OUTPATIENT)
Dept: MAMMOGRAPHY | Facility: HOSPITAL | Age: 47
End: 2022-10-19

## 2022-10-19 NOTE — TELEPHONE ENCOUNTER
Patient called S/P right breast biopsy on 10-11-22. Patient c/o a large grape sized lump to right breast with bruising and pain. Hematoma education provided. Patient instructed to watch the area and report any worsening or new developments. Patient also instructed that she can apply warmth to the breast, take Advil for the pain, and gently massage the area. Patient verbalized understanding and will call with any worsening or new issues.

## 2022-11-02 RX ORDER — TRAZODONE HYDROCHLORIDE 50 MG/1
150 TABLET ORAL NIGHTLY
Qty: 90 TABLET | Refills: 0 | Status: SHIPPED | OUTPATIENT
Start: 2022-11-02 | End: 2022-12-02

## 2022-11-02 RX ORDER — DIAZEPAM 5 MG/1
5 TABLET ORAL EVERY 12 HOURS PRN
Qty: 30 TABLET | Refills: 0 | Status: SHIPPED | OUTPATIENT
Start: 2022-11-02

## 2022-11-02 NOTE — TELEPHONE ENCOUNTER
LOV: 7/15/22   Last Refill: 10/10/22 #90 0 RF    Future Appointments   Date Time Provider Fitz Kay   11/15/2022  8:00 AM Ayana Dobbins MD [facility] Surg/Onc

## 2022-11-04 RX ORDER — CLINDAMYCIN HYDROCHLORIDE 300 MG/1
CAPSULE ORAL
Qty: 10 CAPSULE | Refills: 0 | Status: SHIPPED | OUTPATIENT
Start: 2022-11-04 | End: 2022-11-25

## 2022-11-07 ENCOUNTER — TELEPHONE (OUTPATIENT)
Dept: NEUROSURGERY | Age: 47
End: 2022-11-07

## 2022-11-15 ENCOUNTER — TELEPHONE (OUTPATIENT)
Dept: NEUROSURGERY | Age: 47
End: 2022-11-15

## 2022-11-21 ENCOUNTER — TELEPHONE (OUTPATIENT)
Dept: NEUROSURGERY | Age: 47
End: 2022-11-21

## 2022-11-21 RX ORDER — GABAPENTIN 600 MG/1
600 TABLET ORAL 3 TIMES DAILY
Qty: 90 TABLET | Refills: 0 | Status: SHIPPED | OUTPATIENT
Start: 2022-11-21

## 2022-11-22 ENCOUNTER — TELEPHONE (OUTPATIENT)
Dept: NEUROSURGERY | Age: 47
End: 2022-11-22

## 2022-11-22 ENCOUNTER — IMAGING SERVICES (OUTPATIENT)
Dept: GENERAL RADIOLOGY | Age: 47
End: 2022-11-22
Attending: NEUROLOGICAL SURGERY

## 2022-11-22 ENCOUNTER — OFFICE VISIT (OUTPATIENT)
Dept: NEUROSURGERY | Age: 47
End: 2022-11-22

## 2022-11-22 ENCOUNTER — IMAGING SERVICES (OUTPATIENT)
Dept: GENERAL RADIOLOGY | Age: 47
End: 2022-11-22

## 2022-11-22 VITALS
HEIGHT: 65 IN | WEIGHT: 233 LBS | RESPIRATION RATE: 18 BRPM | TEMPERATURE: 97.7 F | SYSTOLIC BLOOD PRESSURE: 120 MMHG | HEART RATE: 69 BPM | BODY MASS INDEX: 38.82 KG/M2 | DIASTOLIC BLOOD PRESSURE: 80 MMHG

## 2022-11-22 DIAGNOSIS — G89.29 CHRONIC PAIN OF RIGHT KNEE: ICD-10-CM

## 2022-11-22 DIAGNOSIS — M25.561 CHRONIC PAIN OF RIGHT KNEE: ICD-10-CM

## 2022-11-22 DIAGNOSIS — G56.21 ULNAR NEUROPATHY OF RIGHT UPPER EXTREMITY: Primary | ICD-10-CM

## 2022-11-22 DIAGNOSIS — M25.551 RIGHT HIP PAIN: Primary | ICD-10-CM

## 2022-11-22 DIAGNOSIS — M25.551 RIGHT HIP PAIN: ICD-10-CM

## 2022-11-22 DIAGNOSIS — Z98.1 S/P SPINAL FUSION: ICD-10-CM

## 2022-11-22 PROCEDURE — 73564 X-RAY EXAM KNEE 4 OR MORE: CPT | Performed by: NEUROLOGICAL SURGERY

## 2022-11-22 PROCEDURE — 72082 X-RAY EXAM ENTIRE SPI 2/3 VW: CPT | Performed by: NEUROLOGICAL SURGERY

## 2022-11-22 PROCEDURE — 73502 X-RAY EXAM HIP UNI 2-3 VIEWS: CPT | Performed by: NEUROLOGICAL SURGERY

## 2022-11-22 PROCEDURE — 99213 OFFICE O/P EST LOW 20 MIN: CPT | Performed by: NEUROLOGICAL SURGERY

## 2022-11-22 PROCEDURE — 77073 BONE LENGTH STUDIES: CPT | Performed by: NEUROLOGICAL SURGERY

## 2022-11-25 ENCOUNTER — TELEPHONE (OUTPATIENT)
Dept: FAMILY MEDICINE CLINIC | Facility: CLINIC | Age: 47
End: 2022-11-25

## 2022-11-25 NOTE — TELEPHONE ENCOUNTER
Telemedicine visit 02/02/2022 - addressed \"Neurogenic bladder\" - visit notes printed     Dr. Kori Fernandez to review and sign form    Need to fax visit note and signed form to #[phone]

## 2022-12-06 RX ORDER — DIAZEPAM 5 MG/1
5 TABLET ORAL EVERY 12 HOURS PRN
Qty: 30 TABLET | Refills: 0 | Status: SHIPPED | OUTPATIENT
Start: 2022-12-06

## 2022-12-06 RX ORDER — PREGABALIN 50 MG/1
50 CAPSULE ORAL 2 TIMES DAILY
Qty: 180 CAPSULE | Refills: 0 | Status: SHIPPED | OUTPATIENT
Start: 2022-12-06 | End: 2023-03-06

## 2022-12-06 RX ORDER — CELECOXIB 200 MG/1
200 CAPSULE ORAL DAILY
Qty: 90 CAPSULE | Refills: 0 | Status: SHIPPED | OUTPATIENT
Start: 2022-12-06 | End: 2023-03-06

## 2022-12-06 NOTE — TELEPHONE ENCOUNTER
Last OV 7/15/22  Last refilled:  9/14/22 celecoxib #90  0 refill  10/6/22 pregabalin  #180  0 refill  11/2/22  Diazepam #30  0 refill

## 2022-12-07 ENCOUNTER — PATIENT MESSAGE (OUTPATIENT)
Dept: FAMILY MEDICINE CLINIC | Facility: CLINIC | Age: 47
End: 2022-12-07

## 2022-12-07 RX ORDER — TRAZODONE HYDROCHLORIDE 50 MG/1
150 TABLET ORAL NIGHTLY
Qty: 90 TABLET | Refills: 0 | OUTPATIENT
Start: 2022-12-07 | End: 2023-01-06

## 2022-12-07 RX ORDER — TRAZODONE HYDROCHLORIDE 50 MG/1
150 TABLET ORAL NIGHTLY
Qty: 270 TABLET | Refills: 0 | Status: SHIPPED | OUTPATIENT
Start: 2022-12-07 | End: 2023-03-07

## 2022-12-07 NOTE — TELEPHONE ENCOUNTER
Please see Patient message encounter 12/07/22    Sent to pharmacy as: traZODone HCl 50 MG Oral Tablet (Desyrel)    E-Prescribing Status: Receipt confirmed by pharmacy (12/7/2022 10:14 AM CST)        Refused - deuplicate

## 2022-12-07 NOTE — TELEPHONE ENCOUNTER
From: Ival Postal  To: Mydhili Claudetta Newton, MD  Sent: 12/7/2022 9:47 AM CST  Subject: Meds    I asked for my trazadone to be refilled and all of a sudden it's not in my meds list and the pharmacy says there's no refills.   So can you please fill the 150mg or 3 50mg my tabs nightly

## 2022-12-13 RX ORDER — GABAPENTIN 600 MG/1
600 TABLET ORAL 3 TIMES DAILY
Qty: 90 TABLET | Refills: 0 | Status: SHIPPED | OUTPATIENT
Start: 2022-12-13 | End: 2022-12-15

## 2022-12-13 NOTE — TELEPHONE ENCOUNTER
Last OV:07/15/2022  Last refill:11/21/2022, 90 tabs    Medication pended, please sign if appropriate    Patient is requesting 3 month supply   Script is in there for one month only right now.

## 2022-12-15 ENCOUNTER — OFFICE VISIT (OUTPATIENT)
Dept: FAMILY MEDICINE CLINIC | Facility: CLINIC | Age: 47
End: 2022-12-15
Payer: MEDICAID

## 2022-12-15 VITALS
HEIGHT: 63 IN | BODY MASS INDEX: 33.95 KG/M2 | SYSTOLIC BLOOD PRESSURE: 122 MMHG | HEART RATE: 76 BPM | DIASTOLIC BLOOD PRESSURE: 76 MMHG | WEIGHT: 191.63 LBS | OXYGEN SATURATION: 98 % | TEMPERATURE: 98 F

## 2022-12-15 DIAGNOSIS — E66.9 OBESITY (BMI 30.0-34.9): ICD-10-CM

## 2022-12-15 DIAGNOSIS — Z78.9 WEIGHT LOSS ADVISED: ICD-10-CM

## 2022-12-15 DIAGNOSIS — N39.490 URINARY INCONTINENCE, OVERFLOW: Primary | ICD-10-CM

## 2022-12-15 DIAGNOSIS — Z23 NEED FOR VACCINATION: ICD-10-CM

## 2022-12-15 DIAGNOSIS — Z76.0 ENCOUNTER FOR MEDICATION REFILL: ICD-10-CM

## 2022-12-15 PROCEDURE — 3074F SYST BP LT 130 MM HG: CPT | Performed by: FAMILY MEDICINE

## 2022-12-15 PROCEDURE — 90686 IIV4 VACC NO PRSV 0.5 ML IM: CPT | Performed by: FAMILY MEDICINE

## 2022-12-15 PROCEDURE — 3008F BODY MASS INDEX DOCD: CPT | Performed by: FAMILY MEDICINE

## 2022-12-15 PROCEDURE — 90471 IMMUNIZATION ADMIN: CPT | Performed by: FAMILY MEDICINE

## 2022-12-15 PROCEDURE — 99213 OFFICE O/P EST LOW 20 MIN: CPT | Performed by: FAMILY MEDICINE

## 2022-12-15 PROCEDURE — 3078F DIAST BP <80 MM HG: CPT | Performed by: FAMILY MEDICINE

## 2022-12-15 RX ORDER — DIAZEPAM 5 MG/1
2.5 TABLET ORAL EVERY 12 HOURS PRN
Qty: 30 TABLET | Refills: 0 | COMMUNITY
Start: 2022-12-15

## 2022-12-15 RX ORDER — GABAPENTIN 600 MG/1
600 TABLET ORAL 3 TIMES DAILY
Qty: 270 TABLET | Refills: 1 | Status: SHIPPED | OUTPATIENT
Start: 2022-12-15 | End: 2023-06-13

## 2022-12-15 RX ORDER — NICOTINE POLACRILEX 4 MG/1
40 GUM, CHEWING ORAL DAILY
Qty: 180 TABLET | Refills: 1 | Status: SHIPPED | OUTPATIENT
Start: 2022-12-15 | End: 2023-03-15

## 2022-12-15 RX ORDER — MELOXICAM 7.5 MG/1
TABLET ORAL
COMMUNITY
Start: 2022-12-09 | End: 2022-12-15

## 2022-12-19 ENCOUNTER — OFFICE VISIT (OUTPATIENT)
Dept: SURGERY | Facility: CLINIC | Age: 47
End: 2022-12-19
Payer: MEDICAID

## 2022-12-19 VITALS
HEIGHT: 63 IN | WEIGHT: 191 LBS | TEMPERATURE: 97 F | BODY MASS INDEX: 33.84 KG/M2 | DIASTOLIC BLOOD PRESSURE: 68 MMHG | RESPIRATION RATE: 18 BRPM | OXYGEN SATURATION: 98 % | SYSTOLIC BLOOD PRESSURE: 110 MMHG | HEART RATE: 68 BPM

## 2022-12-19 DIAGNOSIS — D24.1 INTRADUCTAL PAPILLOMA OF BREAST, RIGHT: Primary | ICD-10-CM

## 2022-12-19 NOTE — PATIENT INSTRUCTIONS
Dr. Alfonso Craft  Tel: [phone]  Fax: [address] 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 Radiology Instructions: 1st site: Right breast, 0430 o'clock position, pellet shaped clip, biopsy confirms intraductal papilloma. 2nd site: Right breast, 0700 o'clock position, butterfly shaped clip, biopsy confirms fragments of major duct intraductal papilloma.    _______________________________________________________________________________    Someone must accompany you the day of the procedure to drive you home safely, because of anesthesia. You will need an adult  to stay with you the first night following your surgery. You must remove any kind of makeup, acrylic nails, lotions, powders, creams or deodorant. [place] ONLY: Pre-admission will give instruct you on when to take Gatorade and Tylenol/acetaminophen prior to your surgery, purchase 2 - 12oz bottles of regular Gatorade (NOT RED/SUGAR FREE). Otherwise, you may not eat or drink anything else after 11PM the night before surgery. [place] ONLY: You may not eat or drink anything after midnight the day of your surgery. Wear comfortable clothing that can be easily removed. If you wear dentures, contacts lenses, or any prosthesis, you will be asked to remove them. Do not drink alcohol or smoke 24 hours prior to your procedure. Bring a picture ID and your insurance card. GENERAL ANESTHESIA ONLY: You will be contacted by the hospital for Pre-Admission Covid-19 testing (regardless of vaccination status) to be scheduled as an appointment prior to surgery.  They will call closer to the surgery date to set this up, because the earliest this can be done is 72 hours prior to surgery. The Pre-Admission Testing Department will call the day before to confirm your procedure, give you the time you need to arrive by and directions on where to go. They begin making calls after 2pm, if you are not contacted by 4pm, please call the surgeon's office listed above. Do not take any blood thinners at least one week prior to the procedure/surgery. This includes aspirin, baby aspirin, Ibuprofen products, herbal supplements, diet medications, vitamin E, fish oil and green tea supplements. Please check other supplements for these ingredients. *TYLENOL or acetaminophen is acceptable*  If you take Coumadin, Plavix, Xarelto, or Eliquis, please contact your prescribing physician for special instructions on how long to hold. If you take insulin contact your primary care physician for special instructions. Our surgery scheduler, Aníbal Segovia, will be contacting you to discuss surgery dates. If you have any questions related to scheduling your surgery, please reach out to her at [phone].  _____________________________________________________________________  PRE-OPERATIVE TESTING IF INDICATED BELOW  PLEASE COMPLETE ASAP (AT LEAST 14-21 DAYS PRIOR TO SURGERY)  [] CBC [x] BMP [] CMP [x] EKG    [] PT, PTT, INR [] Cardiac Clearance  [x] H&P Medical Clearance [] Chest X-ray     Please call Central Scheduling to schedule an appointment for pre-operative labs/tests @ [phone]    Does the patient have a pacemaker or ICD?      [] Yes   [x] No

## 2022-12-20 ENCOUNTER — TELEPHONE (OUTPATIENT)
Dept: SURGERY | Facility: CLINIC | Age: 47
End: 2022-12-20

## 2022-12-20 ENCOUNTER — TELEPHONE (OUTPATIENT)
Dept: FAMILY MEDICINE CLINIC | Facility: CLINIC | Age: 47
End: 2022-12-20

## 2022-12-20 DIAGNOSIS — D24.1 INTRADUCTAL PAPILLOMA OF BREAST, RIGHT: Primary | ICD-10-CM

## 2022-12-20 NOTE — TELEPHONE ENCOUNTER
PATIENT SCHEDULED FOR HER PRE OP WITH DR LOERA ON January 5, 2023.  RIGHT BREAST BIOPSY SCHEDULED FOR January 27, 2023 Simin Wright  [phone]

## 2022-12-20 NOTE — TELEPHONE ENCOUNTER
Calling pt in regards to scheduling surgery. Informed pt that I have 01/27/2023 available at [facility] with Dr. Glaser Manual. Patient will have the same insurance in 2023. Patient aware needs ride to & from surgery and mother will be taking care of her after  Pt verbalized understanding and in agreement with date and location. All questions answered. Encouraged pt to call or [employer] message office with any other questions or concerns.

## 2022-12-21 ENCOUNTER — APPOINTMENT (OUTPATIENT)
Dept: NEUROLOGY | Age: 47
End: 2022-12-21
Attending: NEUROLOGICAL SURGERY

## 2023-01-04 ENCOUNTER — HOSPITAL ENCOUNTER (OUTPATIENT)
Dept: NEUROLOGY | Age: 48
Discharge: HOME | End: 2023-01-04
Attending: NEUROLOGICAL SURGERY

## 2023-01-04 DIAGNOSIS — R29.898 HAND WEAKNESS: ICD-10-CM

## 2023-01-04 DIAGNOSIS — G56.21 ULNAR NEUROPATHY OF RIGHT UPPER EXTREMITY: ICD-10-CM

## 2023-01-04 DIAGNOSIS — M54.12 CERVICAL RADICULOPATHY: ICD-10-CM

## 2023-01-04 PROCEDURE — 95910 NRV CNDJ TEST 7-8 STUDIES: CPT

## 2023-01-04 PROCEDURE — 95886 MUSC TEST DONE W/N TEST COMP: CPT | Performed by: PSYCHIATRY & NEUROLOGY

## 2023-01-04 PROCEDURE — 95910 NRV CNDJ TEST 7-8 STUDIES: CPT | Performed by: PSYCHIATRY & NEUROLOGY

## 2023-01-04 PROCEDURE — 95886 MUSC TEST DONE W/N TEST COMP: CPT

## 2023-01-05 ENCOUNTER — OFFICE VISIT (OUTPATIENT)
Dept: FAMILY MEDICINE CLINIC | Facility: CLINIC | Age: 48
End: 2023-01-05
Payer: MEDICAID

## 2023-01-05 ENCOUNTER — TELEPHONE (OUTPATIENT)
Dept: FAMILY MEDICINE CLINIC | Facility: CLINIC | Age: 48
End: 2023-01-05

## 2023-01-05 VITALS
TEMPERATURE: 97 F | HEART RATE: 80 BPM | SYSTOLIC BLOOD PRESSURE: 124 MMHG | BODY MASS INDEX: 33.91 KG/M2 | WEIGHT: 191.38 LBS | HEIGHT: 63 IN | OXYGEN SATURATION: 100 % | DIASTOLIC BLOOD PRESSURE: 82 MMHG

## 2023-01-05 DIAGNOSIS — R29.898 WRIST WEAKNESS: ICD-10-CM

## 2023-01-05 DIAGNOSIS — Z92.89 HISTORY OF TRANSFUSION: ICD-10-CM

## 2023-01-05 DIAGNOSIS — N39.490 OVERFLOW INCONTINENCE OF URINE: ICD-10-CM

## 2023-01-05 DIAGNOSIS — D50.8 OTHER IRON DEFICIENCY ANEMIA: ICD-10-CM

## 2023-01-05 DIAGNOSIS — D64.9 ANEMIA, UNSPECIFIED TYPE: ICD-10-CM

## 2023-01-05 DIAGNOSIS — Z01.818 PREOP EXAMINATION: Primary | ICD-10-CM

## 2023-01-05 DIAGNOSIS — M54.2 CHRONIC NECK PAIN WITH HISTORY OF CERVICAL SPINAL SURGERY: ICD-10-CM

## 2023-01-05 DIAGNOSIS — G89.28 CHRONIC NECK PAIN WITH HISTORY OF CERVICAL SPINAL SURGERY: ICD-10-CM

## 2023-01-05 DIAGNOSIS — Z87.19 HISTORY OF CONSTIPATION: ICD-10-CM

## 2023-01-05 DIAGNOSIS — Z98.890 CHRONIC NECK PAIN WITH HISTORY OF CERVICAL SPINAL SURGERY: ICD-10-CM

## 2023-01-05 DIAGNOSIS — Z87.440 HISTORY OF URINARY TRACT INFECTION: ICD-10-CM

## 2023-01-05 PROBLEM — M43.9 ACQUIRED DEFORMITY OF SPINE: Status: ACTIVE | Noted: 2022-04-25

## 2023-01-05 PROBLEM — F11.20 OPIOID DEPENDENCE (HCC): Status: RESOLVED | Noted: 2022-01-07 | Resolved: 2023-01-05

## 2023-01-05 LAB
ANION GAP SERPL CALC-SCNC: 4 MMOL/L (ref 0–18)
ATRIAL RATE: 54 BPM
BUN BLD-MCNC: 21 MG/DL (ref 7–18)
CALCIUM BLD-MCNC: 9.2 MG/DL (ref 8.5–10.1)
CHLORIDE SERPL-SCNC: 111 MMOL/L (ref 98–112)
CO2 SERPL-SCNC: 27 MMOL/L (ref 21–32)
CREAT BLD-MCNC: 0.8 MG/DL
FASTING STATUS PATIENT QL REPORTED: YES
GFR SERPLBLD BASED ON 1.73 SQ M-ARVRAT: 90 ML/MIN/1.73M2 (ref 60–?)
GLUCOSE BLD-MCNC: 106 MG/DL (ref 70–99)
OSMOLALITY SERPL CALC.SUM OF ELEC: 297 MOSM/KG (ref 275–295)
P AXIS: 27 DEGREES
P-R INTERVAL: 188 MS
POTASSIUM SERPL-SCNC: 4 MMOL/L (ref 3.5–5.1)
Q-T INTERVAL: 428 MS
QRS DURATION: 88 MS
QTC CALCULATION (BEZET): 405 MS
R AXIS: 0 DEGREES
SODIUM SERPL-SCNC: 142 MMOL/L (ref 136–145)
T AXIS: 32 DEGREES
VENTRICULAR RATE: 54 BPM

## 2023-01-05 PROCEDURE — 3079F DIAST BP 80-89 MM HG: CPT | Performed by: FAMILY MEDICINE

## 2023-01-05 PROCEDURE — 3074F SYST BP LT 130 MM HG: CPT | Performed by: FAMILY MEDICINE

## 2023-01-05 PROCEDURE — 3008F BODY MASS INDEX DOCD: CPT | Performed by: FAMILY MEDICINE

## 2023-01-05 PROCEDURE — 99214 OFFICE O/P EST MOD 30 MIN: CPT | Performed by: FAMILY MEDICINE

## 2023-01-05 PROCEDURE — 93000 ELECTROCARDIOGRAM COMPLETE: CPT | Performed by: FAMILY MEDICINE

## 2023-01-05 PROCEDURE — 80048 BASIC METABOLIC PNL TOTAL CA: CPT | Performed by: FAMILY MEDICINE

## 2023-01-05 NOTE — TELEPHONE ENCOUNTER
Per Dr. Trejo April - intended to order CBC, unfortunately forgot to place order - please apologize on her behalf    Advised patient of Doctor's note above. Patient verbalized understanding.     Future Appointments   Date Time Provider Fitz Villanueva   1/6/2023  8:45 AM  [facility] [address] [facility] Karlo Mann

## 2023-01-06 ENCOUNTER — NURSE ONLY (OUTPATIENT)
Dept: FAMILY MEDICINE CLINIC | Facility: CLINIC | Age: 48
End: 2023-01-06
Payer: MEDICAID

## 2023-01-06 LAB
BASOPHILS # BLD AUTO: 0.07 X10(3) UL (ref 0–0.2)
BASOPHILS NFR BLD AUTO: 1.2 %
EOSINOPHIL # BLD AUTO: 0.1 X10(3) UL (ref 0–0.7)
EOSINOPHIL NFR BLD AUTO: 1.7 %
ERYTHROCYTE [DISTWIDTH] IN BLOOD BY AUTOMATED COUNT: 17.3 %
HCT VFR BLD AUTO: 36.7 %
HGB BLD-MCNC: 11.7 G/DL
IMM GRANULOCYTES # BLD AUTO: 0.02 X10(3) UL (ref 0–1)
IMM GRANULOCYTES NFR BLD: 0.3 %
LYMPHOCYTES # BLD AUTO: 1.69 X10(3) UL (ref 1–4)
LYMPHOCYTES NFR BLD AUTO: 28.1 %
MCH RBC QN AUTO: 24.1 PG (ref 26–34)
MCHC RBC AUTO-ENTMCNC: 31.9 G/DL (ref 31–37)
MCV RBC AUTO: 75.5 FL
MONOCYTES # BLD AUTO: 0.38 X10(3) UL (ref 0.1–1)
MONOCYTES NFR BLD AUTO: 6.3 %
NEUTROPHILS # BLD AUTO: 3.75 X10 (3) UL (ref 1.5–7.7)
NEUTROPHILS # BLD AUTO: 3.75 X10(3) UL (ref 1.5–7.7)
NEUTROPHILS NFR BLD AUTO: 62.4 %
PLATELET # BLD AUTO: 318 10(3)UL (ref 150–450)
RBC # BLD AUTO: 4.86 X10(6)UL
WBC # BLD AUTO: 6 X10(3) UL (ref 4–11)

## 2023-01-06 PROCEDURE — 85025 COMPLETE CBC W/AUTO DIFF WBC: CPT | Performed by: FAMILY MEDICINE

## 2023-01-06 NOTE — PATIENT INSTRUCTIONS
Patient is here for lab draw per HCP   Venipuncture site on left arm AC with 23G collected a lavender   Patient left in stable condition no concerns

## 2023-01-09 ENCOUNTER — TELEPHONE (OUTPATIENT)
Dept: FAMILY MEDICINE CLINIC | Facility: CLINIC | Age: 48
End: 2023-01-09

## 2023-01-09 NOTE — TELEPHONE ENCOUNTER
pre-op clearance , labs, EKG - faxed to the [facility] at fax #[phone]    [facility] sent to pt

## 2023-01-09 NOTE — TELEPHONE ENCOUNTER
----- Message from Georgie Stephens MD sent at 1/7/2023  7:57 PM CST -----  Reviewed labs - hgb looks stable at this time. Note - LOV 1/5/2023 - preop noted addendum completed. Please fax clearance to surgeon's office. Thank you.

## 2023-01-10 ENCOUNTER — E-ADVICE (OUTPATIENT)
Dept: NEUROSURGERY | Age: 48
End: 2023-01-10

## 2023-01-10 DIAGNOSIS — M79.642 LEFT HAND PAIN: Primary | ICD-10-CM

## 2023-01-15 DIAGNOSIS — Z76.0 ENCOUNTER FOR MEDICATION REFILL: ICD-10-CM

## 2023-01-15 RX ORDER — GABAPENTIN 600 MG/1
600 TABLET ORAL 3 TIMES DAILY
Qty: 270 TABLET | Refills: 1 | Status: CANCELLED | OUTPATIENT
Start: 2023-01-15 | End: 2023-07-14

## 2023-01-16 ENCOUNTER — TELEPHONE (OUTPATIENT)
Dept: GENERAL RADIOLOGY | Facility: HOSPITAL | Age: 48
End: 2023-01-16

## 2023-01-16 RX ORDER — DIAZEPAM 5 MG/1
2.5 TABLET ORAL EVERY 12 HOURS PRN
Qty: 15 TABLET | Refills: 0 | Status: SHIPPED | OUTPATIENT
Start: 2023-01-16

## 2023-01-16 NOTE — TELEPHONE ENCOUNTER
1457: Spoke with Fco Blanchard at this time. Discussed localization procedure to [facility]   prior to surgery on Friday, January 27. Procedure and flow of the day reviewed and questions answered. Ms. Lan Andrews verbalized understanding and gratitude for the call.

## 2023-01-16 NOTE — TELEPHONE ENCOUNTER
Last OV:01/05/2023  Last refill:Diazepam 12/15/2022, 30 tabs, 0 refills    Medication pended, please sign if appropriate

## 2023-01-17 DIAGNOSIS — Z76.0 ENCOUNTER FOR MEDICATION REFILL: ICD-10-CM

## 2023-01-17 RX ORDER — GABAPENTIN 600 MG/1
600 TABLET ORAL 3 TIMES DAILY
Qty: 270 TABLET | Refills: 1 | Status: SHIPPED | OUTPATIENT
Start: 2023-01-17 | End: 2023-07-16

## 2023-01-17 NOTE — TELEPHONE ENCOUNTER
No refill protocol for this medication. Last refill: 12/15/2022 #270 with 1 refill  Last Visit: 1/05/2023  Next Visit:   No Future Appointments         Forward to Dr. Suzan Tomas please advise on refills. Thanks.

## 2023-01-23 ENCOUNTER — OFFICE VISIT (OUTPATIENT)
Dept: SURGERY | Facility: CLINIC | Age: 48
End: 2023-01-23

## 2023-01-23 DIAGNOSIS — N39.490 OVERFLOW INCONTINENCE OF URINE: ICD-10-CM

## 2023-01-23 DIAGNOSIS — N39.0 RECURRENT UTI: Primary | ICD-10-CM

## 2023-01-23 DIAGNOSIS — N31.9 NEUROGENIC BLADDER: ICD-10-CM

## 2023-01-23 LAB
APPEARANCE: CLEAR
BILIRUBIN: NEGATIVE
GLUCOSE (URINE DIPSTICK): NEGATIVE MG/DL
KETONES (URINE DIPSTICK): NEGATIVE MG/DL
MULTISTIX LOT#: ABNORMAL NUMERIC
NITRITE, URINE: NEGATIVE
PH, URINE: 6.5 (ref 4.5–8)
PROTEIN (URINE DIPSTICK): NEGATIVE MG/DL
SPECIFIC GRAVITY: 1.02 (ref 1–1.03)
URINE-COLOR: YELLOW
UROBILINOGEN,SEMI-QN: 0.2 MG/DL (ref 0–1.9)

## 2023-01-23 PROCEDURE — 81003 URINALYSIS AUTO W/O SCOPE: CPT | Performed by: UROLOGY

## 2023-01-23 PROCEDURE — 99244 OFF/OP CNSLTJ NEW/EST MOD 40: CPT | Performed by: UROLOGY

## 2023-01-24 ENCOUNTER — TELEPHONE (OUTPATIENT)
Dept: SURGERY | Facility: CLINIC | Age: 48
End: 2023-01-24

## 2023-01-27 ENCOUNTER — HOSPITAL ENCOUNTER (OUTPATIENT)
Facility: HOSPITAL | Age: 48
Setting detail: HOSPITAL OUTPATIENT SURGERY
Discharge: HOME | End: 2023-01-27
Attending: SURGERY | Admitting: SURGERY
Payer: MEDICAID

## 2023-01-27 ENCOUNTER — HOSPITAL ENCOUNTER (OUTPATIENT)
Dept: MAMMOGRAPHY | Facility: HOSPITAL | Age: 48
Discharge: HOME | End: 2023-01-27
Attending: SURGERY
Payer: MEDICAID

## 2023-01-27 ENCOUNTER — ANESTHESIA (OUTPATIENT)
Dept: SURGERY | Facility: HOSPITAL | Age: 48
End: 2023-01-27
Payer: MEDICAID

## 2023-01-27 ENCOUNTER — ANESTHESIA EVENT (OUTPATIENT)
Dept: SURGERY | Facility: HOSPITAL | Age: 48
End: 2023-01-27
Payer: MEDICAID

## 2023-01-27 VITALS
WEIGHT: 185 LBS | OXYGEN SATURATION: 100 % | TEMPERATURE: 97 F | RESPIRATION RATE: 16 BRPM | BODY MASS INDEX: 31.58 KG/M2 | HEIGHT: 64 IN | SYSTOLIC BLOOD PRESSURE: 102 MMHG | HEART RATE: 60 BPM | DIASTOLIC BLOOD PRESSURE: 70 MMHG

## 2023-01-27 DIAGNOSIS — D24.1 INTRADUCTAL PAPILLOMA OF BREAST, RIGHT: ICD-10-CM

## 2023-01-27 PROCEDURE — 19282 PERQ DEVICE BREAST EA IMAG: CPT | Performed by: SURGERY

## 2023-01-27 PROCEDURE — 0JB60ZX EXCISION OF CHEST SUBCUTANEOUS TISSUE AND FASCIA, OPEN APPROACH, DIAGNOSTIC: ICD-10-PCS | Performed by: SURGERY

## 2023-01-27 PROCEDURE — 76098 X-RAY EXAM SURGICAL SPECIMEN: CPT | Performed by: SURGERY

## 2023-01-27 PROCEDURE — 88307 TISSUE EXAM BY PATHOLOGIST: CPT | Performed by: SURGERY

## 2023-01-27 PROCEDURE — 19281 PERQ DEVICE BREAST 1ST IMAG: CPT | Performed by: SURGERY

## 2023-01-27 RX ORDER — HYDROCODONE BITARTRATE AND ACETAMINOPHEN 5; 325 MG/1; MG/1
1-2 TABLET ORAL EVERY 6 HOURS PRN
Qty: 20 TABLET | Refills: 0 | Status: SHIPPED | OUTPATIENT
Start: 2023-01-27

## 2023-01-27 RX ORDER — EPHEDRINE SULFATE 50 MG/ML
INJECTION INTRAVENOUS AS NEEDED
Status: DISCONTINUED | OUTPATIENT
Start: 2023-01-27 | End: 2023-01-27 | Stop reason: SURG

## 2023-01-27 RX ORDER — LIDOCAINE HYDROCHLORIDE AND EPINEPHRINE 10; 10 MG/ML; UG/ML
INJECTION, SOLUTION INFILTRATION; PERINEURAL AS NEEDED
Status: DISCONTINUED | OUTPATIENT
Start: 2023-01-27 | End: 2023-01-27 | Stop reason: HOSPADM

## 2023-01-27 RX ORDER — ACETAMINOPHEN 500 MG
1000 TABLET ORAL ONCE
Status: DISCONTINUED | OUTPATIENT
Start: 2023-01-27 | End: 2023-01-27 | Stop reason: HOSPADM

## 2023-01-27 RX ORDER — SODIUM CHLORIDE, SODIUM LACTATE, POTASSIUM CHLORIDE, CALCIUM CHLORIDE 600; 310; 30; 20 MG/100ML; MG/100ML; MG/100ML; MG/100ML
INJECTION, SOLUTION INTRAVENOUS CONTINUOUS
Status: DISCONTINUED | OUTPATIENT
Start: 2023-01-27 | End: 2023-01-27

## 2023-01-27 RX ORDER — MIDAZOLAM HYDROCHLORIDE 1 MG/ML
INJECTION INTRAMUSCULAR; INTRAVENOUS AS NEEDED
Status: DISCONTINUED | OUTPATIENT
Start: 2023-01-27 | End: 2023-01-27 | Stop reason: SURG

## 2023-01-27 RX ORDER — MIDAZOLAM HYDROCHLORIDE 1 MG/ML
1 INJECTION INTRAMUSCULAR; INTRAVENOUS EVERY 5 MIN PRN
Status: DISCONTINUED | OUTPATIENT
Start: 2023-01-27 | End: 2023-01-27

## 2023-01-27 RX ORDER — DEXAMETHASONE SODIUM PHOSPHATE 4 MG/ML
VIAL (ML) INJECTION AS NEEDED
Status: DISCONTINUED | OUTPATIENT
Start: 2023-01-27 | End: 2023-01-27 | Stop reason: SURG

## 2023-01-27 RX ORDER — CLINDAMYCIN PHOSPHATE 900 MG/50ML
900 INJECTION INTRAVENOUS ONCE
Status: COMPLETED | OUTPATIENT
Start: 2023-01-27 | End: 2023-01-27

## 2023-01-27 RX ORDER — HYDROMORPHONE HYDROCHLORIDE 1 MG/ML
0.6 INJECTION, SOLUTION INTRAMUSCULAR; INTRAVENOUS; SUBCUTANEOUS EVERY 5 MIN PRN
Status: DISCONTINUED | OUTPATIENT
Start: 2023-01-27 | End: 2023-01-27

## 2023-01-27 RX ORDER — LIDOCAINE HYDROCHLORIDE 10 MG/ML
INJECTION, SOLUTION EPIDURAL; INFILTRATION; INTRACAUDAL; PERINEURAL AS NEEDED
Status: DISCONTINUED | OUTPATIENT
Start: 2023-01-27 | End: 2023-01-27 | Stop reason: SURG

## 2023-01-27 RX ORDER — KETAMINE HYDROCHLORIDE 50 MG/ML
INJECTION, SOLUTION, CONCENTRATE INTRAMUSCULAR; INTRAVENOUS AS NEEDED
Status: DISCONTINUED | OUTPATIENT
Start: 2023-01-27 | End: 2023-01-27 | Stop reason: SURG

## 2023-01-27 RX ORDER — NALOXONE HYDROCHLORIDE 0.4 MG/ML
80 INJECTION, SOLUTION INTRAMUSCULAR; INTRAVENOUS; SUBCUTANEOUS AS NEEDED
Status: DISCONTINUED | OUTPATIENT
Start: 2023-01-27 | End: 2023-01-27

## 2023-01-27 RX ORDER — DIAZEPAM 5 MG/1
5 TABLET ORAL AS NEEDED
Status: DISCONTINUED | OUTPATIENT
Start: 2023-01-27 | End: 2023-01-27 | Stop reason: HOSPADM

## 2023-01-27 RX ORDER — ACETAMINOPHEN 500 MG
1000 TABLET ORAL ONCE AS NEEDED
Status: DISCONTINUED | OUTPATIENT
Start: 2023-01-27 | End: 2023-01-27

## 2023-01-27 RX ORDER — HYDROMORPHONE HYDROCHLORIDE 1 MG/ML
0.4 INJECTION, SOLUTION INTRAMUSCULAR; INTRAVENOUS; SUBCUTANEOUS EVERY 5 MIN PRN
Status: DISCONTINUED | OUTPATIENT
Start: 2023-01-27 | End: 2023-01-27

## 2023-01-27 RX ORDER — SCOLOPAMINE TRANSDERMAL SYSTEM 1 MG/1
1 PATCH, EXTENDED RELEASE TRANSDERMAL ONCE
Status: DISCONTINUED | OUTPATIENT
Start: 2023-01-27 | End: 2023-01-27 | Stop reason: HOSPADM

## 2023-01-27 RX ORDER — GLYCOPYRROLATE 0.2 MG/ML
INJECTION, SOLUTION INTRAMUSCULAR; INTRAVENOUS AS NEEDED
Status: DISCONTINUED | OUTPATIENT
Start: 2023-01-27 | End: 2023-01-27 | Stop reason: SURG

## 2023-01-27 RX ORDER — MEPERIDINE HYDROCHLORIDE 25 MG/ML
12.5 INJECTION INTRAMUSCULAR; INTRAVENOUS; SUBCUTANEOUS AS NEEDED
Status: DISCONTINUED | OUTPATIENT
Start: 2023-01-27 | End: 2023-01-27

## 2023-01-27 RX ORDER — HYDROCODONE BITARTRATE AND ACETAMINOPHEN 5; 325 MG/1; MG/1
1 TABLET ORAL ONCE AS NEEDED
Status: DISCONTINUED | OUTPATIENT
Start: 2023-01-27 | End: 2023-01-27

## 2023-01-27 RX ORDER — DIPHENHYDRAMINE HYDROCHLORIDE 50 MG/ML
12.5 INJECTION INTRAMUSCULAR; INTRAVENOUS AS NEEDED
Status: DISCONTINUED | OUTPATIENT
Start: 2023-01-27 | End: 2023-01-27

## 2023-01-27 RX ORDER — HYDROCODONE BITARTRATE AND ACETAMINOPHEN 5; 325 MG/1; MG/1
2 TABLET ORAL ONCE AS NEEDED
Status: DISCONTINUED | OUTPATIENT
Start: 2023-01-27 | End: 2023-01-27

## 2023-01-27 RX ORDER — BUPIVACAINE HYDROCHLORIDE 5 MG/ML
INJECTION, SOLUTION EPIDURAL; INTRACAUDAL AS NEEDED
Status: DISCONTINUED | OUTPATIENT
Start: 2023-01-27 | End: 2023-01-27 | Stop reason: HOSPADM

## 2023-01-27 RX ORDER — ONDANSETRON 2 MG/ML
INJECTION INTRAMUSCULAR; INTRAVENOUS AS NEEDED
Status: DISCONTINUED | OUTPATIENT
Start: 2023-01-27 | End: 2023-01-27 | Stop reason: SURG

## 2023-01-27 RX ORDER — HYDROMORPHONE HYDROCHLORIDE 1 MG/ML
0.2 INJECTION, SOLUTION INTRAMUSCULAR; INTRAVENOUS; SUBCUTANEOUS EVERY 5 MIN PRN
Status: DISCONTINUED | OUTPATIENT
Start: 2023-01-27 | End: 2023-01-27

## 2023-01-27 RX ADMIN — SODIUM CHLORIDE, SODIUM LACTATE, POTASSIUM CHLORIDE, CALCIUM CHLORIDE: 600; 310; 30; 20 INJECTION, SOLUTION INTRAVENOUS at 09:14:00

## 2023-01-27 RX ADMIN — ONDANSETRON 4 MG: 2 INJECTION INTRAMUSCULAR; INTRAVENOUS at 09:22:00

## 2023-01-27 RX ADMIN — CLINDAMYCIN PHOSPHATE 900 MG: 900 INJECTION INTRAVENOUS at 09:15:00

## 2023-01-27 RX ADMIN — MIDAZOLAM HYDROCHLORIDE 2 MG: 1 INJECTION INTRAMUSCULAR; INTRAVENOUS at 09:04:00

## 2023-01-27 RX ADMIN — LIDOCAINE HYDROCHLORIDE 50 MG: 10 INJECTION, SOLUTION EPIDURAL; INFILTRATION; INTRACAUDAL; PERINEURAL at 09:04:00

## 2023-01-27 RX ADMIN — GLYCOPYRROLATE 0.4 MG: 0.2 INJECTION, SOLUTION INTRAMUSCULAR; INTRAVENOUS at 09:15:00

## 2023-01-27 RX ADMIN — SODIUM CHLORIDE, SODIUM LACTATE, POTASSIUM CHLORIDE, CALCIUM CHLORIDE: 600; 310; 30; 20 INJECTION, SOLUTION INTRAVENOUS at 09:51:00

## 2023-01-27 RX ADMIN — DEXAMETHASONE SODIUM PHOSPHATE 8 MG: 4 MG/ML VIAL (ML) INJECTION at 09:15:00

## 2023-01-27 RX ADMIN — EPHEDRINE SULFATE 10 MG: 50 INJECTION INTRAVENOUS at 09:16:00

## 2023-01-27 RX ADMIN — KETAMINE HYDROCHLORIDE 10 MG: 50 INJECTION, SOLUTION, CONCENTRATE INTRAMUSCULAR; INTRAVENOUS at 09:18:00

## 2023-01-27 NOTE — ANESTHESIA PROCEDURE NOTES
Airway  Date/Time: 1/27/2023 9:09 AM  Urgency: elective      General Information and Staff    Patient location during procedure: OR  Anesthesiologist: Cesar Cunha MD  Resident/CRNA: Taisha Nye CRNA  Performed: CRNA     Indications and Patient Condition  Indications for airway management: anesthesia  Sedation level: deep  Preoxygenated: yes  Patient position: sniffing  Mask difficulty assessment: 1 - vent by mask    Final Airway Details  Final airway type: supraglottic airway      Successful airway: classic  Size 4       Number of attempts at approach: 1

## 2023-01-27 NOTE — BRIEF OP NOTE
Pre-Operative Diagnosis: Intraductal papilloma of breast, right [D24.1]     Post-Operative Diagnosis: Intraductal papilloma of breast, right [D24.1]      Procedure Performed:   Right breast 2 site wire localized excisional biopsy, right breast terminal duct excision    Surgeon(s) and Role:     Christina Wolfe MD - Primary    Assistant(s):  Surgical Assistant.: Allen Payne CSA     Surgical Findings: Clips x2 seen on specimen radiograph, central duct with bloody expressible nipple discharge.      Specimen: Right breast bracketed lumpectomy with terminal duct excision     Estimated Blood Loss: Blood Output: 5 mL (1/27/2023  9:40 AM)     Todd Us MD  1/27/2023  10:02 AM

## 2023-01-27 NOTE — IMAGING NOTE
Assisted  with mammography guided needle localization of the right breast x 2 sites. Peng Aceves identified with spelling of name and date of birth. Medications and allergies reviewed. The following allergies were reported:    Amoxicillin-pot ClavulanateITCHING, HIVES, ANGIOEDEMA   Augmentin [Amoclan]SWELLING      History:  Right breast Intraductal papilloma   Surgery: Right breast 2 site wire localized excisional biopsy, right breast terminal duct excision    Order verified. Procedure explained and questions answered. Peng Aceves verbalized understanding and agreement. 0730: Written consent obtained by imaging staff    6446: Scans taken by Rita Leyden - mammography technologist    6883: Dr. Lesa Ochoa present    5794: Time out complete. Site #1 right breast marked by butterfly clip  0749: Site prepped in a sterile manner. 3348: Lidocaine administered for anesthetic affect. 3638Rosia Hey 20G x 5 cm needle placed    Site #2 right breast marked by pellet clip  0749: Site prepped in a sterile manner. 1118: Lidocaine administered for anesthetic affect. 7223Rosia Hey 20G x 5 cm needle placed    Emotional support provided. Peng Aceves tolerated procedure well. Site cleaned. Wires secured with blue clips, steri strips, sterile 4x4 gauze dressing, and Tegaderm. 9096: Peng Aceves transported via wheelchair to pre-op/surgery holding in stable condition. Ms. Sujit Bella without complaints or concerns at this time.

## 2023-01-27 NOTE — ANESTHESIA PROCEDURE NOTES
Peripheral IV  Date/Time: 1/27/2023 9:13 AM  Inserted by: Uvaldo Cedeño MD    Placement  Needle size: 20 G (Placed with ultrasound)  Laterality: left  Location: antecubital  Site prep: alcohol  Attempts: 1

## 2023-01-27 NOTE — ANESTHESIA POSTPROCEDURE EVALUATION
Erica[address]cem 57 Patient Status:  Hospital Outpatient Surgery   Age/Gender 50year old female MRN [id number]   [facility] SURGERY Attending Sheryle Hick, MD   Hosp Day # 0 PCP Georgie Levin MD       Anesthesia Post-op Note    Right breast 2 site wire localized excisional biopsy, right breast terminal duct excision    Procedure Summary     Date: 01/27/23 Room / Location: [facility] MAIN OR  / [facility] MAIN OR    Anesthesia Start: 0858 Anesthesia Stop: 2036    Procedure: Right breast 2 site wire localized excisional biopsy, right breast terminal duct excision (Right: Breast) Diagnosis:       Intraductal papilloma of breast, right      (Intraductal papilloma of breast, right [D24.1])    Surgeons: Sheryle Hick, MD Anesthesiologist: Devorah Trejo MD    Anesthesia Type: general ASA Status: 2          Anesthesia Type: general    Vitals Value Taken Time   /68 01/27/23 1002   Temp 97.8 01/27/23 1002   Pulse 73 01/27/23 1002   Resp 18 01/27/23 1002   SpO2 98% 01/27/23 1002       Patient Location: Same Day Surgery    Anesthesia Type: general    Airway Patency: patent    Postop Pain Control: adequate    Mental Status: mildly sedated but able to meaningfully participate in the post-anesthesia evaluation    Nausea/Vomiting: none    Cardiopulmonary/Hydration status: stable euvolemic    Complications: no apparent anesthesia related complications    Postop vital signs: stable    Dental Exam: Unchanged from Preop    Patient to be discharged home when criteria met.

## 2023-01-30 NOTE — OPERATIVE REPORT
[address]    PATIENT'S NAME: Nikki MORTENSEN   ATTENDING PHYSICIAN: Bernadine Salas M.D. OPERATING PHYSICIAN: Bernadine Salas M.D. PATIENT ACCOUNT#:   [de-identified]    LOCATION:  PREOPASCC EH PRE ASCC 12 EDWP 10  MEDICAL RECORD #:   [id number]       YOB: 1975  ADMISSION DATE:       01/27/2023      OPERATION DATE:  01/27/2023    OPERATIVE REPORT    PREOPERATIVE DIAGNOSIS:  Intraductal papilloma of multiple sites of right breast as well as right bloody nipple discharge. POSTOPERATIVE DIAGNOSIS:  Intraductal papilloma of multiple sites of right breast as well as right bloody nipple discharge. PROCEDURE:  Right breast 2-site wire-localized bracketed lumpectomy with right breast specimen radiography and right breast terminal duct excision. ASSISTANT:  Tasha Hui CSA. ANESTHESIA:  General anesthesia and local.    ESTIMATED BLOOD LOSS:  5 mL. DRAINS:  None. COMPLICATIONS:  None. DISPOSITION:  Stable on transfer to recovery room. INDICATIONS:  This patient is a 78-year-old female who presented with self-detected spontaneous drainage from her right nipple. She was found on imaging to have multiple suspicious nodules and biopsy of 2 were performed, confirming intraductal papillomas. She has had persistent nipple discharge since that time. We discussed surgical excision of the papillary areas in order to exclude coexisting atypia and/or intraductal malignancy with simultaneous terminal duct excision for therapeutic and diagnostic reasons to stop that discharge. Risks and possible complications were discussed with the patient including but not limited to infection, bleeding, injury to surrounding structures, possible need for reoperation. She agreed to the proposed surgery. OPERATIVE TECHNIQUE:  Patient was brought to the imaging suite.   She underwent a wire localization of 2 sites of the prior biopsy concerns in the right breast.  She was then brought to the OR, placed in supine position, properly padded and secured, given a dose of IV antibiotics, and sequential compression devices were applied to the legs for DVT prophylaxis. General anesthesia was induced. Her right breast was prepped and draped in the usual sterile fashion. Lidocaine 1% with epinephrine was used to infiltrate the skin and subcutaneous tissues at the targeted incision site and in the subareolar space. A lacrimal duct probe was then inserted into the duct containing the bloody discharge. This was used to dilate and define the offending duct. An incision was completed along the medial areolar border with a 15 blade knife in the skin. The duct containing the lacrimal duct probe was excised from immediate subareolar down into its termination into normal-appearing ductal tissue. The proximal aspect of this duct in the immediate subareolar space was marked with a short stitch. Attention was taken towards the location of the wires. These were noted to be just medial to the area of the terminal duct excision; therefore, in a contiguous specimen these wires were identified, brought into the field. Using sharp dissection and electrocautery, a segment of breast tissue surrounding the tip of both wires was excised en bloc with the terminal duct excision. This was oriented further with short stitch single clip superiorly, long stitch double clip laterally and placed in imaging device where specimen x-ray confirmed the presence of both targeted biopsy clips with adequate margins as deemed by myself. The specimen was then sent for routine evaluation pathologically in order to confirm appropriate pathological margin, assessment, and review. The wound was then copiously irrigated. Pexy of the nipple was performed with a 3-0 chromic pursestring suture at the base of the nipple.   The closure of the wound was accomplished with an interrupted 3-0 Vicryl for deep layer, running 4-0 subcuticular Monocryl for skin. Mastisol and Steri-Strips were applied. Marcaine 0.5% was instilled in the cavity to assist with postoperative analgesia. A sterile dressing and compression bra were placed. Blood loss was minimal.  All counts were correct at the conclusion of the procedure. She tolerated the procedure well. She was transferred to the recovery room in stable condition. Dictated By Angeles Lozoya.  Phyllis Ramirez M.D.  d: 01/27/2023 10:14:17  t: 01/27/2023 10:22:26  [facility] [id number]  [facility]/    cc: Georgie Hidalgo MD

## 2023-02-02 ENCOUNTER — OFFICE VISIT (OUTPATIENT)
Dept: SURGERY | Facility: CLINIC | Age: 48
End: 2023-02-02
Payer: MEDICAID

## 2023-02-02 ENCOUNTER — TELEPHONE (OUTPATIENT)
Dept: FAMILY MEDICINE CLINIC | Facility: CLINIC | Age: 48
End: 2023-02-02

## 2023-02-02 VITALS
SYSTOLIC BLOOD PRESSURE: 128 MMHG | WEIGHT: 185 LBS | HEART RATE: 81 BPM | BODY MASS INDEX: 31.58 KG/M2 | HEIGHT: 64 IN | OXYGEN SATURATION: 96 % | DIASTOLIC BLOOD PRESSURE: 77 MMHG | TEMPERATURE: 98 F | RESPIRATION RATE: 18 BRPM

## 2023-02-02 DIAGNOSIS — D24.1 INTRADUCTAL PAPILLOMA OF BREAST, RIGHT: Primary | ICD-10-CM

## 2023-02-02 DIAGNOSIS — R52 PAIN: ICD-10-CM

## 2023-02-02 PROCEDURE — 99024 POSTOP FOLLOW-UP VISIT: CPT

## 2023-02-02 PROCEDURE — 3074F SYST BP LT 130 MM HG: CPT

## 2023-02-02 PROCEDURE — 3078F DIAST BP <80 MM HG: CPT

## 2023-02-02 PROCEDURE — 3008F BODY MASS INDEX DOCD: CPT

## 2023-02-02 RX ORDER — HYDROCODONE BITARTRATE AND ACETAMINOPHEN 5; 325 MG/1; MG/1
1-2 TABLET ORAL EVERY 4 HOURS PRN
Qty: 20 TABLET | Refills: 0 | Status: SHIPPED | OUTPATIENT
Start: 2023-02-02

## 2023-02-02 NOTE — TELEPHONE ENCOUNTER
RECEIVED MEDICAL RECORD REQUEST FROM [facility] - REQUESTING SPECIFIC RECORDS FROM 09/28/21 TO PRESENT.     FAXED AND SENT TO SCAN STAT

## 2023-02-03 ENCOUNTER — HOSPITAL ENCOUNTER (OUTPATIENT)
Dept: CT IMAGING | Age: 48
Discharge: HOME | End: 2023-02-03
Attending: UROLOGY
Payer: MEDICAID

## 2023-02-03 DIAGNOSIS — N31.9 NEUROGENIC BLADDER: ICD-10-CM

## 2023-02-03 PROCEDURE — 74176 CT ABD & PELVIS W/O CONTRAST: CPT | Performed by: UROLOGY

## 2023-02-07 ENCOUNTER — NURSE ONLY (OUTPATIENT)
Dept: SURGERY | Facility: CLINIC | Age: 48
End: 2023-02-07

## 2023-02-07 ENCOUNTER — PROCEDURE (OUTPATIENT)
Dept: SURGERY | Facility: CLINIC | Age: 48
End: 2023-02-07

## 2023-02-07 ENCOUNTER — OFFICE VISIT (OUTPATIENT)
Dept: SURGERY | Facility: CLINIC | Age: 48
End: 2023-02-07
Payer: MEDICAID

## 2023-02-07 VITALS
DIASTOLIC BLOOD PRESSURE: 82 MMHG | WEIGHT: 185 LBS | HEIGHT: 64 IN | BODY MASS INDEX: 31.58 KG/M2 | OXYGEN SATURATION: 98 % | HEART RATE: 73 BPM | SYSTOLIC BLOOD PRESSURE: 121 MMHG | RESPIRATION RATE: 18 BRPM

## 2023-02-07 DIAGNOSIS — N31.9 NEUROGENIC BLADDER: ICD-10-CM

## 2023-02-07 DIAGNOSIS — N39.490 OVERFLOW INCONTINENCE OF URINE: ICD-10-CM

## 2023-02-07 DIAGNOSIS — N39.41 URGE INCONTINENCE: ICD-10-CM

## 2023-02-07 DIAGNOSIS — R82.90 URINE FINDING: Primary | ICD-10-CM

## 2023-02-07 DIAGNOSIS — N60.91 ATYPICAL DUCTAL HYPERPLASIA OF RIGHT BREAST: Primary | ICD-10-CM

## 2023-02-07 DIAGNOSIS — N39.0 RECURRENT UTI: Primary | ICD-10-CM

## 2023-02-07 LAB
APPEARANCE: CLEAR
BILIRUBIN: NEGATIVE
GLUCOSE (URINE DIPSTICK): NEGATIVE MG/DL
KETONES (URINE DIPSTICK): NEGATIVE MG/DL
LEUKOCYTES: NEGATIVE
MULTISTIX LOT#: ABNORMAL NUMERIC
NITRITE, URINE: POSITIVE
OCCULT BLOOD: NEGATIVE
PH, URINE: 5 (ref 4.5–8)
PROTEIN (URINE DIPSTICK): NEGATIVE MG/DL
SPECIFIC GRAVITY: >=1.03 (ref 1–1.03)
URINE-COLOR: YELLOW
UROBILINOGEN,SEMI-QN: 0.2 MG/DL (ref 0–1.9)

## 2023-02-07 PROCEDURE — 99215 OFFICE O/P EST HI 40 MIN: CPT | Performed by: UROLOGY

## 2023-02-07 PROCEDURE — 99024 POSTOP FOLLOW-UP VISIT: CPT | Performed by: SURGERY

## 2023-02-07 PROCEDURE — 3074F SYST BP LT 130 MM HG: CPT | Performed by: SURGERY

## 2023-02-07 PROCEDURE — 51729 CYSTOMETROGRAM W/VP&UP: CPT | Performed by: UROLOGY

## 2023-02-07 PROCEDURE — 81003 URINALYSIS AUTO W/O SCOPE: CPT | Performed by: UROLOGY

## 2023-02-07 PROCEDURE — 51797 INTRAABDOMINAL PRESSURE TEST: CPT | Performed by: UROLOGY

## 2023-02-07 PROCEDURE — 51784 ANAL/URINARY MUSCLE STUDY: CPT | Performed by: UROLOGY

## 2023-02-07 PROCEDURE — 52000 CYSTOURETHROSCOPY: CPT | Performed by: UROLOGY

## 2023-02-07 PROCEDURE — 3008F BODY MASS INDEX DOCD: CPT | Performed by: SURGERY

## 2023-02-07 PROCEDURE — 3079F DIAST BP 80-89 MM HG: CPT | Performed by: SURGERY

## 2023-02-07 PROCEDURE — 51741 ELECTRO-UROFLOWMETRY FIRST: CPT | Performed by: UROLOGY

## 2023-02-07 RX ORDER — OXYBUTYNIN CHLORIDE 15 MG/1
15 TABLET, EXTENDED RELEASE ORAL DAILY
Qty: 90 TABLET | Refills: 6 | Status: SHIPPED | OUTPATIENT
Start: 2023-02-07

## 2023-02-07 RX ORDER — CIPROFLOXACIN 500 MG/1
500 TABLET, FILM COATED ORAL ONCE
Status: COMPLETED | OUTPATIENT
Start: 2023-02-07 | End: 2023-02-07

## 2023-02-07 RX ADMIN — CIPROFLOXACIN 500 MG: 500 TABLET, FILM COATED ORAL at 14:11:00

## 2023-02-08 ENCOUNTER — TELEPHONE (OUTPATIENT)
Dept: FAMILY MEDICINE CLINIC | Facility: CLINIC | Age: 48
End: 2023-02-08

## 2023-02-08 NOTE — TELEPHONE ENCOUNTER
No [facility] Urogynecology available  Reviewed [facility] - generated list of urigynecologists    [facility] sent to pt of list  Notify me if not read by 02/15/23

## 2023-02-08 NOTE — TELEPHONE ENCOUNTER
PT CALLED AND ADV THAT SHE WENT AND SAW DR Marylen Reeds. WAS ADV THAT SHE HAS SOME BLADDER ISSUES AND WAS REFERRED TO ANOTHER DR OUT OF [facility] IN [place]. PT NOT ABLE TO TRAVEL TO [place] AND WAS WONDERING IF THERE IS ANOTHER UROGYNECOLOGY SPECIALIST W/IN THE [place] AREA.     LOOKING FOR RECOMMENDATIONS    PLEASE ADV    THANK YOU

## 2023-02-10 RX ORDER — SERTRALINE HYDROCHLORIDE 100 MG/1
TABLET, FILM COATED ORAL
Qty: 90 TABLET | Refills: 1 | Status: SHIPPED | OUTPATIENT
Start: 2023-02-10

## 2023-02-10 NOTE — TELEPHONE ENCOUNTER
No refill protocol for this medication. Last refill: 8/04/2022 #90 with 1 refill  Last Visit: 1/05/2023  Next Visit:   No Future Appointments         Forward to ROSETTA Zeng please advise on refills. Thanks.

## 2023-02-17 ENCOUNTER — PATIENT MESSAGE (OUTPATIENT)
Dept: FAMILY MEDICINE CLINIC | Facility: CLINIC | Age: 48
End: 2023-02-17

## 2023-02-17 DIAGNOSIS — N39.0 RECURRENT UTI: Primary | ICD-10-CM

## 2023-02-17 DIAGNOSIS — N39.490 OVERFLOW INCONTINENCE: ICD-10-CM

## 2023-02-17 DIAGNOSIS — N31.9 NEUROGENIC BLADDER: ICD-10-CM

## 2023-02-17 RX ORDER — NICOTINE POLACRILEX 4 MG/1
40 GUM, CHEWING ORAL DAILY
Qty: 180 TABLET | Refills: 1 | Status: SHIPPED | OUTPATIENT
Start: 2023-02-17 | End: 2023-05-18

## 2023-02-17 NOTE — TELEPHONE ENCOUNTER
Please review pt's [address]    LOV 01/05/23 with Dr. Paco Plummer - for pre-op exam  01/27/23 - Procedure: Right breast 2 site wire localized excisional biopsy, right breast terminal duct excision    Pt requesting referral for Urogyne - Dr. Kali Zayas  \"going in to weigh my options to see the different surgical options I have\"    Order(s) pending, please advise diagnoses. Thank you.

## 2023-02-17 NOTE — TELEPHONE ENCOUNTER
From: Zhen Everett  To: Georgie Fagan MD  Sent: 2/17/2023 11:43 AM CST  Subject: Mallika Zuluaga gynecologist     Can you send a referral for Dr Kassy Kam. They say I may need one because she is with [facility] but she does the surgeries out of [facility] but her office is in [place].

## 2023-02-17 NOTE — TELEPHONE ENCOUNTER
LOV 12/15/22  Last labs 01/05/23  Last refill on 12/15/22, for #180 tabs, with 1 refills  Omeprazole 20 MG Oral Tab EC    Future Appointments   Date Time Provider Fitz Villanueva   4/13/2023  1:00 PM ROSETTA Del Toro [facility]     Order(s) pending, please review. Thank you.

## 2023-03-06 RX ORDER — CELECOXIB 200 MG/1
200 CAPSULE ORAL DAILY
Qty: 90 CAPSULE | Refills: 0 | Status: SHIPPED | OUTPATIENT
Start: 2023-03-06 | End: 2023-06-04

## 2023-03-06 RX ORDER — TRAZODONE HYDROCHLORIDE 50 MG/1
150 TABLET ORAL NIGHTLY
Qty: 270 TABLET | Refills: 0 | Status: SHIPPED | OUTPATIENT
Start: 2023-03-06 | End: 2023-06-04

## 2023-03-06 NOTE — TELEPHONE ENCOUNTER
LOV 01/05/23    Last refill on 12/07/22, for #270 tabs, with 0 refills  traZODone 50 MG Oral Tab    Last refill on 12/06/22, for #90 caps, with 0 refills  celecoxib 200 MG Oral Cap    Future Appointments   Date Time Provider Fitz Villanueva   4/13/2023  1:00 PM ROSETTA Bradford [facility]     Order(s) pending, please review. Thank you.

## 2023-03-09 ENCOUNTER — TELEPHONE (OUTPATIENT)
Dept: NEUROSURGERY | Age: 48
End: 2023-03-09

## 2023-03-19 ENCOUNTER — HOSPITAL ENCOUNTER (OUTPATIENT)
Dept: MRI IMAGING | Age: 48
Discharge: HOME | End: 2023-03-19
Attending: ORTHOPAEDIC SURGERY
Payer: MEDICAID

## 2023-03-19 DIAGNOSIS — G56.21 ULNAR NEUROPATHY OF RIGHT UPPER EXTREMITY: ICD-10-CM

## 2023-03-30 ENCOUNTER — HOSPITAL ENCOUNTER (OUTPATIENT)
Dept: MRI IMAGING | Facility: HOSPITAL | Age: 48
Discharge: HOME | End: 2023-03-30
Attending: ORTHOPAEDIC SURGERY
Payer: MEDICAID

## 2023-03-30 DIAGNOSIS — G56.21 ULNAR NEUROPATHY OF RIGHT UPPER EXTREMITY: ICD-10-CM

## 2023-03-30 PROCEDURE — 73221 MRI JOINT UPR EXTREM W/O DYE: CPT | Performed by: ORTHOPAEDIC SURGERY

## 2023-04-08 RX ORDER — SERTRALINE HYDROCHLORIDE 100 MG/1
TABLET, FILM COATED ORAL
Qty: 90 TABLET | Refills: 1 | Status: CANCELLED | OUTPATIENT
Start: 2023-04-08

## 2023-04-10 NOTE — TELEPHONE ENCOUNTER
LOV   1/5/23  LRF2/10/23  #90 with 1    45 tabs is a 30 day supply so the script written on 2/10/23 for 4 months supply    Called [place] spoke with Will and there are refills on file for the patient they will get it ready for her
normal...

## 2023-04-13 ENCOUNTER — OFFICE VISIT (OUTPATIENT)
Dept: INTERNAL MEDICINE CLINIC | Facility: CLINIC | Age: 48
End: 2023-04-13
Payer: MEDICAID

## 2023-04-13 VITALS
SYSTOLIC BLOOD PRESSURE: 116 MMHG | BODY MASS INDEX: 32.07 KG/M2 | HEART RATE: 72 BPM | RESPIRATION RATE: 18 BRPM | HEIGHT: 63 IN | DIASTOLIC BLOOD PRESSURE: 82 MMHG | WEIGHT: 181 LBS

## 2023-04-13 DIAGNOSIS — Z51.81 ENCOUNTER FOR THERAPEUTIC DRUG MONITORING: Primary | ICD-10-CM

## 2023-04-13 DIAGNOSIS — F32.A ANXIETY AND DEPRESSION: ICD-10-CM

## 2023-04-13 DIAGNOSIS — M54.2 CHRONIC NECK AND BACK PAIN: ICD-10-CM

## 2023-04-13 DIAGNOSIS — M54.9 CHRONIC NECK AND BACK PAIN: ICD-10-CM

## 2023-04-13 DIAGNOSIS — G62.9 NEUROPATHY: ICD-10-CM

## 2023-04-13 DIAGNOSIS — E66.9 CLASS 1 OBESITY WITH SERIOUS COMORBIDITY AND BODY MASS INDEX (BMI) OF 32.0 TO 32.9 IN ADULT, UNSPECIFIED OBESITY TYPE: ICD-10-CM

## 2023-04-13 DIAGNOSIS — K21.9 GASTROESOPHAGEAL REFLUX DISEASE, UNSPECIFIED WHETHER ESOPHAGITIS PRESENT: ICD-10-CM

## 2023-04-13 DIAGNOSIS — F41.9 ANXIETY AND DEPRESSION: ICD-10-CM

## 2023-04-13 DIAGNOSIS — G89.29 CHRONIC NECK AND BACK PAIN: ICD-10-CM

## 2023-04-13 DIAGNOSIS — N31.9 NEUROGENIC BLADDER: ICD-10-CM

## 2023-04-13 PROBLEM — E66.811 CLASS 1 OBESITY WITH SERIOUS COMORBIDITY AND BODY MASS INDEX (BMI) OF 32.0 TO 32.9 IN ADULT: Status: ACTIVE | Noted: 2023-04-13

## 2023-04-13 PROCEDURE — 99214 OFFICE O/P EST MOD 30 MIN: CPT | Performed by: NURSE PRACTITIONER

## 2023-04-13 PROCEDURE — 3008F BODY MASS INDEX DOCD: CPT | Performed by: NURSE PRACTITIONER

## 2023-04-13 PROCEDURE — 3074F SYST BP LT 130 MM HG: CPT | Performed by: NURSE PRACTITIONER

## 2023-04-13 PROCEDURE — 3079F DIAST BP 80-89 MM HG: CPT | Performed by: NURSE PRACTITIONER

## 2023-04-13 RX ORDER — METFORMIN HYDROCHLORIDE 750 MG/1
1500 TABLET, EXTENDED RELEASE ORAL
Qty: 60 TABLET | Refills: 2 | Status: SHIPPED | OUTPATIENT
Start: 2023-04-13

## 2023-04-13 NOTE — PATIENT INSTRUCTIONS
Welcome to the [facility]...your Lifestyle Renovation begins now! Thank you for placing your trust in our health care team, I look forward to working with you along this journey to better health! Next steps:     1. Call our office at [phone] to schedule a personal nutrition consultation with one of our registered dieticians. Bring along your food journal (3 days minimum). See journal options below. 2.  Complete fasting (10-12 hours, water only) labs at [address] [place] [address] lab site prior to next office visit. Lab results will be communicated via [employer]. 3.  Fill your prescribed medication and take as discussed and prescribed: Start Metformin ER as directed. Please try to work on the following dietary changes this first month:    1. Drink water with meals and throughout the day, cut down on soda and/or juice if consumed. Consider flavored water options like Bubbly, Spindrift, Hint and Susan. 2.  Eat breakfast daily and focus on having protein with each meal, examples include: greek yogurt, cottage cheese, hard boiled egg, whole grain toast with peanut butter. Daily protein recommendation to start: 90 grams. 3.  Reduce refined carbohydrates and sugars which includes items such as sweets, as well as rice, pasta, and bread and make sure to choose whole grain options when having them with just 1 serving per meal about the size of your inner palm. 4.  Consume non starchy veggies daily working towards making them a good 50% of your daily food intake. Add them to lunch and dinner consistently. 5.  Start a daily probiotic: VSL#3 is recommended, (order on line at www.vsl3. com). Take 1 capsule daily with water for 30 days, then reduce to 1 every other day (this will reduce the cost). Capsules can be left out for 2 weeks, but then must be refrigerated. Please download ailin My Fitness Kathee Cutting!  Or Net Diary to monitor daily dietary intake and you will be able to see if you are eating the right amount of calories or too much or too little which would hinder weight loss. Additionally this will help to see your daily carbohydrate and protein intake. When you set the joanne up choose 1-2 lbs/week as a goal.  Keeping a paper food journal is an option as well to remain accountable for your choices- this is the start to mindful eating! A low calorie diet has been consistently shown to support weight loss. If you are using paper to log your nutrition I recommend your daily calorie intake to start: 1300. Continue or start exercising to help establish a routine. If not already exercising begin with 1 day and progress as able with long-term goal of 30 minutes 5 days a week at a minimum. Meditation daily can help manage and control stress. Chronic stress can make weight loss difficult. Exercising is one way to help with stress, but meditation using the CALM Joanne or another comparable alternative can be done in your home or place of work with little time commitment. This Joanne can also help work on behavior change and improve sleep. Check out the segment under Calm Masterclass and listen to The 4 Pillars of Health. A great way to begin learning about the foundation of lifestyle with practical tips to use in your every day. Check out www.yourweightmatters. org blog for continued daily support and education along this weight loss journey! Patient Resources:    Personal Training/Fitness Classes/Health Coaching    Marlene Gardner and Bean Sophiaside @ http://www.mitchell-reyes.roe/ Full fitness center with group fitness and personal training located in [facility] available when client of [facility]. Health Coaching with Jannie Washington, Regi Westbrook, and Sheridan Dickey at our [employer]- individual coaching to work on your health goals. Call [phone] and/or email @ [email or web address]  Free 60 minute consult when client of [facility]. 360FIT Nickolas @ https://www.BiBCOM.com/. A variety of group fitness options plus various yoga classes [phone] and/or email Aisha Harper at [email or web address]  [address]  with multiple locations: Aetna (www.My Computer Works), F45 Training (www.KIWATCH), myPizza.com Body Bootcamp (www.OneRecruit), Splendor Telecom UK (www.Springest), The Exercise  (www.exercisecoach.com), Club RebelMail (www.Azendoo)    Online Fitness  Fitness  on Whole Foods in 10 DVD series   www. mjgrp87KQXZnapshop  Chair exercises via Sit and Be Fit (www.sitandbefit.CleanFish) and [address] (www.MOTA Motors) or Rom Pritchard or Mai Ware videos on YouTube. Hip Hop Fit with Tyrone Meng at www.hiphopfit. Snapstream    Apps for on the AERON Lifestyle Technology 7 Minute Workout (orange box with white 7) - free on the go HIIT training joanne  Peloton Jaonne @ wwwBlue Water Technologies    Nutrition Trackers and Tools  LoseIT! And My Fitness Pal apps and on line for tracking nutrition  NOOM - virtual health coaching  FitFoundation (healthy meals on the go) in [facility] @ www. mcnemykzucohu5y. Kasey Horn MD @ www.Arquo Technologies and Michael De Paz (calorie smart and low carb plans recommended) @ www. SDDKSX93.ISD, Metabolic Meals @ www. MyMetabolicMeals. com - individual prepared meals to go  Zoji, Bioscale, International Business Machines, Every Plate, Dynamics Research- on line meal delivery programs for preparation at home  [place] Optiant in [place] for homemade meals to go @ www.mealvillage. com  Diet Doctor @ www. dietdoctor. com - low carb swaps  Tamago - meal prep and planning joanne (www.yummly. com)    Stress Management/Behavior/Mindful Eating  CALM meditation joanne (www.calmZnapshop)  Headspace  Am I Hungry? Mindful eating virtual  joanne (www.Tasspass)  Www.yourweightmatters. org - Obesity Action Coalition sponsored Blog posts daily  Motivation joanne (black box with white \")- daily supportive messages sent to your phone    Books/Video Education/Podcasts  Mindless Eating by Danni Asencio  Why We Get Sick by Amy Jalloh (a book about insulin resistance)  Atomic Habits by Evertt Barthel (a book about taking small steps to promote greater behavior change)   Can't Hurt Me by Roberto Monroe (a book exploring the power of discipline in achieving your goals)  The End of Dieting: How to Live for Life by Dr. Ferdinand Cadena M.D. or listen to The [facility] Episode 61: Understanding \"Nutritarian\" Eating w/Dr. Ferdinand Cadena  Your Body in Balance: The Portfolia of Food, Hormones, and Health by Dr. Patrizia aGma  The Menopause Diet Plan by Nicolasa Bloch and [facility]OSP AT [facility]  The Complete Guide to fasting by Dr. Sulaiman Cameron, [facility] by Hebert Redd, Ph.D, R.D. Weight Loss Surgery Will Not Treat Food Addiction by Tomy Rogers Ph.D  The [address] [place] [address] on plant based nutrition  Fed Up - documentary about obesity (Free on [facility])  The Truth About Sugar - documentary on sugar (Free on Gorb, https://youMobileWebsitesu. be/6U9bsvoLC8a)  The Dr. Tello Minium by Dr. Chelsea Julian MD  Fitlosophy Fitspiration - journal to better health (found at [employer] in fitness aisle)  What Happened to You?- a look at the impact trauma has on behavior written by Karen Rose and Dr. Carine Edmondson Again by Samantha Michelle - discovering your true self after trauma  Oddis Sprain talk on NetTLBX.me, The Call to Courage  Podcasts: The Exam Room by the Physician's Committee, Nutrition Facts by Dr. Jas Doe, Simple Steps to Healthier More Balanced Living with certified health and  Michael Rios.

## 2023-04-25 ENCOUNTER — PATIENT MESSAGE (OUTPATIENT)
Dept: FAMILY MEDICINE CLINIC | Facility: CLINIC | Age: 48
End: 2023-04-25

## 2023-04-26 ENCOUNTER — MED REC SCAN ONLY (OUTPATIENT)
Dept: FAMILY MEDICINE CLINIC | Facility: CLINIC | Age: 48
End: 2023-04-26

## 2023-04-26 NOTE — TELEPHONE ENCOUNTER
From: Esme Singer  To: Georgie Glez MD  Sent: 4/25/2023 9:31 PM CDT  Subject: Handicap placard     Can Dr Betsy Martin fill out the paper for the placard. I have an appointment at the [facility] Thursday morning.

## 2023-04-26 NOTE — TELEPHONE ENCOUNTER
Form reviewed and signed by Georgie Gilmore MD  to [address] [place] [address] Drive 4/26/23 12:45 PM  Filled. Should be ready for you to  today.      Last read by Sander Vargas at 12:49 PM on 4/26/2023      Original copy placed in pt blue book for pt     Copy send to scanning

## 2023-04-28 LAB
CHOL/HDLC RATIO: 3.7 (CALC)
CHOLESTEROL, TOTAL: 221 MG/DL
HDL CHOLESTEROL: 59 MG/DL
HEMOGLOBIN A1C: 5.5 % OF TOTAL HGB
LDL-CHOLESTEROL: 139 MG/DL (CALC)
NON-HDL CHOLESTEROL: 162 MG/DL (CALC)
T4, FREE: 1.1 NG/DL (ref 0.8–1.8)
TRIGLYCERIDES: 111 MG/DL
TSH: 1.32 MIU/L
VITAMIN B12: 514 PG/ML (ref 200–1100)

## 2023-05-07 DIAGNOSIS — E66.9 CLASS 1 OBESITY WITH SERIOUS COMORBIDITY AND BODY MASS INDEX (BMI) OF 32.0 TO 32.9 IN ADULT, UNSPECIFIED OBESITY TYPE: ICD-10-CM

## 2023-05-07 DIAGNOSIS — Z51.81 ENCOUNTER FOR THERAPEUTIC DRUG MONITORING: ICD-10-CM

## 2023-05-07 RX ORDER — METFORMIN HYDROCHLORIDE 750 MG/1
1500 TABLET, EXTENDED RELEASE ORAL
Qty: 60 TABLET | Refills: 2 | OUTPATIENT
Start: 2023-05-07

## 2023-05-10 ENCOUNTER — MED REC SCAN ONLY (OUTPATIENT)
Dept: FAMILY MEDICINE CLINIC | Facility: CLINIC | Age: 48
End: 2023-05-10

## 2023-05-23 ENCOUNTER — OFFICE VISIT (OUTPATIENT)
Dept: NEUROSURGERY | Age: 48
End: 2023-05-23

## 2023-05-23 ENCOUNTER — IMAGING SERVICES (OUTPATIENT)
Dept: GENERAL RADIOLOGY | Age: 48
End: 2023-05-23

## 2023-05-23 ENCOUNTER — APPOINTMENT (OUTPATIENT)
Dept: GENERAL RADIOLOGY | Age: 48
End: 2023-05-23

## 2023-05-23 VITALS
TEMPERATURE: 97.7 F | BODY MASS INDEX: 38.82 KG/M2 | HEART RATE: 82 BPM | HEIGHT: 65 IN | RESPIRATION RATE: 18 BRPM | SYSTOLIC BLOOD PRESSURE: 105 MMHG | DIASTOLIC BLOOD PRESSURE: 68 MMHG | WEIGHT: 233 LBS

## 2023-05-23 DIAGNOSIS — Z98.1 S/P SPINAL FUSION: ICD-10-CM

## 2023-05-23 DIAGNOSIS — Z98.1 S/P SPINAL FUSION: Primary | ICD-10-CM

## 2023-05-23 PROCEDURE — 99244 OFF/OP CNSLTJ NEW/EST MOD 40: CPT | Performed by: NEUROLOGICAL SURGERY

## 2023-05-23 PROCEDURE — 77073 BONE LENGTH STUDIES: CPT | Performed by: NEUROLOGICAL SURGERY

## 2023-05-23 PROCEDURE — 72082 X-RAY EXAM ENTIRE SPI 2/3 VW: CPT | Performed by: NEUROLOGICAL SURGERY

## 2023-05-23 RX ORDER — OXYBUTYNIN CHLORIDE 15 MG/1
TABLET, EXTENDED RELEASE ORAL
COMMUNITY
Start: 2023-02-07

## 2023-05-23 RX ORDER — METFORMIN HYDROCHLORIDE 750 MG/1
1 TABLET, EXTENDED RELEASE ORAL DAILY
COMMUNITY
Start: 2023-04-13

## 2023-05-24 ENCOUNTER — TELEPHONE (OUTPATIENT)
Dept: FAMILY MEDICINE CLINIC | Facility: CLINIC | Age: 48
End: 2023-05-24

## 2023-05-24 NOTE — TELEPHONE ENCOUNTER
Pt met w/ Dr. Lissy Santiago office and is scheduling surgery. Date of surgery not set yet, but it will be through [address] [address] in [place].      Pt will call office as soon as date is set; aware surgeons office will need to fax the orders    Just FYI

## 2023-05-26 NOTE — TELEPHONE ENCOUNTER
Received fax from [employer] regarding pre-op request - placed in pt blue book    DOS 06/19/23  With Dr. George Granados    Trapezial excision arthroplasty, flexor carpi raialis tendon transfer, endoscopic carpal tunnel release, cubital tunnel release  At [facility]    \"testing not required. .please fax history and physical to fax# [phone] three days prior to the procedure date\"    Future Appointments   Date Time Provider Fitz Villanueva   5/30/2023  2:15 PM Ligia Coley MD [facility]   6/5/2023 10:20 AM ROSETTA Butt [facility] ARIANE Gaffney       Left detailed message to voicemail (per verbal release form consent with confirmed identifying message) of note - pre-op request received. Patient was advised to call office back with any questions/concerns.

## 2023-05-26 NOTE — TELEPHONE ENCOUNTER
Pt called back and has scheduled surgery for June 19th with Dr. George Granados.  Orders not received yet

## 2023-05-30 ENCOUNTER — OFFICE VISIT (OUTPATIENT)
Facility: LOCATION | Age: 48
End: 2023-05-30
Payer: MEDICAID

## 2023-05-30 DIAGNOSIS — J34.89 NASAL SEPTAL PERFORATION: Primary | ICD-10-CM

## 2023-05-30 PROCEDURE — 99203 OFFICE O/P NEW LOW 30 MIN: CPT | Performed by: OTOLARYNGOLOGY

## 2023-06-05 ENCOUNTER — OFFICE VISIT (OUTPATIENT)
Dept: FAMILY MEDICINE CLINIC | Facility: CLINIC | Age: 48
End: 2023-06-05
Payer: MEDICAID

## 2023-06-05 VITALS
BODY MASS INDEX: 32 KG/M2 | OXYGEN SATURATION: 98 % | DIASTOLIC BLOOD PRESSURE: 80 MMHG | TEMPERATURE: 97 F | SYSTOLIC BLOOD PRESSURE: 100 MMHG | HEART RATE: 91 BPM | WEIGHT: 181.81 LBS

## 2023-06-05 DIAGNOSIS — G56.02 CARPAL TUNNEL SYNDROME, LEFT: ICD-10-CM

## 2023-06-05 DIAGNOSIS — G56.22 CUBITAL TUNNEL SYNDROME ON LEFT: ICD-10-CM

## 2023-06-05 DIAGNOSIS — Z01.818 PREOP EXAMINATION: Primary | ICD-10-CM

## 2023-06-05 DIAGNOSIS — Z86.2 HISTORY OF ANEMIA: ICD-10-CM

## 2023-06-05 DIAGNOSIS — M54.16 LUMBAR RADICULOPATHY: ICD-10-CM

## 2023-06-05 DIAGNOSIS — M65.332 TRIGGER MIDDLE FINGER OF LEFT HAND: ICD-10-CM

## 2023-06-05 DIAGNOSIS — M18.12 PRIMARY OSTEOARTHRITIS OF FIRST CARPOMETACARPAL JOINT OF LEFT HAND: ICD-10-CM

## 2023-06-05 DIAGNOSIS — G62.9 NEUROPATHY: ICD-10-CM

## 2023-06-05 PROBLEM — D50.9 IRON DEFICIENCY ANEMIA: Status: ACTIVE | Noted: 2022-05-08

## 2023-06-05 PROBLEM — F17.200 TOBACCO DEPENDENCE SYNDROME: Status: RESOLVED | Noted: 2022-01-07 | Resolved: 2023-06-05

## 2023-06-05 PROBLEM — G56.21 ULNAR NEUROPATHY OF RIGHT UPPER EXTREMITY: Status: ACTIVE | Noted: 2023-02-16

## 2023-06-05 PROBLEM — G83.20 MONOPARESIS OF UPPER EXTREMITY (HCC): Status: ACTIVE | Noted: 2023-06-05

## 2023-06-05 LAB
ALBUMIN SERPL-MCNC: 4 G/DL (ref 3.4–5)
ALBUMIN/GLOB SERPL: 1.1 {RATIO} (ref 1–2)
ALP LIVER SERPL-CCNC: 52 U/L
ALT SERPL-CCNC: 22 U/L
ANION GAP SERPL CALC-SCNC: 5 MMOL/L (ref 0–18)
AST SERPL-CCNC: 19 U/L (ref 15–37)
BASOPHILS # BLD AUTO: 0.06 X10(3) UL (ref 0–0.2)
BASOPHILS NFR BLD AUTO: 0.9 %
BILIRUB SERPL-MCNC: 0.4 MG/DL (ref 0.1–2)
BUN BLD-MCNC: 22 MG/DL (ref 7–18)
CALCIUM BLD-MCNC: 9.1 MG/DL (ref 8.5–10.1)
CHLORIDE SERPL-SCNC: 109 MMOL/L (ref 98–112)
CO2 SERPL-SCNC: 24 MMOL/L (ref 21–32)
CREAT BLD-MCNC: 0.94 MG/DL
EOSINOPHIL # BLD AUTO: 0.07 X10(3) UL (ref 0–0.7)
EOSINOPHIL NFR BLD AUTO: 1.1 %
ERYTHROCYTE [DISTWIDTH] IN BLOOD BY AUTOMATED COUNT: 16.6 %
FASTING STATUS PATIENT QL REPORTED: YES
GFR SERPLBLD BASED ON 1.73 SQ M-ARVRAT: 74 ML/MIN/1.73M2 (ref 60–?)
GLOBULIN PLAS-MCNC: 3.6 G/DL (ref 2.8–4.4)
GLUCOSE BLD-MCNC: 91 MG/DL (ref 70–99)
HCT VFR BLD AUTO: 36 %
HGB BLD-MCNC: 11.4 G/DL
IMM GRANULOCYTES # BLD AUTO: 0.02 X10(3) UL (ref 0–1)
IMM GRANULOCYTES NFR BLD: 0.3 %
LYMPHOCYTES # BLD AUTO: 1.48 X10(3) UL (ref 1–4)
LYMPHOCYTES NFR BLD AUTO: 22.8 %
MCH RBC QN AUTO: 24.7 PG (ref 26–34)
MCHC RBC AUTO-ENTMCNC: 31.7 G/DL (ref 31–37)
MCV RBC AUTO: 77.9 FL
MONOCYTES # BLD AUTO: 0.29 X10(3) UL (ref 0.1–1)
MONOCYTES NFR BLD AUTO: 4.5 %
NEUTROPHILS # BLD AUTO: 4.56 X10 (3) UL (ref 1.5–7.7)
NEUTROPHILS # BLD AUTO: 4.56 X10(3) UL (ref 1.5–7.7)
NEUTROPHILS NFR BLD AUTO: 70.4 %
OSMOLALITY SERPL CALC.SUM OF ELEC: 289 MOSM/KG (ref 275–295)
PLATELET # BLD AUTO: 289 10(3)UL (ref 150–450)
POTASSIUM SERPL-SCNC: 3.9 MMOL/L (ref 3.5–5.1)
PROT SERPL-MCNC: 7.6 G/DL (ref 6.4–8.2)
RBC # BLD AUTO: 4.62 X10(6)UL
SODIUM SERPL-SCNC: 138 MMOL/L (ref 136–145)
WBC # BLD AUTO: 6.5 X10(3) UL (ref 4–11)

## 2023-06-05 PROCEDURE — 85025 COMPLETE CBC W/AUTO DIFF WBC: CPT | Performed by: NURSE PRACTITIONER

## 2023-06-05 PROCEDURE — 80053 COMPREHEN METABOLIC PANEL: CPT | Performed by: NURSE PRACTITIONER

## 2023-06-05 PROCEDURE — 3074F SYST BP LT 130 MM HG: CPT | Performed by: NURSE PRACTITIONER

## 2023-06-05 PROCEDURE — 99214 OFFICE O/P EST MOD 30 MIN: CPT | Performed by: NURSE PRACTITIONER

## 2023-06-05 PROCEDURE — 3079F DIAST BP 80-89 MM HG: CPT | Performed by: NURSE PRACTITIONER

## 2023-06-05 RX ORDER — SERTRALINE HYDROCHLORIDE 100 MG/1
TABLET, FILM COATED ORAL
Qty: 90 TABLET | Refills: 0 | Status: SHIPPED | OUTPATIENT
Start: 2023-06-05

## 2023-06-05 RX ORDER — OMEPRAZOLE 20 MG/1
40 CAPSULE, DELAYED RELEASE ORAL DAILY
COMMUNITY
Start: 2023-05-11

## 2023-06-05 RX ORDER — TRAZODONE HYDROCHLORIDE 150 MG/1
50 TABLET ORAL 3 TIMES DAILY
COMMUNITY
End: 2023-06-05

## 2023-06-05 RX ORDER — CELECOXIB 200 MG/1
200 CAPSULE ORAL DAILY
Qty: 90 CAPSULE | Refills: 0 | Status: SHIPPED | OUTPATIENT
Start: 2023-06-05 | End: 2023-09-03

## 2023-06-05 RX ORDER — GABAPENTIN 600 MG/1
600 TABLET ORAL 3 TIMES DAILY
Qty: 270 TABLET | Refills: 0 | Status: SHIPPED | OUTPATIENT
Start: 2023-06-05 | End: 2023-09-03

## 2023-06-05 RX ORDER — CELECOXIB 100 MG/1
100 CAPSULE ORAL 2 TIMES DAILY
COMMUNITY
End: 2023-06-05

## 2023-06-05 RX ORDER — TRAZODONE HYDROCHLORIDE 50 MG/1
150 TABLET ORAL NIGHTLY
Qty: 90 TABLET | Refills: 0 | Status: SHIPPED | OUTPATIENT
Start: 2023-06-05 | End: 2023-07-05

## 2023-06-05 RX ORDER — CELECOXIB 200 MG/1
200 CAPSULE ORAL DAILY
Qty: 90 CAPSULE | Refills: 0 | OUTPATIENT
Start: 2023-06-05 | End: 2023-09-03

## 2023-06-06 ENCOUNTER — TELEPHONE (OUTPATIENT)
Dept: FAMILY MEDICINE CLINIC | Facility: CLINIC | Age: 48
End: 2023-06-06

## 2023-06-07 ENCOUNTER — TELEPHONE (OUTPATIENT)
Dept: FAMILY MEDICINE CLINIC | Facility: CLINIC | Age: 48
End: 2023-06-07

## 2023-06-07 NOTE — TELEPHONE ENCOUNTER
Please addend notes for pre-surgical visit. Labs have been completed. Please review labs and addend and clear patient. Thank you!     ILIR patient

## 2023-06-07 NOTE — TELEPHONE ENCOUNTER
\"please fax the completed H&P to fax #[phone] three days prior to procedure date for review by the anesthesiologist\"    Procedure date 06/19/23    Need to send on 06/16/23

## 2023-06-07 NOTE — TELEPHONE ENCOUNTER
Called Flor back #[phone]    Advised of note below - she v/u and confirmed received updated note    No further questions at this time

## 2023-06-28 RX ORDER — SERTRALINE HYDROCHLORIDE 100 MG/1
TABLET, FILM COATED ORAL
Qty: 90 TABLET | Refills: 0 | OUTPATIENT
Start: 2023-06-28

## 2023-06-28 RX ORDER — TRAZODONE HYDROCHLORIDE 50 MG/1
150 TABLET ORAL NIGHTLY
Qty: 90 TABLET | Refills: 0 | OUTPATIENT
Start: 2023-06-28 | End: 2023-07-28

## 2023-06-29 ENCOUNTER — PATIENT MESSAGE (OUTPATIENT)
Dept: FAMILY MEDICINE CLINIC | Facility: CLINIC | Age: 48
End: 2023-06-29

## 2023-06-29 RX ORDER — TRAZODONE HYDROCHLORIDE 50 MG/1
150 TABLET ORAL NIGHTLY
Qty: 90 TABLET | Refills: 0 | Status: SHIPPED | OUTPATIENT
Start: 2023-06-29 | End: 2023-07-29

## 2023-07-13 RX ORDER — TRAZODONE HYDROCHLORIDE 50 MG/1
150 TABLET ORAL NIGHTLY
Qty: 90 TABLET | Refills: 0 | Status: SHIPPED | OUTPATIENT
Start: 2023-07-13 | End: 2023-08-12

## 2023-07-13 NOTE — TELEPHONE ENCOUNTER
No refill protocol for this medication. Last refill: 6/29/2023 #90 with 0 refills  Last Visit: 6/05/2023  Next Visit:   No Future Appointments         Forward to ROSETTA Caballero please advise on refills. Thanks.

## 2023-08-06 RX ORDER — SERTRALINE HYDROCHLORIDE 100 MG/1
TABLET, FILM COATED ORAL
Qty: 90 TABLET | Refills: 1 | Status: SHIPPED | OUTPATIENT
Start: 2023-08-06

## 2023-08-21 ENCOUNTER — OFFICE VISIT (OUTPATIENT)
Dept: INTERNAL MEDICINE CLINIC | Facility: CLINIC | Age: 48
End: 2023-08-21
Payer: MEDICAID

## 2023-08-21 VITALS
WEIGHT: 179 LBS | OXYGEN SATURATION: 98 % | HEIGHT: 63 IN | DIASTOLIC BLOOD PRESSURE: 78 MMHG | RESPIRATION RATE: 18 BRPM | SYSTOLIC BLOOD PRESSURE: 120 MMHG | HEART RATE: 88 BPM | BODY MASS INDEX: 31.71 KG/M2

## 2023-08-21 DIAGNOSIS — N31.9 NEUROGENIC BLADDER: ICD-10-CM

## 2023-08-21 DIAGNOSIS — G89.29 CHRONIC NECK AND BACK PAIN: ICD-10-CM

## 2023-08-21 DIAGNOSIS — E66.9 OBESITY (BMI 30-39.9): ICD-10-CM

## 2023-08-21 DIAGNOSIS — M54.9 CHRONIC NECK AND BACK PAIN: ICD-10-CM

## 2023-08-21 DIAGNOSIS — F32.A ANXIETY AND DEPRESSION: ICD-10-CM

## 2023-08-21 DIAGNOSIS — F41.9 ANXIETY AND DEPRESSION: ICD-10-CM

## 2023-08-21 DIAGNOSIS — M54.2 CHRONIC NECK AND BACK PAIN: ICD-10-CM

## 2023-08-21 DIAGNOSIS — Z51.81 ENCOUNTER FOR THERAPEUTIC DRUG MONITORING: Primary | ICD-10-CM

## 2023-08-21 PROCEDURE — 3008F BODY MASS INDEX DOCD: CPT | Performed by: NURSE PRACTITIONER

## 2023-08-21 PROCEDURE — 99214 OFFICE O/P EST MOD 30 MIN: CPT | Performed by: NURSE PRACTITIONER

## 2023-08-21 PROCEDURE — 3078F DIAST BP <80 MM HG: CPT | Performed by: NURSE PRACTITIONER

## 2023-08-21 PROCEDURE — 3074F SYST BP LT 130 MM HG: CPT | Performed by: NURSE PRACTITIONER

## 2023-08-21 RX ORDER — HYDROCODONE BITARTRATE AND ACETAMINOPHEN 5; 325 MG/1; MG/1
TABLET ORAL
COMMUNITY
Start: 2023-06-19

## 2023-08-21 NOTE — PATIENT INSTRUCTIONS
Next steps:  1.  we will call you tomorrow about medications   2. Schedule a personal nutrition consultation with one of our registered dieticians     Please try to work on the following dietary changes:  Daily protein recommendation to start:  grams  Daily carbohydrate: <100g  Daily calories: 1,200  1. Drink water with meals and throughout the day, cut down on soda and/or juice if consumed. Consider flavored water options like Bubbly, Spindrift, Hint and Susan. 2.  Eat breakfast daily and focus on having protein with each meal, examples include: greek yogurt, cottage cheese, hard boiled egg, whole grain toast with peanut butter. 3.  Reduce refined carbohydrates and sugars which includes items such as sweets, as well as rice, pasta, and bread and make sure to choose whole grain options when having them with just 1 serving per meal about the size of your inner palm. 4.  Consume non starchy veggies daily working towards making them a good 50% of your daily food intake. Add them to lunch and dinner consistently. 5.  Start a daily probiotic: VSL#3 is recommended, (order on line at www.vsl3. com). Take 1 capsule daily with water for 30 days, then reduce to 1 every other day (this will reduce the cost). Capsules can be left out for 2 weeks, but then must be refrigerated. Please download ailin My Fitness Orvel Dubin! Or Net Diary to monitor daily dietary intake and you will be able to see if you are eating the right amount of calories or too much or too little which would hinder weight loss. Additionally this will help to see your daily carbohydrate and protein intake. When you set the ailin up choose 1-2 lbs/week as a goal.  Keeping a paper food journal is an option as well to remain accountable for your choices- this is the start to mindful eating! A low calorie diet has been consistently shown to support weight loss. Continue or start exercising to help establish a routine.  If not already exercising begin with 1 day and progress as able with long-term goal of 30 minutes 5 days a week at a minimum. Meditation daily can help manage and control stress. Chronic stress can make weight loss difficult. Exercising is one way to help with stress, but meditation using the CALM Joanne or another comparable alternative can be done in your home or place of work with little time commitment. This Joanne can also help work on behavior change and improve sleep. Check out the segment under Calm Masterclass and listen to The 4 Pillars of Health. A great way to begin learning about the foundation of lifestyle with practical tips to use in your every day. Check out www.yourweightmatters. org blog for continued daily support and education along this weight loss journey! Patient Resources:     Personal Training/Fitness Classes/Health Coaching     [facility] MAGDA and [place] @ http://www.Montefiore Nyack Hospitalreyes.roe/ Full fitness center with group fitness and personal training. Discount available as client of [facility]. Health Coaching and Personal Training with Mle Rodney at our [facility]- individual weekly coaching with option to add personal training and small group fitness classes targeted at weight loss- [phone] and/or email @ Joan Murray. Jamie@Aryaka Networks. org  360FIT [place] [email or web address] Group Fitness [phone] and/or email Belinda Griffiths at [facility] [email or web address] Medical [email or web address]. DB Networks  [address] [facility] with multiple locations: Aetna (www.Replica Labs. DB Networks), Eat The Frog Fitness (www.Kandu. DB Networks), Fit Body Bootcamp (www.AudiencePointbodybootcamp.DB Networks), HITbills Fitness (www.Vaccine Technologies International. DB Networks), The Exercise  (www.exercisecoach.DB Networks)     Online Fitness  Fitness  on Whole Foods in 10 DVD series- www. aucnn98UTK. DB Networks  Sit and Be Fit - Chair exercise series Www.sitandbefit. org  Hip Hop Fit with Tyrone Meng at www.hiphopfit. net Apps for on the Hojo.pl 7 Minute Workout (orange box with white 7) - free on the go HIIT training joanne  Peloton Joanne @ www. Swagbucks     Nutrition Trackers and Tools  LoseIT! And My Fitness Pal apps and on line for tracking nutrition  NOOM - virtual health coaching  FitFoundation (healthy meals on the go) in [place] @ www. xzuflghhqhpek7c. Jo Webb MD @ www.NextMusic.TVtromdMedia Matchmaker and Landy Iraheta (keto and low carb plans recommended) @ www. JUZIGN37.EEP, Metabolic Meals @ www. PressConnectMetabolicMeals. com - individual prepared meals to go  LookMedBook, "[employer], [place]", International Business Machines, Every Plate, Appuri- on line meal delivery programs for preparation at home  AK Paystik in [place] for homemade meals to go @ www.Jotky. Given.to  Diet Doctor @ www. dietdoctor. Given.to - low carb swaps  [employer] - meal prep and planning joanne (www.yummly. com)     Stress Management/Behavior/Mindful Eating  CALM meditation joanne (www.PEARL Unlimited Holdings)  Headspace  Am I Hungry? Mindful eating virtual  joanne  Www.yourweightmatters. org - Obesity Action Coalition sponsored Blog posts daily  Motivation joanne (black box with white \")- daily supportive messages sent to your phone     Books/Video Education/Podcasts  Mindless Eating by Aicha Elizabeth  Why We Get Sick by Amy Hernandez (a book about insulin resistance)  Atomic Habits by Soraida Nowak (a book about taking small steps to promote greater behavior change)   Can't Hurt Me by Oscar Diane (a book exploring the power of discipline in achieving your goals)  The End of Dieting: How to Live for Life by Dr. Mary Jane Valerio M.D. or listen to The [address] [facility] [address] Episode 61: Understanding \"Nutritarian\" Eating w/Dr. Mary Jane Valerio  Your Body in Balance: The World Fuel Services Corporation of Food, Hormones, and Health by Dr. Johana Clark  The Menopause Diet Plan by Jamie Meehan and [facility] HOSP AT [facility]  The Complete Guide to fasting by Dr. Murphy Hargrove, [facility] by Denise White, Ph.D, R.D.   Weight Loss Surgery Will Not Treat Food Addiction by Tima Carson Ph.D  The [address] [place] [address] on plant based nutrition  Fed Up - documentary about obesity (Free on [place])  The Truth About Sugar - documentary on sugar (Free on Utube, https://youtu. be/5J5tvqsGZ5s)  The Dr. Komal Hoang by Dr. Clemencia Schilling MD  Fitlosophy Fitspiration - journal to better health (found at [employer] in fitness aisle)  What Happened to You?- a look at the impact trauma has on behavior written by Ana María Basilio and Dr. Afua Bruce Again by Armen Louis - discovering your true self after trauma  Luca Aden talk on Gigturn, The Call to Ifensi.comage  Podcasts: The Exam Room by the Physician's Committee, Nutrition Facts by Dr. Wilkinson Lively    We are here to support you with weight loss, but please remember that you still need your primary care provider for your routine health maintenance.

## 2023-08-23 PROBLEM — E66.9 OBESITY (BMI 30-39.9): Status: ACTIVE | Noted: 2023-04-13

## 2023-08-23 NOTE — TELEPHONE ENCOUNTER
Patient saw AMF as new patient and was to start a medicine but [facility] was to talk to another doctor about it and let her know. She is calling to f/u on that.

## 2023-08-24 RX ORDER — TRAZODONE HYDROCHLORIDE 50 MG/1
150 TABLET ORAL NIGHTLY
Qty: 90 TABLET | Refills: 0 | Status: SHIPPED | OUTPATIENT
Start: 2023-08-24 | End: 2023-09-23

## 2023-08-24 RX ORDER — TOPIRAMATE 25 MG/1
TABLET ORAL
Qty: 60 TABLET | Refills: 1 | Status: SHIPPED | OUTPATIENT
Start: 2023-08-24

## 2023-08-24 NOTE — TELEPHONE ENCOUNTER
Last OV:06/05/2023  Last refill:07/13/2023, 90 tabs, 0 refill     Medication pended, please sign if appropriate

## 2023-09-01 RX ORDER — CELECOXIB 200 MG/1
200 CAPSULE ORAL DAILY
Qty: 90 CAPSULE | Refills: 0 | Status: SHIPPED | OUTPATIENT
Start: 2023-09-01 | End: 2023-11-30

## 2023-09-12 ENCOUNTER — HOSPITAL ENCOUNTER (OUTPATIENT)
Dept: MAMMOGRAPHY | Age: 48
Discharge: HOME | End: 2023-09-12
Attending: SURGERY
Payer: MEDICAID

## 2023-09-12 DIAGNOSIS — N60.91 ATYPICAL DUCTAL HYPERPLASIA OF RIGHT BREAST: ICD-10-CM

## 2023-09-12 PROCEDURE — 77066 DX MAMMO INCL CAD BI: CPT | Performed by: SURGERY

## 2023-09-12 PROCEDURE — 77062 BREAST TOMOSYNTHESIS BI: CPT | Performed by: SURGERY

## 2023-09-23 DIAGNOSIS — M54.16 LUMBAR RADICULOPATHY: ICD-10-CM

## 2023-09-23 DIAGNOSIS — G62.9 NEUROPATHY: ICD-10-CM

## 2023-09-25 RX ORDER — GABAPENTIN 600 MG/1
600 TABLET ORAL 3 TIMES DAILY
Qty: 270 TABLET | Refills: 0 | Status: SHIPPED | OUTPATIENT
Start: 2023-09-25 | End: 2023-12-24

## 2023-09-25 NOTE — TELEPHONE ENCOUNTER
No refill protocol for this medication. Last refill: 6/05/2023 #270 with 0 refills  Last Visit: 6/05/2023  Next Visit:   No Future Appointments         Forward to ROSETTA Ibarra please advise on refills. Thanks.

## 2023-09-26 RX ORDER — TRAZODONE HYDROCHLORIDE 50 MG/1
150 TABLET ORAL NIGHTLY
Qty: 90 TABLET | Refills: 0 | Status: SHIPPED | OUTPATIENT
Start: 2023-09-26

## 2023-09-26 NOTE — TELEPHONE ENCOUNTER
Last OV:06/05/2023  Last refill:08/24/2023, 90 tabs, 0 refill       Medication pended, please sign if appropriate

## 2023-10-05 ENCOUNTER — OFFICE VISIT (OUTPATIENT)
Dept: FAMILY MEDICINE CLINIC | Facility: CLINIC | Age: 48
End: 2023-10-05
Payer: MEDICAID

## 2023-10-05 VITALS
OXYGEN SATURATION: 98 % | SYSTOLIC BLOOD PRESSURE: 122 MMHG | RESPIRATION RATE: 16 BRPM | DIASTOLIC BLOOD PRESSURE: 78 MMHG | TEMPERATURE: 98 F | HEART RATE: 84 BPM

## 2023-10-05 DIAGNOSIS — R30.0 DYSURIA: ICD-10-CM

## 2023-10-05 DIAGNOSIS — R39.9 UTI SYMPTOMS: Primary | ICD-10-CM

## 2023-10-05 LAB
BILIRUBIN: NEGATIVE
GLUCOSE (URINE DIPSTICK): NEGATIVE MG/DL
KETONES (URINE DIPSTICK): NEGATIVE MG/DL
MULTISTIX LOT#: ABNORMAL NUMERIC
NITRITE, URINE: NEGATIVE
OCCULT BLOOD: NEGATIVE
PH, URINE: 7.5 (ref 4.5–8)
PROTEIN (URINE DIPSTICK): 30 MG/DL
SPECIFIC GRAVITY: 1.02 (ref 1–1.03)
URINE-COLOR: YELLOW
UROBILINOGEN,SEMI-QN: 0.2 MG/DL (ref 0–1.9)

## 2023-10-05 PROCEDURE — 87086 URINE CULTURE/COLONY COUNT: CPT | Performed by: FAMILY MEDICINE

## 2023-10-05 PROCEDURE — 3078F DIAST BP <80 MM HG: CPT | Performed by: FAMILY MEDICINE

## 2023-10-05 PROCEDURE — 99213 OFFICE O/P EST LOW 20 MIN: CPT | Performed by: FAMILY MEDICINE

## 2023-10-05 PROCEDURE — 81003 URINALYSIS AUTO W/O SCOPE: CPT | Performed by: FAMILY MEDICINE

## 2023-10-05 PROCEDURE — 3074F SYST BP LT 130 MM HG: CPT | Performed by: FAMILY MEDICINE

## 2023-10-05 RX ORDER — SULFAMETHOXAZOLE AND TRIMETHOPRIM 800; 160 MG/1; MG/1
1 TABLET ORAL 2 TIMES DAILY
Qty: 14 TABLET | Refills: 0 | Status: SHIPPED | OUTPATIENT
Start: 2023-10-05 | End: 2023-10-12

## 2023-10-15 ENCOUNTER — PATIENT MESSAGE (OUTPATIENT)
Dept: FAMILY MEDICINE CLINIC | Facility: CLINIC | Age: 48
End: 2023-10-15

## 2023-10-16 NOTE — TELEPHONE ENCOUNTER
Misty, do you feel comfortable filling out a form for this patient since I have not seen her? If not, she will need appointment with me.   Thank you

## 2023-10-16 NOTE — TELEPHONE ENCOUNTER
Routing to [facility]- this patiet is looking for her parking placard to be filled out, former moorthie patient? Can you fill that out? She saw Daya Briggs once for a pre-op in June?

## 2023-10-18 NOTE — TELEPHONE ENCOUNTER
APRN's can fill these forms out per Section 1-159.1 and 3-616 of the "[employer]. I've filled them out before and when in doubt, I asked my collab physicians to cosign. I've only ever offered temporary though. Piter Hendricks, would you reconsider to save the patient an office visit? Dr. Frederick Wyatt offered temporary 04/26/2023. Patient has monoparesis, hx of spinal surgery, lumbar radiculopathy, and neuropathy.

## 2023-10-19 NOTE — TELEPHONE ENCOUNTER
I cannot fill out that form. I saw patient for a preop for carpal tunnel, so I really didn't evaluate the conditions she is asking for permanent placard for. Who filled out her disability forms? That provider may be more appropriate. Or needs to schedule physical with provider in [place] and can discuss placard then. Thank you for the info Beatriz Baker! I'm glad to hear we can do these forms for patients.

## 2023-10-19 NOTE — TELEPHONE ENCOUNTER
Please let the patient know the best I can do is provide for temporary since I have not seen her. I can review documentation and provide based on documentation. Collab physician agrees. Thank you    + multiple level DDD  + monoparesis of upper extremity  + spinal stenosis  + ulnar neuropathy of RUE  + cervical spondylosis  + hx of cervical and lumbar fusion    Form completed and is in patient pick-up folder [place] station.

## 2023-10-20 ENCOUNTER — MED REC SCAN ONLY (OUTPATIENT)
Dept: FAMILY MEDICINE CLINIC | Facility: CLINIC | Age: 48
End: 2023-10-20

## 2023-10-20 ENCOUNTER — PATIENT MESSAGE (OUTPATIENT)
Dept: INTERNAL MEDICINE CLINIC | Facility: CLINIC | Age: 48
End: 2023-10-20

## 2023-10-20 RX ORDER — TOPIRAMATE 50 MG/1
50 TABLET, FILM COATED ORAL 2 TIMES DAILY
Qty: 60 TABLET | Refills: 2 | Status: SHIPPED | OUTPATIENT
Start: 2023-10-20

## 2023-10-21 RX ORDER — TOPIRAMATE 25 MG/1
TABLET ORAL
Qty: 60 TABLET | Refills: 0 | OUTPATIENT
Start: 2023-10-21

## 2023-10-24 RX ORDER — TRAZODONE HYDROCHLORIDE 50 MG/1
150 TABLET ORAL NIGHTLY
Qty: 90 TABLET | Refills: 0 | Status: SHIPPED | OUTPATIENT
Start: 2023-10-24

## 2023-10-24 NOTE — TELEPHONE ENCOUNTER
LOV: 6/5/23    LAST LAB:6/5/23    LAST RX: 9/26/23    Next OV:   Future Appointments   Date Time Provider Fitz Kay   11/13/2023 12:30 PM Jason Mclean MD [facility]   12/1/2023 10:40 AM Blake Fatima APRN [facility] [address]         PROTOCOL: n/a

## 2023-11-01 ENCOUNTER — PATIENT MESSAGE (OUTPATIENT)
Dept: FAMILY MEDICINE CLINIC | Facility: CLINIC | Age: 48
End: 2023-11-01

## 2023-11-01 RX ORDER — SERTRALINE HYDROCHLORIDE 100 MG/1
150 TABLET, FILM COATED ORAL DAILY
Qty: 45 TABLET | Refills: 0 | Status: SHIPPED | OUTPATIENT
Start: 2023-11-01 | End: 2023-12-01

## 2023-11-01 NOTE — TELEPHONE ENCOUNTER
Please see pt's [facility]    Last refill on 02/10/23, for #90 tabs, with 1 refills  sertraline 100 MG Oral Tab  Sig:   Take 1.5 tabs for total 150 mg once daily        Last refill on 08/06/23, for #90 tabs, with 1 refills  sertraline 100 MG Oral Tab     Order(s) pending, please review. Thank you.

## 2023-11-01 NOTE — TELEPHONE ENCOUNTER
From: Christy Mcpherson  To: Jessie Esqueda  Sent: 11/1/2023 8:15 AM CDT  Subject: Meds    I'm not sure where the mix up started but I've been short on my sertraline every month. Dr Claire Deshpande prescribed me 150mg daily I went to fill my script today. They won't fill until the 14th and I have none. This is the worst time of year to be out can you adjust this please. I usually take 1and a half tabs a day.  Thanks

## 2023-11-02 ENCOUNTER — E-ADVICE (OUTPATIENT)
Dept: NEUROSURGERY | Age: 48
End: 2023-11-02

## 2023-11-03 ENCOUNTER — PATIENT MESSAGE (OUTPATIENT)
Dept: INTERNAL MEDICINE CLINIC | Facility: CLINIC | Age: 48
End: 2023-11-03

## 2023-11-04 NOTE — TELEPHONE ENCOUNTER
From: Charmayne Knudsen  To: Catrinalaura Greenberg  Sent: 11/3/2023 2:55 PM CDT  Subject: Medication     Hello ok so we increased the Topamax and I'm 180lbs now. I was wondering if I could use awesome Ozempic? Two of my Aunts and my daughter have been using it and none are diabetic. Could that be an option for me. We have a video appointment but not until Dec. I was just hoping for something especially because you usually gain weight around the holidays and my depression hits hard towards the end of the year so I'm fighting an up hill burk. Please let me know if that's a possibility, it sounds reassuring but I'm not a doc.

## 2023-11-13 ENCOUNTER — TELEPHONE (OUTPATIENT)
Dept: NEUROSURGERY | Age: 48
End: 2023-11-13

## 2023-11-13 ENCOUNTER — OFFICE VISIT (OUTPATIENT)
Dept: FAMILY MEDICINE CLINIC | Facility: CLINIC | Age: 48
End: 2023-11-13
Payer: MEDICAID

## 2023-11-13 VITALS
WEIGHT: 177.63 LBS | HEART RATE: 63 BPM | TEMPERATURE: 97 F | HEIGHT: 63 IN | SYSTOLIC BLOOD PRESSURE: 100 MMHG | BODY MASS INDEX: 31.47 KG/M2 | OXYGEN SATURATION: 99 % | DIASTOLIC BLOOD PRESSURE: 60 MMHG

## 2023-11-13 DIAGNOSIS — M54.2 CHRONIC NECK PAIN WITH HISTORY OF CERVICAL SPINAL SURGERY: Primary | ICD-10-CM

## 2023-11-13 DIAGNOSIS — G89.28 CHRONIC NECK PAIN WITH HISTORY OF CERVICAL SPINAL SURGERY: Primary | ICD-10-CM

## 2023-11-13 DIAGNOSIS — G62.9 NEUROPATHY: ICD-10-CM

## 2023-11-13 DIAGNOSIS — Z90.711 HISTORY OF PARTIAL HYSTERECTOMY: ICD-10-CM

## 2023-11-13 DIAGNOSIS — Z98.890 CHRONIC NECK PAIN WITH HISTORY OF CERVICAL SPINAL SURGERY: Primary | ICD-10-CM

## 2023-11-13 DIAGNOSIS — R53.83 FATIGUE, UNSPECIFIED TYPE: ICD-10-CM

## 2023-11-13 DIAGNOSIS — R42 DIZZINESS: ICD-10-CM

## 2023-11-13 DIAGNOSIS — M54.16 LUMBAR RADICULOPATHY: ICD-10-CM

## 2023-11-13 DIAGNOSIS — F33.8 SEASONAL AFFECTIVE DISORDER (HCC): ICD-10-CM

## 2023-11-13 PROBLEM — N39.3 SUI (STRESS URINARY INCONTINENCE, FEMALE): Status: ACTIVE | Noted: 2023-10-11

## 2023-11-13 PROBLEM — N39.41 URGE INCONTINENCE OF URINE: Status: ACTIVE | Noted: 2023-01-05

## 2023-11-13 PROBLEM — M18.12 OSTEOARTHRITIS OF CARPOMETACARPAL (CMC) JOINT OF LEFT THUMB: Status: ACTIVE | Noted: 2023-05-24

## 2023-11-13 LAB
FSH SERPL-ACNC: 98.8 MIU/ML
T4 FREE SERPL-MCNC: 1.1 NG/DL (ref 0.8–1.7)
TSI SER-ACNC: 0.93 MIU/ML (ref 0.36–3.74)
VIT D+METAB SERPL-MCNC: 10.5 NG/ML (ref 30–100)

## 2023-11-13 PROCEDURE — 82306 VITAMIN D 25 HYDROXY: CPT | Performed by: FAMILY MEDICINE

## 2023-11-13 PROCEDURE — 84439 ASSAY OF FREE THYROXINE: CPT | Performed by: FAMILY MEDICINE

## 2023-11-13 PROCEDURE — 84443 ASSAY THYROID STIM HORMONE: CPT | Performed by: FAMILY MEDICINE

## 2023-11-13 PROCEDURE — 83001 ASSAY OF GONADOTROPIN (FSH): CPT | Performed by: FAMILY MEDICINE

## 2023-11-13 RX ORDER — PSEUDOEPHEDRINE HCL 30 MG
100 TABLET ORAL
COMMUNITY
Start: 2023-10-31

## 2023-11-13 RX ORDER — MIRABEGRON 50 MG/1
50 TABLET, FILM COATED, EXTENDED RELEASE ORAL DAILY
COMMUNITY
Start: 2023-10-09

## 2023-11-14 ENCOUNTER — PATIENT MESSAGE (OUTPATIENT)
Dept: FAMILY MEDICINE CLINIC | Facility: CLINIC | Age: 48
End: 2023-11-14

## 2023-11-14 DIAGNOSIS — Z13.820 ENCOUNTER FOR OSTEOPOROSIS SCREENING IN ASYMPTOMATIC POSTMENOPAUSAL PATIENT: Primary | ICD-10-CM

## 2023-11-14 DIAGNOSIS — Z78.0 ENCOUNTER FOR OSTEOPOROSIS SCREENING IN ASYMPTOMATIC POSTMENOPAUSAL PATIENT: Primary | ICD-10-CM

## 2023-11-14 NOTE — TELEPHONE ENCOUNTER
Beau Wang MD  11/14/2023  7:38 AM CST       Please let pt know that she is in menopause and will benefit form a bone density scan. Her vit D is very low. She needs to take at leas 2000IU daily and I will recheck this in 3 months.  Thank you     Dexa ordered as Per above result note

## 2023-11-14 NOTE — TELEPHONE ENCOUNTER
From: Chris Cox  To: Neha Dover  Sent: 11/14/2023 10:36 AM CST  Subject: Bone Density     Who do I get the test from.

## 2023-11-24 RX ORDER — TRAZODONE HYDROCHLORIDE 50 MG/1
TABLET ORAL
Qty: 90 TABLET | Refills: 0 | OUTPATIENT
Start: 2023-11-24

## 2023-11-24 NOTE — TELEPHONE ENCOUNTER
LOV 11/13/23    Last Refill 10/24/23 Refill 0 #90     Future Appointments   Date Time Provider Department Center   12/1/2023 10:40 AM Rosy Biggs APRN [facility] [address]   12/5/2023  2:20 PM PF DEXA 1 PF DEXA [place]

## 2023-11-27 RX ORDER — TRAZODONE HYDROCHLORIDE 150 MG/1
150 TABLET ORAL NIGHTLY
Qty: 90 TABLET | Refills: 0 | Status: SHIPPED | OUTPATIENT
Start: 2023-11-27

## 2023-11-27 NOTE — TELEPHONE ENCOUNTER
Please let pt know that the script has been changed to 150 mg nightly but just one tablet.    She was taking 3x50mg tablets

## 2023-11-27 NOTE — TELEPHONE ENCOUNTER
Last OV 11/13/23: advised to return in April for wellness  Last lab 6/5/23 cmp  Last refilled 10/24/23  #90  0 refilled

## 2023-11-28 RX ORDER — CELECOXIB 200 MG/1
200 CAPSULE ORAL DAILY
Qty: 90 CAPSULE | Refills: 0 | Status: SHIPPED | OUTPATIENT
Start: 2023-11-28 | End: 2024-02-26

## 2023-11-28 NOTE — TELEPHONE ENCOUNTER
LOV 11/13/23  Last labs 11/13/23  Last refill on 09/01/23, for #90 caps, with 0 refills  CELECOXIB 200 MG Oral Cap     Future Appointments   Date Time Provider Fitz Villanueva   12/1/2023 10:40 AM ROSETTA Chacko EM[facility] [address]   12/5/2023  2:20 PM PF DEXA RM1 PF DEXA [place]     Order(s) pending, please review. Thank you. \

## 2023-11-30 ENCOUNTER — IMAGING SERVICES (OUTPATIENT)
Dept: GENERAL RADIOLOGY | Age: 48
End: 2023-11-30

## 2023-11-30 ENCOUNTER — APPOINTMENT (OUTPATIENT)
Dept: NEUROSURGERY | Age: 48
End: 2023-11-30

## 2023-11-30 VITALS — HEART RATE: 76 BPM | DIASTOLIC BLOOD PRESSURE: 82 MMHG | RESPIRATION RATE: 16 BRPM | SYSTOLIC BLOOD PRESSURE: 119 MMHG

## 2023-11-30 DIAGNOSIS — M54.2 NECK PAIN: ICD-10-CM

## 2023-11-30 DIAGNOSIS — G89.29 CHRONIC RIGHT SHOULDER PAIN: ICD-10-CM

## 2023-11-30 DIAGNOSIS — M25.511 CHRONIC RIGHT SHOULDER PAIN: ICD-10-CM

## 2023-11-30 DIAGNOSIS — Z98.1 S/P SPINAL FUSION: ICD-10-CM

## 2023-11-30 DIAGNOSIS — Z98.1 S/P SPINAL FUSION: Primary | ICD-10-CM

## 2023-11-30 PROCEDURE — 99213 OFFICE O/P EST LOW 20 MIN: CPT | Performed by: NURSE PRACTITIONER

## 2023-11-30 PROCEDURE — 77073 BONE LENGTH STUDIES: CPT | Performed by: NURSE PRACTITIONER

## 2023-11-30 PROCEDURE — 72082 X-RAY EXAM ENTIRE SPI 2/3 VW: CPT | Performed by: NURSE PRACTITIONER

## 2023-11-30 RX ORDER — TOPIRAMATE 50 MG/1
50 TABLET, FILM COATED ORAL 2 TIMES DAILY
COMMUNITY

## 2023-11-30 ASSESSMENT — ENCOUNTER SYMPTOMS: BACK PAIN: 0

## 2023-12-01 NOTE — TELEPHONE ENCOUNTER
LOV: 11/13/23 for chronic neck pain    sertraline 100 MG Oral Tab  Take 1.5 tablets (150 mg total) by mouth daily.  Dispense: 45 tablet, Refills: 0 ordered       11/01/2023     Future Appointments   Date Time Provider Fitz Villanueva   12/5/2023  2:20 PM PF DEXA RM1 PF DEXA [place]

## 2023-12-03 DIAGNOSIS — M54.16 LUMBAR RADICULOPATHY: ICD-10-CM

## 2023-12-03 DIAGNOSIS — G62.9 NEUROPATHY: ICD-10-CM

## 2023-12-04 RX ORDER — GABAPENTIN 600 MG/1
600 TABLET ORAL 3 TIMES DAILY
Qty: 270 TABLET | Refills: 0 | Status: SHIPPED | OUTPATIENT
Start: 2023-12-04 | End: 2024-03-03

## 2023-12-04 RX ORDER — SERTRALINE HYDROCHLORIDE 100 MG/1
150 TABLET, FILM COATED ORAL DAILY
Qty: 135 TABLET | Refills: 0 | Status: SHIPPED | OUTPATIENT
Start: 2023-12-04 | End: 2024-03-03

## 2023-12-04 NOTE — TELEPHONE ENCOUNTER
LOV 11/13/23     Last Refill 9/27/23 #270 Refill 0      Future Appointments   Date Time Provider Fitz Villanueva   12/5/2023  2:20 PM PF DEXA MARTÍN PF DEXA Fred   3/12/2024 11:20 AM Deon Biggs APRN [facility]

## 2023-12-05 ENCOUNTER — HOSPITAL ENCOUNTER (OUTPATIENT)
Dept: BONE DENSITY | Age: 48
Discharge: HOME | End: 2023-12-05
Attending: FAMILY MEDICINE
Payer: MEDICAID

## 2023-12-05 DIAGNOSIS — Z13.820 ENCOUNTER FOR OSTEOPOROSIS SCREENING IN ASYMPTOMATIC POSTMENOPAUSAL PATIENT: ICD-10-CM

## 2023-12-05 DIAGNOSIS — Z78.0 ENCOUNTER FOR OSTEOPOROSIS SCREENING IN ASYMPTOMATIC POSTMENOPAUSAL PATIENT: ICD-10-CM

## 2023-12-05 PROCEDURE — 77080 DXA BONE DENSITY AXIAL: CPT | Performed by: FAMILY MEDICINE

## 2023-12-19 ENCOUNTER — TELEPHONE (OUTPATIENT)
Dept: ORTHOPEDICS | Age: 48
End: 2023-12-19

## 2023-12-29 ENCOUNTER — PATIENT MESSAGE (OUTPATIENT)
Dept: FAMILY MEDICINE CLINIC | Facility: CLINIC | Age: 48
End: 2023-12-29

## 2023-12-29 DIAGNOSIS — G62.9 NEUROPATHY: ICD-10-CM

## 2023-12-29 DIAGNOSIS — M54.16 LUMBAR RADICULOPATHY: ICD-10-CM

## 2023-12-29 NOTE — TELEPHONE ENCOUNTER
From: Sarah Triplett  To: Jeff Lisseth  Sent: 12/29/2023 9:49 AM CST  Subject: Medication update     Dr Olson per our discussion on Trazadone I am only taking 50 mg at night or 100mg very rarely. Also can you please update my gabapentin script I've only been taking a total of 900mg daily for a while now and seems to be good enough. I'm almost 2 years post op. Thank you

## 2024-01-02 RX ORDER — GABAPENTIN 600 MG/1
300 TABLET ORAL 3 TIMES DAILY
COMMUNITY
Start: 2024-01-02 | End: 2024-01-03

## 2024-01-02 RX ORDER — TRAZODONE HYDROCHLORIDE 100 MG/1
50 TABLET ORAL NIGHTLY
COMMUNITY
Start: 2024-01-02 | End: 2024-01-03

## 2024-01-03 RX ORDER — GABAPENTIN 600 MG/1
300 TABLET ORAL 3 TIMES DAILY
Qty: 45 TABLET | Refills: 2 | Status: SHIPPED | OUTPATIENT
Start: 2024-01-03 | End: 2024-04-02

## 2024-01-03 RX ORDER — TRAZODONE HYDROCHLORIDE 100 MG/1
50 TABLET ORAL NIGHTLY
Qty: 15 TABLET | Refills: 0 | Status: SHIPPED | OUTPATIENT
Start: 2024-01-03

## 2024-01-03 NOTE — TELEPHONE ENCOUNTER
LOV 11/13/23  Last refill on 12/04/23, for #135 tabs, with 0 refills  sertraline 100 MG Oral Tab     Last refill on 12/04/23, for #270 tabs, with 0 refills  gabapentin 600 MG Oral Tab     Future Appointments   Date Time Provider Department Center   3/12/2024 11:20 AM Rosy Biggs APRN [facility]     Order(s) pending, please review. Thank you.  Perlita Garvey

## 2024-01-09 NOTE — IMAGING NOTE
This Breast Care RN assisted Dr. Merlinda Kurk with recommendation for a right breast 1 site ultrasound guided biopsy for nodule. Procedure reviewed and all questions answered. Emotional and educational support given. On the day of the biopsy, pt instructed to take Tylenol 1000mg PO, eat a light meal & bring or wear a sports bra. Post biopsy care also reviewed with pt to include NO lifting more than 5lbs, no exercising or housework (limit upper body movement) for 24-48 hrs post biopsy. Patient denies blood thinners, bleeding disorders, liver disease, and chemo. Pt verbalized understanding. Our breast center schedulers will be calling to schedule an appt that is convenient for pt.
pulse oximetry

## 2024-01-16 RX ORDER — TOPIRAMATE 50 MG/1
50 TABLET, FILM COATED ORAL 2 TIMES DAILY
Qty: 60 TABLET | Refills: 1 | Status: SHIPPED | OUTPATIENT
Start: 2024-01-16

## 2024-01-16 NOTE — TELEPHONE ENCOUNTER
Requesting   Requested Prescriptions     Pending Prescriptions Disp Refills    TOPIRAMATE 50 MG Oral Tab [Pharmacy Med Name: Topiramate 50 Mg Tab Cipl] 60 tablet 0     Sig: Take 1 tablet (50 mg total) by mouth 2 (two) times daily.      LOV: 08/21/2023  RTC: in 8 weeks  Filled: 10/20/2023 #60 with 2 refills    Future Appointments   Date Time Provider Department Center   3/12/2024 11:20 AM Rosy Biggs APRN [facility]

## 2024-01-31 DIAGNOSIS — Z98.1 S/P SPINAL FUSION: ICD-10-CM

## 2024-01-31 DIAGNOSIS — M54.2 NECK PAIN: Primary | ICD-10-CM

## 2024-02-15 RX ORDER — SERTRALINE HYDROCHLORIDE 100 MG/1
150 TABLET, FILM COATED ORAL DAILY
Qty: 135 TABLET | Refills: 0 | Status: SHIPPED | OUTPATIENT
Start: 2024-02-15 | End: 2024-05-15

## 2024-02-19 ENCOUNTER — PATIENT MESSAGE (OUTPATIENT)
Dept: FAMILY MEDICINE CLINIC | Facility: CLINIC | Age: 49
End: 2024-02-19

## 2024-02-19 ENCOUNTER — TELEPHONE (OUTPATIENT)
Dept: FAMILY MEDICINE CLINIC | Facility: CLINIC | Age: 49
End: 2024-02-19

## 2024-02-19 DIAGNOSIS — E55.9 VITAMIN D DEFICIENCY: Primary | ICD-10-CM

## 2024-02-19 NOTE — TELEPHONE ENCOUNTER
Letter mailed to patient reminding her she is due for labs per pt reminder.      Lab Frequency Next Occurrence   Vitamin D [E] Once 02/19/2024     Geetha Morales RN  Jeff Fortune Nurse  Vit D recheck in 3 months

## 2024-02-19 NOTE — TELEPHONE ENCOUNTER
From: Sarah Triplett  To: Jeff Montanez  Sent: 2/19/2024 2:27 PM CST  Subject: Vit D    Is there a standing order for the blood draw or do I just need to make an appt?

## 2024-02-27 ENCOUNTER — OFFICE VISIT (OUTPATIENT)
Dept: FAMILY MEDICINE CLINIC | Facility: CLINIC | Age: 49
End: 2024-02-27
Payer: MEDICAID

## 2024-02-27 VITALS
HEART RATE: 83 BPM | DIASTOLIC BLOOD PRESSURE: 66 MMHG | HEIGHT: 63 IN | BODY MASS INDEX: 30.54 KG/M2 | WEIGHT: 172.38 LBS | SYSTOLIC BLOOD PRESSURE: 130 MMHG | TEMPERATURE: 98 F | OXYGEN SATURATION: 100 %

## 2024-02-27 DIAGNOSIS — M54.2 NECK PAIN: ICD-10-CM

## 2024-02-27 DIAGNOSIS — Z00.00 WELL ADULT EXAM: Primary | ICD-10-CM

## 2024-02-27 DIAGNOSIS — M54.9 UPPER BACK PAIN: ICD-10-CM

## 2024-02-27 DIAGNOSIS — E55.9 HYPOVITAMINOSIS D: ICD-10-CM

## 2024-02-27 PROCEDURE — 99396 PREV VISIT EST AGE 40-64: CPT | Performed by: FAMILY MEDICINE

## 2024-02-27 RX ORDER — ACETAMINOPHEN AND CODEINE PHOSPHATE 300; 30 MG/1; MG/1
TABLET ORAL
COMMUNITY
Start: 2023-11-14 | End: 2024-02-27 | Stop reason: ALTCHOICE

## 2024-02-27 RX ORDER — SULFAMETHOXAZOLE AND TRIMETHOPRIM 800; 160 MG/1; MG/1
1 TABLET ORAL 2 TIMES DAILY
COMMUNITY
Start: 2023-12-06 | End: 2024-02-27 | Stop reason: ALTCHOICE

## 2024-02-27 RX ORDER — CELECOXIB 200 MG/1
200 CAPSULE ORAL DAILY
Qty: 90 CAPSULE | Refills: 3 | Status: SHIPPED | OUTPATIENT
Start: 2024-02-27 | End: 2025-02-21

## 2024-02-27 NOTE — PROGRESS NOTES
Chief Complaint   Patient presents with    Physical     Pt is not fasting.          [facility]  Pt is here for wellness visit.  She was seen by her pain specialist at [facility] but would like to do physical therapy here but needs an order from me to do so therefore order was placed.    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)     Acute bronchitis 06/29/2012    Anxiety state     Arthritis     Back pain     Back problem     DDD (degenerative disc disease), cervical     c2-c7 fusions    DDD (degenerative disc disease), lumbar     L4-L5    DDD (degenerative disc disease), lumbosacral     S1-S2    Depression     Esophageal reflux     Fibromyalgia     History of blood transfusion     Long term current use of opiate analgesic 04/25/2014    Neuropathy     Opioid dependence ([facility]) 01/07/2022    Osteoarthritis     Other and unspecified alcohol dependence, unspecified drinking behavior 03/05/2014    Shortness of breath 06/29/2012    Tachycardia, unspecified 06/29/2012    Urine incontinence     Visual impairment        Past Surgical History:   Procedure Laterality Date    APPENDECTOMY      APPENDECTOMY      BACK SURGERY      Both cervical/lumbar fusion.    EXCIS LUMBAR DISK,ONE LEVEL      HYSTERECTOMY      BHAKTI LOCALIZATION WIRE 1 SITE RIGHT (CPT=19281) Right 01/27/2023    ADH; intraductal papilloma    NEEDLE BIOPSY RIGHT  02/2022    intraductal papilloma    OTHER SURGICAL HISTORY      gallbladder    OTHER SURGICAL HISTORY      REMOVAL GALLBLADDER      TUBAL LIGATION      TUBAL LIGATION  2004       Social History     Socioeconomic History    Marital status:  Single   Tobacco Use    Smoking status: Former     Packs/day: 1.00     Years: 35.00     Additional pack years: 0.00     Total pack years: 35.00     Types: Cigarettes     Quit date: 2021     Years since quittin.1    Smokeless tobacco: Never   Vaping Use    Vaping Use: Never used   Substance and Sexual Activity    Alcohol use: Not Currently     Comment: pt states she drinks every once in a while. Pt drinks a pint of vodka    Drug use: Never       Family History   Problem Relation Age of Onset    Anxiety Mother     Other ([other]) Daughter     Other ([other]) Son     Anxiety Maternal Aunt     Depression Maternal Aunt     Other (lung cancer) Maternal Aunt     Other (lung cancer) Maternal Uncle         Current Outpatient Medications on File Prior to Visit   Medication Sig Dispense Refill    sertraline 100 MG Oral Tab Take 1.5 tablets (150 mg total) by mouth daily. 135 tablet 0    topiramate 50 MG Oral Tab Take 1 tablet (50 mg total) by mouth 2 (two) times daily. 60 tablet 1    gabapentin 600 MG Oral Tab Take 0.5 tablets (300 mg total) by mouth 3 (three) times daily. 45 tablet 2    traZODone 100 MG Oral Tab Take 0.5 tablets (50 mg total) by mouth nightly. 50 MG by mouth nightly; 100 MG nightly as needed 15 tablet 0    docusate sodium 100 MG Oral Cap Take 100 mg by mouth daily as needed.      MYRBETRIQ 50 MG Oral Tablet 24 Hr Take 1 tablet (50 mg total) by mouth daily.      omeprazole 20 MG Oral Capsule Delayed Release Take 2 capsules (40 mg total) by mouth daily.      Oxybutynin Chloride ER 15 MG Oral Tablet 24 Hr Take 1 tablet (15 mg total) by mouth daily. 90 tablet 6     No current facility-administered medications on file prior to visit.         Objective  Vitals:    24 1516   BP: 130/66   Pulse: 83   Temp: 98.1 °F (36.7 °C)   TempSrc: Temporal   SpO2: 100%   Weight: 172 lb 6.4 oz (78.2 kg)   Height: 5' 3\" (1.6 m)     Physical Exam  Constitutional:       Appearance: Normal appearance.   HEENT:       Head: Normocephalic and atraumatic.      Eyes: PERRLA no notable nystagmus     Ears: TM normal no air fluid level normal appearing landmarks     Nose: Nose normal.      Mouth/Throat:      Mouth: Mucous membranes are moist.   Cardiovascular:      Rate and Rhythm: Normal rate and regular rhythm.   Pulmonary:      Effort: Pulmonary effort is normal.      Breath sounds: Normal breath sounds.   Abdominal:      General: Bowel sounds are normal.      Palpations: Abdomen is soft. There is no mass.   Musculoskeletal:         General: Normal range of motion.      Cervical back: Normal range of motion.   Skin:     General: Skin is warm and dry.   Neurological:      General: No focal deficit present.      Mental Status: She is alert and oriented to person, place, and time.   Psychiatric:         Mood and Affect: Mood normal.         Thought Content: Thought content normal.       Assessment and Plan  Sarah was seen today for physical.    Diagnoses and all orders for this visit:    Well adult exam  -     CBC With Differential With Platelet; Future  -     Comp Metabolic Panel (14); Future  -     Lipid Panel; Future    Hypovitaminosis D  -     Vitamin D [E]; Future  -     Vitamin D [E]    Neck pain  -     Physical Therapy Referral - [facility]    Upper back pain  -     Physical Therapy Referral - [facility]    Other orders  -     celecoxib (CELEBREX) 200 MG Oral Cap; Take 1 capsule (200 mg total) by mouth daily.           Follow up  No follow-ups on file.      Patient Instructions  There are no Patient Instructions on file for this visit.       Jeff Montanez MD

## 2024-02-28 DIAGNOSIS — Z80.3 FAMILY HISTORY OF BREAST CANCER: ICD-10-CM

## 2024-02-28 DIAGNOSIS — Z91.89 AT HIGH RISK FOR BREAST CANCER: ICD-10-CM

## 2024-02-28 DIAGNOSIS — N60.91 ATYPICAL DUCTAL HYPERPLASIA OF RIGHT BREAST: Primary | ICD-10-CM

## 2024-02-29 ENCOUNTER — TELEPHONE (OUTPATIENT)
Dept: FAMILY MEDICINE CLINIC | Facility: CLINIC | Age: 49
End: 2024-02-29

## 2024-02-29 DIAGNOSIS — D64.9 ANEMIA, UNSPECIFIED TYPE: Primary | ICD-10-CM

## 2024-02-29 LAB
ABSOLUTE BASOPHILS: 41 CELLS/UL (ref 0–200)
ABSOLUTE EOSINOPHILS: 58 CELLS/UL (ref 15–500)
ABSOLUTE LYMPHOCYTES: 1427 CELLS/UL (ref 850–3900)
ABSOLUTE MONOCYTES: 290 CELLS/UL (ref 200–950)
ABSOLUTE NEUTROPHILS: 3985 CELLS/UL (ref 1500–7800)
ALBUMIN/GLOBULIN RATIO: 1.9 (CALC) (ref 1–2.5)
ALBUMIN: 4.5 G/DL (ref 3.6–5.1)
ALKALINE PHOSPHATASE: 43 U/L (ref 37–153)
ALT: 8 U/L (ref 6–29)
AST: 11 U/L (ref 10–35)
BASOPHILS: 0.7 %
BILIRUBIN, TOTAL: 0.4 MG/DL (ref 0.2–1.2)
BUN/CREATININE RATIO: 38 (CALC) (ref 6–22)
BUN: 30 MG/DL (ref 7–25)
CALCIUM: 9.4 MG/DL (ref 8.6–10.4)
CARBON DIOXIDE: 24 MMOL/L (ref 20–32)
CHLORIDE: 111 MMOL/L (ref 98–110)
CHOL/HDLC RATIO: 4.1 (CALC)
CHOLESTEROL, TOTAL: 221 MG/DL
CREATININE: 0.79 MG/DL (ref 0.5–1.03)
EGFR: 91 ML/MIN/1.73M2
EOSINOPHILS: 1 %
GLOBULIN: 2.4 G/DL (CALC) (ref 1.9–3.7)
GLUCOSE: 92 MG/DL (ref 65–99)
HDL CHOLESTEROL: 54 MG/DL
HEMATOCRIT: 35.4 % (ref 35–45)
HEMOGLOBIN: 11.3 G/DL (ref 11.7–15.5)
LDL-CHOLESTEROL: 145 MG/DL (CALC)
LYMPHOCYTES: 24.6 %
MCH: 27 PG (ref 27–33)
MCHC: 31.9 G/DL (ref 32–36)
MCV: 84.7 FL (ref 80–100)
MONOCYTES: 5 %
MPV: 10.8 FL (ref 7.5–12.5)
NEUTROPHILS: 68.7 %
NON-HDL CHOLESTEROL: 167 MG/DL (CALC)
PLATELET COUNT: 241 THOUSAND/UL (ref 140–400)
POTASSIUM: 4.8 MMOL/L (ref 3.5–5.3)
PROTEIN, TOTAL: 6.9 G/DL (ref 6.1–8.1)
RDW: 14.6 % (ref 11–15)
RED BLOOD CELL COUNT: 4.18 MILLION/UL (ref 3.8–5.1)
SODIUM: 143 MMOL/L (ref 135–146)
TRIGLYCERIDES: 103 MG/DL
VITAMIN D, 25-OH, TOTAL: 46 NG/ML (ref 30–100)
WHITE BLOOD CELL COUNT: 5.8 THOUSAND/UL (ref 3.8–10.8)

## 2024-02-29 NOTE — TELEPHONE ENCOUNTER
----- Message from Gary Morse MD sent at 2/29/2024  1:53 PM CST -----  Please let patient know that the vit d, sugar, electrolyte, liver, and kidney tests were fine. The white blood cell count is fine.       Her lipids are elevated still. I recommend she have a further discussion with Dr. Olson regarding whether lipid lowering agents are needed.     She continues to be anemic. I don;t see that the  EGD and colonoscopy that her GI did in 12/2023 revealed a source of bleeding. If still has menses and they are heavy then she needs to see GYN to discuss therapies. .[facility]  P:[phone]. If no issues with menses then I recommend she Hematology to look into this further. [facility]: P# [phone]     Thanks

## 2024-02-29 NOTE — TELEPHONE ENCOUNTER
Advised patient of Dr. Morse's note below. Patient verbalized understanding.  Pt reports had hysterectomy - pt agreeable to receive MCM with referral contact info for hematologist.  Offered to schedule appt with Dr. Olson to discuss cholesterol medication - she v/u and declines at this time, stated will call office back to schedule appt. No further questions at this time.    Referral order placed    MCM sent to pt  Notify me if not read by 03/07/24

## 2024-03-01 RX ORDER — TRAZODONE HYDROCHLORIDE 100 MG/1
50 TABLET ORAL NIGHTLY
Qty: 15 TABLET | Refills: 0 | Status: SHIPPED
Start: 2024-03-01

## 2024-03-01 NOTE — TELEPHONE ENCOUNTER
Rx printed and signed by Dr. Mercado    Placed in pt blue book for pt     Advised patient of note above. Patient verbalized understanding.   Pt reports won't be able to  paper Rx until Monday - advised pt to call office back to see if able to resend Rx electronically by then - she v/u. No further questions at this time.

## 2024-03-01 NOTE — TELEPHONE ENCOUNTER
Pt failed refill protocol for the following reasons:  traZODone 100 MG Oral Tab          Sig: Take 0.5 tablets (50 mg total) by mouth nightly. 50 MG by mouth nightly; 100 MG nightly as needed    Disp: 15 tablet    Refills: 0    Start: 3/1/2024    Class: Normal    Last ordered: 1 month ago (1/3/2024) by Jeff Montanez MD       [facility] be [place] at: [id number] [address] [phone], [phone]         Last refill: 1/3/24  Last appt: 2/27/24  Next appt:   Future Appointments   Date Time Provider Department Center   3/12/2024 11:20 AM Rosy Biggs APRN [facility]         Forward to Dr. Mercado, please advise on refills. Thank you.

## 2024-03-01 NOTE — TELEPHONE ENCOUNTER
LOV 2/27/24     Last Refill 1/3/24 #15 Refill 0     Future Appointments   Date Time Provider Department Center   3/12/2024 11:20 AM Rosy Biggs APRN [facility]

## 2024-03-05 RX ORDER — CELECOXIB 200 MG/1
200 CAPSULE ORAL DAILY
Qty: 90 CAPSULE | Refills: 3 | OUTPATIENT
Start: 2024-03-05 | End: 2025-02-28

## 2024-03-05 NOTE — TELEPHONE ENCOUNTER
Non-Narcotic Pain Medication Protocol Passed03/05/2024 11:25 AM   Protocol Details In person appointment or virtual visit in the past 6 mos or appointment in next 3 mos      Refilled per protocol  celecoxib (CELEBREX) 200 MG Oral Cap   Last refilled on 2/27/24 #90 with 3 rf.  LOV-   Last labs-     Sent to pharmacy

## 2024-03-12 ENCOUNTER — OFFICE VISIT (OUTPATIENT)
Dept: INTERNAL MEDICINE CLINIC | Facility: CLINIC | Age: 49
End: 2024-03-12
Payer: MEDICAID

## 2024-03-12 VITALS
HEART RATE: 78 BPM | SYSTOLIC BLOOD PRESSURE: 120 MMHG | WEIGHT: 172 LBS | HEIGHT: 63 IN | RESPIRATION RATE: 16 BRPM | DIASTOLIC BLOOD PRESSURE: 70 MMHG | BODY MASS INDEX: 30.48 KG/M2

## 2024-03-12 DIAGNOSIS — E66.9 OBESITY (BMI 30-39.9): ICD-10-CM

## 2024-03-12 DIAGNOSIS — M54.16 LUMBAR RADICULOPATHY: ICD-10-CM

## 2024-03-12 DIAGNOSIS — G89.29 CHRONIC NECK AND BACK PAIN: ICD-10-CM

## 2024-03-12 DIAGNOSIS — Z51.81 ENCOUNTER FOR THERAPEUTIC DRUG MONITORING: Primary | ICD-10-CM

## 2024-03-12 DIAGNOSIS — M54.9 CHRONIC NECK AND BACK PAIN: ICD-10-CM

## 2024-03-12 DIAGNOSIS — F32.A ANXIETY AND DEPRESSION: ICD-10-CM

## 2024-03-12 DIAGNOSIS — M54.2 CHRONIC NECK AND BACK PAIN: ICD-10-CM

## 2024-03-12 DIAGNOSIS — F41.9 ANXIETY AND DEPRESSION: ICD-10-CM

## 2024-03-12 PROCEDURE — 99214 OFFICE O/P EST MOD 30 MIN: CPT | Performed by: NURSE PRACTITIONER

## 2024-03-12 NOTE — PATIENT INSTRUCTIONS
Next steps:  1.  Fill your prescribed medication and take as discussed and prescribed: topamax 50mg  Add in semiglutide compound  [facility]  [address]  [place] [address]    Phone  Phone: [phone]    Fax  Fax: [phone]    Hours  Pharmacy: Mon - Fri 7:30AM - 7:30PM    Call Center: Mon - Fri 7:00AM - 7:00PM   2.  Schedule a personal nutrition consultation with one of our registered dieticians     Please try to work on the following dietary changes:  Daily protein recommendation to start:  grams  Daily carbohydrate: <105g  Daily calories: 1,200-1,300  1.  Drink water with meals and throughout the day, cut down on soda and/or juice if consumed. Consider flavored water options like Bubbly, Spindrift, Hint and Susan.  2.  Eat breakfast daily and focus on having protein with each meal, examples include: greek yogurt, cottage cheese, hard boiled egg, whole grain toast with peanut butter.   3.  Reduce refined carbohydrates and sugars which includes items such as sweets, as well as rice, pasta, and bread and make sure to choose whole grain options when having them with just 1 serving per meal about the size of your inner palm.  4.  Consume non starchy veggies daily working towards making them a good 50% of your daily food intake. Add them to lunch and dinner consistently.  5.  Start a daily probiotic: VSL#3 is recommended, (order on line at www.vsl3.com). Take 1 capsule daily with water for 30 days, then reduce to 1 every other day (this will reduce the cost). Capsules can be left out for 2 weeks, but then must be refrigerated.      Please download ailin My Fitness Pal, LoseIt! Or Net Diary to monitor daily dietary intake and you will be able to see if you are eating the right amount of calories or too much or too little which would hinder weight loss. Additionally this will help to see your daily carbohydrate and protein intake. When you set the ailin up choose 1-2 lbs/week as a  goal.  Keeping a paper food journal is an option as well to remain accountable for your choices- this is the start to mindful eating! A low calorie diet has been consistently shown to support weight loss.     Continue or start exercising to help establish a routine. If not already exercising begin with 1 day and progress as able with long-term goal of 30 minutes 5 days a week at a minimum.     Meditation daily can help manage and control stress. Chronic stress can make weight loss difficult.  Exercising is one way to help with stress, but meditation using the CALM Joanne or another comparable alternative can be done in your home or place of work with little time commitment. This Joanne can also help work on behavior change and improve sleep. Check out the segment under Calm Masterclass and listen to The 4 Pillars of Health. A great way to begin learning about the foundation of lifestyle with practical tips to use in your every day.     Check out www.yourweightmatters.org blog for continued daily support and education along this weight loss journey!    Patient Resources:     Personal Training/Fitness Classes/Health Coaching     [facility] @ https://www.health.org/healthy-driven/fitness-center Full fitness center with group fitness and personal training. Discount available as client of [facility].  Health Coaching and Personal Training with Shaylee Hutton at our [facility]- individual weekly coaching with option to add personal training and small group fitness classes targeted at weight loss- [phone] and/or email @ [email or web address]  360FIT [place] http://www.Zebra Technologies.Data Storage Group. Group Fitness [phone] and/or email Brandi at [email or web address]  FrancW[facility] Hospitaled Fitness Centers with multiple locations: [employer] Fitness (www.Nippon Renewable Energy), Eat The Frog Fitness (www.Desino.Data Storage Group), Fit Body Bootcamp (www.Pin-DigitalboUSDSp.Data Storage Group),  Delta Life Fitness (www.Ratio.DigitalOcean), The Exercise  (www.exercisecoach.DigitalOcean)     Online Fitness  Fitness  on Utube  Fit in 10 DVD series- www.wxiow35HVU.com  Sit and Be Fit - Chair exercise series Www.sitandbefit.org  Hip Hop Fit with Tyrone Meng at www.hiphopfit.net     Apps for on the Go Fitness  [place] 7 Minute Workout (orange box with white 7) - free on the go HIIT training joanne  Peloton Joanne @ www.onepeloton.com     Nutrition Trackers and Tools  LoseIT! And My Fitness Pal apps and on line for tracking nutrition  NOOM - virtual health coaching  FitFoundation (healthy meals on the go) in [place] @ wwwmxHerosfusvvwojaivw9hPenxy  Sorin MCKNIGHT @ Customer Alliancetromd.com and Afoybu03 (keto and low carb plans recommended) @ www.jvvzmc44.com, Metabolic Meals @ www.NeteroMeals.DigitalOcean - individual prepared meals to go  Gobble, Blue Apron, Home , Every Plate, Sunbasket- on line meal delivery programs for preparation at home  [place]al [place] in [place] for homemade meals to go @ wwwmxHeromealMaven Networks  Diet Doctor @ www.dietdoctor.com - low carb swaps  YuLinPrim - meal prep and planning joanne (www.yummly.com)     Stress Management/Behavior/Mindful Eating  CALM meditation joanne (www.calm.com)  Headspace  Am I Hungry? Mindful eating virtual  joanne  Www.yourweightmatters.org - Obesity Action Coalition sponsored Blog posts daily  Motivation joanne (black box with white \")- daily supportive messages sent to your phone     Books/Video Education/Podcasts  Mindless Eating by Adrian Zimmerman  Why We Get Sick by Simba Hawk (a book about insulin resistance)  Atomic Habits by Nemesio Fermin (a book about taking small steps to promote greater behavior change)   Can't Hurt Me by Enmanuel Hanna (a book exploring the power of discipline in achieving your goals)  The End of Dieting: How to Live for Life by Dr. Huseyin Cornejo M.D. or listen to The Cytosorbents Podcast Episode 63: Understanding \"Nutritarian\" Eating w/Dr. Huseyin Cornejo  Your Body  in Balance: The New Science of Food, Hormones, and Health by Dr. Lalit Kat  The Menopause Diet Plan by Yanni Do and Paulette Rubalcava  The Complete Guide to fasting by Dr. Palm  Sugar, Salt & Fat by Yaritza Tobin, Ph.D, R.D.  Weight Loss Surgery Will Not Treat Food Addiction by Tiera Hoffman Ph.D  The Game Changers- Deline.JY Inc.ix Documentary on plant based nutrition  Fed Up - documentary about obesity (Free on Utube)  The Truth About Sugar - documentary on sugar (Free on Utube, https://youElevator Labsu.be/2N5dvajLG5v)  The Dr. Larsen T5 Wellness Plan by Dr. Tito Larsen MD  Fitlosophy Fitspiration - journal to better health (found at [employer] in fitness aisle)  What Happened to You?- a look at the impact trauma has on behavior written by Emily Willingham and Dr. Rudy Basurto  Whole Again by Geronimo Mueller - discovering your true self after trauma  Marlin Harper talk on uControl, The Call to Courage  Podcasts: The Exam Room by the Physician's Committee, Nutrition Facts by Dr. Rosado    We are here to support you with weight loss, but please remember that you still need your primary care provider for your routine health maintenance.

## 2024-03-12 NOTE — PROGRESS NOTES
HISTORY OF PRESENT ILLNESS  Chief Complaint   Patient presents with    Weight Check     -7     Sarah Triplett is a 48 year old female here for follow up with medical weight loss program for the treatment of overweight, obesity, or morbid obesity.     Down 7 lbs (last appt was 8/2023)  Compliant on topamax 50mg bid  Tolerating well, doesn't really feel like its' helping with decreasing appetite and no side effects     Success: well I may get to #170 lbs but then I go right back up  Challenging: gaining it right back. I really need at least #15 lbs lost for my neck and back  Is currently doing PT 2 days per week  High stress, mother was recently dx with breast cancer  Exercise/Activity: 2x/ week, via walking and PT limited due to pain issues   Nutrition: eating regular meals, +protein, minimal veggies. not tracking reports  Meals out per week on average: 1  Stress is manageable   Sleep: 6 hours/night, waking up feeling tired    Denies chest pain, shortness of breath, dizziness, blurred vision, headache, paresthesia, nausea/vomiting.     Breakfast Lunch Dinner Snacks Fluids   Reviewed              Wt Readings from Last 6 Encounters:   03/12/24 172 lb (78 kg)   02/27/24 172 lb 6.4 oz (78.2 kg)   11/13/23 177 lb 9.6 oz (80.6 kg)   08/21/23 179 lb (81.2 kg)   06/05/23 181 lb 12.8 oz (82.5 kg)   04/13/23 181 lb (82.1 kg)          REVIEW OF SYSTEMS  GENERAL: feels well otherwise, denied any fevers chills or night sweats   LUNGS: denies shortness of breath  CARDIOVASCULAR: denies chest pain  MUSCULOSKELETAL:  +chronic, back/neck pain, + joint pains  NEURO: neuropathy to extremities, neurogenic bladder reported with urinary incontinence- will be having botox treatments   PSYCH: denies change in behavior or mood, denies feeling sad or depressed    EXAM  /70   Pulse 78   Resp 16   Ht 5' 3\" (1.6 m)   Wt 172 lb (78 kg)   LMP 02/11/2014   BMI 30.47 kg/m²       GENERAL: well developed, well nourished, in no  apparent distress, A/O x3  SKIN: no rashes, no suspicious lesions  HEENT: atraumatic, normocephalic, OP-clear, PERRLA  NECK: supple, no adenopathy  LUNGS: CTA in all fields, breathing non labored  CARDIO: RRR without murmur  GI: +BS, NT/ND, no masses or HSM  EXTREMITIES: no cyanosis, no clubbing, no edema    Lab Results   Component Value Date    GLU 92 02/28/2024    BUN 30 (H) 02/28/2024    BUNCREA 38 (H) 02/28/2024    CREATSERUM 0.79 02/28/2024    ANIONGAP 5 06/05/2023     01/22/2016    GFRNAA 101 04/20/2022    GFRAA 117 04/20/2022    CA 9.4 02/28/2024    OSMOCALC 289 06/05/2023    ALKPHO 43 02/28/2024    AST 11 02/28/2024    ALT 8 02/28/2024    BILT 0.4 02/28/2024    TP 6.9 02/28/2024    ALB 4.5 02/28/2024    GLOBULIN 2.4 02/28/2024    AGRATIO 1.9 02/28/2024     02/28/2024    K 4.8 02/28/2024     (H) 02/28/2024    CO2 24 02/28/2024     Lab Results   Component Value Date    A1C 5.5 04/27/2023     Lab Results   Component Value Date    CHOLEST 221 (H) 02/28/2024    TRIG 103 02/28/2024    HDL 54 02/28/2024     (H) 02/28/2024    VLDL 36 04/26/2013    TCHDLRATIO 4.1 02/28/2024    NONHDLC 167 (H) 02/28/2024     Lab Results   Component Value Date    B12 449 09/27/2021    VITB12 514 04/27/2023     Lab Results   Component Value Date    VITD 46 02/28/2024       Current Outpatient Medications on File Prior to Visit   Medication Sig Dispense Refill    Cholecalciferol (VITAMIN D3 ULTRA POTENCY) 1.25 MG (98071 UT) Oral Tab Take 2,000 Int'l Units/day by mouth daily.      traZODone 100 MG Oral Tab Take 0.5 tablets (50 mg total) by mouth nightly. 50 MG by mouth nightly; 100 MG nightly as needed 15 tablet 0    celecoxib (CELEBREX) 200 MG Oral Cap Take 1 capsule (200 mg total) by mouth daily. 90 capsule 3    sertraline 100 MG Oral Tab Take 1.5 tablets (150 mg total) by mouth daily. 135 tablet 0    topiramate 50 MG Oral Tab Take 1 tablet (50 mg total) by mouth 2 (two) times daily. 60 tablet 1    gabapentin  600 MG Oral Tab Take 0.5 tablets (300 mg total) by mouth 3 (three) times daily. 45 tablet 2    docusate sodium 100 MG Oral Cap Take 100 mg by mouth daily as needed.      MYRBETRIQ 50 MG Oral Tablet 24 Hr Take 1 tablet (50 mg total) by mouth daily.      omeprazole 20 MG Oral Capsule Delayed Release Take 2 capsules (40 mg total) by mouth daily.      Oxybutynin Chloride ER 15 MG Oral Tablet 24 Hr Take 1 tablet (15 mg total) by mouth daily. 90 tablet 6     No current facility-administered medications on file prior to visit.       ASSESSMENT/PLAN    ICD-10-CM    1. Encounter for therapeutic drug monitoring  Z51.81       2. Obesity (BMI 30-39.9)  E66.9       3. Anxiety and depression  F41.9     F32.A       4. Lumbar radiculopathy  M54.16           PLAN   Initial Weight Data and Goal Weight Loss:  Initial consult: #181 lbs on 4/2023  Weight Calculations  Initial Weight: 181 lbs  Initial Weight Date: 04/01/23  Today's Weight: 172 lbs  5% Goal: 9.05  10% Goal: 18.1  Total Weight Loss: 9 lbs  Total weight loss: Down 7 lbs total, Net loss 9 lbs  Continue with medications: topamax 50mg bid   Compound semaglutide is a custom-made medication that mimics the GLP-1 hormone. It is used to treat type 2 diabetes and lower the risk of heart or blood vessel disease. It works by increasing insulin release, lowering glucagon release, delaying gastric emptying and reducing appetite. Compound semaglutide is prescribed when an FDA-approved medication, dose, or dosage form is unavailable (ie. Nationwide shortage or no obesity coverage for GLP-1 meds). Patients are aware of the difference between these medications.    --advised of side effects and adverse effects of this medication  Contradictions: has done metformin in the past  Reviewed labs   Continue with vitamin d OTC   Anxiety/depression, stable  Chronic back pain- possibly trying out aqua therapy (with last appt)  Nutrition: Low carb diet, recommended to eat breakfast daily/ regular  protein intake  Follow up with dietitian and psychologist as recommended.  Discussed the role of sleep and stress in weight management.  Counseled on comprehensive weight loss plan including attention to nutrition, exercise and behavior/stress management for success. See patient instruction below for more details.  Discussed strategies to overcome barriers to successful weight loss and weight maintenance  FITTE: ACSM recommendations (150-300 minutes/ week in active weight loss)   Weight Loss Consent to treat reviewed and signed.    Total time spent on chart review, pre-charting, obtaining history, counseling, and educating, reviewing labs was 30 minutes.       NOTE TO PATIENT: The 21st Century Cures Act makes clinical notes like these available to patients in the interest of transparency. Clinical notes are medical documents used by physicians and care providers to communicate with each other. These documents include medical language and terminology, abbreviations, and treatment information that may sound technical and at times possibly unfamiliar. In addition, at times, the verbiage may appear blunt or direct. These documents are one tool providers use to communicate relevant information and clinical opinions of the care providers in a way that allows common understanding of the clinical context.     There are no Patient Instructions on file for this visit.    No follow-ups on file.    Patient verbalizes understanding.    Rosy Biggs, APRN

## 2024-03-15 RX ORDER — TOPIRAMATE 50 MG/1
50 TABLET, FILM COATED ORAL 2 TIMES DAILY
Qty: 60 TABLET | Refills: 2 | Status: SHIPPED | OUTPATIENT
Start: 2024-03-15

## 2024-03-15 NOTE — TELEPHONE ENCOUNTER
Requesting   Requested Prescriptions     Pending Prescriptions Disp Refills    TOPIRAMATE 50 MG Oral Tab [Pharmacy Med Name: Topiramate 50 Mg Tab Cipl] 60 tablet 0     Sig: Take 1 tablet (50 mg total) by mouth 2 (two) times daily.     LOV: 3/12/24  RTC: 3 months  Filled: 1/16/24 #60 with 1 refills    Future Appointments   Date Time Provider Department Center   3/18/2024  9:00 AM Neto Abdul, PT [facility] [place]   3/20/2024  1:15 PM Maldonado Samson MD PF HEM [facility] [place]   3/25/2024 10:30 AM Neto Abdul, PT [facility] [place]   4/4/2024 11:15 AM Neto Abdul, PT [facility] [place]   4/8/2024  9:45 AM Neto Abdul, PT [facility] [place]   4/11/2024 10:30 AM Tina Raman, PTA [facility] [place]   4/15/2024  9:00 AM Neto Abdul, PT [facility] [place]   4/28/2024  8:30 AM  MR RM2 (1.5T WIDE)  [facility]   7/10/2024  9:40 AM Rosy Biggs APRN [facility]

## 2024-03-18 ENCOUNTER — OFFICE VISIT (OUTPATIENT)
Dept: PHYSICAL THERAPY | Age: 49
End: 2024-03-18
Attending: FAMILY MEDICINE
Payer: MEDICAID

## 2024-03-18 PROCEDURE — 97161 PT EVAL LOW COMPLEX 20 MIN: CPT

## 2024-03-18 PROCEDURE — 97110 THERAPEUTIC EXERCISES: CPT

## 2024-03-18 NOTE — PROGRESS NOTES
SPINE EVALUATION:     Diagnosis:    Neck pain (M54.2)  Upper back pain (M54.9)      Referring Provider: Lisseth  Date of Evaluation:    3/18/2024    Precautions:  None Next MD visit:   none scheduled  Date of Surgery: n/a     PATIENT SUMMARY   Sarah Triplett is a 48 year old female who presents to therapy today with complaints of neck pain (right scapula to neck).    Headaches from back of head to forehead    Pt describes pain level at worst 8/10.   Current functional limitations include turning head, cooking, reaching up into a shelf. Looking down to quilt for an hour    Describes obstruction.    Sarah describes prior level of function no issue prior to level months ago. Pt goals include get rid of the pain.  Past medical history was reviewed with Sarah. Significant findings include (prior fusion of neck from sacral to cervical spine 2 years prior.  Past Medical History:   Diagnosis Date    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)     Acute bronchitis 06/29/2012    Anxiety state     Arthritis     Back pain     Back problem     DDD (degenerative disc disease), cervical     c2-c7 fusions    DDD (degenerative disc disease), lumbar     L4-L5    DDD (degenerative disc disease), lumbosacral     S1-S2    Depression     Esophageal reflux     Fibromyalgia     History of blood transfusion     Long term current use of opiate analgesic 04/25/2014    Neuropathy     Opioid dependence (HCC) 01/07/2022    Osteoarthritis     Other and unspecified alcohol dependence, unspecified drinking behavior 03/05/2014    Shortness of breath 06/29/2012    Tachycardia, unspecified 06/29/2012    Urine incontinence     Visual impairment        Pt denies diplopia, dysarthria, dysphasia, dizziness, drop attacks, bowel/bladder changes, saddle anesthesia, and STARR LE N/T.    ASSESSMENT  Sarah presents to physical therapy evaluation with primary c/o neck pain. The results of the objective tests and measures show limited  ROM.  Functional deficits include but are not limited to turning head, cooking, reaching up into a shelf..  Signs and symptoms are consistent with diagnosis of mechanical neck pain. Pt and PT discussed evaluation findings, pathology, POC and HEP.  Pt voiced understanding and performs HEP correctly without reported pain. Skilled Physical Therapy is medically necessary to address the above impairments and reach functional goals.     OBJECTIVE:   Observation/Posture: no effect  Neuro Screen: intact sensation    Cervical AROM: (* denotes performed with pain)  Flexion: 15 degrees without protrusion: 15 degrees  Extension: 11 degrees without retraction: 27  Sidebending: R 18; L 7  Rotation: R 38; L 30    Accessory motion: intact  Palpation: left upper trap*    Strength: (* denotes performed with pain)  UE/Scapular   Shoulder Flex: R 5/5, L 5/5  Shoulder ABD (C5): R 5/5, L 5/5  Biceps (C6): R 5/5, L 5/5  Wrist ext (C6): R 5/5, L 5/5  Triceps (C7): R 5/5, L 5/5  Wrist Flex (C7): R 5/5, L 5/5  Digit Flex (C8): R 5/5, L 5/5  Thumb Ext (C8): R 5/5, L 5/5  Interossei (T1): R 5/5, L 5/5    (Below, tested in sitting ,reports unable to lay prone)  Rhomboids: R 5/5, L 5/5  Mid trap: R 5/5; L 5/5  Lats: R 5/5, L 5/5  Low trap: R 5/5; L 5/5     Flexibility:   UE/Scapular   Upper Trap: R severe/mod; L severe/mod  Levator Scap: R severe/mod; L severe/mod  Pec Major: R min; L min  Scalenes: R severe/mod, L severe/mod     Special tests:  Right hand in 5th digit flexion of distal 3 digits position at rest.  Displays inability to move right distal 2 fingers from thumb (reports can't use scissors on this hand)    Today’s Treatment and Response:   Retraction sitting with overpressure x10: worse  Retraction supine x10: worse    L lateral flexion x10 sitting: worse  R lateral flexion x10 sitting: worse    R rotation x10 sitting x2: worse  R rotation mid-cervical spine x10: worse    L rotation sitting x10: worse    Flexion x10:  worse    Extension with rolled towel behind neck x15: worse    Supine R rotation x10 on one pillow:    Pt education was provided on exam findings, treatment diagnosis, treatment plan, expectations, and prognosis. Pt was also provided recommendations for postural corrections and ergonomics  Patient was instructed in and issued a HEP for: supine R rotation    Charges: PT Eval Low Complexity, 2 there ex      Total Timed Treatment: 23 min     Total Treatment Time: 38 min     PLAN OF CARE:    Goals: (to be met in 12 visits)   Pt will improve NDI score from 40/100 to 20/100 to display improved ability to turning head, cooking, reaching up into a shelf.  Pt will reduce pain at its worst from 8/10 to 5/10 to allow for improved ability to turning head, cooking, reaching up into a shelf.  Pt will improve cervical rotation from 30 degrees to 45 degrees allow for improved ability to turning head, cooking, reaching up into a shelf.  Pt will be independent with home program to allow for maintenance of goals achieved in therapy.      Frequency / Duration: Patient will be seen for 2 x/week or a total of 12 visits over a 90 day period. Treatment will include: Manual Therapy, Neuromuscular Re-education, Self-Care Home Management, Therapeutic Activities, Therapeutic Exercise, and Home Exercise Program instruction    Education or treatment limitation: None  Rehab Potential:good    Neck Disability Index Score  Score: 40 % (3/13/2024  4:40 PM)      Patient/Family/Caregiver was advised of these findings, precautions, and treatment options and has agreed to actively participate in planning and for this course of care.    Thank you for your referral. Please co-sign or sign and return this letter via fax as soon as possible to [phone]. If you have any questions, please contact me at Dept: [phone]    Sincerely,  Electronically signed by therapist: Neto Abdul PT    Physician's certification required: Yes  I certify the need for  these services furnished under this plan of treatment and while under my care.    X___________________________________________________ Date____________________    Certification From: 3/18/2024  To:6/16/2024

## 2024-03-19 ENCOUNTER — MED REC SCAN ONLY (OUTPATIENT)
Dept: FAMILY MEDICINE CLINIC | Facility: CLINIC | Age: 49
End: 2024-03-19

## 2024-03-20 ENCOUNTER — OFFICE VISIT (OUTPATIENT)
Dept: HEMATOLOGY/ONCOLOGY | Age: 49
End: 2024-03-20
Attending: INTERNAL MEDICINE
Payer: MEDICAID

## 2024-03-20 VITALS
WEIGHT: 175 LBS | SYSTOLIC BLOOD PRESSURE: 121 MMHG | HEART RATE: 86 BPM | DIASTOLIC BLOOD PRESSURE: 77 MMHG | RESPIRATION RATE: 18 BRPM | TEMPERATURE: 98 F | BODY MASS INDEX: 31.01 KG/M2 | OXYGEN SATURATION: 99 % | HEIGHT: 63 IN

## 2024-03-20 DIAGNOSIS — D50.8 IRON DEFICIENCY ANEMIA SECONDARY TO INADEQUATE DIETARY IRON INTAKE: ICD-10-CM

## 2024-03-20 DIAGNOSIS — D64.9 ANEMIA, UNSPECIFIED TYPE: Primary | ICD-10-CM

## 2024-03-20 LAB
DEPRECATED HBV CORE AB SER IA-ACNC: 7.3 NG/ML
IRON SATN MFR SERPL: 8 %
IRON SERPL-MCNC: 33 UG/DL
TIBC SERPL-MCNC: 398 UG/DL (ref 240–450)
TRANSFERRIN SERPL-MCNC: 267 MG/DL (ref 200–360)

## 2024-03-20 PROCEDURE — 99205 OFFICE O/P NEW HI 60 MIN: CPT | Performed by: INTERNAL MEDICINE

## 2024-03-20 NOTE — PROGRESS NOTES
Education Record    Learner:  Patient    Disease / Diagnosis: Anemia    Barriers / Limitations:  None   Comments:    Method:  Discussion   Comments:    General Topics:  Medication and Plan of care reviewed   Comments:    Outcome:  Shows understanding   Comments:    Here for new consult- anemia. Feeling fatigued, dizzy at times, and cold. Hx spinal fusion surgery 2 years ago which she required a blood transfusion after. No gi bleeding. No periods (hysterectomy).

## 2024-03-20 NOTE — PROGRESS NOTES
Hematology/Oncology Initial Consultation Note    Patient Name: Sarah Triplett  Medical Record Number: [id number]    YOB: 1975   Date of Consultation: 3/20/2024   PCP: Jeff Montanez MD    Reason for Consultation:  Sarah Triplett was seen today for the diagnosis of iron deficiency anemia    History of Present Illness:      47 y/o F PMH degenerative disc disease who presents for evaluation of iron deficiency anemia.    - reports she had a hysterectomy in 2018 for heavy menstrual periods in [place]  - had spine surgery 2 years ago, said she bled a lot and required a blood transfusion  - but even prior to this, she said she has always been anemic  - no blood in stool  - reports EGD/colonoscopy without GI bleeding issues  - reports ongoing fatigue    Past Medical History:  Past Medical History:   Diagnosis Date    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)     Acute bronchitis 06/29/2012    Anxiety state     Arthritis     Back pain     Back problem     DDD (degenerative disc disease), cervical     c2-c7 fusions    DDD (degenerative disc disease), lumbar     L4-L5    DDD (degenerative disc disease), lumbosacral     S1-S2    Depression     Esophageal reflux     Fibromyalgia     History of blood transfusion     Long term current use of opiate analgesic 04/25/2014    Neuropathy     Opioid dependence (HCC) 01/07/2022    Osteoarthritis     Other and unspecified alcohol dependence, unspecified drinking behavior 03/05/2014    Shortness of breath 06/29/2012    Tachycardia, unspecified 06/29/2012    Urine incontinence     Visual impairment        Past Surgical History:   Procedure Laterality Date    APPENDECTOMY      APPENDECTOMY      BACK SURGERY      Both cervical/lumbar fusion.    EXCIS LUMBAR DISK,ONE LEVEL      HYSTERECTOMY      BHAKTI LOCALIZATION WIRE 1 SITE RIGHT (CPT=19281) Right 01/27/2023    ADH; intraductal papilloma    NEEDLE BIOPSY RIGHT  02/2022    intraductal papilloma     OTHER SURGICAL HISTORY      gallbladder    OTHER SURGICAL HISTORY      REMOVAL GALLBLADDER      TUBAL LIGATION      TUBAL LIGATION  2004       Home Medications:   topiramate 50 MG Oral Tab Take 1 tablet (50 mg total) by mouth 2 (two) times daily. (Patient taking differently: Take 0.5 tablets (25 mg total) by mouth daily.) 60 tablet 2    Cholecalciferol (VITAMIN D3 ULTRA POTENCY) 1.25 MG (02435 UT) Oral Tab Take 2,000 Int'l Units/day by mouth daily.      CUSTOM MEDICATION Semaglutide/cyanobalamin  Concentration: 1/ 0.5 mg/mL  Dispense: 1 mL vial  Instructions: Inject 25 units/0.25mg q weekly 1 each 0    traZODone 100 MG Oral Tab Take 0.5 tablets (50 mg total) by mouth nightly. 50 MG by mouth nightly; 100 MG nightly as needed 15 tablet 0    celecoxib (CELEBREX) 200 MG Oral Cap Take 1 capsule (200 mg total) by mouth daily. 90 capsule 3    sertraline 100 MG Oral Tab Take 1.5 tablets (150 mg total) by mouth daily. 135 tablet 0    gabapentin 600 MG Oral Tab Take 0.5 tablets (300 mg total) by mouth 3 (three) times daily. 45 tablet 2    docusate sodium 100 MG Oral Cap Take 100 mg by mouth daily as needed.      MYRBETRIQ 50 MG Oral Tablet 24 Hr Take 1 tablet (50 mg total) by mouth daily.      omeprazole 20 MG Oral Capsule Delayed Release Take 2 capsules (40 mg total) by mouth daily.      Oxybutynin Chloride ER 15 MG Oral Tablet 24 Hr Take 1 tablet (15 mg total) by mouth daily. 90 tablet 6     -------  Current Outpatient Medications on File Prior to Visit   Medication Sig Dispense Refill    topiramate 50 MG Oral Tab Take 1 tablet (50 mg total) by mouth 2 (two) times daily. (Patient taking differently: Take 0.5 tablets (25 mg total) by mouth daily.) 60 tablet 2    Cholecalciferol (VITAMIN D3 ULTRA POTENCY) 1.25 MG (92470 UT) Oral Tab Take 2,000 Int'l Units/day by mouth daily.      CUSTOM MEDICATION Semaglutide/cyanobalamin  Concentration: 1/ 0.5 mg/mL  Dispense: 1 mL vial  Instructions: Inject 25 units/0.25mg q weekly 1 each 0     traZODone 100 MG Oral Tab Take 0.5 tablets (50 mg total) by mouth nightly. 50 MG by mouth nightly; 100 MG nightly as needed 15 tablet 0    celecoxib (CELEBREX) 200 MG Oral Cap Take 1 capsule (200 mg total) by mouth daily. 90 capsule 3    sertraline 100 MG Oral Tab Take 1.5 tablets (150 mg total) by mouth daily. 135 tablet 0    gabapentin 600 MG Oral Tab Take 0.5 tablets (300 mg total) by mouth 3 (three) times daily. 45 tablet 2    docusate sodium 100 MG Oral Cap Take 100 mg by mouth daily as needed.      MYRBETRIQ 50 MG Oral Tablet 24 Hr Take 1 tablet (50 mg total) by mouth daily.      omeprazole 20 MG Oral Capsule Delayed Release Take 2 capsules (40 mg total) by mouth daily.      Oxybutynin Chloride ER 15 MG Oral Tablet 24 Hr Take 1 tablet (15 mg total) by mouth daily. 90 tablet 6    [] celecoxib 200 MG Oral Cap Take 1 capsule (200 mg total) by mouth daily. 90 capsule 0     No current facility-administered medications on file prior to visit.       Allergies:   Allergies   Allergen Reactions    Amoxicillin-Pot Clavulanate ITCHING, HIVES and ANGIOEDEMA    Augmentin [Amoclan] SWELLING       Psychosocial History:  Social History     Social History Narrative    ** Merged History Encounter **          Social History     Socioeconomic History    Marital status: Single   Tobacco Use    Smoking status: Former     Packs/day: 1.00     Years: 35.00     Additional pack years: 0.00     Total pack years: 35.00     Types: Cigarettes     Quit date: 2021     Years since quittin.2    Smokeless tobacco: Never   Vaping Use    Vaping Use: Never used   Substance and Sexual Activity    Alcohol use: Not Currently     Comment: pt states she drinks every once in a while. Pt drinks a pint of vodka    Drug use: Never   Social History Narrative    ** Merged History Encounter **            Family Medical History:  Family History   Problem Relation Age of Onset    Anxiety Mother     Breast Cancer Mother     Other ([other])  Daughter     Other ([other]) Son     Anxiety Maternal Aunt     Depression Maternal Aunt     Other (lung cancer) Maternal Aunt     Other (lung cancer) Maternal Uncle        Review of Systems:  A 10-point ROS was done with pertinent positives and negative per the HPI    Vital Signs:  Height: 160 cm (5' 3\") (03/20 1237)  Weight: 79.4 kg (175 lb) (03/20 1237)  BSA (Calculated - sq m): 1.83 sq meters (03/20 1237)  Pulse: 86 (03/20 1237)  BP: 121/77 (03/20 1237)  Temp: 97.8 °F (36.6 °C) (03/20 1237)  Do Not Use - Resp Rate: --  SpO2: 99 % (03/20 1237)    Wt Readings from Last 6 Encounters:   03/20/24 79.4 kg (175 lb)   03/12/24 78 kg (172 lb)   02/27/24 78.2 kg (172 lb 6.4 oz)   11/13/23 80.6 kg (177 lb 9.6 oz)   08/21/23 81.2 kg (179 lb)   06/05/23 82.5 kg (181 lb 12.8 oz)       Physical Examination:  General: Patient is alert and oriented, not in acute distress  Psych: Mood and affect are appropriate  Eyes: EOMI, PERRL  ENT: Oropharynx is clear, no adenopathy  CV: no LE edema  Respiratory: Non-labored respirations  Neurological: Grossly intact   Skin: no rashes or petechiae    Laboratory:  Lab Results   Component Value Date    WBC 5.8 02/28/2024    WBC 6.5 06/05/2023    WBC 6.0 01/06/2023    HGB 11.3 (L) 02/28/2024    HGB 11.4 (L) 06/05/2023    HGB 11.7 (L) 01/06/2023    HCT 35.4 02/28/2024    MCV 84.7 02/28/2024    MCH 27.0 02/28/2024    MCHC 31.9 (L) 02/28/2024    RDW 14.6 02/28/2024     02/28/2024    .0 06/05/2023    .0 01/06/2023     Lab Results   Component Value Date    GLU 92 02/28/2024    BUN 30 (H) 02/28/2024    BUNCREA 38 (H) 02/28/2024    CREATSERUM 0.79 02/28/2024    CREATSERUM 0.94 06/05/2023    CREATSERUM 0.80 01/05/2023    ANIONGAP 5 06/05/2023     01/22/2016    GFRNAA 101 04/20/2022    GFRAA 117 04/20/2022    CA 9.4 02/28/2024    OSMOCALC 289 06/05/2023    ALKPHO 43 02/28/2024    AST 11 02/28/2024    ALT 8 02/28/2024    BILT 0.4 02/28/2024    TP 6.9 02/28/2024    ALB 4.5  02/28/2024    GLOBULIN 2.4 02/28/2024    AGRATIO 1.9 02/28/2024     02/28/2024    K 4.8 02/28/2024     (H) 02/28/2024    CO2 24 02/28/2024     Lab Results   Component Value Date    PTT 28.2 01/22/2016    PT 9.8 04/20/2022    INR 1.0 04/20/2022       Impression & Plan:     Iron deficiency anemia  - secondary to likely inadequate iron intake. Her iron levels may have never recovered after her surgery as well from blood loss  - plan for 750mg IV injectafer x2, spaced one week apart, close monitoring in infusion. Discussed also SE of possible hypophosphatemia, to eat 1 cup of cashews once daily for 1 week following each iron infusion  - repeat CBC/iron/tibc/ferritin in 3 months to reassess iron stores    Maldonado Samson MD  Hematology/Medical Oncology  [facility]

## 2024-03-21 ENCOUNTER — TELEPHONE (OUTPATIENT)
Dept: HEMATOLOGY/ONCOLOGY | Facility: HOSPITAL | Age: 49
End: 2024-03-21

## 2024-03-25 ENCOUNTER — OFFICE VISIT (OUTPATIENT)
Dept: PHYSICAL THERAPY | Age: 49
End: 2024-03-25
Attending: FAMILY MEDICINE
Payer: MEDICAID

## 2024-03-25 DIAGNOSIS — M54.2 NECK PAIN: Primary | ICD-10-CM

## 2024-03-25 DIAGNOSIS — M54.9 UPPER BACK PAIN: ICD-10-CM

## 2024-03-25 PROCEDURE — 97110 THERAPEUTIC EXERCISES: CPT

## 2024-03-25 PROCEDURE — 97140 MANUAL THERAPY 1/> REGIONS: CPT

## 2024-03-25 NOTE — PROGRESS NOTES
Insurance (Authorized # of Visits):  [employer]         Authorizing Physician: Dr. Montanez  Next MD visit: none scheduled    Fall Risk: standard         Precautions: n/a             Diagnosis:    Neck pain (M54.2)  Upper back pain (M54.9)        Referring Provider: Lisseth   Date of Evaluation:    3/18/2024  Precautions:  None  Next MD visit:   none scheduled  Date of Surgery: n/a    Subjective:   Did home program, the same. Headache last three days, no apparent reason.  Sarah Triplett is a 48 year old female who presents to therapy today with complaints of neck pain (right scapula to neck).     Headaches from back of head to forehead     Pt describes pain level at worst 8/10.   Current functional limitations include turning head, cooking, reaching up into a shelf. Looking down to quilt for an hour     Describes obstruction.     Sarah describes prior level of function no issue prior to level months ago. Pt goals include get rid of the pain.    Objective:   (Pain with *)  Tested 3/25/2024:    Cervical AROM: (* denotes performed with pain)  Flexion: 19 degrees without protrusion: 15 degrees with protrustion  Extension: 17 degrees without retraction: 42  Sidebending: R 15; L 8  Rotation: R 45; L 38       Tested 3/18/2024:  Palpation: left upper trap*         Special tests:  Right hand in 5th digit flexion of distal 3 digits position at rest.  Displays inability to move right distal 2 fingers from thumb (reports can't use scissors on this hand)    Today’s Treatment and Response:   Retraction sitting with overpressure x10: worse  Retraction supine x10: worse     L lateral flexion x10 sitting: worse  R lateral flexion x10 sitting: worse     R rotation x10 sitting x2: worse  R rotation mid-cervical spine x10: worse     L rotation sitting x10: worse     Flexion x10: worse     Extension with rolled towel behind neck x15: worse     Supine R rotation x10 on one pillow:    Assessment: Given trial of L  lateral flexion mid-cervical spine      Goals: (to be met in 12 visits)   Pt will improve NDI score from 40/100 to 20/100 to display improved ability to turning head, cooking, reaching up into a shelf.  Pt will reduce pain at its worst from 8/10 to 5/10 to allow for improved ability to turning head, cooking, reaching up into a shelf.  Pt will improve cervical rotation from 30 degrees to 45 degrees allow for improved ability to turning head, cooking, reaching up into a shelf.  Pt will be independent with home program to allow for maintenance of goals achieved in therapy.       Plan:  Note: does not find value with lateral flexion measurements, says she is fused to this level    Exhaust L lateral flexion mid-cervical spine    Never done:  Supine L lateral flexion  Supine L rotation  Supine R lateral flexion  Arm pit sniff either way      Reminder:  R lateral flexion mid-cervcical x20: no effect    Charges: 2 there ex, 1 manual therapy     Total Timed Treatment: 38 min  Total Treatment Time: 38 min  Date: 3/25/2024  Tx#: 2 Date:   Tx#: 3 Date:  Tx#: 4 Date:  Tx#: 5 Date:  Tx#: 6   Ther-Ex 30 minutes:    R rotation x20 supine: better (but see manual below)    L rotation mid-cervical x20: worse    R lateral flexion mid-cervical x20: no effect    L lateral flexion mid-cervical x20: betterx3 and 30' held and 60': better        Educated on home program, see below.  Verbal, visual and tactile cues for there ex.           Manual 8 minutes:  Soft tissue massage R levator scapula    R rotation overpressure supine x20: worse    Dry needling C1 both sides of neck to multifidus, 1 finger breath    L lateral flexion overpressure/mobilization x10: better             N Re-Ed

## 2024-03-26 ENCOUNTER — TELEPHONE (OUTPATIENT)
Dept: PHYSICAL THERAPY | Age: 49
End: 2024-03-26

## 2024-03-26 ENCOUNTER — APPOINTMENT (OUTPATIENT)
Dept: HEMATOLOGY/ONCOLOGY | Age: 49
End: 2024-03-26
Attending: INTERNAL MEDICINE
Payer: MEDICAID

## 2024-03-26 NOTE — TELEPHONE ENCOUNTER
Better, not have a headache. Doing home program.    Neck pulls with lifting groceries and donning shoes.

## 2024-03-26 NOTE — TELEPHONE ENCOUNTER
Well Exam: 12-18 YR OLD FEMALE                                                                         Mobility Evaluation       UBJECTIVE:  Socorro Vernon is a 10 year old 2013 female who presents for a well child exam. And mobility evaluation   Patient presents with Mother.    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=She has severe scoliosis and has had several spinal surgeries with mike placement  Dr Is Dr Russell who did the last surgery 11/2023  She is followed by Endocrinology for hair thinning/osteoporosis  She is wheelchair dependent  She has significant osteoporosis and get IV treatments every 3 months of Zoledronic    She has a pulmonologist Dr Shy Elder and neurologist  Dr Macie Whitmore as well  She has a respiratory vest/BIPAP/SuctioningPRN /Albuterol Nebs  Last ICU admission was 3/2022 Typical follow ups are Neurology every 4 months with Dr Anderson/Endocrinology every 3-4 months  Pulmonary Dr Driver for restrictive lung disease every 3-4 months  There was also a mention of Intrathecal injections  every 4 months to halt the severity of the SMA process  She has had trouble gaining weight and is on daily Tolerex /Additives  Weight has been stable at 60 pounds        Today is her Mobility Assessment                Due to her Spinal muscular Atrophy she has decreased ROM in all her joints   She also has no weight bearing capabilities     Upper and Lower extremity strength  RUE   2/5  LUE   2/5    RLE 1/5  LLE   1/5   Her MRADL's that are impaired are her dressing/grooming/toileting/feeding and bathing  She needs complete assistance on all of the above   She is non-ambulatory based on having no strength in her legs  She does not qualify for a cane or walker because of her extreme disabilities in her upper and lower extremities  A power wheelchair is the most medically appropriate  due to lacking  ----- Message from Mydhili Claudetta Newton, MD sent at 10/4/2022  1:14 PM CDT -----  US guided core biopsy recommended - R breast. Radiology already discussed with the patient and I was informed that she has consented to get this done. Paper order request needs to signed and faxed back to radiology. strength and balance in all upper and lower extremities She is unable to self propel a manual wheelchair due to lack of trunk stability and arm weakness She is unable to operate a scooter for the same reasons A scooter also does not provide any pressure relief that Socorro requires due to inability to independently weight shift or transfer   She has been using a power wheelchair for 11 years   She needs a power wheelchair with the ability to raise and lower as well as incline and tilt  She also requires lights for evening safety                                                                                                                                                                                       MEASUREMENT: Visit Vitals  BP (!) 98/60   Pulse 100   Temp 98.3 °F (36.8 °C)   Ht 4' 8.1\" (1.425 m)   Wt (!) 27.2 kg (60 lb)   LMP  (LMP Unknown)   SpO2 100%   BMI 13.40 kg/m²        OBJECTIVE:  PAST HISTORIES:  ALLERGIES:  Milk   (food or med), Milk intolerance   (food), and Milk protein extract   (food or med)   Current Medications    ALBUTEROL (VENTOLIN) (2.5 MG/3ML) 0.083% NEBULIZER SOLUTION    Take 3 mLs by nebulization 2 times daily as needed for Wheezing or Shortness of Breath.    ALBUTEROL (VENTOLIN) 2 MG/5ML SYRUP    Take 5 mLs by mouth 3 times daily.    BIOTIN 2.5 MG CAP    Take 2.5 mg by mouth every morning.    BUDESONIDE (PULMICORT) 0.5 MG/2ML NEBULIZER SUSPENSION    Take 2 mLs by nebulization in the morning and 2 mLs in the evening.    CALCITRIOL ORAL (ROCALTROL) 1 MCG/ML SOLUTION    GIVE 1 ML BY MOUTH DAILY FOR 5 DAYS STARTING THE DAY BEFORE INFUSION    CALCIUM CARBONATE 1250 MG/5ML SUSPENSION    SHAKE LIQUID WELL AND GIVE 7 ML BY MOUTH TWICE DAILY FOR 14 DAYS START 1 WEEK BEFORE INFUSION    CHOLECALCIFEROL 10 MCG (400 UNITS)/ML ORAL LIQUID    Take 2.5 mLs by mouth every morning.    FERROUS GLUCONATE (FERGON) 240 (27 FE) MG TAB    Take 27 mg by mouth.    NUSINERSEN (SPINRAZA) 12 MG/5ML SOLUTION  INTRATHECAL INJECTION        SODIUM CHLORIDE 3 % NEBULIZER SOLUTION    Inhale 4 mLs into the lungs.      History       Not marked as reviewed during this visit.           IMMUNIZATION STATUS: Up to date per review of the electronic health records.      RECENT HEALTH EVENTS:  Illnesses: []  Yes  [x]  None  Hospitalizations: []  Yes  [x]  None  Injuries or Accidents: []  Yes  [x]  None    Menses  Age at Menarche: 10/2023  No LMP recorded (lmp unknown).   [] Yes [x] No Regular  Days of flow 5  Concerns:     REVIEW OF SYSTEMS:    Review of Systems   Musculoskeletal:         Global muscle weekness   Neurological:         Global muscle weakness/scoliosis   All other systems reviewed and are negative.       PHYSICAL EXAM:    Physical Exam  Vitals reviewed.   Constitutional:       General: She is active.   HENT:      Head: Normocephalic and atraumatic.      Right Ear: Tympanic membrane normal.      Left Ear: Tympanic membrane normal.      Nose: Nose normal.      Mouth/Throat:      Mouth: Mucous membranes are moist.      Pharynx: Oropharynx is clear.      Neck: Normal range of motion and neck supple.   Eyes:      Extraocular Movements: Extraocular movements intact.      Pupils: Pupils are equal, round, and reactive to light.   Cardiovascular:      Rate and Rhythm: Normal rate and regular rhythm.      Pulses: Normal pulses.      Heart sounds: Normal heart sounds.   Pulmonary:      Effort: Pulmonary effort is normal.      Breath sounds: Normal breath sounds.   Abdominal:      General: Abdomen is flat.      Palpations: Abdomen is soft.   Musculoskeletal:         General: Normal range of motion.   Skin:     General: Skin is warm.      Comments: acne   Neurological:      Mental Status: She is alert.      Motor: Weakness present.      Coordination: Coordination abnormal.      Deep Tendon Reflexes: Reflexes abnormal.      Comments: Non-ambulatory           Patient Education  [] Referenced Bright Futures Parent Handout  [x]  Recommend routine dental exam  [] Reviewed and discussed parent questions / concerns     Recent PHQ 2/9 Score    PHQ 2:  PHQ 2 Score Peds PHQ 2 Score Peds PHQ 2 Interpretation   3/27/2024  10:28 AM 0 No further screening needed       PHQ 9:  PHQ 9 Score Peds PHQ 9 Score   5/16/2023   5:03 PM 0       DEPRESSION ASSESSMENT/PLAN:  Depression screening is negative no further plan needed.       ASSESSMENT:  Socorro Vernon 10 year old female seen for:  Encounter Diagnoses   Name Primary?    Encounter for routine child health examination without abnormal findings Yes    SMA-11 Scoliosis/Non-ambulatory /Restrictive Lung disease     PLAN:  All patient and parental concerns and questions discussed.  No Immunizations   Risks, benefits, and side effects discussed. Vaccine Information Statement for Immunizations given.    Follow up: No follow-ups on file.    Taniya Olsen MD  [facility] [address] [address]  Dept Phone: [phone]

## 2024-03-28 ENCOUNTER — OFFICE VISIT (OUTPATIENT)
Dept: HEMATOLOGY/ONCOLOGY | Age: 49
End: 2024-03-28
Attending: INTERNAL MEDICINE
Payer: MEDICAID

## 2024-03-28 ENCOUNTER — TELEPHONE (OUTPATIENT)
Dept: PHYSICAL THERAPY | Facility: HOSPITAL | Age: 49
End: 2024-03-28

## 2024-03-28 VITALS
OXYGEN SATURATION: 100 % | DIASTOLIC BLOOD PRESSURE: 84 MMHG | RESPIRATION RATE: 16 BRPM | SYSTOLIC BLOOD PRESSURE: 131 MMHG | HEART RATE: 63 BPM | TEMPERATURE: 97 F

## 2024-03-28 DIAGNOSIS — D50.8 IRON DEFICIENCY ANEMIA SECONDARY TO INADEQUATE DIETARY IRON INTAKE: Primary | ICD-10-CM

## 2024-03-28 PROCEDURE — 96365 THER/PROPH/DIAG IV INF INIT: CPT

## 2024-03-28 NOTE — PROGRESS NOTES
Education Record    Learner:  Patient    Disease / Diagnosis: here for injectafer    Barriers / Limitations:  None    Method:  Brief focused, printed material and  reinforcement    General Topics:  Plan of care reviewed    Outcome:  patient ambulatory with cane. No complaints. Tolerated infusion. VSS. Has next appt. [facility]. Orders changed and print out given to patient. Instructed patient to get labs drawn 3 months from last iron which would be around 6/27. Shows understanding. Discharged in stable condition

## 2024-03-29 ENCOUNTER — OFFICE VISIT (OUTPATIENT)
Dept: PHYSICAL THERAPY | Age: 49
End: 2024-03-29
Attending: FAMILY MEDICINE
Payer: MEDICAID

## 2024-03-29 ENCOUNTER — APPOINTMENT (OUTPATIENT)
Dept: PHYSICAL THERAPY | Age: 49
End: 2024-03-29
Attending: FAMILY MEDICINE
Payer: MEDICAID

## 2024-03-29 PROCEDURE — 97110 THERAPEUTIC EXERCISES: CPT

## 2024-03-29 PROCEDURE — 97140 MANUAL THERAPY 1/> REGIONS: CPT

## 2024-03-29 NOTE — PROGRESS NOTES
Insurance (Authorized # of Visits):  [employer]         Authorizing Physician: Dr. Montanez  Next MD visit: none scheduled    Fall Risk: standard         Precautions: n/a             Diagnosis:    Neck pain (M54.2)  Upper back pain (M54.9)        Referring Provider: Lisseth   Date of Evaluation:    3/18/2024  Precautions:  None  Next MD visit:   none scheduled  Date of Surgery: n/a    Subjective:   Maybe 50% better. Still pain with quilting, bending over to quilt, or housework to lift with arm.    Did home program, the same. Headache last three days, no apparent reason.  Sarah Triplett is a 48 year old female who presents to therapy today with complaints of neck pain (right scapula to neck).     Headaches from back of head to forehead     Pt describes pain level at worst 8/10.   Current functional limitations include turning head, cooking, reaching up into a shelf. Looking down to quilt for an hour     Describes obstruction.     Sarah describes prior level of function no issue prior to level months ago. Pt goals include get rid of the pain.    Objective:   (Pain with *)  Tested 3/29/2024:    Cervical AROM: (* denotes performed with pain)  Flexion: 34 degrees without protrusion:   Extension: 42 degrees without retraction  Sidebending: R 15; L 8 (tested 3/25/2024)  Rotation: R 52; L 41    Special tests:  8lbs bicep curl*     Intact:  alar, transverse ligaments and C1 fracture test (Note alar not intact, but may be due to fusion, very limited motion of upper cervical spine)    Tested 3/18/2024:  Palpation: left upper trap*     Special tests:  Right hand in 5th digit flexion of distal 3 digits position at rest.  Displays inability to move right distal 2 fingers from thumb (reports can't use scissors on this hand)      Assessment: Given trial of supine L lateral flexion      Goals: (to be met in 12 visits)   Pt will improve NDI score from 40/100 to 20/100 to display improved ability to turning  head, cooking, reaching up into a shelf.  Pt will reduce pain at its worst from 8/10 to 5/10 to allow for improved ability to turning head, cooking, reaching up into a shelf.  Pt will improve cervical rotation from 30 degrees to 45 degrees allow for improved ability to turning head, cooking, reaching up into a shelf.  Pt will be independent with home program to allow for maintenance of goals achieved in therapy.       Plan:  Note: does not find value with lateral flexion measurements, says she is fused to this level    Exhaust Supine L lateral flexion, sustained 90 seconds (maybe go to 3 minutes)  -After can she do retraction supine, progress from here    Never done:  Supine L rotation  Supine R lateral flexion  Scape margins along neck  Arm pit sniff either way        Reminder:  R lateral flexion mid-cervcical x20: no effect    Charges: 2 there ex, 1 manual therapy     Total Timed Treatment: 38 min  Total Treatment Time: 38 min  Date: 3/25/2024  Tx#: 2 Date: 3/29/2024  Tx#: 3 Date:  Tx#: 4 Date:  Tx#: 5 Date:  Tx#: 6   Ther-Ex 30 minutes:    R rotation x20 supine: better (but see manual below)    L rotation mid-cervical x20: worse    R lateral flexion mid-cervical x20: no effect    L lateral flexion mid-cervical x20: betterx3 and 30' held and 60': better        Educated on home program, see below.  Verbal, visual and tactile cues for there ex.     Ther-Ex 30 minutes:    L lateral flexion mid-cervical x20: better: therapist overpressure/mobilization: worse    Extension with rolled towel with behind neck 10x: worse    L rotation mid-cervical x10 and 30': a bit better    L lateral flexion supine x20: better, 45': more better    Isometric cervical protrustion and retraction x20x2        Educated on home program, see below.  Verbal, visual and tactile cues for there ex.        Manual 8 minutes:  Soft tissue massage R levator scapula    R rotation overpressure supine x20: worse    Dry needling C1 both sides of neck to  multifidus, 1 finger breath    L lateral flexion overpressure/mobilization x10: better       Manual 8 minutes:  Dry needling C1 both sides of neck to multifidus, 1 and 2 finger breaths and one distal occiput on rectus capitus    L lateral flexion mid-cervical spine overpressure/mobilization x20: worse    L rotation mid-cervical spine overpressure/mobilization x20: worse    L lateral flexion supine overpressure/mobilization x20: better       N Re-Ed

## 2024-04-01 ENCOUNTER — APPOINTMENT (OUTPATIENT)
Dept: PHYSICAL THERAPY | Age: 49
End: 2024-04-01
Attending: FAMILY MEDICINE
Payer: MEDICAID

## 2024-04-01 ENCOUNTER — TELEPHONE (OUTPATIENT)
Dept: PHYSICAL THERAPY | Age: 49
End: 2024-04-01

## 2024-04-01 NOTE — TELEPHONE ENCOUNTER
Overall better. Woke up day after last appointment with a headache, credits Dry Needling. New home program going well, still pain to be cook for a while for Easter dinner.

## 2024-04-02 ENCOUNTER — APPOINTMENT (OUTPATIENT)
Dept: HEMATOLOGY/ONCOLOGY | Age: 49
End: 2024-04-02
Attending: INTERNAL MEDICINE
Payer: MEDICAID

## 2024-04-03 ENCOUNTER — APPOINTMENT (OUTPATIENT)
Dept: PHYSICAL THERAPY | Age: 49
End: 2024-04-03
Attending: FAMILY MEDICINE
Payer: MEDICAID

## 2024-04-03 ENCOUNTER — TELEPHONE (OUTPATIENT)
Dept: PHYSICAL THERAPY | Age: 49
End: 2024-04-03

## 2024-04-03 DIAGNOSIS — Z51.81 ENCOUNTER FOR THERAPEUTIC DRUG MONITORING: ICD-10-CM

## 2024-04-03 DIAGNOSIS — E66.9 OBESITY (BMI 30-39.9): ICD-10-CM

## 2024-04-03 NOTE — TELEPHONE ENCOUNTER
Requesting increase dose  Requested Prescriptions     Pending Prescriptions Disp Refills    CUSTOM MEDICATION 1 each 0     Sig: Semaglutide/cyanobalamin  Concentration: 1/ 0.5 mg/mL  Dispense: 1 mL vial  Instructions: Inject 25 units/0.25mg q weekly       LOV: 03/12/2024  RTC: in about 3 months  Filled: 03/12/2024 #1 each with 0 refills    Future Appointments   Date Time Provider Department Center   4/4/2024  1:00 PM PF TX RN5 [facility] [place]   4/28/2024  8:30 AM  MR RM2 (1.5T WIDE)  [facility]   7/10/2024  9:40 AM Rosy Biggs, ROSETTA [facility]     I would like to increase the dose. According to my scale I went from 175.6 lbs to 170.2.

## 2024-04-03 NOTE — TELEPHONE ENCOUNTER
Message left to check on status of condition that Physical Therapist was treating patient for in recent past. Left direct phone number for Physical therapist to receive call back.

## 2024-04-04 ENCOUNTER — APPOINTMENT (OUTPATIENT)
Dept: HEMATOLOGY/ONCOLOGY | Age: 49
End: 2024-04-04
Attending: INTERNAL MEDICINE
Payer: MEDICAID

## 2024-04-04 ENCOUNTER — APPOINTMENT (OUTPATIENT)
Dept: PHYSICAL THERAPY | Age: 49
End: 2024-04-04
Attending: FAMILY MEDICINE
Payer: MEDICAID

## 2024-04-05 ENCOUNTER — APPOINTMENT (OUTPATIENT)
Dept: PHYSICAL THERAPY | Age: 49
End: 2024-04-05
Attending: FAMILY MEDICINE
Payer: MEDICAID

## 2024-04-08 ENCOUNTER — APPOINTMENT (OUTPATIENT)
Dept: PHYSICAL THERAPY | Age: 49
End: 2024-04-08
Attending: FAMILY MEDICINE
Payer: MEDICAID

## 2024-04-09 ENCOUNTER — TELEPHONE (OUTPATIENT)
Dept: FAMILY MEDICINE CLINIC | Facility: CLINIC | Age: 49
End: 2024-04-09

## 2024-04-09 ENCOUNTER — PATIENT MESSAGE (OUTPATIENT)
Dept: FAMILY MEDICINE CLINIC | Facility: CLINIC | Age: 49
End: 2024-04-09

## 2024-04-09 ENCOUNTER — NURSE ONLY (OUTPATIENT)
Dept: FAMILY MEDICINE CLINIC | Facility: CLINIC | Age: 49
End: 2024-04-09
Payer: MEDICAID

## 2024-04-09 DIAGNOSIS — R31.9 HEMATURIA, UNSPECIFIED TYPE: Primary | ICD-10-CM

## 2024-04-09 DIAGNOSIS — R31.9 HEMATURIA, UNSPECIFIED TYPE: ICD-10-CM

## 2024-04-09 LAB
APPEARANCE: CLEAR
GLUCOSE (URINE DIPSTICK): 100 MG/DL
MULTISTIX LOT#: ABNORMAL NUMERIC
NITRITE, URINE: POSITIVE
PH, URINE: 6 (ref 4.5–8)
PROTEIN (URINE DIPSTICK): >=300 MG/DL
SPECIFIC GRAVITY: 1.03 (ref 1–1.03)
UROBILINOGEN,SEMI-QN: 1 MG/DL (ref 0–1.9)

## 2024-04-09 PROCEDURE — 87086 URINE CULTURE/COLONY COUNT: CPT | Performed by: FAMILY MEDICINE

## 2024-04-09 PROCEDURE — 81001 URINALYSIS AUTO W/SCOPE: CPT | Performed by: FAMILY MEDICINE

## 2024-04-09 RX ORDER — NITROFURANTOIN 25; 75 MG/1; MG/1
100 CAPSULE ORAL 2 TIMES DAILY
Qty: 14 CAPSULE | Refills: 0 | Status: SHIPPED | OUTPATIENT
Start: 2024-04-09

## 2024-04-09 NOTE — TELEPHONE ENCOUNTER
From: Sarah Triplett  To: Jeff Montanez  Sent: 4/9/2024 8:38 AM CDT  Subject: UTI     I'm concerned, I had an iron infusion on March 28th but all of a sudden there's a lot of blood in my urine. I've had a hysterectomy so I don't understand why.   I can have my Mom  a specimen cup this morning and then bring it in right away if possible.

## 2024-04-10 ENCOUNTER — APPOINTMENT (OUTPATIENT)
Dept: PHYSICAL THERAPY | Age: 49
End: 2024-04-10
Attending: FAMILY MEDICINE
Payer: MEDICAID

## 2024-04-11 ENCOUNTER — APPOINTMENT (OUTPATIENT)
Dept: PHYSICAL THERAPY | Age: 49
End: 2024-04-11
Attending: FAMILY MEDICINE
Payer: MEDICAID

## 2024-04-12 ENCOUNTER — APPOINTMENT (OUTPATIENT)
Dept: PHYSICAL THERAPY | Age: 49
End: 2024-04-12
Attending: FAMILY MEDICINE
Payer: MEDICAID

## 2024-04-15 ENCOUNTER — TELEPHONE (OUTPATIENT)
Dept: FAMILY MEDICINE CLINIC | Facility: CLINIC | Age: 49
End: 2024-04-15

## 2024-04-15 ENCOUNTER — APPOINTMENT (OUTPATIENT)
Dept: PHYSICAL THERAPY | Age: 49
End: 2024-04-15
Attending: FAMILY MEDICINE
Payer: MEDICAID

## 2024-04-15 NOTE — TELEPHONE ENCOUNTER
----- Message from Jeff Montanez MD sent at 4/11/2024  8:32 AM CDT -----  Please let patient know that it appears that her urine culture is negative.  If she continues to have symptoms I would recommend she make an appointment to be evaluated.  Will wait for the full culture report in 3 days

## 2024-04-15 NOTE — TELEPHONE ENCOUNTER
Advised patient of Dr. Olson's note below. Patient verbalized understanding. No further questions at this time.

## 2024-04-22 ENCOUNTER — APPOINTMENT (OUTPATIENT)
Dept: PHYSICAL THERAPY | Age: 49
End: 2024-04-22
Attending: FAMILY MEDICINE
Payer: MEDICAID

## 2024-04-28 ENCOUNTER — HOSPITAL ENCOUNTER (OUTPATIENT)
Dept: MRI IMAGING | Facility: HOSPITAL | Age: 49
Discharge: HOME | End: 2024-04-28
Payer: MEDICAID

## 2024-04-28 DIAGNOSIS — N60.91 ATYPICAL DUCTAL HYPERPLASIA OF RIGHT BREAST: ICD-10-CM

## 2024-04-28 DIAGNOSIS — Z80.3 FAMILY HISTORY OF BREAST CANCER: ICD-10-CM

## 2024-04-28 DIAGNOSIS — Z91.89 AT HIGH RISK FOR BREAST CANCER: ICD-10-CM

## 2024-05-17 ENCOUNTER — PATIENT MESSAGE (OUTPATIENT)
Dept: INTERNAL MEDICINE CLINIC | Facility: CLINIC | Age: 49
End: 2024-05-17

## 2024-05-17 DIAGNOSIS — Z51.81 ENCOUNTER FOR THERAPEUTIC DRUG MONITORING: ICD-10-CM

## 2024-05-17 DIAGNOSIS — E66.9 OBESITY (BMI 30-39.9): ICD-10-CM

## 2024-05-17 NOTE — TELEPHONE ENCOUNTER
From: Sarah Triplett  To: Rosy Biggs  Sent: 5/17/2024 7:47 AM CDT  Subject: Meds    Hello, can you please call in the senaglutide to [place] [facility]. I'm at 165 lbs. I do notice that the topamax really helps the cravings. Thank you

## 2024-05-19 NOTE — TELEPHONE ENCOUNTER
Requesting semaglutide compound - same dose  LOV: 3/12/24  RTC: 3 months  Last Relevant Labs: na  Filled: 4/3/24 #2.5 ml with 0 refills    7/10/2024  9:40 AM Rosy Biggs APRN [facility]     Medication Detail    Medication Quantity Refills Start End   CUSTOM MEDICATION 1 each 0 4/3/2024 --   Sig:   Semaglutide 0.5mg/cyanobalamin  Concentration: 1/ 0.5 mg/mL  Instructions: Inject 50 units/0.5ml into skin q weekly  Dispense: 2.5 mL vial     Route:   (none)     Note to Pharmacy:   Localize Direct          [phone]

## 2024-05-28 ENCOUNTER — HOSPITAL ENCOUNTER (OUTPATIENT)
Dept: MRI IMAGING | Facility: HOSPITAL | Age: 49
Discharge: HOME | End: 2024-05-28

## 2024-05-28 PROCEDURE — A9575 INJ GADOTERATE MEGLUMI 0.1ML: HCPCS

## 2024-05-28 PROCEDURE — 77049 MRI BREAST C-+ W/CAD BI: CPT

## 2024-05-28 RX ORDER — GADOTERATE MEGLUMINE 376.9 MG/ML
20 INJECTION INTRAVENOUS
Status: COMPLETED | OUTPATIENT
Start: 2024-05-28 | End: 2024-05-28

## 2024-05-28 RX ADMIN — GADOTERATE MEGLUMINE 16 ML: 376.9 INJECTION INTRAVENOUS at 13:17:00

## 2024-05-30 ENCOUNTER — PATIENT MESSAGE (OUTPATIENT)
Dept: FAMILY MEDICINE CLINIC | Facility: CLINIC | Age: 49
End: 2024-05-30

## 2024-05-30 DIAGNOSIS — R39.9 UTI SYMPTOMS: Primary | ICD-10-CM

## 2024-05-30 RX ORDER — SERTRALINE HYDROCHLORIDE 100 MG/1
150 TABLET, FILM COATED ORAL DAILY
Qty: 135 TABLET | Refills: 0 | Status: SHIPPED | OUTPATIENT
Start: 2024-05-30 | End: 2024-08-28

## 2024-05-30 NOTE — TELEPHONE ENCOUNTER
From: Sarah Triplett  To: Jeff Lisseth  Sent: 5/30/2024 6:11 AM CDT  Subject: Meds    I've been requesting my Sertraline 150mg daily and the pharmacy says they've sent requests. I have only a few days left, so if you can refill that would be great.  I would also be greatly if you can also send in a script for a UTI. Same symptoms as I just recently had but this is my norm. Thank you

## 2024-05-30 NOTE — TELEPHONE ENCOUNTER
Please see pt's MCM - wrt UTI symptoms  Have pt come in for urine sample?  Last UA done 04/09/24  Please advise, thank you    ______________________    LOV/well adult 02/27/24  Last refill on 02/15/24, for #135 tabs, with 0 refills  sertraline 100 MG Oral Tab   Psychiatric Non-Scheduled (Anti-Anxiety) [id number]/30/2024 08:07 AM   Protocol Details In person appointment or virtual visit in the past 6 mos or appointment in next 3 mos    Depression Screening completed within the past 12 months     Future Appointments   Date Time Provider Department Center   7/10/2024  9:40 AM Rosy Biggs APRN [facility]     Order(s) pending, please review. Thank you.

## 2024-05-31 NOTE — TELEPHONE ENCOUNTER
Advised patient of Dr. Olson's note below. Patient verbalized understanding and stated will need to check if mother home as pt does not drive. Advised pt to call office prior to dropping off urine sample - she v/u. No further questions at this time.    Urine dip order placed  Will send urine culture if indicated  Will send abx once ua collected

## 2024-06-10 ENCOUNTER — NURSE ONLY (OUTPATIENT)
Dept: FAMILY MEDICINE CLINIC | Facility: CLINIC | Age: 49
End: 2024-06-10
Payer: MEDICAID

## 2024-06-10 ENCOUNTER — TELEPHONE (OUTPATIENT)
Dept: FAMILY MEDICINE CLINIC | Facility: CLINIC | Age: 49
End: 2024-06-10

## 2024-06-10 DIAGNOSIS — R39.9 UTI SYMPTOMS: Primary | ICD-10-CM

## 2024-06-10 DIAGNOSIS — R82.998 LEUKOCYTES IN URINE: ICD-10-CM

## 2024-06-10 LAB
BILIRUBIN: NEGATIVE
GLUCOSE (URINE DIPSTICK): NEGATIVE MG/DL
KETONES (URINE DIPSTICK): NEGATIVE MG/DL
MULTISTIX EXPIRATION DATE: ABNORMAL DATE
MULTISTIX LOT#: ABNORMAL NUMERIC
NITRITE, URINE: POSITIVE
PH, URINE: 7 (ref 4.5–8)
PROTEIN (URINE DIPSTICK): >=300 MG/DL
SPECIFIC GRAVITY: 1.02 (ref 1–1.03)
URINE-COLOR: YELLOW
UROBILINOGEN,SEMI-QN: 0.2 MG/DL (ref 0–1.9)

## 2024-06-10 PROCEDURE — 87186 SC STD MICRODIL/AGAR DIL: CPT | Performed by: FAMILY MEDICINE

## 2024-06-10 PROCEDURE — 87086 URINE CULTURE/COLONY COUNT: CPT | Performed by: FAMILY MEDICINE

## 2024-06-10 PROCEDURE — 87088 URINE BACTERIA CULTURE: CPT | Performed by: FAMILY MEDICINE

## 2024-06-10 RX ORDER — NITROFURANTOIN 25; 75 MG/1; MG/1
100 CAPSULE ORAL 2 TIMES DAILY
Qty: 14 CAPSULE | Refills: 0 | Status: SHIPPED | OUTPATIENT
Start: 2024-06-10

## 2024-06-10 RX ORDER — TOPIRAMATE 50 MG/1
25 TABLET, FILM COATED ORAL DAILY
Qty: 90 TABLET | Refills: 0 | Status: SHIPPED | OUTPATIENT
Start: 2024-06-10

## 2024-06-10 NOTE — TELEPHONE ENCOUNTER
Per Dr. Olson:  Lanie sent. How many UTIs has she had in the last six months if more than 2 she needs to see urologynecologist. Thank you

## 2024-06-10 NOTE — TELEPHONE ENCOUNTER
Requesting   Requested Prescriptions     Pending Prescriptions Disp Refills    TOPIRAMATE 50 MG Oral Tab [Pharmacy Med Name: Topiramate 50 Mg Tab Cipl] 60 tablet 0     Sig: Take 1 tablet (50 mg total) by mouth 2 (two) times daily.       LOV: 03/12/2024  RTC: in about 3 months  Filled: 03/15/2024 #60 with 2 refills    Future Appointments   Date Time Provider Department Center   6/10/2024  3:00 PM [facility]   7/10/2024  9:40 AM Rosy Biggs APRN [facility]

## 2024-06-10 NOTE — TELEPHONE ENCOUNTER
Advised patient of Dr. Olson's note above. Patient verbalized understanding.   Pt reports she has already contacted her urogyne at [place] and has appt for next Thursday - its just our office is closer for her to complete urine sample  Will call pt back once urine culture results - she v/u.  No further questions at this time.

## 2024-06-10 NOTE — PROGRESS NOTES
Sarah Triplett present in office for nurse visit.  Pt v/u clean catch urine  Urine sample obtained     All questions/concerns addressed. Patient left in stable condition.

## 2024-06-13 ENCOUNTER — PATIENT MESSAGE (OUTPATIENT)
Dept: FAMILY MEDICINE CLINIC | Facility: CLINIC | Age: 49
End: 2024-06-13

## 2024-06-13 NOTE — TELEPHONE ENCOUNTER
From: Sarah Triplett  To: Jeff Montanez  Sent: 6/13/2024 7:55 AM CDT  Subject: Antibiotic     I don't know that the antibiotic you sent in is working. I can hardly sit down I'm dizzy and nauseated and the palms of my hands itch. The worst part is the constant burning though.

## 2024-06-13 NOTE — TELEPHONE ENCOUNTER
Pt reports started Rx macrobid on 06/10/24 - had symptoms dizziness, nausea, itchy palms on 2nd day of abx    Pt reports she picked up new Rx sent today and has started.  Advised pt to call office back if symptoms do not improve after abx - she v/u.   Advised pt will add on Rx macrobid to allergy list - she v/u  No further questions at this time    Allergy list updated      Please let pt know that her infection is resistant to the antibiotic she ws given. I am sending in a different medication for her. It can increase risk of tendon rupture upto a year after taking so no strenuous lifiting   Written by Jeff Montanez MD on 6/13/2024  9:44 AM CDT  Seen by patient Sarah Triplett on 6/13/2024  9:51 AM

## 2024-06-14 RX ORDER — GABAPENTIN 600 MG/1
TABLET ORAL
COMMUNITY
Start: 2024-05-15 | End: 2024-06-14

## 2024-06-14 RX ORDER — GABAPENTIN 600 MG/1
600 TABLET ORAL 3 TIMES DAILY
Qty: 45 TABLET | Refills: 2 | Status: SHIPPED | OUTPATIENT
Start: 2024-06-14

## 2024-06-14 NOTE — TELEPHONE ENCOUNTER
Neurology Medications [id number]/14/2024 07:27 AM   Protocol Details In person appointment or virtual visit in the past 6 mos or appointment in next 3 mos      Refilled per protocol  gabapentin 600 MG Oral Tab   Last refilled on 5/15/24 # with  rf.  LOV- 2/27/24  Last labs- 6/10/24    Sent to pharmacy

## 2024-06-16 DIAGNOSIS — N39.41 URGE INCONTINENCE: ICD-10-CM

## 2024-06-17 RX ORDER — OXYBUTYNIN CHLORIDE 15 MG/1
15 TABLET, EXTENDED RELEASE ORAL DAILY
Qty: 90 TABLET | Refills: 0 | OUTPATIENT
Start: 2024-06-17

## 2024-06-23 DIAGNOSIS — N39.41 URGE INCONTINENCE: ICD-10-CM

## 2024-06-24 RX ORDER — OXYBUTYNIN CHLORIDE 15 MG/1
15 TABLET, EXTENDED RELEASE ORAL DAILY
Qty: 90 TABLET | Refills: 0 | OUTPATIENT
Start: 2024-06-24

## 2024-06-25 DIAGNOSIS — N39.41 URGE INCONTINENCE: ICD-10-CM

## 2024-06-25 RX ORDER — OXYBUTYNIN CHLORIDE 15 MG/1
15 TABLET, EXTENDED RELEASE ORAL DAILY
Qty: 90 TABLET | Refills: 0 | OUTPATIENT
Start: 2024-06-25

## 2024-06-25 RX ORDER — TRAZODONE HYDROCHLORIDE 100 MG/1
50 TABLET ORAL NIGHTLY
Qty: 15 TABLET | Refills: 0 | Status: SHIPPED | OUTPATIENT
Start: 2024-06-25

## 2024-06-25 NOTE — TELEPHONE ENCOUNTER
Trazodone refill request     LOV 2/27/24     Last Refill   traZODone 100 MG Oral Tab 15 tablet 0 3/1/2024       Future Appointments   Date Time Provider Department Center   7/6/2024 10:30 AM [address]   11/11/2024  9:20 AM Rosy Biggs APRN [facility]

## 2024-07-02 ENCOUNTER — HOSPITAL ENCOUNTER (OUTPATIENT)
Age: 49
Discharge: HOME | End: 2024-07-02
Payer: MEDICAID

## 2024-07-02 VITALS
SYSTOLIC BLOOD PRESSURE: 106 MMHG | OXYGEN SATURATION: 97 % | DIASTOLIC BLOOD PRESSURE: 77 MMHG | HEART RATE: 89 BPM | TEMPERATURE: 98 F | RESPIRATION RATE: 18 BRPM

## 2024-07-02 DIAGNOSIS — N39.0 FREQUENT UTI: Primary | ICD-10-CM

## 2024-07-02 LAB
BILIRUB UR QL STRIP: NEGATIVE
GLUCOSE UR STRIP-MCNC: NEGATIVE MG/DL
KETONES UR STRIP-MCNC: NEGATIVE MG/DL
NITRITE UR QL STRIP: NEGATIVE
PH UR STRIP: >=9 [PH]
PROT UR STRIP-MCNC: >=300 MG/DL
SP GR UR STRIP: 1.01
UROBILINOGEN UR STRIP-ACNC: <2 MG/DL

## 2024-07-02 PROCEDURE — 81002 URINALYSIS NONAUTO W/O SCOPE: CPT | Performed by: NURSE PRACTITIONER

## 2024-07-02 PROCEDURE — 99214 OFFICE O/P EST MOD 30 MIN: CPT | Performed by: NURSE PRACTITIONER

## 2024-07-02 RX ORDER — CIPROFLOXACIN 500 MG/1
500 TABLET, FILM COATED ORAL 2 TIMES DAILY
Qty: 14 TABLET | Refills: 0 | Status: SHIPPED | OUTPATIENT
Start: 2024-07-02 | End: 2024-07-09

## 2024-07-02 NOTE — ED INITIAL ASSESSMENT (HPI)
Pt sts on 3 courses of antibiotics in the past 3 weeks. Finished last course 5 days ago. Sts symptoms have not resolved. C/o bladder pressure/cramping, intermittent nausea.. Denies dysuria.

## 2024-07-02 NOTE — ED PROVIDER NOTES
Patient Seen in: [facility]      History   No chief complaint on file.    Stated Complaint: uti problems    Subjective:   48-year-old female who came in here with on and off bladder discomfort for a few weeks.  States she has been on 3 rounds of antibiotics, which has been first Bactrim, then cephalexin, then nitrofurantoin.  States she sees a doctor through [facility].  States she has a cystoscopy scheduled for tomorrow.  However her urologist wanted her to to get a urine test to see if she needs to be started on antibiotics.            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)    Acute bronchitis    Anxiety state    Arthritis    Back pain    Back problem    DDD (degenerative disc disease), cervical    c2-c7 fusions    DDD (degenerative disc disease), lumbar    L4-L5    DDD (degenerative disc disease), lumbosacral    S1-S2    Depression    Esophageal reflux    Fibromyalgia    History of blood transfusion    Long term current use of opiate analgesic    Neuropathy    Opioid dependence (HCC)    Osteoarthritis    Other and unspecified alcohol dependence, unspecified drinking behavior    Shortness of breath    Tachycardia, unspecified    Urine incontinence    Visual impairment              Past Surgical History:   Procedure Laterality Date    Appendectomy      Appendectomy      Back surgery      Both cervical/lumbar fusion.    Excis lumbar disk,one level      Hysterectomy      Trang localization wire 1 site right (cpt=19281) Right 01/27/2023    ADH; intraductal papilloma    Needle biopsy right  02/2022    intraductal papilloma    Other surgical history      gallbladder    Other surgical history      Removal gallbladder      Tubal ligation      Tubal ligation  2004                No pertinent social history.            Review of Systems   Constitutional: Negative.    Genitourinary:         Bladder discomfort   All other systems reviewed and are  negative.      Positive for stated Chief Complaint: No chief complaint on file.    Other systems are as noted in HPI.  Constitutional and vital signs reviewed.      All other systems reviewed and negative except as noted above.    Physical Exam     ED Triage Vitals [07/02/24 1805]   /77   Pulse 89   Resp 18   Temp 97.6 °F (36.4 °C)   Temp src Temporal   SpO2 97 %   O2 Device None (Room air)       Current Vitals:   Vital Signs  BP: 106/77  Pulse: 89  Resp: 18  Temp: 97.6 °F (36.4 °C)  Temp src: Temporal    Oxygen Therapy  SpO2: 97 %  O2 Device: None (Room air)            Physical Exam  Vitals and nursing note reviewed.   Constitutional:       Appearance: Normal appearance. She is not ill-appearing, toxic-appearing or diaphoretic.   Pulmonary:      Effort: Pulmonary effort is normal. No respiratory distress.   Abdominal:      Tenderness: There is no abdominal tenderness. There is no guarding.   Skin:     General: Skin is warm and dry.   Neurological:      General: No focal deficit present.      Mental Status: She is alert and oriented to person, place, and time.               ED Course     Labs Reviewed   [facility] POCT URINALYSIS DIPSTICK - Abnormal; Notable for the following components:       Result Value    Urine Clarity Cloudy (*)     PH, Urine >=9.0 (*)     Protein urine >=300 (*)     Blood, Urine Moderate (*)     Leukocyte esterase urine Small (*)     All other components within normal limits   URINE CULTURE, ROUTINE                      MDM                                         Medical Decision Making  56-year-old female, with several bouts of UTI over the last few weeks.  Will be having a cystoscopy done tomorrow.  Urine dip positive for leukocytes.  Will send for culture.  Reviewing the last urine culture results, and recent antibiotics taken, will empirically treat with Cipro.    Supportive/home management of diagnosis/illness/injury discussed. Red flag symptoms discussed.  Signs and symptoms/criteria that  would necessitate reevaluation, including ER evaluation discussed.  Patient and/or responsible adult verbalize and agree with management and plan of care.    Speech recognition software was used during this dictation.  There may be minor errors in transcription.      Amount and/or Complexity of Data Reviewed  Labs: ordered. Decision-making details documented in ED Course.    Risk  Prescription drug management.        Disposition and Plan     Clinical Impression:  1. Frequent UTI         Disposition:  Discharge  7/2/2024  6:44 pm    Follow-up:  No follow-up provider specified.        Medications Prescribed:  Current Discharge Medication List

## 2024-07-06 ENCOUNTER — APPOINTMENT (OUTPATIENT)
Dept: CT IMAGING | Facility: HOSPITAL | Age: 49
End: 2024-07-06
Attending: EMERGENCY MEDICINE
Payer: MEDICAID

## 2024-07-06 ENCOUNTER — HOSPITAL ENCOUNTER (EMERGENCY)
Facility: HOSPITAL | Age: 49
Discharge: HOME | End: 2024-07-06
Attending: EMERGENCY MEDICINE
Payer: MEDICAID

## 2024-07-06 VITALS
WEIGHT: 158 LBS | BODY MASS INDEX: 28 KG/M2 | TEMPERATURE: 98 F | HEIGHT: 63 IN | DIASTOLIC BLOOD PRESSURE: 85 MMHG | SYSTOLIC BLOOD PRESSURE: 135 MMHG | HEART RATE: 69 BPM | OXYGEN SATURATION: 97 % | RESPIRATION RATE: 19 BRPM

## 2024-07-06 DIAGNOSIS — N82.0 VESICOVAGINAL FISTULA: Primary | ICD-10-CM

## 2024-07-06 LAB
ALBUMIN SERPL-MCNC: 3.9 G/DL (ref 3.4–5)
ALBUMIN/GLOB SERPL: 1.2 {RATIO} (ref 1–2)
ALP LIVER SERPL-CCNC: 42 U/L
ALT SERPL-CCNC: 17 U/L
ANION GAP SERPL CALC-SCNC: 7 MMOL/L (ref 0–18)
AST SERPL-CCNC: 11 U/L (ref 15–37)
BASOPHILS # BLD AUTO: 0.09 X10(3) UL (ref 0–0.2)
BASOPHILS NFR BLD AUTO: 1.1 %
BILIRUB SERPL-MCNC: 0.5 MG/DL (ref 0.1–2)
BILIRUB UR QL STRIP.AUTO: NEGATIVE
BUN BLD-MCNC: 19 MG/DL (ref 9–23)
CALCIUM BLD-MCNC: 9.3 MG/DL (ref 8.5–10.1)
CHLORIDE SERPL-SCNC: 114 MMOL/L (ref 98–112)
CO2 SERPL-SCNC: 22 MMOL/L (ref 21–32)
COLOR UR AUTO: YELLOW
CREAT BLD-MCNC: 1.05 MG/DL
EGFRCR SERPLBLD CKD-EPI 2021: 65 ML/MIN/1.73M2 (ref 60–?)
EOSINOPHIL # BLD AUTO: 0.33 X10(3) UL (ref 0–0.7)
EOSINOPHIL NFR BLD AUTO: 4 %
ERYTHROCYTE [DISTWIDTH] IN BLOOD BY AUTOMATED COUNT: 13.7 %
GLOBULIN PLAS-MCNC: 3.3 G/DL (ref 2.8–4.4)
GLUCOSE BLD-MCNC: 82 MG/DL (ref 70–99)
GLUCOSE UR STRIP.AUTO-MCNC: NORMAL MG/DL
HCT VFR BLD AUTO: 43.3 %
HGB BLD-MCNC: 15 G/DL
IMM GRANULOCYTES # BLD AUTO: 0.02 X10(3) UL (ref 0–1)
IMM GRANULOCYTES NFR BLD: 0.2 %
KETONES UR STRIP.AUTO-MCNC: NEGATIVE MG/DL
LACTATE SERPL-SCNC: 0.3 MMOL/L (ref 0.4–2)
LACTATE SERPL-SCNC: 2.8 MMOL/L (ref 0.4–2)
LEUKOCYTE ESTERASE UR QL STRIP.AUTO: 500
LYMPHOCYTES # BLD AUTO: 2.54 X10(3) UL (ref 1–4)
LYMPHOCYTES NFR BLD AUTO: 30.7 %
MCH RBC QN AUTO: 30.4 PG (ref 26–34)
MCHC RBC AUTO-ENTMCNC: 34.6 G/DL (ref 31–37)
MCV RBC AUTO: 87.7 FL
MONOCYTES # BLD AUTO: 0.43 X10(3) UL (ref 0.1–1)
MONOCYTES NFR BLD AUTO: 5.2 %
NEUTROPHILS # BLD AUTO: 4.86 X10 (3) UL (ref 1.5–7.7)
NEUTROPHILS # BLD AUTO: 4.86 X10(3) UL (ref 1.5–7.7)
NEUTROPHILS NFR BLD AUTO: 58.8 %
NITRITE UR QL STRIP.AUTO: NEGATIVE
OSMOLALITY SERPL CALC.SUM OF ELEC: 297 MOSM/KG (ref 275–295)
PH UR STRIP.AUTO: 7.5 [PH] (ref 5–8)
PLATELET # BLD AUTO: 238 10(3)UL (ref 150–450)
POTASSIUM SERPL-SCNC: 3.4 MMOL/L (ref 3.5–5.1)
PROT SERPL-MCNC: 7.2 G/DL (ref 6.4–8.2)
PROT UR STRIP.AUTO-MCNC: 100 MG/DL
RBC # BLD AUTO: 4.94 X10(6)UL
SODIUM SERPL-SCNC: 143 MMOL/L (ref 136–145)
SP GR UR STRIP.AUTO: 1.02 (ref 1–1.03)
UROBILINOGEN UR STRIP.AUTO-MCNC: NORMAL MG/DL
WBC # BLD AUTO: 8.3 X10(3) UL (ref 4–11)

## 2024-07-06 PROCEDURE — 51600 INJECTION FOR BLADDER X-RAY: CPT | Performed by: EMERGENCY MEDICINE

## 2024-07-06 PROCEDURE — 96365 THER/PROPH/DIAG IV INF INIT: CPT

## 2024-07-06 PROCEDURE — 36415 COLL VENOUS BLD VENIPUNCTURE: CPT

## 2024-07-06 PROCEDURE — 96361 HYDRATE IV INFUSION ADD-ON: CPT

## 2024-07-06 PROCEDURE — 99285 EMERGENCY DEPT VISIT HI MDM: CPT

## 2024-07-06 PROCEDURE — 85025 COMPLETE CBC W/AUTO DIFF WBC: CPT | Performed by: EMERGENCY MEDICINE

## 2024-07-06 PROCEDURE — 74177 CT ABD & PELVIS W/CONTRAST: CPT | Performed by: EMERGENCY MEDICINE

## 2024-07-06 PROCEDURE — 87086 URINE CULTURE/COLONY COUNT: CPT | Performed by: EMERGENCY MEDICINE

## 2024-07-06 PROCEDURE — 51702 INSERT TEMP BLADDER CATH: CPT

## 2024-07-06 PROCEDURE — 96375 TX/PRO/DX INJ NEW DRUG ADDON: CPT

## 2024-07-06 PROCEDURE — 83605 ASSAY OF LACTIC ACID: CPT | Performed by: EMERGENCY MEDICINE

## 2024-07-06 PROCEDURE — 87040 BLOOD CULTURE FOR BACTERIA: CPT | Performed by: EMERGENCY MEDICINE

## 2024-07-06 PROCEDURE — 81001 URINALYSIS AUTO W/SCOPE: CPT | Performed by: EMERGENCY MEDICINE

## 2024-07-06 PROCEDURE — 85025 COMPLETE CBC W/AUTO DIFF WBC: CPT

## 2024-07-06 PROCEDURE — 74430 CONTRAST X-RAY BLADDER: CPT | Performed by: EMERGENCY MEDICINE

## 2024-07-06 PROCEDURE — 80053 COMPREHEN METABOLIC PANEL: CPT | Performed by: EMERGENCY MEDICINE

## 2024-07-06 PROCEDURE — 96376 TX/PRO/DX INJ SAME DRUG ADON: CPT

## 2024-07-06 RX ORDER — HYDROCODONE BITARTRATE AND ACETAMINOPHEN 5; 325 MG/1; MG/1
1 TABLET ORAL ONCE
Status: COMPLETED | OUTPATIENT
Start: 2024-07-06 | End: 2024-07-06

## 2024-07-06 RX ORDER — HYDROCODONE BITARTRATE AND ACETAMINOPHEN 5; 325 MG/1; MG/1
1 TABLET ORAL EVERY 4 HOURS PRN
Qty: 20 TABLET | Refills: 0 | Status: SHIPPED | OUTPATIENT
Start: 2024-07-06 | End: 2024-07-08

## 2024-07-06 RX ORDER — HYDROMORPHONE HYDROCHLORIDE 1 MG/ML
1 INJECTION, SOLUTION INTRAMUSCULAR; INTRAVENOUS; SUBCUTANEOUS ONCE
Status: COMPLETED | OUTPATIENT
Start: 2024-07-06 | End: 2024-07-06

## 2024-07-06 RX ORDER — LIDOCAINE HYDROCHLORIDE 20 MG/ML
10 JELLY TOPICAL ONCE
Status: COMPLETED | OUTPATIENT
Start: 2024-07-06 | End: 2024-07-06

## 2024-07-06 RX ORDER — HYDROMORPHONE HYDROCHLORIDE 1 MG/ML
0.5 INJECTION, SOLUTION INTRAMUSCULAR; INTRAVENOUS; SUBCUTANEOUS ONCE
Status: COMPLETED | OUTPATIENT
Start: 2024-07-06 | End: 2024-07-06
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
## 2024-07-07 NOTE — ED QUICK NOTES
Rounding Completed. Report received from TAWNYA Cornelius    Plan of Care reviewed. Waiting for disposition.  Elimination needs assessed.  Provided updates.    Bed is locked and in lowest position. Call light within reach.

## 2024-07-07 NOTE — ED QUICK NOTES
Pt to go home with villegas in placed. Standard villegas bag changed to leg bag. Education provided, pt verbalized understanding.

## 2024-07-07 NOTE — DISCHARGE INSTRUCTIONS
Follow-up with Dr. Dani Walsh contact him Monday morning for further guidance and recheck with him this week.  Take the Norco as needed for pain.  Finish the Cipro.

## 2024-07-15 ENCOUNTER — TELEPHONE (OUTPATIENT)
Dept: FAMILY MEDICINE CLINIC | Facility: CLINIC | Age: 49
End: 2024-07-15

## 2024-07-15 RX ORDER — TRAZODONE HYDROCHLORIDE 100 MG/1
50 TABLET ORAL NIGHTLY
Qty: 15 TABLET | Refills: 0 | Status: SHIPPED | OUTPATIENT
Start: 2024-07-15

## 2024-07-15 RX ORDER — OMEPRAZOLE 20 MG/1
40 CAPSULE, DELAYED RELEASE ORAL DAILY
Qty: 90 CAPSULE | Refills: 0 | Status: SHIPPED | OUTPATIENT
Start: 2024-07-15

## 2024-07-15 NOTE — TELEPHONE ENCOUNTER
Routing to provider per protocol.   traZODone 100 MG Oral Tab   Last refilled on 6/25/24 for #15  with 0 rf.   Last labs 7/6/24.   Last seen on 2/27/24.       Future Appointments   Date Time Provider Department Center   11/11/2024  9:20 AM Rosy Biggs APRN [facility]          Thank you.         Patient comment: I'm completely out and will need asap please. Especially due to upcoming surgical procedure and surgery. Thank you     Gastrointestional Medication Protocol Anpalq1207/14/2024 08:43 AM   Protocol Details In person appointment or virtual visit in the past 12 mos or appointment in next 3 mos      Refilled per protocol  omeprazole 20 MG Oral Capsule Delayed Release   Last refilled on 5/11/23 # with  rf.  Sent to provider for quantity.

## 2024-07-15 NOTE — TELEPHONE ENCOUNTER
Viviana at [facility] states that the directions for Trazodone state 50 and 100 mg.  Not sure of dosage.  Please advise.  Thank you!

## 2024-07-15 NOTE — TELEPHONE ENCOUNTER
Sig - Route: Take 0.5 tablets (50 mg total) by mouth nightly. 50 MG by mouth nightly; 100 MG nightly as needed - Oral    Sent to pharmacy as: traZODone HCl 100 MG Oral Tablet (Desyrel)    E-Prescribing Status: Receipt confirmed by pharmacy (7/15/2024 10:04 AM CDT)        Called and spoke with pharmacy ph#[phone] - advised pharmacist Pt to take 50 MG nightly, can take 100 mg as needed nightly - she v/u, no further questions

## 2024-07-17 ENCOUNTER — TELEPHONE (OUTPATIENT)
Dept: FAMILY MEDICINE CLINIC | Facility: CLINIC | Age: 49
End: 2024-07-17

## 2024-07-17 NOTE — TELEPHONE ENCOUNTER
Received fax from [facility] regarding pre-op request    Date of surgery: 07/29/24  At [facility]  Procedure: Cystoscopy, possible bladder biopsy, possible retrograde pyelogram  With Dr. Walsh  Under GA    Need UA and culture    Need to fax to #[phone]    Future Appointments   Date Time Provider Department Center   7/18/2024 10:00 AM Jeff Montanez MD [facility]   11/11/2024  9:20 AM Rosy Biggs APRN [facility]

## 2024-07-18 ENCOUNTER — OFFICE VISIT (OUTPATIENT)
Dept: FAMILY MEDICINE CLINIC | Facility: CLINIC | Age: 49
End: 2024-07-18
Payer: MEDICAID

## 2024-07-18 VITALS
DIASTOLIC BLOOD PRESSURE: 66 MMHG | WEIGHT: 159.19 LBS | OXYGEN SATURATION: 98 % | BODY MASS INDEX: 28.21 KG/M2 | RESPIRATION RATE: 18 BRPM | HEIGHT: 63 IN | TEMPERATURE: 97 F | SYSTOLIC BLOOD PRESSURE: 98 MMHG | HEART RATE: 101 BPM

## 2024-07-18 DIAGNOSIS — R30.0 DYSURIA: ICD-10-CM

## 2024-07-18 DIAGNOSIS — Z01.818 PREOP EXAMINATION: ICD-10-CM

## 2024-07-18 DIAGNOSIS — Z13.6 ISCHEMIC HEART DISEASE SCREEN: ICD-10-CM

## 2024-07-18 DIAGNOSIS — R39.15 URINARY URGENCY: ICD-10-CM

## 2024-07-18 DIAGNOSIS — N32.89 BLADDER SPASMS: ICD-10-CM

## 2024-07-18 DIAGNOSIS — N32.2 BLADDER FISTULA: Primary | ICD-10-CM

## 2024-07-18 LAB
ATRIAL RATE: 61 BPM
P AXIS: 22 DEGREES
P-R INTERVAL: 180 MS
Q-T INTERVAL: 420 MS
QRS DURATION: 86 MS
QTC CALCULATION (BEZET): 422 MS
R AXIS: -5 DEGREES
T AXIS: 29 DEGREES
VENTRICULAR RATE: 61 BPM

## 2024-07-18 PROCEDURE — 87086 URINE CULTURE/COLONY COUNT: CPT | Performed by: FAMILY MEDICINE

## 2024-07-18 PROCEDURE — 81001 URINALYSIS AUTO W/SCOPE: CPT | Performed by: FAMILY MEDICINE

## 2024-07-18 PROCEDURE — 99214 OFFICE O/P EST MOD 30 MIN: CPT | Performed by: FAMILY MEDICINE

## 2024-07-18 PROCEDURE — 87077 CULTURE AEROBIC IDENTIFY: CPT | Performed by: FAMILY MEDICINE

## 2024-07-18 RX ORDER — HYDROCODONE BITARTRATE AND ACETAMINOPHEN 5; 325 MG/1; MG/1
1 TABLET ORAL EVERY 6 HOURS PRN
COMMUNITY
Start: 2024-07-16

## 2024-07-18 RX ORDER — HYDROCODONE BITARTRATE AND ACETAMINOPHEN 5; 325 MG/1; MG/1
1 TABLET ORAL
Qty: 10 TABLET | Refills: 0 | Status: SHIPPED | OUTPATIENT
Start: 2024-07-18

## 2024-07-18 NOTE — PROGRESS NOTES
Chief Complaint   Patient presents with    Pre-Op Exam     Received fax from [facility] regarding pre-op request     Date of surgery: 24  At [facility]  Procedure: Cystoscopy, possible bladder biopsy, possible retrograde pyelogram  With Dr. Walsh  Under GA     Need UA and culture     Need to fax to #[phone]                HPI related to surgery:   Sarah Triplett is a 48 year old female who presents for a pre-operative physical exam.   Sarah Triplett is scheduled for a cystoscopy with potential bladder biopsy and retrograde pyelography procedure at [facility] on 2024 performed by Dr Walsh under [facility]. Indication: Bladder fistula neurogenic bladder, dysuria and frequency    Having difficulty sitting and it feels like she is having stabbing in the urethra and spasms in the bladder.     She  has had previous anesthesia:  Yes.  Previous complications:  No    Social History     Socioeconomic History    Marital status: Single   Tobacco Use    Smoking status: Former     Current packs/day: 0.00     Average packs/day: 1 pack/day for 35.0 years (35.0 ttl pk-yrs)     Types: Cigarettes     Start date: 1986     Quit date: 2021     Years since quittin.5     Passive exposure: Past    Smokeless tobacco: Never   Vaping Use    Vaping status: Never Used   Substance and Sexual Activity    Alcohol use: Not Currently     Comment: pt states she drinks every once in a while. Pt drinks a pint of vodka    Drug use: Never   Social History Narrative    ** Merged History Encounter **          Social Determinants of Health     Food Insecurity: Low Risk  (2022)    [facility], [facility]    Food Insecurity     Have there been times that your food ran out, and you didn't have money to get more?: No     Are there times that you worry that this might happen?: No   Transportation Needs: High Risk (2022)    [facility] from  [facility], [facility]    Transportation Needs     Do you have trouble getting transportation to medical appointments?: Yes     How do you normally get to and from your appointments?: Family/Friend    [facility]    Social Connections   Housing Stability: Low Risk  (6/19/2022)    [facility] from [facility], [facility]    Housing Stability     Are you concerned about having a safe and reliable place to live?: No      Past Medical History:    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)    Acute bronchitis    Anxiety state    Arthritis    Back pain    Back problem    DDD (degenerative disc disease), cervical    c2-c7 fusions    DDD (degenerative disc disease), lumbar    L4-L5    DDD (degenerative disc disease), lumbosacral    S1-S2    Depression    Esophageal reflux    Fibromyalgia    History of blood transfusion    Long term current use of opiate analgesic    Neuropathy    Opioid dependence (HCC)    Osteoarthritis    Other and unspecified alcohol dependence, unspecified drinking behavior    Shortness of breath    Tachycardia, unspecified    Urine incontinence    Visual impairment     Past Surgical History:   Procedure Laterality Date    Appendectomy      Appendectomy      Back surgery      Both cervical/lumbar fusion.    Excis lumbar disk,one level      Hysterectomy      Trang localization wire 1 site right (cpt=19281) Right 01/27/2023    ADH; intraductal papilloma    Needle biopsy right  02/2022    intraductal papilloma    Other surgical history      gallbladder    Other surgical history      Removal gallbladder      Tubal ligation      Tubal ligation  2004     Allergies:   Allergies   Allergen Reactions    Amoxicillin-Pot Clavulanate ITCHING, HIVES and ANGIOEDEMA    Nitrofurantoin Monohydrate Macrocrystals [Nitrofurantoin] ITCHING, NAUSEA ONLY and DIZZINESS     Potassium Clavulanate ITCHING    Augmentin [Amoclan] SWELLING     Current Outpatient Medications   Medication Sig Dispense Refill    HYDROcodone-acetaminophen 5-325 MG Oral Tab Take 1 tablet by mouth every 6 (six) hours as needed.      HYDROcodone-acetaminophen (NORCO) 5-325 MG Oral Tab Take 1 tablet by mouth daily as needed for Pain. 10 tablet 0    omeprazole 20 MG Oral Capsule Delayed Release Take 2 capsules (40 mg total) by mouth daily. 90 capsule 0    traZODone 100 MG Oral Tab Take 0.5 tablets (50 mg total) by mouth nightly. 50 MG by mouth nightly; 100 MG nightly as needed 15 tablet 0    gabapentin 600 MG Oral Tab Take 1 tablet (600 mg total) by mouth 3 (three) times daily. 45 tablet 2    topiramate 50 MG Oral Tab Take 0.5 tablets (25 mg total) by mouth daily. 90 tablet 0    sertraline 100 MG Oral Tab Take 1.5 tablets (150 mg total) by mouth daily. 135 tablet 0    Cholecalciferol (VITAMIN D3 ULTRA POTENCY) 1.25 MG (63684 UT) Oral Tab Take 2,000 Int'l Units/day by mouth daily.      docusate sodium 100 MG Oral Cap Take 100 mg by mouth daily as needed.      MYRBETRIQ 50 MG Oral Tablet 24 Hr Take 1 tablet (50 mg total) by mouth daily.      Oxybutynin Chloride ER 15 MG Oral Tablet 24 Hr Take 1 tablet (15 mg total) by mouth daily. 90 tablet 6    semaglutide-weight management 1 MG/0.5ML Subcutaneous Solution Auto-injector Inject 0.5 mL (1 mg total) into the skin every 7 days. (Patient not taking: Reported on 7/18/2024)      CUSTOM MEDICATION Semaglutide 0.5mg/cyanobalamin   Concentration: 1/ 0.5 mg/mL  Instructions: Inject 50 units/0.5ml into skin q weekly  Dispense: 2.5 mL vial (Patient not taking: Reported on 7/18/2024) 1 each 2        REVIEW OF SYSTEMS:   As per HPI and all other systems reviewed and are negative      PHYSICAL EXAM:   BP 98/66   Pulse 101   Temp 97.3 °F (36.3 °C)   Resp 18   Ht 5' 3\" (1.6 m)   Wt 159 lb 3.2 oz (72.2 kg)   LMP 02/11/2014   SpO2 98%   BMI 28.20 kg/m²      Physical  Exam  Constitutional:       Appearance: Normal appearance.   HEENT:      Head: Normocephalic and atraumatic.      Eyes: PERRLA no notable nystagmus     Ears: normal on observation     Nose: Nose normal.      Mouth: Mucous membranes are moist.      Neck: no masses no bruit  Cardiovascular:      Rate and Rhythm: Normal rate and regular rhythm.   Pulmonary:      Effort: Pulmonary effort is normal.      Breath sounds: Normal breath sounds.   Abdominal:      General: Bowel sounds are normal.      Palpations: Abdomen is soft. There is no mass.   Musculoskeletal:         General: Normal range of motion.      Cervical back: Normal range of motion.   Skin:     General: Skin is warm and dry.   Neurological:      General: No focal deficit present.      Mental Status: She is alert and oriented to person, place, and time.   Psychiatric:         Mood and Affect: Mood normal.         Thought Content: Thought content normal.         ASSESSMENT AND PLAN:     1. Bladder fistula  - Urinalysis, Routine [E]; Future  - Urine Culture, Routine [E]; Future  - Urinalysis, Routine [E]  - Urine Culture, Routine [E]    2. Dysuria  - Urinalysis, Routine [E]; Future  - Urine Culture, Routine [E]; Future  - Urinalysis, Routine [E]  - Urine Culture, Routine [E]  - HYDROcodone-acetaminophen (NORCO) 5-325 MG Oral Tab; Take 1 tablet by mouth daily as needed for Pain.  Dispense: 10 tablet; Refill: 0    3. Urinary urgency  - Urinalysis, Routine [E]; Future  - Urine Culture, Routine [E]; Future  - Urinalysis, Routine [E]  - Urine Culture, Routine [E]    4. Bladder spasms  - HYDROcodone-acetaminophen (NORCO) 5-325 MG Oral Tab; Take 1 tablet by mouth daily as needed for Pain.  Dispense: 10 tablet; Refill: 0    5. Ischemic heart disease screen  - Cardio Referral - Internal    6. Preop examination  - Cardio Referral - Internal     Given normal EKG and no evidence of angina or angina equivalent symptoms pt is currently medically stable to undergo above at  moderate risk procedure. Will obtain cardiac evaluation post operatively.     Jeff Montanez MD

## 2024-07-22 ENCOUNTER — TELEPHONE (OUTPATIENT)
Dept: FAMILY MEDICINE CLINIC | Facility: CLINIC | Age: 49
End: 2024-07-22

## 2024-07-22 DIAGNOSIS — N30.00 ACUTE CYSTITIS WITHOUT HEMATURIA: Primary | ICD-10-CM

## 2024-07-22 RX ORDER — ERYTHROMYCIN 250 MG/1
1 TABLET, DELAYED RELEASE ORAL 2 TIMES DAILY WITH MEALS
Qty: 14 TABLET | Refills: 0 | Status: SHIPPED | OUTPATIENT
Start: 2024-07-22 | End: 2024-07-29

## 2024-07-22 NOTE — TELEPHONE ENCOUNTER
ERIKA CALLED FROM [facility] - LOOKING FOR H&P CLEARANCE     PLEASE FAX OVER [phone]    THANK YOU

## 2024-07-22 NOTE — TELEPHONE ENCOUNTER
Yoko Ware CNA P Jeganmohan, Janandana Nurse  Caller: Unspecified (Today, 12:43 PM)  PT CALLED AND ADV HAS SOME QUESTIONS ABOUT THE SCRIPT THAT WAS CALLED IN    PLEASE CALL AND ADV    THANK YOU      Please see Patient message encounter 07/19/24

## 2024-07-22 NOTE — TELEPHONE ENCOUNTER
07/18/24 Pre-op office visit, lab results - faxed to [facility] fax#[phone] and to [facility] dept fax#[phone]    Attempted to call pt - phone rang x10+ with no answer  MCM sent to pt  Notify me if not read by 07/23/24

## 2024-07-22 NOTE — TELEPHONE ENCOUNTER
Per Dr. Olson:  Urine culture did come back positive after weekend she is cleared for surgery but should discuss antibiotic coverage with urologist. Rx sent. Thank you      Please addendum Office visit 07/18/24 pre-op note    Please send back to nurse pool to send pre-op. Thank you

## 2024-07-25 RX ORDER — TRAZODONE HYDROCHLORIDE 50 MG/1
50 TABLET ORAL NIGHTLY PRN
Qty: 30 TABLET | Refills: 0 | Status: SHIPPED | OUTPATIENT
Start: 2024-07-25

## 2024-07-25 NOTE — TELEPHONE ENCOUNTER
Please see patient message encounter 07/25/24 - pt requesting 90 day supply    LOV 07/18/24  Last refill on 07/15/24, for #15 tabs, with 0 refills  traZODone 100 MG Oral Tab     Future Appointments   Date Time Provider Department Center   11/11/2024  9:20 AM Rosy Biggs APRN [facility]     Order(s) pending, please review. Thank you.

## 2024-07-25 NOTE — TELEPHONE ENCOUNTER
Due to interaction between zoloft and trazodone pt to try and wean down. Have her take 50-75mg as needed then 25-50mg as needed then stop when she is down to 25 mg

## 2024-07-26 ENCOUNTER — PATIENT MESSAGE (OUTPATIENT)
Dept: INTERNAL MEDICINE CLINIC | Facility: CLINIC | Age: 49
End: 2024-07-26

## 2024-07-26 NOTE — TELEPHONE ENCOUNTER
Requesting semaglutide compound  LOV: 3/12/24  RTC: 3 months  Last Relevant Labs: na  Filled: 5/20/24 #2.5 ml with 2 refills    Future Appointments   Date Time Provider Department Center   11/11/2024  9:20 AM Rosy Biggs APRN [facility]

## 2024-07-26 NOTE — TELEPHONE ENCOUNTER
From: Sarah Triplett  To: Rosy Biggs  Sent: 7/26/2024 4:41 AM CDT  Subject: Refill     I'm at 159 lb I go a little back and forth but it's working. I do need a refill called in to [facility] I'm good with the dose I'm on. Thanks

## 2024-08-13 RX ORDER — TRAZODONE HYDROCHLORIDE 50 MG/1
50 TABLET ORAL NIGHTLY PRN
Qty: 30 TABLET | Refills: 0 | Status: SHIPPED | OUTPATIENT
Start: 2024-08-13

## 2024-08-13 NOTE — TELEPHONE ENCOUNTER
traZODone 50 MG Oral Tab     No refill protocol for this medication.    Last refill: 7/25/2024, for #30, 0 refills  Last Visit: 7/18/2024  Next Visit: No future appts  Last Px: 2/27/2024    Forward to Dr. Lorna Montanez, please advise on refills. Thanks.

## 2024-08-21 ENCOUNTER — PATIENT MESSAGE (OUTPATIENT)
Dept: FAMILY MEDICINE CLINIC | Facility: CLINIC | Age: 49
End: 2024-08-21

## 2024-08-21 ENCOUNTER — TELEPHONE (OUTPATIENT)
Dept: FAMILY MEDICINE CLINIC | Facility: CLINIC | Age: 49
End: 2024-08-21

## 2024-08-21 DIAGNOSIS — N30.00 ACUTE CYSTITIS WITHOUT HEMATURIA: ICD-10-CM

## 2024-08-21 DIAGNOSIS — N32.2 BLADDER FISTULA: Primary | ICD-10-CM

## 2024-08-21 RX ORDER — HYDROCODONE BITARTRATE AND ACETAMINOPHEN 5; 325 MG/1; MG/1
1 TABLET ORAL EVERY 6 HOURS PRN
Qty: 20 TABLET | Refills: 0 | Status: SHIPPED | OUTPATIENT
Start: 2024-08-21

## 2024-08-21 NOTE — TELEPHONE ENCOUNTER
Advised patient of Dr. Olson's note below. Patient verbalized understanding.    Pt states has \"Big surgery\" scheduled for late September and surgery in next week or two.  No further questions at this time    Routing as FYI only

## 2024-08-21 NOTE — TELEPHONE ENCOUNTER
Please see note below    Please see patient message encounter 08/21/24 - pt asking for pain meds?    Please advise, thank you

## 2024-08-21 NOTE — TELEPHONE ENCOUNTER
PATIENT CALLING THIS AFTERNOON.  PATIENT VERY UPSET.  LOTS OF PAIN.  URINE SMELLS LIKE FECAL MATTER.  THIS IS KNOWN ALREADY BY SURGEON.

## 2024-08-22 RX ORDER — SERTRALINE HYDROCHLORIDE 100 MG/1
150 TABLET, FILM COATED ORAL DAILY
Qty: 135 TABLET | Refills: 0 | OUTPATIENT
Start: 2024-08-22

## 2024-08-22 RX ORDER — TOPIRAMATE 50 MG/1
25 TABLET, FILM COATED ORAL DAILY
Qty: 90 TABLET | Refills: 0 | Status: SHIPPED | OUTPATIENT
Start: 2024-08-22

## 2024-08-22 NOTE — TELEPHONE ENCOUNTER
Psychiatric Non-Scheduled (Anti-Anxiety) [id number]/22/2024 09:41 AM   Protocol Details In person appointment or virtual visit in the past 6 mos or appointment in next 3 mos    Depression Screening completed within the past 12 months   REFUSING BECAUSE PATIENT NO LONGER TAKING.

## 2024-08-22 NOTE — TELEPHONE ENCOUNTER
Requesting topamax 50 mg  LOV: 3/12/24  RTC: 3 months  Last Relevant Labs: na  Filled: 6/10/24 #90 with 0 refills    Future Appointments   Date Time Provider Department Center   11/11/2024  9:20 AM Rosy Biggs APRN [facility]

## 2024-08-26 RX ORDER — GABAPENTIN 600 MG/1
600 TABLET ORAL 3 TIMES DAILY
Qty: 45 TABLET | Refills: 0 | Status: SHIPPED | OUTPATIENT
Start: 2024-08-26

## 2024-08-26 NOTE — TELEPHONE ENCOUNTER
Omeprazole Dr 20 Mg Cap Nort   Last refill: 7/15/2024, for #90, 0 refills    Gastrointestional Medication Protocol [id number]/25/2024 02:22 PM   Protocol Details In person appointment or virtual visit in the past 12 mos or appointment in next 3 mos         Gabapentin 600 Mg Tab Nort     Neurology Medications [id number]/25/2024 02:22 PM   Protocol Details In person appointment or virtual visit in the past 6 mos or appointment in next 3 mos     LOV 7/18/2024  Last Px: 2/27/2024  Last refill on 6/14/2024, for #45, with 2 refills    Future Appointments   Date Time Provider Department Center   11/11/2024  9:20 AM Rosy Biggs APRN [facility]

## 2024-08-28 RX ORDER — SERTRALINE HYDROCHLORIDE 100 MG/1
150 TABLET, FILM COATED ORAL DAILY
Qty: 135 TABLET | Refills: 0 | OUTPATIENT
Start: 2024-08-28

## 2024-08-28 NOTE — TELEPHONE ENCOUNTER
The original prescription was discontinued on 7/25/2024 by Jeff Montanez MD. Renewing this prescription may not be appropriate.       PATIENT NO LONGER TAKING.

## 2024-08-29 ENCOUNTER — TELEPHONE (OUTPATIENT)
Dept: FAMILY MEDICINE CLINIC | Facility: CLINIC | Age: 49
End: 2024-08-29

## 2024-08-29 NOTE — TELEPHONE ENCOUNTER
PT CALLED AND ADV IS HAVING SURGERY ON 9/6 AND 9/26. PT ADV IS NEEDING PREOP.  PT HAVING SOME ABD SURGERY (CYSTOSCOPY) WITH DR RITA PARMAR.    LOOKING FOR RECOMMENDATIONS ON WHEN PT CAN BE SEEN.    PT WILL BE BRINGING IN EKC FROM DR HAWKINS OFFICE  - PLEASE FAX TO   [phone]     PLEASE ADV    THANK YOU

## 2024-08-30 ENCOUNTER — TELEPHONE (OUTPATIENT)
Dept: FAMILY MEDICINE CLINIC | Facility: CLINIC | Age: 49
End: 2024-08-30

## 2024-08-30 ENCOUNTER — NURSE ONLY (OUTPATIENT)
Dept: FAMILY MEDICINE CLINIC | Facility: CLINIC | Age: 49
End: 2024-08-30
Payer: MEDICAID

## 2024-08-30 ENCOUNTER — PATIENT MESSAGE (OUTPATIENT)
Dept: FAMILY MEDICINE CLINIC | Facility: CLINIC | Age: 49
End: 2024-08-30

## 2024-08-30 DIAGNOSIS — R30.0 DYSURIA: ICD-10-CM

## 2024-08-30 DIAGNOSIS — Z01.818 PRE-OP TESTING: Primary | ICD-10-CM

## 2024-08-30 LAB
BILIRUB UR QL STRIP.AUTO: NEGATIVE
GLUCOSE UR STRIP.AUTO-MCNC: NORMAL MG/DL
KETONES UR STRIP.AUTO-MCNC: NEGATIVE MG/DL
LEUKOCYTE ESTERASE UR QL STRIP.AUTO: 500
NITRITE UR QL STRIP.AUTO: NEGATIVE
PH UR STRIP.AUTO: 6.5 [PH] (ref 5–8)
PROT UR STRIP.AUTO-MCNC: 200 MG/DL
RBC #/AREA URNS AUTO: >10 /HPF
SP GR UR STRIP.AUTO: 1.02 (ref 1–1.03)
UROBILINOGEN UR STRIP.AUTO-MCNC: 3 MG/DL
WBC #/AREA URNS AUTO: >50 /HPF

## 2024-08-30 PROCEDURE — 81001 URINALYSIS AUTO W/SCOPE: CPT | Performed by: FAMILY MEDICINE

## 2024-08-30 PROCEDURE — 87086 URINE CULTURE/COLONY COUNT: CPT | Performed by: FAMILY MEDICINE

## 2024-08-30 NOTE — TELEPHONE ENCOUNTER
From: Sarah Triplett  To: Jeff Montanez  Sent: 8/30/2024 12:02 PM CDT  Subject: Orders    This is all I could find in my chart

## 2024-08-30 NOTE — TELEPHONE ENCOUNTER
Patient states that she received a message from  medicine regarding her pre-op, states they want a urine culture completed today. Patient states she has a clean catch cup at home, would like to know if it is okay to drop off sample today. Endorsed to RN>

## 2024-08-30 NOTE — PROGRESS NOTES
Sarah Triplett present in office for nurse visit.  Urine sample obtained     All questions/concerns addressed. Patient left in stable condition.

## 2024-08-30 NOTE — TELEPHONE ENCOUNTER
Called Dr. Rosa Castelan's office ph#[phone] - advised staff of needing urine culture order for pt - message to be sent to office, our office fax# and ph# provided - awaiting fax

## 2024-08-30 NOTE — TELEPHONE ENCOUNTER
Received fax from [facility] regarding order    Ordering provider: Rosa Castelan MD  Urinalysis w/ Microscopic w/ reflex culture  Diagnosis: dysuria    UA w/ culture ordered per protocol

## 2024-08-30 NOTE — TELEPHONE ENCOUNTER
Patient scheduled 9/3/24 with Dr Taylor for preop physical    Patient brought copy of recent EKG to clinic  Placed in Dr Brandon garnett for review

## 2024-08-30 NOTE — TELEPHONE ENCOUNTER
Future Appointments   Date Time Provider Department Center   8/30/2024  2:00 PM [facility]   9/4/2024 11:00 AM Alejandro Jose MD [facility]   11/11/2024  9:20 AM Rosy Biggs APRN [facility]

## 2024-08-30 NOTE — TELEPHONE ENCOUNTER
Pt reports has order for urine culture on her patient portal ailin from Dr. Rosa Castelan of [facility] - advised pt to take screenshot of order and send via MCM so RN can place urine culture order - she v/u    Awaiting MCM from pt for order    Nurse visit appt schedule for urine drop off  Future Appointments   Date Time Provider Department Center   8/30/2024  2:00 PM [facility] NURSE [facility]   9/4/2024 11:00 AM Alejandro Jose MD [facility]   11/11/2024  9:20 AM Rosy Biggs APRN [facility]

## 2024-08-31 ENCOUNTER — TELEPHONE (OUTPATIENT)
Dept: FAMILY MEDICINE CLINIC | Facility: CLINIC | Age: 49
End: 2024-08-31

## 2024-08-31 DIAGNOSIS — B37.0 THRUSH: Primary | ICD-10-CM

## 2024-08-31 RX ORDER — NYSTATIN 100000/ML
5 SUSPENSION, ORAL (FINAL DOSE FORM) ORAL 4 TIMES DAILY
Qty: 280 ML | Refills: 0 | Status: SHIPPED | OUTPATIENT
Start: 2024-08-31 | End: 2024-09-14

## 2024-08-31 NOTE — TELEPHONE ENCOUNTER
Patient's name and  verified     Patient having  same symptoms since . Patient is in extreme pain and frequency.     Patient also is experiencing thrush white spots on tongue. Patient had thrush before and was wondering if something can be called in for the thrush too.     Patient said Cipro has worked in the past for urine symptoms.    According to patient, the Infectious disease doctor looked at results said he will wait until for Dr Castelan to review since not all results are back.     Verified   [id number] - [place], [place]     Please Advise

## 2024-08-31 NOTE — TELEPHONE ENCOUNTER
If she has two specialists watching her urine, then I will not send anything in to avoid confusion.     I can send in some nystatin for thrush for now.

## 2024-09-03 ENCOUNTER — MED REC SCAN ONLY (OUTPATIENT)
Dept: FAMILY MEDICINE CLINIC | Facility: CLINIC | Age: 49
End: 2024-09-03

## 2024-09-04 ENCOUNTER — OFFICE VISIT (OUTPATIENT)
Dept: FAMILY MEDICINE CLINIC | Facility: CLINIC | Age: 49
End: 2024-09-04
Payer: MEDICAID

## 2024-09-04 VITALS
HEART RATE: 87 BPM | RESPIRATION RATE: 18 BRPM | DIASTOLIC BLOOD PRESSURE: 80 MMHG | TEMPERATURE: 98 F | BODY MASS INDEX: 28 KG/M2 | WEIGHT: 156 LBS | OXYGEN SATURATION: 98 % | SYSTOLIC BLOOD PRESSURE: 128 MMHG

## 2024-09-04 DIAGNOSIS — R39.15 URINARY URGENCY: Chronic | ICD-10-CM

## 2024-09-04 DIAGNOSIS — Z01.818 PRE-OP EXAMINATION: Primary | ICD-10-CM

## 2024-09-04 DIAGNOSIS — N31.9 NEUROGENIC BLADDER: ICD-10-CM

## 2024-09-04 PROBLEM — N30.90 CYSTITIS: Chronic | Status: ACTIVE | Noted: 2023-10-05

## 2024-09-04 LAB
ALBUMIN SERPL-MCNC: 4.7 G/DL (ref 3.2–4.8)
ALBUMIN/GLOB SERPL: 1.7 {RATIO} (ref 1–2)
ALP LIVER SERPL-CCNC: 44 U/L
ALT SERPL-CCNC: 9 U/L
ANION GAP SERPL CALC-SCNC: 10 MMOL/L (ref 0–18)
AST SERPL-CCNC: 19 U/L (ref ?–34)
BASOPHILS # BLD AUTO: 0.05 X10(3) UL (ref 0–0.2)
BASOPHILS NFR BLD AUTO: 1 %
BILIRUB SERPL-MCNC: 0.4 MG/DL (ref 0.3–1.2)
BUN BLD-MCNC: 17 MG/DL (ref 9–23)
CALCIUM BLD-MCNC: 10.2 MG/DL (ref 8.7–10.4)
CHLORIDE SERPL-SCNC: 112 MMOL/L (ref 98–112)
CO2 SERPL-SCNC: 16 MMOL/L (ref 21–32)
CREAT BLD-MCNC: 0.97 MG/DL
EGFRCR SERPLBLD CKD-EPI 2021: 71 ML/MIN/1.73M2 (ref 60–?)
EOSINOPHIL # BLD AUTO: 0.08 X10(3) UL (ref 0–0.7)
EOSINOPHIL NFR BLD AUTO: 1.6 %
ERYTHROCYTE [DISTWIDTH] IN BLOOD BY AUTOMATED COUNT: 12.8 %
FASTING STATUS PATIENT QL REPORTED: NO
GLOBULIN PLAS-MCNC: 2.7 G/DL (ref 2–3.5)
GLUCOSE BLD-MCNC: 97 MG/DL (ref 70–99)
HCT VFR BLD AUTO: 40.1 %
HGB BLD-MCNC: 13.3 G/DL
IMM GRANULOCYTES # BLD AUTO: 0.02 X10(3) UL (ref 0–1)
IMM GRANULOCYTES NFR BLD: 0.4 %
LYMPHOCYTES # BLD AUTO: 1.64 X10(3) UL (ref 1–4)
LYMPHOCYTES NFR BLD AUTO: 32.6 %
MCH RBC QN AUTO: 30.6 PG (ref 26–34)
MCHC RBC AUTO-ENTMCNC: 33.2 G/DL (ref 31–37)
MCV RBC AUTO: 92.2 FL
MONOCYTES # BLD AUTO: 0.3 X10(3) UL (ref 0.1–1)
MONOCYTES NFR BLD AUTO: 6 %
NEUTROPHILS # BLD AUTO: 2.94 X10 (3) UL (ref 1.5–7.7)
NEUTROPHILS # BLD AUTO: 2.94 X10(3) UL (ref 1.5–7.7)
NEUTROPHILS NFR BLD AUTO: 58.4 %
OSMOLALITY SERPL CALC.SUM OF ELEC: 287 MOSM/KG (ref 275–295)
PLATELET # BLD AUTO: 212 10(3)UL (ref 150–450)
POTASSIUM SERPL-SCNC: 4.2 MMOL/L (ref 3.5–5.1)
PROT SERPL-MCNC: 7.4 G/DL (ref 5.7–8.2)
RBC # BLD AUTO: 4.35 X10(6)UL
SODIUM SERPL-SCNC: 138 MMOL/L (ref 136–145)
WBC # BLD AUTO: 5 X10(3) UL (ref 4–11)

## 2024-09-04 PROCEDURE — 85025 COMPLETE CBC W/AUTO DIFF WBC: CPT | Performed by: FAMILY MEDICINE

## 2024-09-04 PROCEDURE — 99213 OFFICE O/P EST LOW 20 MIN: CPT | Performed by: FAMILY MEDICINE

## 2024-09-04 PROCEDURE — 80053 COMPREHEN METABOLIC PANEL: CPT | Performed by: FAMILY MEDICINE

## 2024-09-04 RX ORDER — SERTRALINE HYDROCHLORIDE 100 MG/1
150 TABLET, FILM COATED ORAL DAILY
Qty: 135 TABLET | Refills: 0 | Status: SHIPPED | OUTPATIENT
Start: 2024-09-04 | End: 2024-12-03

## 2024-09-04 RX ORDER — CIPROFLOXACIN 500 MG/1
TABLET, FILM COATED ORAL
COMMUNITY
Start: 2024-09-03

## 2024-09-04 RX ORDER — SERTRALINE HYDROCHLORIDE 100 MG/1
150 TABLET, FILM COATED ORAL DAILY
Qty: 135 TABLET | Refills: 0 | OUTPATIENT
Start: 2024-09-04

## 2024-09-04 RX ORDER — BISACODYL 5 MG/1
10 TABLET, DELAYED RELEASE ORAL EVERY OTHER DAY
COMMUNITY

## 2024-09-04 RX ORDER — SERTRALINE HYDROCHLORIDE 100 MG/1
150 TABLET, FILM COATED ORAL DAILY
Qty: 90 TABLET | Refills: 0 | Status: SHIPPED | OUTPATIENT
Start: 2024-09-04

## 2024-09-04 RX ORDER — CEPHALEXIN 500 MG/1
CAPSULE ORAL
COMMUNITY
Start: 2024-09-03

## 2024-09-04 RX ORDER — SERTRALINE HYDROCHLORIDE 100 MG/1
150 TABLET, FILM COATED ORAL DAILY
COMMUNITY
End: 2024-09-04

## 2024-09-04 NOTE — TELEPHONE ENCOUNTER
Pt calling - states she takes this medication and is out  Pt confirms- takes 150 mg daily    LOV 07/18/24  Last refill on 02/15/24, for #135 tabs, with 0 refills  sertraline 100 MG Oral Tab     Future Appointments   Date Time Provider Department Center   9/4/2024 11:00 AM Alejandro Jose MD [facility]   11/11/2024  9:20 AM Rosy Biggs APRN [facility]       Order(s) pending, please review. Thank you.  Perlita Garvey

## 2024-09-04 NOTE — TELEPHONE ENCOUNTER
Received fax from Dr. Rosa Castelan's office regarding pt's pre-op orders    CBC w/ diff, CMP, UA, urine culture, EKG, letter of medical clearance      Date of surgery 09/06/24  With Dr. Rosa Castelan  At [facility]  Procedure: Cystoscopy, cystogram, bilteral retrograde pyelogram, possible ureteroscopy   With GA

## 2024-09-04 NOTE — PROGRESS NOTES
HPI:   Sarah Triplett is a 49 year old female who presents for a pre-operative physical exam. Patient is to have cystoscopy, cystogram, bileral retrograde pyelogram, et al, to be done by Dr. Rosa Castelan at [facility] on 9/6/24.      Pt complains of urine issues.    Revised Cardiac Risk Index (modified Gottlieb Index):   High-risk surgical procedure (intraperitoneal, intrathoracic, suprainguinal vascular) No   History of MI or positive stress test, current chest pain secondary to myocardiac ischemia, current nitrate therapy, or EKG with pathologic Q wave No   History of CHF, pulmonary edema, PND, current rales, S3 gallop, chest xray with pulmonary vascular redistribution No   History of CVA/TIA No   Preoperative treatment with insulin No   Preoperative creatinine > 2.0 No   Score 0     Risk of major cardiac complication: low    Patient reports no:  Chest pain  Chest discomfort  Jaw pain  Jaw discomfort  Arm pain   Arm discomfort  Shortness of breath   Dyspnea on exertion  Paroxysmal nocturnal dyspnea  Orthopnea  Palpations  Edema    Previous hx of cardiac or vascular issues are none    Current Outpatient Medications   Medication Sig Dispense Refill    cephALEXin 500 MG Oral Cap       ciprofloxacin 500 MG Oral Tab       sertraline 100 MG Oral Tab Take 1.5 tablets (150 mg total) by mouth daily.      nystatin 050147 UNIT/ML Mouth/Throat Suspension Take 5 mL (500,000 Units total) by mouth 4 (four) times daily for 14 days. Gargle for at least 10 seconds and swallow. 280 mL 0    OMEPRAZOLE 20 MG Oral Capsule Delayed Release Take 2 capsules (40 mg total) by mouth daily. 90 capsule 0    GABAPENTIN 600 MG Oral Tab Take 1 tablet (600 mg total) by mouth 3 (three) times daily. 45 tablet 0    topiramate 50 MG Oral Tab Take 0.5 tablets (25 mg total) by mouth daily. 90 tablet 0    traZODone 50 MG Oral Tab Take 1 tablet (50 mg total) by mouth nightly as needed for Sleep. 30 tablet 0    Cholecalciferol (VITAMIN D3 ULTRA POTENCY)  1.25 MG (24550 UT) Oral Tab Take 2,000 Int'l Units/day by mouth daily.      docusate sodium 100 MG Oral Cap Take 100 mg by mouth daily as needed.      MYRBETRIQ 50 MG Oral Tablet 24 Hr Take 1 tablet (50 mg total) by mouth daily.      Oxybutynin Chloride ER 15 MG Oral Tablet 24 Hr Take 1 tablet (15 mg total) by mouth daily. 90 tablet 6    bisacodyl 5 MG Oral Tab EC Take 2 tablets (10 mg total) by mouth every other day. 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)    Acute bronchitis    Anxiety state    Arthritis    Back pain    Back problem    DDD (degenerative disc disease), cervical    c2-c7 fusions    DDD (degenerative disc disease), lumbar    L4-L5    DDD (degenerative disc disease), lumbosacral    S1-S2    Depression    Esophageal reflux    Fibromyalgia    History of blood transfusion    Long term current use of opiate analgesic    Neuropathy    Opioid dependence (HCC)    Osteoarthritis    Other and unspecified alcohol dependence, unspecified drinking behavior    Shortness of breath    Tachycardia, unspecified    Urine incontinence    Visual impairment      Past Surgical History:   Procedure Laterality Date    Appendectomy      Appendectomy      Back surgery      Both cervical/lumbar fusion.    Excis lumbar disk,one level      Hysterectomy      Trang localization wire 1 site right (cpt=19281) Right 01/27/2023    ADH; intraductal papilloma    Needle biopsy right  02/2022    intraductal papilloma    Other surgical history      gallbladder    Other surgical history      Removal gallbladder      Tubal ligation      Tubal ligation  2004      Family History   Problem Relation  Age of Onset    Anxiety Mother     Breast Cancer Mother     Other ([other]) Daughter     Other ([other]) Son     Anxiety Maternal Aunt     Depression Maternal Aunt     Other (lung cancer) Maternal Aunt     Other (lung cancer) Maternal Uncle       Social History:   Social History     Socioeconomic History    Marital status: Single   Tobacco Use    Smoking status: Former     Current packs/day: 0.00     Average packs/day: 1 pack/day for 35.0 years (35.0 ttl pk-yrs)     Types: Cigarettes     Start date: 1986     Quit date: 2021     Years since quittin.6     Passive exposure: Past    Smokeless tobacco: Never   Vaping Use    Vaping status: Never Used   Substance and Sexual Activity    Alcohol use: Not Currently     Comment: pt states she drinks every once in a while. Pt drinks a pint of vodka    Drug use: Never   Social History Narrative    ** Merged History Encounter **          Social Determinants of Health     Food Insecurity: Low Risk  (2022)    Received from [facility]    Food Insecurity     Have there been times that your food ran out, and you didn't have money to get more?: No     Are there times that you worry that this might happen?: No   Transportation Needs: High Risk (2022)    Received from [facility]    Transportation Needs     Do you have trouble getting transportation to medical appointments?: Yes     How do you normally get to and from your appointments?: Family/Friend    Received from [facility], [facility]    Social Connections   Housing Stability: Low Risk  (2022)    Received from [facility]    Housing Stability     Are you concerned about having a safe and reliable place to live?: No          REVIEW OF SYSTEMS:   GENERAL: feels well otherwise  SKIN: denies any unusual  skin lesions  EYES: denies blurred vision or double vision  HEENT: denies URI symptoms  LUNGS: see HPI  CARDIOVASCULAR: see HPI  GI: denies diarrhea  : no urinary symptoms  MUSCULOSKELETAL: good rom  PSYCHE: denies depression or anxiety  HEMATOLOGIC: denies hx of anemia  ENDOCRINE: denies thyroid history  ALL/ASTHMA: denies hx of allergy or asthma    EXAM:   /80 (BP Location: Left arm, Patient Position: Sitting, Cuff Size: large)   Pulse 87   Temp 98.3 °F (36.8 °C) (Temporal)   Resp 18   Wt 156 lb (70.8 kg)   LMP 02/11/2014   SpO2 98%   BMI 27.63 kg/m²   GENERAL: well developed, well nourished, in no apparent distress  SKIN: no rashes, no suspicious lesions  HEENT: atraumatic, normocephalic, ears and throat are clear  EYES: PERRL, EOMI, normal conjunctiva, anicteric  NECK: supple, no adenopathy, no bruits  LUNGS: clear to auscultation  CARDIO: RRR without murmur  GI: good BS's, no masses, HSM or tenderness  : deferred  MUSCULOSKELETAL: good rum  EXTREMITIES: no cyanosis, clubbing or edema  NEURO: A &O x 3, CN intact, motor and sensory are grossly intact    ASSESSMENT AND PLAN:     Sarah Triplett is a 49 year old female who presents for a pre-operative physical exam. Patient is to have  cystoscopy, cystogram, bileral retrograde pyelogram, et al, to be done by Dr. Castelan at [facility] on 9/6/24. Pt has the following conditions: urinary issues. Pt has no significant history of cardiac or pulmonary conditions. Patient is cleared for surgery with the risks of the procedure and anesthesia as discussed with those specific specialists.    This consult was sent back the referring physician, Dr. Castelan.

## 2024-09-04 NOTE — TELEPHONE ENCOUNTER
Called Dr. Rosa Castelan's office ph#[phone] - staff sending message as high priority - requesting pre-op request      Called pt - inquired if she has pre-op orders/request - she v/u and stated she does not - advised pt to call Dr. Castelan's office to have pre-op orders sent to our office, advised pt of note above - she v/u    Awaiting fax for pre-op

## 2024-09-05 ENCOUNTER — TELEPHONE (OUTPATIENT)
Dept: FAMILY MEDICINE CLINIC | Facility: CLINIC | Age: 49
End: 2024-09-05

## 2024-09-05 NOTE — TELEPHONE ENCOUNTER
Office visit pre-op clearance 09/04/24  Labs from 08/30/24 and 09/04/24  And pt's EKG - all faxed to Dr. Rosa Castelan's fax#[phone]    Advised patient of Dr. Taylor's note below and of note above. Patient verbalized understanding.   Pt asking about RBC and WBC being low? Reviewed pt's CBC - advised pt levels WNL - she v/u. No further questions at this time.

## 2024-09-05 NOTE — TELEPHONE ENCOUNTER
----- Message from Alejandro Guerrero sent at 9/5/2024  7:35 AM CDT -----  Results reviewed.  Tests show no significant abnormalities. Please inform the patient.

## 2024-09-16 RX ORDER — GABAPENTIN 600 MG/1
600 TABLET ORAL 3 TIMES DAILY
Qty: 45 TABLET | Refills: 0 | Status: SHIPPED | OUTPATIENT
Start: 2024-09-16

## 2024-09-16 RX ORDER — TRAZODONE HYDROCHLORIDE 50 MG/1
50 TABLET, FILM COATED ORAL NIGHTLY PRN
Qty: 30 TABLET | Refills: 0 | Status: SHIPPED | OUTPATIENT
Start: 2024-09-16

## 2024-09-16 NOTE — TELEPHONE ENCOUNTER
Neurology Medications Uuyhxi51/15/2024 04:02 PM   Protocol Details In person appointment or virtual visit in the past 6 mos or appointment in next 3 mos        Last Refill   Medication Quantity Refills Start End   traZODone 50 MG Oral Tab 30 tablet 0 8/13/2024      GABAPENTIN 600 MG Oral Tab 45 tablet 0 8/26/2024     Last visit:9/4/24   Last Labs applicable to refill:   Last wellness visit. 2/27/24

## 2024-09-30 RX ORDER — GABAPENTIN 600 MG/1
600 TABLET ORAL 3 TIMES DAILY
Qty: 45 TABLET | Refills: 0 | Status: SHIPPED | OUTPATIENT
Start: 2024-09-30

## 2024-09-30 NOTE — TELEPHONE ENCOUNTER
Last OV 7/18/24, 2/27/24 wellness  (saw Dr Taylor for preop 9/4/24)  Last lab 9/4/24 CMP  Last refilled 9/16/24  #45  0 refills

## 2024-10-02 DIAGNOSIS — N39.41 URGE INCONTINENCE: ICD-10-CM

## 2024-10-02 RX ORDER — GABAPENTIN 600 MG/1
600 TABLET ORAL 3 TIMES DAILY
Qty: 45 TABLET | Refills: 0 | OUTPATIENT
Start: 2024-10-02

## 2024-10-02 RX ORDER — TRAZODONE HYDROCHLORIDE 50 MG/1
50 TABLET, FILM COATED ORAL NIGHTLY PRN
Qty: 30 TABLET | Refills: 0 | Status: SHIPPED | OUTPATIENT
Start: 2024-10-16

## 2024-10-02 RX ORDER — OXYBUTYNIN CHLORIDE 15 MG/1
15 TABLET, EXTENDED RELEASE ORAL DAILY
Qty: 90 TABLET | Refills: 6 | OUTPATIENT
Start: 2024-10-02

## 2024-10-02 NOTE — TELEPHONE ENCOUNTER
already been filled. Refill not appropriate.   
+right paracervical region tenderness, +right trapezius tenderness, neurovasc intact, no deformity, FROM

## 2024-10-02 NOTE — TELEPHONE ENCOUNTER
Pt failed refill protocol for the following reasons:    Requested Renewals     traZODone 50 MG Oral Tab         Sig: Take 1 tablet (50 mg total) by mouth nightly as needed for Sleep.    Disp: 30 tablet    Refills: 0    Start: 10/2/2024    Class: Normal    Last ordered: 2 weeks ago (9/16/2024) by Jeff Montanez MD       To be filled at: [id number] [address] [phone], [phone]             Last refill: 9/16/24  Last appt: 7/18/24  Next appt:   Future Appointments   Date Time Provider Department Center   11/11/2024  9:20 AM Rosy Biggs APRN [facility]   Last lab 9/4/24    Forward to Dr. Olson, please advise on refills. Thank you.

## 2024-10-06 ENCOUNTER — PATIENT MESSAGE (OUTPATIENT)
Dept: INTERNAL MEDICINE CLINIC | Facility: CLINIC | Age: 49
End: 2024-10-06

## 2024-10-07 RX ORDER — TOPIRAMATE 50 MG/1
25 TABLET, FILM COATED ORAL DAILY
Qty: 90 TABLET | Refills: 0 | Status: SHIPPED | OUTPATIENT
Start: 2024-10-07

## 2024-10-07 NOTE — TELEPHONE ENCOUNTER
From: Sarah Triplett  To: Rosy Biggs  Sent: 10/6/2024 7:15 AM CDT  Subject: Meds    Can you refill my Topriamate 50 mg please. I am out. I think it's my fault also cause I didn't realize that I was only supposed to start taking half at a time.

## 2024-10-07 NOTE — TELEPHONE ENCOUNTER
Requesting topamax 50 mg  LOV: 3/12/24  RTC: 3 months  Last Relevant Labs: na  Filled: 8/22/24 #90 with 0 refills    Future Appointments   Date Time Provider Department Center   11/11/2024  9:20 AM Rosy Biggs APRN [facility]     Pt cancelled her July appt as she was on vacation  Made her November appt in June

## 2024-10-07 NOTE — TELEPHONE ENCOUNTER
Omeprazole Dr 20 Mg Cap Nort     LOV 9/4/2024  Last Px: 2/27/2024  Last refill on 8/26/2024, for #90, with 0 refills    Future Appointments   Date Time Provider Department Center   11/11/2024  9:20 AM Rosy Biggs APRN [facility]     Gastrointestional Medication Protocol Igjacr41/06/2024 09:52 AM   Protocol Details In person appointment or virtual visit in the past 12 mos or appointment in next 3 mos

## 2024-10-10 RX ORDER — TRAZODONE HYDROCHLORIDE 50 MG/1
50 TABLET, FILM COATED ORAL NIGHTLY PRN
Qty: 30 TABLET | Refills: 0 | Status: SHIPPED | OUTPATIENT
Start: 2024-10-10

## 2024-10-10 NOTE — TELEPHONE ENCOUNTER
Routing to provider per protocol.   traZODone 50 MG Oral Tab   Last refilled on 10/16/24 for #30  with 0 rf.   Last labs 9/4/24.   Last seen on 9/4/24.       Future Appointments   Date Time Provider Department Center   11/11/2024  9:20 AM Rosy Biggs APRN [facility]          Thank you.

## 2024-10-19 RX ORDER — GABAPENTIN 600 MG/1
600 TABLET ORAL 3 TIMES DAILY
Qty: 45 TABLET | Refills: 0 | Status: SHIPPED | OUTPATIENT
Start: 2024-10-19

## 2024-11-11 ENCOUNTER — OFFICE VISIT (OUTPATIENT)
Dept: INTERNAL MEDICINE CLINIC | Facility: CLINIC | Age: 49
End: 2024-11-11
Payer: MEDICAID

## 2024-11-11 VITALS
HEART RATE: 72 BPM | BODY MASS INDEX: 26.4 KG/M2 | DIASTOLIC BLOOD PRESSURE: 72 MMHG | HEIGHT: 63 IN | RESPIRATION RATE: 16 BRPM | WEIGHT: 149 LBS | SYSTOLIC BLOOD PRESSURE: 110 MMHG

## 2024-11-11 DIAGNOSIS — Z51.81 ENCOUNTER FOR THERAPEUTIC DRUG MONITORING: Primary | ICD-10-CM

## 2024-11-11 DIAGNOSIS — E66.9 OBESITY (BMI 30-39.9): ICD-10-CM

## 2024-11-11 DIAGNOSIS — M54.16 LUMBAR RADICULOPATHY: ICD-10-CM

## 2024-11-11 DIAGNOSIS — G89.29 CHRONIC NECK AND BACK PAIN: ICD-10-CM

## 2024-11-11 DIAGNOSIS — F32.A ANXIETY AND DEPRESSION: ICD-10-CM

## 2024-11-11 DIAGNOSIS — G62.9 NEUROPATHY: ICD-10-CM

## 2024-11-11 DIAGNOSIS — M54.2 CHRONIC NECK AND BACK PAIN: ICD-10-CM

## 2024-11-11 DIAGNOSIS — F41.9 ANXIETY AND DEPRESSION: ICD-10-CM

## 2024-11-11 DIAGNOSIS — M54.9 CHRONIC NECK AND BACK PAIN: ICD-10-CM

## 2024-11-11 PROCEDURE — 99214 OFFICE O/P EST MOD 30 MIN: CPT | Performed by: NURSE PRACTITIONER

## 2024-11-11 NOTE — PATIENT INSTRUCTIONS
Next steps:  1.  Fill your prescribed medication and take as discussed and prescribed: topamax 25mg and semaglutide compound  2.  Schedule a personal nutrition consultation with one of our registered dieticians     Please try to work on the following dietary changes:  Daily protein recommendation to start:  grams  Daily carbohydrate: <100g  Daily calories: 1,200-1,300  1.  Drink water with meals and throughout the day, cut down on soda and/or juice if consumed. Consider flavored water options like Bubbly, Spindrift, Hint and uSsan.  2.  Eat breakfast daily and focus on having protein with each meal, examples include: greek yogurt, cottage cheese, hard boiled egg, whole grain toast with peanut butter.   3.  Reduce refined carbohydrates and sugars which includes items such as sweets, as well as rice, pasta, and bread and make sure to choose whole grain options when having them with just 1 serving per meal about the size of your inner palm.  4.  Consume non starchy veggies daily working towards making them a good 50% of your daily food intake. Add them to lunch and dinner consistently.  5.  Start a daily probiotic: VSL#3 is recommended, (order on line at www.vsl3.com). Take 1 capsule daily with water for 30 days, then reduce to 1 every other day (this will reduce the cost). Capsules can be left out for 2 weeks, but then must be refrigerated.      Please download ailin My Fitness Pal, LoseIt! Or Net Diary to monitor daily dietary intake and you will be able to see if you are eating the right amount of calories or too much or too little which would hinder weight loss. Additionally this will help to see your daily carbohydrate and protein intake. When you set the ailin up choose 1-2 lbs/week as a goal.  Keeping a paper food journal is an option as well to remain accountable for your choices- this is the start to mindful eating! A low calorie diet has been consistently shown to support weight loss.     Continue or  start exercising to help establish a routine. If not already exercising begin with 1 day and progress as able with long-term goal of 30 minutes 5 days a week at a minimum.     Meditation daily can help manage and control stress. Chronic stress can make weight loss difficult.  Exercising is one way to help with stress, but meditation using the CALM Joanne or another comparable alternative can be done in your home or place of work with little time commitment. This Joanne can also help work on behavior change and improve sleep. Check out the segment under Calm Masterclass and listen to The 4 Pillars of Health. A great way to begin learning about the foundation of lifestyle with practical tips to use in your every day.     Check out www.yourweightmatters.org blog for continued daily support and education along this weight loss journey!    Patient Resources:     Personal Training/Fitness Classes/Health Coaching     [facility] @ [email or web address]/healthy-[email or web address]/[email or web address]-center Full fitness center with group fitness and personal training. Discount available as client of [facility].  Health Coaching and Personal Training with Shaylee Hutton at our [facility]- individual weekly coaching with option to add personal training and small group fitness classes targeted at weight loss- [phone] and/or email @ [email or web address]  360[employer] [place] [email or web address] Group Fitness [phone] and/or email Brandi at [email or web address]  [facility] Fitness Centers with multiple locations: "[employer] Inc[employer] (www.Retellity), Eat The Frog Fitness (www.Ditto Labs.Flexis), Fit Body Bootcamp (www.AlfalightbodybootWeb International Englishp.Flexis), Parametric Fitness (www.GRIN Publishing.Flexis), The Exercise  (www.exercisecoach.Flexis)     Online Fitness  Fitness  on [place]  Fit in 10 DVD series- www.vqzpw31JYS.com  Sit and Be Fit - Chair exercise series  Www.sitandbefit.org  Hip Hop Fit with Tyrone Meng at www.hiphopfit.net     Apps for on the Go Fitness  MoSo 7 Minute Workout (orange box with white 7) - free on the go HIIT training joanne  Peloton Joanne @ www.onepeloton.com     Nutrition Trackers and Tools  LoseIT! And My Fitness Pal apps and on line for tracking nutrition  NOOM - virtual health coaching  FitFoundation (healthy meals on the go) in [place] @ www.adauzqnhdzrhi2fVULCUN  Sorin MCKNIGHT @ wwwSeattle Geneticsbistromd.com and Hbeczd69 (keto and low carb plans recommended) @ www.ocdqoh80.com, Metabolic Meals @ www.MyMetabolicMeals.com - individual prepared meals to go  Gobble, Blue Apron, Home , Every Plate, Sunbasket- on line meal delivery programs for preparation at home  Meal Village in [place] for homemade meals to go @ www.mealLeversenseage.Zeenshare  Diet Doctor @ www.dietdoctor.Zeenshare - low carb swaps  Yummly - meal prep and planning joanne (www.yummly.com)     Stress Management/Behavior/Mindful Eating  CALM meditation joanne (www.calm.com)  Headspace  Am I Hungry? Mindful eating virtual  joanne  Www.yourweightmatters.org - Obesity Action Coalition sponsored Blog posts daily  Motivation joanne (black box with white \")- daily supportive messages sent to your phone     Books/Video Education/Podcasts  Mindless Eating by Adrian Zimmerman  Why We Get Sick by Simba Hawk (a book about insulin resistance)  Atomic Habits by Nemesio Fermin (a book about taking small steps to promote greater behavior change)   Can't Hurt Me by Enmanuel Hanna (a book exploring the power of discipline in achieving your goals)  The End of Dieting: How to Live for Life by Dr. Huseyin Cornejo M.D. or listen to The Buyanihan Podcast Episode 63: Understanding \"Nutritarian\" Eating w/Dr. Huseyin Cornejo  Your Body in Balance: The New Science of Food, Hormones, and Health by Dr. Lalit Kat  The Menopause Diet Plan by Yanni Do and Paulette Rubalcava  The Complete Guide to fasting by Dr. Palm  Sugar, Salt & Fat by Yaritza  Mahad, Ph.D, R.D.  Weight Loss Surgery Will Not Treat Food Addiction by Tiera Hoffman Ph.D  The Game Changers- Evolitaix Documentary on plant based nutrition  Fed Up - documentary about obesity (Free on Utube)  The Truth About Sugar - documentary on sugar (Free on Utube, https://youtu.be/0Z1josyTL4l)  The Dr. Larsen T5 Wellness Plan by Dr. Tito Larsen MD  Fitlosophy Fitspiration - journal to better health (found at [employer] in fitness aisle)  What Happened to You?- a look at the impact trauma has on behavior written by Emily Willingham and Dr. Rudy Basurto  Whole Again by Geronimo Mueller - discovering your true self after trauma  Marlin Harper talk on Globitel, The Call to Courage  Podcasts: The Exam Room by the Physician's Committee, Nutrition Facts by Dr. Rosado    We are here to support you with weight loss, but please remember that you still need your primary care provider for your routine health maintenance.

## 2024-11-11 NOTE — PROGRESS NOTES
HISTORY OF PRESENT ILLNESS  Chief Complaint   Patient presents with    Weight Check     -23     Sarah Triplett is a 49 year old female here for follow up with medical weight loss program for the treatment of overweight, obesity, or morbid obesity.     Down 23 lbs (f/u from 3/2024)   Compliant on topamax 50mg bid and compound semaglutide   Tolerating well, helping with decreasing appetite and no side effects     Had reconstructive bladder surgery in sept 2024- bowel mv't are back to normal   Has more energy  Can move better and perform ADLs on herself with the weight loss    Success: my weight is down (lowest weight in many many years)  Challenging: I'm just worried about maintaining it especially around the holidays.   Exercise/Activity: 2x/ week, via walking and PT limited due to pain issues   Nutrition: eating regular meals, +protein, minimal veggies. not tracking reports  Meals out per week on average: 0  Stress is manageable   Sleep: 5 hours/night, waking up feeling tired (pain)     Denies chest pain, shortness of breath, dizziness, blurred vision, headache, paresthesia, nausea/vomiting.     Breakfast Lunch Dinner Snacks Fluids   Reviewed              Wt Readings from Last 6 Encounters:   11/11/24 149 lb (67.6 kg)   09/04/24 156 lb (70.8 kg)   07/18/24 159 lb 3.2 oz (72.2 kg)   07/06/24 158 lb (71.7 kg)   03/20/24 175 lb (79.4 kg)   03/12/24 172 lb (78 kg)          REVIEW OF SYSTEMS  GENERAL: feels well otherwise, denied any fevers chills or night sweats   LUNGS: denies shortness of breath  CARDIOVASCULAR: denies chest pain  GI: denies abdominal pain  MUSCULOSKELETAL:  +chronic, back/neck pain, + joint pains   PSYCH: denies change in behavior or mood, denies feeling sad or depressed    EXAM  /72   Pulse 72   Resp 16   Ht 5' 3\" (1.6 m)   Wt 149 lb (67.6 kg)   LMP 02/11/2014   BMI 26.39 kg/m²       GENERAL: well developed, well nourished, in no apparent distress, A/O x3  SKIN: no rashes, no  suspicious lesions  HEENT: atraumatic, normocephalic, OP-clear, PERRLA  NECK: supple, no adenopathy  LUNGS: CTA in all fields, breathing non labored  CARDIO: RRR without murmur  GI: +BS, NT/ND, no masses or HSM  EXTREMITIES: no cyanosis, no clubbing, no edema    Lab Results   Component Value Date    GLU 97 09/04/2024    BUN 17 09/04/2024    BUNCREA 38 (H) 02/28/2024    CREATSERUM 0.97 09/04/2024    ANIONGAP 10 09/04/2024     01/22/2016    GFRNAA 101 04/20/2022    GFRAA 117 04/20/2022    CA 10.2 09/04/2024    OSMOCALC 287 09/04/2024    ALKPHO 44 09/04/2024    AST 19 09/04/2024    ALT 9 (L) 09/04/2024    BILT 0.4 09/04/2024    TP 7.4 09/04/2024    ALB 4.7 09/04/2024    GLOBULIN 2.7 09/04/2024    AGRATIO 1.9 02/28/2024     09/04/2024    K 4.2 09/04/2024     09/04/2024    CO2 16.0 (L) 09/04/2024     Lab Results   Component Value Date    A1C 5.5 04/27/2023     Lab Results   Component Value Date    CHOLEST 221 (H) 02/28/2024    TRIG 103 02/28/2024    HDL 54 02/28/2024     (H) 02/28/2024    VLDL 36 04/26/2013    TCHDLRATIO 4.1 02/28/2024    NONHDLC 167 (H) 02/28/2024     Lab Results   Component Value Date    B12 449 09/27/2021    VITB12 514 04/27/2023     Lab Results   Component Value Date    VITD 46 02/28/2024       Medications Ordered Prior to Encounter[1]    ASSESSMENT/PLAN    ICD-10-CM    1. Encounter for therapeutic drug monitoring  Z51.81       2. Obesity (BMI 30-39.9)  E66.9       3. Anxiety and depression  F41.9     F32.A           PLAN   Initial Weight Data and Goal Weight Loss:  Initial consult: #181 lbs on 4/2023  Weight Calculations  Initial Weight: 181 lbs  Initial Weight Date: 04/01/23  Today's Weight: 149 lbs  5% Goal: 9.05  10% Goal: 18.1  Total Weight Loss: 32 lbs  Total weight loss: Down 23 lbs total, Net loss 32 lbs  Continue with medications: topamax 25mg bid   Compound semaglutide is a custom-made medication that mimics the GLP-1 hormone. It is used to treat type 2 diabetes  and lower the risk of heart or blood vessel disease. It works by increasing insulin release, lowering glucagon release, delaying gastric emptying and reducing appetite. Compound semaglutide is prescribed when an FDA-approved medication, dose, or dosage form is unavailable (ie. Nationwide shortage or no obesity coverage for GLP-1 meds). Patients are aware of the difference between these medications.    --advised of side effects and adverse effects of this medication  Contradictions: has done metformin in the past   Reviewed labs   Continue with vitamin d OTC   Anxiety/depression, stable  Chronic back pain- possibly trying out aqua therapy (with last appt)  Wrote out new macros and encouraged tracking food  Nutrition: Low carb diet, recommended to eat breakfast daily/ regular protein intake  Follow up with dietitian and psychologist as recommended.  Discussed the role of sleep and stress in weight management.  Counseled on comprehensive weight loss plan including attention to nutrition, exercise and behavior/stress management for success. See patient instruction below for more details.  Discussed strategies to overcome barriers to successful weight loss and weight maintenance  FITTE: ACSM recommendations (150-300 minutes/ week in active weight loss)   Weight Loss Consent to treat reviewed and signed.    Total time spent on chart review, pre-charting, obtaining history, counseling, and educating, reviewing labs was 30 minutes.       NOTE TO PATIENT: The 21st Century Cures Act makes clinical notes like these available to patients in the interest of transparency. Clinical notes are medical documents used by physicians and care providers to communicate with each other. These documents include medical language and terminology, abbreviations, and treatment information that may sound technical and at times possibly unfamiliar. In addition, at times, the verbiage may appear blunt or direct. These documents are one tool  providers use to communicate relevant information and clinical opinions of the care providers in a way that allows common understanding of the clinical context.     There are no Patient Instructions on file for this visit.    No follow-ups on file.    Patient verbalizes understanding.    ROSETTA Chance             [1]   Current Outpatient Medications on File Prior to Visit   Medication Sig Dispense Refill    GABAPENTIN 600 MG Oral Tab TAKE ONE TABLET BY MOUTH THREE TIMES DAILY 45 tablet 0    TRAZODONE 50 MG Oral Tab TAKE ONE TABLET BY MOUTH NIGHTLY AS NEEDED FOR SLEEP. 30 tablet 0    topiramate 50 MG Oral Tab Take 0.5 tablets (25 mg total) by mouth daily. 90 tablet 0    OMEPRAZOLE 20 MG Oral Capsule Delayed Release TAKE 2 CAPSULES BY MOUTH EVERY DAY 90 capsule 0    sertraline 100 MG Oral Tab Take 1.5 tablets (150 mg total) by mouth daily. 135 tablet 0    Cholecalciferol (VITAMIN D3 ULTRA POTENCY) 1.25 MG (38716 UT) Oral Tab Take 2,000 Int'l Units/day by mouth daily.      docusate sodium 100 MG Oral Cap Take 100 mg by mouth daily as needed.      MYRBETRIQ 50 MG Oral Tablet 24 Hr Take 1 tablet (50 mg total) by mouth daily.      Oxybutynin Chloride ER 15 MG Oral Tablet 24 Hr Take 1 tablet (15 mg total) by mouth daily. 90 tablet 6     No current facility-administered medications on file prior to visit.

## 2024-11-12 RX ORDER — TRAZODONE HYDROCHLORIDE 50 MG/1
50 TABLET, FILM COATED ORAL NIGHTLY PRN
Qty: 30 TABLET | Refills: 0 | Status: SHIPPED | OUTPATIENT
Start: 2024-11-12

## 2024-11-12 RX ORDER — GABAPENTIN 600 MG/1
600 TABLET ORAL 3 TIMES DAILY
Qty: 45 TABLET | Refills: 0 | Status: SHIPPED | OUTPATIENT
Start: 2024-11-12

## 2024-11-12 NOTE — TELEPHONE ENCOUNTER
Neurology Medications Nscdxj2611/11/2024 09:58 AM   Protocol Details In person appointment or virtual visit in the past 6 mos or appointment in next 3 mos

## 2024-11-12 NOTE — TELEPHONE ENCOUNTER
Trazodone Hydrochloride 50 Mg Tab Zydu     No refill protocol for this medication.    Last refill: 10/10/2024, for #30, 0 refill  Last Px:2/27/2024  Last Visit: 9/4/2024  Next Visit: N/A    Forward to Dr. Lorna Montanez,  please advise on refills. Thanks.  9/4/2024

## 2024-11-25 ENCOUNTER — OFFICE VISIT (OUTPATIENT)
Dept: FAMILY MEDICINE CLINIC | Facility: CLINIC | Age: 49
End: 2024-11-25
Payer: MEDICAID

## 2024-11-25 VITALS
DIASTOLIC BLOOD PRESSURE: 59 MMHG | SYSTOLIC BLOOD PRESSURE: 98 MMHG | WEIGHT: 150 LBS | OXYGEN SATURATION: 98 % | HEIGHT: 63 IN | HEART RATE: 89 BPM | BODY MASS INDEX: 26.58 KG/M2 | TEMPERATURE: 98 F | RESPIRATION RATE: 18 BRPM

## 2024-11-25 DIAGNOSIS — R39.9 UTI SYMPTOMS: Primary | ICD-10-CM

## 2024-11-25 PROCEDURE — 87077 CULTURE AEROBIC IDENTIFY: CPT | Performed by: PHYSICIAN ASSISTANT

## 2024-11-25 PROCEDURE — 87184 SC STD DISK METHOD PER PLATE: CPT | Performed by: PHYSICIAN ASSISTANT

## 2024-11-25 PROCEDURE — 87086 URINE CULTURE/COLONY COUNT: CPT | Performed by: PHYSICIAN ASSISTANT

## 2024-11-25 PROCEDURE — 87186 SC STD MICRODIL/AGAR DIL: CPT | Performed by: PHYSICIAN ASSISTANT

## 2024-11-25 PROCEDURE — 99213 OFFICE O/P EST LOW 20 MIN: CPT | Performed by: NURSE PRACTITIONER

## 2024-11-25 RX ORDER — SULFAMETHOXAZOLE AND TRIMETHOPRIM 800; 160 MG/1; MG/1
1 TABLET ORAL 2 TIMES DAILY
Qty: 16 TABLET | Refills: 0 | Status: SHIPPED | OUTPATIENT
Start: 2024-11-25 | End: 2024-12-03

## 2024-11-25 NOTE — PROGRESS NOTES
Sarah Triplett is a 49 year old female.  HPI:   Patient presents with symptoms of UTI for 7 days.   Complaining of urinary frequency, urgency, dysuria, Pos odor.  Denies back pain, fever, hematuria.  Pt has strong history of UTI in past. Follows with urology. Had bladder surgery in September. Pt has seen infectious disease as well for abx treatment.  Current Outpatient Medications   Medication Sig Dispense Refill    TRAZODONE 50 MG Oral Tab TAKE ONE TABLET BY MOUTH NIGHTLY AS NEEDED FOR SLEEP. 30 tablet 0    GABAPENTIN 600 MG Oral Tab TAKE ONE TABLET BY MOUTH THREE TIMES DAILY 45 tablet 0    CUSTOM MEDICATION Semaglutide 0.5mg/cyanobalamin   Concentration: 1/ 0.5 mg/mL  Instructions: Inject 50 units/0.5ml into skin q weekly  Dispense: 2.5 mL vial 1 each 3    topiramate 50 MG Oral Tab Take 0.5 tablets (25 mg total) by mouth daily. 90 tablet 0    OMEPRAZOLE 20 MG Oral Capsule Delayed Release TAKE 2 CAPSULES BY MOUTH EVERY DAY 90 capsule 0    sertraline 100 MG Oral Tab Take 1.5 tablets (150 mg total) by mouth daily. 135 tablet 0    Cholecalciferol (VITAMIN D3 ULTRA POTENCY) 1.25 MG (75833 UT) Oral Tab Take 2,000 Int'l Units/day by mouth daily.      docusate sodium 100 MG Oral Cap Take 100 mg by mouth daily as needed.      MYRBETRIQ 50 MG Oral Tablet 24 Hr Take 1 tablet (50 mg total) by mouth daily.      Oxybutynin Chloride ER 15 MG Oral Tablet 24 Hr Take 1 tablet (15 mg total) by mouth daily. 90 tablet 6     No current facility-administered medications for this visit.      Past Medical History:    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)    Acute bronchitis    Anxiety state    Arthritis    Back pain    Back problem    DDD (degenerative disc disease), cervical    c2-c7 fusions    DDD (degenerative disc disease), lumbar    L4-L5    DDD (degenerative disc disease), lumbosacral    S1-S2    Depression    Esophageal reflux    Fibromyalgia    History of blood transfusion    Long term current use of opiate analgesic     Neuropathy    Opioid dependence (HCC)    Osteoarthritis    Other and unspecified alcohol dependence, unspecified drinking behavior    Shortness of breath    Tachycardia, unspecified    Urine incontinence    Visual impairment      Social History:  Social History     Socioeconomic History    Marital status: Single   Tobacco Use    Smoking status: Former     Current packs/day: 0.00     Average packs/day: 1 pack/day for 35.0 years (35.0 ttl pk-yrs)     Types: Cigarettes     Start date: 1986     Quit date: 2021     Years since quittin.9     Passive exposure: Past    Smokeless tobacco: Never   Vaping Use    Vaping status: Never Used   Substance and Sexual Activity    Alcohol use: Not Currently     Comment: pt states she drinks every once in a while. Pt drinks a pint of vodka    Drug use: Never   Social History Narrative    ** Merged History Encounter **          Social Drivers of Health     Food Insecurity: Low Risk  (2022)    Received from [facility]    Food Insecurity     Have there been times that your food ran out, and you didn't have money to get more?: No     Are there times that you worry that this might happen?: No   Transportation Needs: High Risk (2022)    Received from [facility]    Transportation Needs     Do you have trouble getting transportation to medical appointments?: Yes     How do you normally get to and from your appointments?: Family/Friend    [facility]    Social Connections   Housing Stability: Low Risk  (2022)    Received from [facility]    Housing Stability     Are you concerned about having a safe and reliable place to live?: No         REVIEW OF SYSTEMS:   GENERAL HEALTH: no  fever/chills or fatigue  SKIN: denies any unusual skin  lesions or rashes  RESPIRATORY: no shortness of breath with exertion  CARDIOVASCULAR: denies chest pain on exertion and palpitations.  GI: no nausea or vomiting  : as above.  NEURO: denies headaches or dizziness.    EXAM:   BP 98/59   Pulse 89   Temp 97.6 °F (36.4 °C)   Resp 18   Ht 5' 3\" (1.6 m)   Wt 150 lb (68 kg)   LMP 02/11/2014   SpO2 98%   BMI 26.57 kg/m²   GENERAL: well developed, well nourished,in no apparent distress, pleasant pt.  SKIN: no rashes,no suspicious lesions  LUNG: Clear to auscultation bilaterally. No W/R/R.  HEART: RRR, no murmur.  GI: normoactive BS x4, no masses, HSM; no suprapubic tenderness, no CVAT    RESULTS:    No results found for this or any previous visit (from the past 24 hours).  Pt took AZO  ASSESSMENT AND PLAN:   UTI symptoms    Educated on medication  Educated on sending urine culture  Educated on supportive measures: ibuprofen/tylenol, hydration  Educated on s/s of worsening sx and when to seek higher level of care  Follow up with pcp if not improving    There are no Patient Instructions on file for this visit.  The patient indicates understanding of these issues and agrees to the plan.

## 2024-11-26 RX ORDER — GABAPENTIN 600 MG/1
600 TABLET ORAL 3 TIMES DAILY
Qty: 45 TABLET | Refills: 0 | Status: SHIPPED | OUTPATIENT
Start: 2024-11-26

## 2024-11-26 NOTE — TELEPHONE ENCOUNTER
Gabapentin 600 Mg Tab Nort  Neurology Medications Yudeke5611/26/2024 10:42 AM   Protocol Details In person appointment or virtual visit in the past 6 mos or appointment in next 3 mos   LOV 7/18/2024  Last Px:2/28/2024  Last refill on 11/12/2024, for #45, with 0 refills    Future Appointments   Date Time Provider Department Center   3/28/2025  8:40 AM Rosy Biggs APRN [facility]   6/16/2025 11:20 AM Rosy Biggs APRN [facility]

## 2024-11-30 RX ORDER — GABAPENTIN 600 MG/1
600 TABLET ORAL 3 TIMES DAILY
Qty: 45 TABLET | Refills: 0 | OUTPATIENT
Start: 2024-11-30

## 2024-12-09 NOTE — TELEPHONE ENCOUNTER
Trazodone Hydrochloride 50 Mg Tab Zydu   No refill protocol for this medication.    Last refill: 11/12/2024, for #30, 0 refill  Last Px:2/27/2024  Last Visit: 7/18/2024  Next Visit: N/A    Forward to Dr. Lorna Montanez, please advise on refills. Thanks.

## 2024-12-10 RX ORDER — TRAZODONE HYDROCHLORIDE 50 MG/1
50 TABLET, FILM COATED ORAL NIGHTLY PRN
Qty: 30 TABLET | Refills: 0 | Status: SHIPPED | OUTPATIENT
Start: 2024-12-10

## 2024-12-10 RX ORDER — CONJUGATED ESTROGENS 0.62 MG/G
0.5 CREAM VAGINAL DAILY
COMMUNITY
Start: 2024-09-23

## 2024-12-10 RX ORDER — CELECOXIB 200 MG/1
200 CAPSULE ORAL DAILY
COMMUNITY
Start: 2024-09-23

## 2024-12-10 RX ORDER — SERTRALINE HYDROCHLORIDE 100 MG/1
100 TABLET, FILM COATED ORAL DAILY
COMMUNITY
Start: 2024-11-01

## 2024-12-16 ENCOUNTER — NURSE ONLY (OUTPATIENT)
Dept: FAMILY MEDICINE CLINIC | Facility: CLINIC | Age: 49
End: 2024-12-16
Payer: MEDICAID

## 2024-12-16 ENCOUNTER — TELEPHONE (OUTPATIENT)
Dept: FAMILY MEDICINE CLINIC | Facility: CLINIC | Age: 49
End: 2024-12-16

## 2024-12-16 DIAGNOSIS — R30.0 DYSURIA: ICD-10-CM

## 2024-12-16 DIAGNOSIS — R30.0 DYSURIA: Primary | ICD-10-CM

## 2024-12-16 LAB
BILIRUBIN: NEGATIVE
GLUCOSE (URINE DIPSTICK): NEGATIVE MG/DL
MULTISTIX EXPIRATION DATE: ABNORMAL DATE
MULTISTIX LOT#: ABNORMAL NUMERIC
NITRITE, URINE: NEGATIVE
OCCULT BLOOD: NEGATIVE
PH, URINE: 7.5 (ref 4.5–8)
PROTEIN (URINE DIPSTICK): 30 MG/DL
SPECIFIC GRAVITY: 1.02 (ref 1–1.03)
URINE-COLOR: YELLOW
UROBILINOGEN,SEMI-QN: 0.2 MG/DL (ref 0–1.9)

## 2024-12-16 PROCEDURE — 87086 URINE CULTURE/COLONY COUNT: CPT | Performed by: FAMILY MEDICINE

## 2024-12-16 PROCEDURE — 87077 CULTURE AEROBIC IDENTIFY: CPT | Performed by: FAMILY MEDICINE

## 2024-12-16 PROCEDURE — 87186 SC STD MICRODIL/AGAR DIL: CPT | Performed by: FAMILY MEDICINE

## 2024-12-16 RX ORDER — SULFAMETHOXAZOLE AND TRIMETHOPRIM 800; 160 MG/1; MG/1
1 TABLET ORAL 2 TIMES DAILY
Qty: 14 TABLET | Refills: 0 | Status: SHIPPED | OUTPATIENT
Start: 2024-12-16

## 2024-12-16 NOTE — PROGRESS NOTES
Sarah DOMINGA Triplett's mother present in office for nurse visit for urine sample drop off.  Urine sample obtained     All questions/concerns addressed. Patient left in stable condition.

## 2024-12-16 NOTE — TELEPHONE ENCOUNTER
See My Chart message    Patient does not drive     She is having mother drop off a urine sample    Please adv  Thank you

## 2024-12-23 RX ORDER — SERTRALINE HYDROCHLORIDE 100 MG/1
150 TABLET, FILM COATED ORAL DAILY
Qty: 45 TABLET | Refills: 1 | Status: SHIPPED | OUTPATIENT
Start: 2024-12-23

## 2024-12-23 NOTE — TELEPHONE ENCOUNTER
Last OV 9/4/24 ([facility] preop),  7/18/24 Wesley  Last lab 9/24  Last refilled 9/4/24  #135  0 refill  Patient confirmed she is taking 1.5 tabs daily    Routed to Dr Taylor as covering provider to advise

## 2025-01-02 RX ORDER — GABAPENTIN 600 MG/1
600 TABLET ORAL 3 TIMES DAILY
Qty: 45 TABLET | Refills: 0 | Status: SHIPPED | OUTPATIENT
Start: 2025-01-02

## 2025-01-02 NOTE — TELEPHONE ENCOUNTER
Neurology Medications Nzqqrt8501/02/2025 09:14 AM   Protocol Details In person appointment or virtual visit in the past 6 mos or appointment in next 3 mo

## 2025-01-06 ENCOUNTER — PATIENT MESSAGE (OUTPATIENT)
Dept: FAMILY MEDICINE CLINIC | Facility: CLINIC | Age: 50
End: 2025-01-06

## 2025-01-06 DIAGNOSIS — R39.9 UTI SYMPTOMS: Primary | ICD-10-CM

## 2025-01-06 RX ORDER — SULFAMETHOXAZOLE AND TRIMETHOPRIM 800; 160 MG/1; MG/1
1 TABLET ORAL 2 TIMES DAILY
Qty: 14 TABLET | Refills: 0 | Status: SHIPPED | OUTPATIENT
Start: 2025-01-06

## 2025-01-07 ENCOUNTER — NURSE ONLY (OUTPATIENT)
Dept: FAMILY MEDICINE CLINIC | Facility: CLINIC | Age: 50
End: 2025-01-07
Payer: MEDICAID

## 2025-01-07 DIAGNOSIS — R39.9 UTI SYMPTOMS: ICD-10-CM

## 2025-01-07 LAB
BILIRUBIN: NEGATIVE
GLUCOSE (URINE DIPSTICK): NEGATIVE MG/DL
MULTISTIX EXPIRATION DATE: ABNORMAL DATE
MULTISTIX LOT#: ABNORMAL NUMERIC
NITRITE, URINE: POSITIVE
OCCULT BLOOD: NEGATIVE
PH, URINE: 6 (ref 4.5–8)
PROTEIN (URINE DIPSTICK): 30 MG/DL
SPECIFIC GRAVITY: 1.02 (ref 1–1.03)
UROBILINOGEN,SEMI-QN: 1 MG/DL (ref 0–1.9)

## 2025-01-07 PROCEDURE — 87077 CULTURE AEROBIC IDENTIFY: CPT | Performed by: FAMILY MEDICINE

## 2025-01-07 PROCEDURE — 87086 URINE CULTURE/COLONY COUNT: CPT | Performed by: FAMILY MEDICINE

## 2025-01-07 RX ORDER — CIPROFLOXACIN 500 MG/1
500 TABLET, FILM COATED ORAL 2 TIMES DAILY
Qty: 6 TABLET | Refills: 0 | Status: SHIPPED | OUTPATIENT
Start: 2025-01-07 | End: 2025-01-10

## 2025-01-07 NOTE — PROGRESS NOTES
Sarah Triplett's mother present in office for nurse visit - urine drop off  Urine sample obtained     All questions/concerns addressed. Patient left in stable condition.    
Female

## 2025-01-09 RX ORDER — TRAZODONE HYDROCHLORIDE 50 MG/1
50 TABLET, FILM COATED ORAL NIGHTLY PRN
Qty: 30 TABLET | Refills: 0 | Status: SHIPPED | OUTPATIENT
Start: 2025-01-09

## 2025-01-09 NOTE — TELEPHONE ENCOUNTER
Trazodone Hydrochloride 50 Mg Tab Zydu   No refill protocol for this medication.    Last refill: 12/10/2024, for #30, 0 refill  Last Px:2/27/2024  Last Visit: 9/4/2024  Next Visit:   Future Appointments   Date Time Provider Department Center   3/28/2025  8:40 AM Rosy Biggs APRN [facility]   6/16/2025 11:20 AM Rosy Biggs APRN [facility]         Forward to Dr. Lorna Montanez, please advise on refills. Thanks.

## 2025-01-14 RX ORDER — GABAPENTIN 600 MG/1
600 TABLET ORAL 3 TIMES DAILY
Qty: 45 TABLET | Refills: 0 | OUTPATIENT
Start: 2025-01-14

## 2025-01-23 RX ORDER — GABAPENTIN 600 MG/1
600 TABLET ORAL 3 TIMES DAILY
Qty: 45 TABLET | Refills: 0 | Status: SHIPPED | OUTPATIENT
Start: 2025-01-23

## 2025-01-23 NOTE — TELEPHONE ENCOUNTER
Gabapentin 600 Mg Tab Nort     Neurology Medications Dtihyr1401/23/2025 11:42 AM   Protocol Details In person appointment or virtual visit in the past 6 mos or appointment in next 3 mos    Medication is active on med list      LOV 9/4/2024  Last Px: 2/27/2024  Last refill on 1/2/2025, for #45, with 0 refills    Future Appointments   Date Time Provider Department Center   3/28/2025  8:40 AM Rosy Biggs APRN [facility] [address]   6/16/2025 11:20 AM Rosy Biggs APRN [facility]

## 2025-01-30 ENCOUNTER — PATIENT MESSAGE (OUTPATIENT)
Dept: FAMILY MEDICINE CLINIC | Facility: CLINIC | Age: 50
End: 2025-01-30

## 2025-01-30 ENCOUNTER — TELEPHONE (OUTPATIENT)
Dept: FAMILY MEDICINE CLINIC | Facility: CLINIC | Age: 50
End: 2025-01-30

## 2025-01-30 DIAGNOSIS — N39.0 RECURRENT UTI: ICD-10-CM

## 2025-01-30 DIAGNOSIS — R39.9 UTI SYMPTOMS: Primary | ICD-10-CM

## 2025-01-30 RX ORDER — SULFAMETHOXAZOLE AND TRIMETHOPRIM 800; 160 MG/1; MG/1
1 TABLET ORAL 2 TIMES DAILY
Qty: 14 TABLET | Refills: 0 | Status: SHIPPED | OUTPATIENT
Start: 2025-01-30

## 2025-01-30 NOTE — TELEPHONE ENCOUNTER
Patient's name and  verified     Patient started with symptoms last 5 days, urgency, can't hold until she gets to bathroom, pressure pain, burning, and strong smelling urine    Patient uses [facility]    Patient had Cystoscopy, cystogram, bileral retrograde pyelogram. Patient stated it was a Fistula surgery in bladder months ago.    Patient is scheduled to see Urologist in [place] on Tuesday but doesn't think she can wait.    Patient would like to drop off a urine tomorrow if possible.     Please Advise

## 2025-01-30 NOTE — TELEPHONE ENCOUNTER
Patient's name and  verified   Future Appointments   Date Time Provider Department Center   2025 10:00 AM ARIANE MARK NURSE [facility]   Dropping off urine tomorrow am   Patient notified and verbalized an understanding

## 2025-01-30 NOTE — TELEPHONE ENCOUNTER
Has patient reached out the urologist. Please have her reach out to them because her symptoms are so frequent and she is taking so much antibiotics she is at risk of urosepsis. She need to bring in sample and I can send abx but going forward she need to reach out to urology

## 2025-01-30 NOTE — TELEPHONE ENCOUNTER
PT CALLED AND ADV THAT SHE HAS ANOTHER UTI AND ITS THE WORST ITS BEEN.    PT GERARDO HAS APT WITH UROLOGY ON 2/4/25.    WOULD LIKE TO DROP OFF URINE TOMORROW IF POSSIBLE - CAN DR PLACE ORDER    PLEASE ADV    THANK YOU

## 2025-01-30 NOTE — TELEPHONE ENCOUNTER
Gastrointestional Medication Protocol Passed01/30/2025 05:41 AM   Protocol Details In person appointment or virtual visit in the past 12 mos or appointment in next 3 mos    Medication is active on med list       DUPLICATE

## 2025-01-31 ENCOUNTER — NURSE ONLY (OUTPATIENT)
Dept: FAMILY MEDICINE CLINIC | Facility: CLINIC | Age: 50
End: 2025-01-31
Payer: MEDICAID

## 2025-01-31 DIAGNOSIS — N39.0 RECURRENT UTI: ICD-10-CM

## 2025-01-31 DIAGNOSIS — R39.9 UTI SYMPTOMS: ICD-10-CM

## 2025-01-31 PROCEDURE — 87086 URINE CULTURE/COLONY COUNT: CPT | Performed by: FAMILY MEDICINE

## 2025-01-31 NOTE — PROGRESS NOTES
Patient to clinic for patient dropped off urine specimen  Patient left office in stable condition

## 2025-02-04 RX ORDER — GABAPENTIN 600 MG/1
600 TABLET ORAL 3 TIMES DAILY
Qty: 45 TABLET | Refills: 0 | Status: SHIPPED | OUTPATIENT
Start: 2025-02-04

## 2025-02-04 NOTE — TELEPHONE ENCOUNTER
Neurology Medications Passed02/04/2025 09:26 AM   Protocol Details In person appointment or virtual visit in the past 6 mos or appointment in next 3 mos    Medication is active on med list

## 2025-02-11 RX ORDER — TRAZODONE HYDROCHLORIDE 50 MG/1
50 TABLET ORAL NIGHTLY PRN
Qty: 30 TABLET | Refills: 0 | Status: SHIPPED | OUTPATIENT
Start: 2025-02-11

## 2025-02-14 RX ORDER — GABAPENTIN 600 MG/1
600 TABLET ORAL 3 TIMES DAILY
Qty: 45 TABLET | Refills: 0 | OUTPATIENT
Start: 2025-02-14

## 2025-02-19 RX ORDER — TOPIRAMATE 50 MG/1
25 TABLET, FILM COATED ORAL DAILY
Qty: 90 TABLET | Refills: 0 | Status: SHIPPED | OUTPATIENT
Start: 2025-02-19

## 2025-02-19 NOTE — TELEPHONE ENCOUNTER
Requesting topamax 50 mg  fax received from [facility]  LOV: 11/11/24  RTC: 5 months  Last Relevant Labs: na  Filled: 10/7/24 #90 with 0 refills    Future Appointments   Date Time Provider Department Center   3/28/2025  8:40 AM Rosy Biggs APRN [facility]   6/16/2025 11:20 AM Rosy Biggs APRN [facility]

## 2025-03-13 ENCOUNTER — TELEPHONE (OUTPATIENT)
Dept: FAMILY MEDICINE CLINIC | Facility: CLINIC | Age: 50
End: 2025-03-13

## 2025-03-13 RX ORDER — TRAZODONE HYDROCHLORIDE 50 MG/1
50 TABLET ORAL NIGHTLY PRN
Qty: 30 TABLET | Refills: 0 | Status: SHIPPED | OUTPATIENT
Start: 2025-03-13

## 2025-03-13 NOTE — TELEPHONE ENCOUNTER
Trazodone Hydrochloride 50 Mg Tab Zydu   No refill protocol for this medication.    Last refill: 2/11/2025, for #30, 0 refill  Last Px: 7/18/2024  Last Visit: 9/4/2024  Next Visit:   Future Appointments   Date Time Provider Department Center   3/28/2025  8:40 AM Rosy Biggs APRN [facility]   6/16/2025 11:20 AM Rosy Biggs APRN [facility]     Forward to Dr. Lorna Montanez, please advise on refills. Thanks.

## 2025-03-26 ENCOUNTER — TELEPHONE (OUTPATIENT)
Dept: FAMILY MEDICINE CLINIC | Facility: CLINIC | Age: 50
End: 2025-03-26

## 2025-03-26 NOTE — TELEPHONE ENCOUNTER
Patient called, patient reports external jaw pain, tender to touch and swelling behind her ear. Patient stated has been going on for 4-5 days, patient stated she has shoulder pain but been going off and on for a month, patient reported dizziness and headaches on and off for a long period of time.     Reviewed case with provider, patient advised that if symptoms get worse to go to ER or [facility]. Okay to wait for appt tomorrow.

## 2025-03-26 NOTE — TELEPHONE ENCOUNTER
PATIENT SCHEDULED FOR TOMORROW WITH DR FAN.  SHE SAYS HER JAW (LEFT SIDE) IS VERY TENDER AND CONTINUES TO BACK OF LEFT EAR.  PATIENT SAYS THESE SYMPTOMS STARTED 5 DAYS AGO.  VERY SENSITIVE TO TOUCH.  IS THIS OK WITH DR FAN? OR SHOULD THIS BE TRIAGED FURTHER?  I ADVISED PATIENT THAT IF DR FAN FELT THIS IS URGENT, THEN WE WILL CALL HER BACK.  IF SHE DOESN'T HEAR FROM OUR OFFICE, THEN WE WILL SEE HER TOMORROW.   NOT SURE IF THIS IS IMPORTANT, BUT PATIENT HAS FULL SET OF DENTURES.

## 2025-03-27 ENCOUNTER — OFFICE VISIT (OUTPATIENT)
Dept: FAMILY MEDICINE CLINIC | Facility: CLINIC | Age: 50
End: 2025-03-27
Payer: MEDICAID

## 2025-03-27 ENCOUNTER — TELEPHONE (OUTPATIENT)
Dept: FAMILY MEDICINE CLINIC | Facility: CLINIC | Age: 50
End: 2025-03-27

## 2025-03-27 VITALS
DIASTOLIC BLOOD PRESSURE: 74 MMHG | RESPIRATION RATE: 16 BRPM | HEART RATE: 91 BPM | WEIGHT: 157 LBS | OXYGEN SATURATION: 99 % | TEMPERATURE: 98 F | HEIGHT: 64 IN | BODY MASS INDEX: 26.8 KG/M2 | SYSTOLIC BLOOD PRESSURE: 110 MMHG

## 2025-03-27 DIAGNOSIS — R20.2 FACIAL PARESTHESIA: Primary | ICD-10-CM

## 2025-03-27 DIAGNOSIS — R20.2 PARESTHESIA OF LEFT UPPER EXTREMITY: ICD-10-CM

## 2025-03-27 LAB
ALBUMIN SERPL-MCNC: 4.7 G/DL (ref 3.2–4.8)
ALBUMIN/GLOB SERPL: 1.9 {RATIO} (ref 1–2)
ALP LIVER SERPL-CCNC: 52 U/L
ALT SERPL-CCNC: 7 U/L
ANION GAP SERPL CALC-SCNC: 8 MMOL/L (ref 0–18)
AST SERPL-CCNC: 14 U/L (ref ?–34)
BASOPHILS # BLD AUTO: 0.04 X10(3) UL (ref 0–0.2)
BASOPHILS NFR BLD AUTO: 0.6 %
BILIRUB SERPL-MCNC: 0.3 MG/DL (ref 0.3–1.2)
BUN BLD-MCNC: 23 MG/DL (ref 9–23)
CALCIUM BLD-MCNC: 9.9 MG/DL (ref 8.7–10.6)
CHLORIDE SERPL-SCNC: 111 MMOL/L (ref 98–112)
CO2 SERPL-SCNC: 25 MMOL/L (ref 21–32)
CREAT BLD-MCNC: 0.93 MG/DL
EGFRCR SERPLBLD CKD-EPI 2021: 74 ML/MIN/1.73M2 (ref 60–?)
EOSINOPHIL # BLD AUTO: 0.11 X10(3) UL (ref 0–0.7)
EOSINOPHIL NFR BLD AUTO: 1.7 %
ERYTHROCYTE [DISTWIDTH] IN BLOOD BY AUTOMATED COUNT: 11.9 %
ERYTHROCYTE [SEDIMENTATION RATE] IN BLOOD: 7 MM/HR
FASTING STATUS PATIENT QL REPORTED: NO
GLOBULIN PLAS-MCNC: 2.5 G/DL (ref 2–3.5)
GLUCOSE BLD-MCNC: 92 MG/DL (ref 70–99)
HCT VFR BLD AUTO: 38.9 %
HGB BLD-MCNC: 13.7 G/DL
IMM GRANULOCYTES # BLD AUTO: 0.02 X10(3) UL (ref 0–1)
IMM GRANULOCYTES NFR BLD: 0.3 %
LYMPHOCYTES # BLD AUTO: 1.46 X10(3) UL (ref 1–4)
LYMPHOCYTES NFR BLD AUTO: 22.7 %
MCH RBC QN AUTO: 31.4 PG (ref 26–34)
MCHC RBC AUTO-ENTMCNC: 35.2 G/DL (ref 31–37)
MCV RBC AUTO: 89.2 FL
MONOCYTES # BLD AUTO: 0.29 X10(3) UL (ref 0.1–1)
MONOCYTES NFR BLD AUTO: 4.5 %
NEUTROPHILS # BLD AUTO: 4.52 X10 (3) UL (ref 1.5–7.7)
NEUTROPHILS # BLD AUTO: 4.52 X10(3) UL (ref 1.5–7.7)
NEUTROPHILS NFR BLD AUTO: 70.2 %
OSMOLALITY SERPL CALC.SUM OF ELEC: 301 MOSM/KG (ref 275–295)
PLATELET # BLD AUTO: 214 10(3)UL (ref 150–450)
POTASSIUM SERPL-SCNC: 4.7 MMOL/L (ref 3.5–5.1)
PROT SERPL-MCNC: 7.2 G/DL (ref 5.7–8.2)
RBC # BLD AUTO: 4.36 X10(6)UL
SODIUM SERPL-SCNC: 144 MMOL/L (ref 136–145)
WBC # BLD AUTO: 6.4 X10(3) UL (ref 4–11)

## 2025-03-27 PROCEDURE — 85025 COMPLETE CBC W/AUTO DIFF WBC: CPT | Performed by: FAMILY MEDICINE

## 2025-03-27 PROCEDURE — 99214 OFFICE O/P EST MOD 30 MIN: CPT | Performed by: FAMILY MEDICINE

## 2025-03-27 PROCEDURE — 85652 RBC SED RATE AUTOMATED: CPT | Performed by: FAMILY MEDICINE

## 2025-03-27 PROCEDURE — 80053 COMPREHEN METABOLIC PANEL: CPT | Performed by: FAMILY MEDICINE

## 2025-03-27 RX ORDER — SERTRALINE HYDROCHLORIDE 100 MG/1
150 TABLET, FILM COATED ORAL DAILY
Qty: 45 TABLET | Refills: 0 | Status: SHIPPED | OUTPATIENT
Start: 2025-03-27

## 2025-03-27 RX ORDER — AZITHROMYCIN 250 MG/1
TABLET, FILM COATED ORAL
Qty: 6 TABLET | Refills: 0 | Status: SHIPPED | OUTPATIENT
Start: 2025-03-27 | End: 2025-04-01

## 2025-03-27 RX ORDER — METHYLPREDNISOLONE 4 MG/1
TABLET ORAL
Qty: 21 TABLET | Refills: 0 | Status: SHIPPED | OUTPATIENT
Start: 2025-03-27

## 2025-03-27 NOTE — TELEPHONE ENCOUNTER
Patient requesting new neuro referral, [facility] Dr. May's office had no openings until June. Endorsed to RN.

## 2025-03-27 NOTE — TELEPHONE ENCOUNTER
Patient's name and  verified   Patient is going to try to get in with another doctor at that facility  Patient notified and verbalized an understanding

## 2025-03-27 NOTE — TELEPHONE ENCOUNTER
Psychiatric Non-Scheduled (Anti-Anxiety) Apegai8003/27/2025 09:30 AM   Protocol Details Depression Screening completed within the past 12 months    In person appointment or virtual visit in the past 6 mos or appointment in next 3 mos    Medication is active on med list          Last refill:   sertraline 100 MG Oral Tab 45 tablet 1 12/23/2024     Last Visit:3/27/25.    Next Visit:   Future Appointments   Date Time Provider Department Center   3/28/2025  8:40 AM Rosy Biggs APRN [facility]   6/16/2025 11:20 AM Rosy Biggs APRN [facility]         Forward to Dr. Olson please advise on refills. Thanks.

## 2025-03-27 NOTE — PROGRESS NOTES
[facility] WEIGHT MANAGEMENT VIRTUAL ENCOUNTER     Sarah Triplett verbally consents to a Virtual/Telephone Check-In service on 03/28/25   Patient understands and accepts financial responsibility for any deductible, co-insurance and/or co-pays associated with this service.    HISTORY OF PRESENT ILLNESS  Chief Complaint   Patient presents with    Other     F/u on weight management      Sarah Triplett is a 49 year old female is being evaluated as a video visit using Telemedicine with live, interactive video and audio    Weight gain/loss since LOV based on home monitoring:   Home scale: 157 lbs   Has up# 8 lbs since LOV 4 months ago     Compliance with medication: topamax 25mg bid and compound semaglutide (had stopped taking for 1 month- due to finances)   Tolerating well, helping with decreasing appetite and no side effects     Gained about #5 lbs over the holiday's   Felt like the lower dosage of topamax is not helping, feels like the higher dose was helping more with sugary cravings   Exercise/Activity: 1x/ week, via walking and PT limited due to pain issues   Nutrition: eating regular meals, +protein, minimal veggies. not tracking reports  Stress is manageable   Sleep: 6 hours/night, waking up feeling rested    Denies chest pain, shortness of breath, dizziness, blurred vision, headache, paresthesia, nausea/vomiting.     Wt Readings from Last 6 Encounters:   03/27/25 157 lb (71.2 kg)   11/25/24 150 lb (68 kg)   11/11/24 149 lb (67.6 kg)   09/04/24 156 lb (70.8 kg)   07/18/24 159 lb 3.2 oz (72.2 kg)   07/06/24 158 lb (71.7 kg)          Subjective  REVIEW OF SYSTEMS  GENERAL HEALTH: feels well otherwise, denied any fevers chills or night sweats   RESPIRATORY: denies shortness of breath   CARDIOVASCULAR: denies chest pain  GI: denies abdominal pain  PSYCH: denies any mood changes    Objective  EXAM  Reviewed most recent set of vitals   Physical Exam:  GENERAL: well developed, well nourished, in no apparent  distress, speaking in full sentences comfortably   SKIN: warm, pink, dry without rashes to exposed area   EYES: conjunctiva pink  HEENT: atraumatic, normocephalic  LUNGS: normal work of breathing, non labored  CARDIO: normal work, no exertion  EXTREMITIES: no cyanosis, no clubbing, no edema  NEURO: Oriented times three  PSYCH: pleasant, cooperative, normal mood and affect    Lab Results   Component Value Date    WBC 6.4 03/27/2025    RBC 4.36 03/27/2025    HGB 13.7 03/27/2025    HCT 38.9 03/27/2025    MCV 89.2 03/27/2025    MCH 31.4 03/27/2025    MCHC 35.2 03/27/2025    RDW 11.9 03/27/2025    .0 03/27/2025    MPV 10.0 09/09/2012     Lab Results   Component Value Date    GLU 92 03/27/2025    BUN 23 03/27/2025    BUNCREA 38 (H) 02/28/2024    CREATSERUM 0.93 03/27/2025    ANIONGAP 8 03/27/2025     01/22/2016    GFRNAA 101 04/20/2022    GFRAA 117 04/20/2022    CA 9.9 03/27/2025    OSMOCALC 301 (H) 03/27/2025    ALKPHO 52 03/27/2025    AST 14 03/27/2025    ALT 7 (L) 03/27/2025    BILT 0.3 03/27/2025    TP 7.2 03/27/2025    ALB 4.7 03/27/2025    GLOBULIN 2.5 03/27/2025    AGRATIO 1.9 02/28/2024     03/27/2025    K 4.7 03/27/2025     03/27/2025    CO2 25.0 03/27/2025     Lab Results   Component Value Date    A1C 5.5 04/27/2023     Lab Results   Component Value Date    CHOLEST 221 (H) 02/28/2024    TRIG 103 02/28/2024    HDL 54 02/28/2024     (H) 02/28/2024    VLDL 36 04/26/2013    TCHDLRATIO 4.1 02/28/2024    NONHDLC 167 (H) 02/28/2024     Lab Results   Component Value Date    T4F 1.1 11/13/2023    TSH 0.925 11/13/2023     Lab Results   Component Value Date    B12 449 09/27/2021    VITB12 514 04/27/2023     Lab Results   Component Value Date    VITD 46 02/28/2024       Medications Ordered Prior to Encounter[1]    ASSESSMENT  Analyzed weight data:       Diagnoses and all orders for this visit:    Encounter for therapeutic drug monitoring    Obesity (BMI 30-39.9)    Anxiety and  depression    Chronic neck and back pain        PLAN  Will increase medications: topamax 50mg bid   Compound semaglutide is a custom-made medication that mimics the GLP-1 hormone. It is used to treat type 2 diabetes and lower the risk of heart or blood vessel disease. It works by increasing insulin release, lowering glucagon release, delaying gastric emptying and reducing appetite. Compound semaglutide is prescribed when an FDA-approved medication, dose, or dosage form is unavailable (ie. Nationwide shortage or no obesity coverage for GLP-1 meds). Patients are aware of the difference between these medications. Mentioned that we will no longer be able to prescribe after April 1, 2025  --advised of side effects and adverse effects of this medication  Contradictions: has done metformin in the past   Reviewed labs   Continue with vitamin d OTC   Anxiety/depression, stable  Chronic back pain- possibly trying out aqua therapy (with last appt)  Wrote out new macros and encouraged tracking food  Advised to monitor blood pressure and pulse at home/ given parameters to review and contact provider.  Nutrition: low carb diet/ recommended to eat breakfast daily/ regular protein intake  Follow up with dietitian and psychologist as recommended.  Discussed the role of sleep and stress in weight management.  Counseled on comprehensive weight loss plan including attention to nutrition, exercise and behavior/stress management for success. See patient instruction below for more details.  Discussed strategies to overcome barriers to successful weight loss and weight maintenance  FITTE: ACSM recommendations (150-300 minutes/ week in active weight loss)       There are no Patient Instructions on file for this visit.    No follow-ups on file.    Patient verbalizes understanding.    Total time spent on chart review, pre-charting, obtaining history, counseling, and educating, reviewing labs was 22 minutes.       Pt understands phone/video  evaluation is not a substitute for face to face examination or emergency care. Pt advised to go to the ER or call 911 for worsening symptoms or acute distress.       Please note that the following visit was completed using two-way, real-time interactive audio and/or video communication.  This has been done in good willis to provide continuity of care in the best interest of the provider-patient relationship, due to the ongoing public health crisis/national emergency and because of restrictions of visitation.  There are limitations of this visit as no physical exam could be performed.  Every conscious effort was taken to allow for sufficient and adequate time.  This billing was spent on reviewing labs, medications, radiology tests and decision making.  Appropriate medical decision-making and tests are ordered as detailed in the plan of care above.     NOTE TO PATIENT: The 21st Century Cures Act makes clinical notes like these available to patients in the interest of transparency. Clinical notes are medical documents used by physicians and care providers to communicate with each other. These documents include medical language and terminology, abbreviations, and treatment information that may sound technical and at times possibly unfamiliar. In addition, at times, the verbiage may appear blunt or direct. These documents are one tool providers use to communicate relevant information and clinical opinions of the care providers in a way that allows common understanding of the clinical context.     Rosy Biggs, APRN  3/27/2025         [1]   Current Outpatient Medications on File Prior to Visit   Medication Sig Dispense Refill    methylPREDNISolone (MEDROL) 4 MG Oral Tablet Therapy Pack As directed. 21 tablet 0    azithromycin (ZITHROMAX Z-MACIEJ) 250 MG Oral Tab Take 2 tablets (500 mg total) by mouth daily for 1 day, THEN 1 tablet (250 mg total) daily for 4 days. 6 tablet 0    SERTRALINE 100 MG Oral Tab Take 1.5 tablets  (150 mg total) by mouth daily. 45 tablet 0    traZODone 50 MG Oral Tab Take 1 tablet (50 mg total) by mouth nightly as needed for Sleep. AT BEDTIME 30 tablet 0    topiramate 50 MG Oral Tab Take 0.5 tablets (25 mg total) by mouth daily. 90 tablet 0    GABAPENTIN 600 MG Oral Tab TAKE ONE TABLET BY MOUTH THREE TIMES DAILY 45 tablet 0    omeprazole 20 MG Oral Capsule Delayed Release Take 2 capsules (40 mg total) by mouth daily. 60 capsule 2    CUSTOM MEDICATION Semaglutide 0.5mg/cyanobalamin   Concentration: 1/ 0.5 mg/mL  Instructions: Inject 50 units/0.5ml into skin q weekly  Dispense: 2.5 mL vial 1 each 3    Cholecalciferol (VITAMIN D3 ULTRA POTENCY) 1.25 MG (62763 UT) Oral Tab Take 2,000 Int'l Units/day by mouth daily.      MYRBETRIQ 50 MG Oral Tablet 24 Hr Take 1 tablet (50 mg total) by mouth daily.      Oxybutynin Chloride ER 15 MG Oral Tablet 24 Hr Take 1 tablet (15 mg total) by mouth daily. 90 tablet 6     No current facility-administered medications on file prior to visit.

## 2025-03-27 NOTE — PROGRESS NOTES
Chief Complaint   Patient presents with    Other     Pt states for 5 days they've been having sensitivity in their jaw, specifically on the left side and behind their left ear, it feels swollen. Left shoulder is achy.    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)    Acute bronchitis    Anxiety state    Arthritis    Back pain    Back problem    DDD (degenerative disc disease), cervical    c2-c7 fusions    DDD (degenerative disc disease), lumbar    L4-L5    DDD (degenerative disc disease), lumbosacral    S1-S2    Depression    Esophageal reflux    Fibromyalgia    History of blood transfusion    Long term current use of opiate analgesic    Neuropathy    Opioid dependence (HCC)    Osteoarthritis    Other and unspecified alcohol dependence, unspecified drinking behavior    Shortness of breath    Tachycardia, unspecified    Urine incontinence    Visual impairment       Past Surgical History:   Procedure Laterality Date    Appendectomy      Appendectomy      Back surgery      Both cervical/lumbar fusion.    Excis lumbar disk,one level      Hysterectomy      Trang localization wire 1 site right (cpt=19281) Right 01/27/2023    ADH; intraductal papilloma    Needle biopsy right  02/2022    intraductal papilloma    Other surgical history      gallbladder    Other surgical history      Removal gallbladder      Tubal ligation      Tubal ligation  2004       Social History     Socioeconomic History    Marital status: Single   Tobacco Use    Smoking status: Former     Current packs/day: 0.00     Average packs/day: 1 pack/day for 35.0 years (35.0 ttl pk-yrs)     Types: Cigarettes     Start date: 12/30/1986      Quit date: 12/30/2021     Years since quitting: 3.2     Passive exposure: Past    Smokeless tobacco: Never   Vaping Use    Vaping status: Never Used   Substance and Sexual Activity    Alcohol use: Not Currently     Comment: pt states she drinks every once in a while. Pt drinks a pint of vodka    Drug use: Never       Family History   Problem Relation Age of Onset    Anxiety Mother     Breast Cancer Mother     Other ([other]) Daughter     Other ([other]) Son     Anxiety Maternal Aunt     Depression Maternal Aunt     Other (lung cancer) Maternal Aunt     Other (lung cancer) Maternal Uncle         Medications Ordered Prior to Encounter[1]      Objective  Vitals:    03/27/25 0828   BP: 110/74   Pulse: 91   Resp: 16   Temp: 97.7 °F (36.5 °C)   TempSrc: Temporal   SpO2: 99%   Weight: 157 lb (71.2 kg)   Height: 5' 4\" (1.626 m)         Body mass index is 26.95 kg/m².    Physical Exam  Constitutional:       Appearance: Normal appearance.   HEENT:      Head: Normocephalic and atraumatic.      Eyes: PERRLA no notable nystagmus     Ears: normal on observation     Nose: Nose normal.      Mouth: Mucous membranes are moist.      Neck: no masses no bruit. Acutely tender over her left mastoid with facial tenderness  Cardiovascular:      Rate and Rhythm: Normal rate and regular rhythm.   Pulmonary:      Effort: Pulmonary effort is normal.      Breath sounds: Normal breath sounds.   Musculoskeletal:         General: Normal range of motion.      Cervical back: Normal range of motion.   Skin:     General: Skin is warm and dry.   Neurological:      General: No focal deficit present.      Mental Status: She is alert and oriented to person, place, and time.   Psychiatric:         Mood and Affect: Mood normal.         Thought Content: Thought content normal.       Assessment and Plan  1. Facial paresthesia  - Neuro Referral - In Network  - methylPREDNISolone (MEDROL) 4 MG Oral Tablet Therapy Pack; As directed.  Dispense: 21 tablet; Refill:  0  - azithromycin (ZITHROMAX Z-MACIEJ) 250 MG Oral Tab; Take 2 tablets (500 mg total) by mouth daily for 1 day, THEN 1 tablet (250 mg total) daily for 4 days.  Dispense: 6 tablet; Refill: 0  - CBC W Differential W Platelet [E]; Future  - Comp Metabolic Panel (14) [E]; Future  - Sed Rate, Westergren (Automated) [E]; Future    2. Paresthesia of left upper extremity  - Neuro Referral - In Network  - methylPREDNISolone (MEDROL) 4 MG Oral Tablet Therapy Pack; As directed.  Dispense: 21 tablet; Refill: 0  - azithromycin (ZITHROMAX Z-MACIEJ) 250 MG Oral Tab; Take 2 tablets (500 mg total) by mouth daily for 1 day, THEN 1 tablet (250 mg total) daily for 4 days.  Dispense: 6 tablet; Refill: 0  - CBC W Differential W Platelet [E]; Future  - Comp Metabolic Panel (14) [E]; Future  - Sed Rate, Westergren (Automated) [E]; Future         Follow up  No follow-ups on file.      Patient Instructions  There are no Patient Instructions on file for this visit.       Jeff Montanez MD       This note was created by Verbling voice recognition. Errors in content may be related to improper recognition by the system; efforts to review and correct have been done but errors may still exist. Please be advised the primary purpose of this note is for me to communicate medical care. Standard sentence structure is not always used. Medical terminology and medical abbreviations may be used. There may be grammatical, typographical, and automated fill ins that may have errors missed in proofreading.          [1]   Current Outpatient Medications on File Prior to Visit   Medication Sig Dispense Refill    traZODone 50 MG Oral Tab Take 1 tablet (50 mg total) by mouth nightly as needed for Sleep. AT BEDTIME 30 tablet 0    topiramate 50 MG Oral Tab Take 0.5 tablets (25 mg total) by mouth daily. 90 tablet 0    GABAPENTIN 600 MG Oral Tab TAKE ONE TABLET BY MOUTH THREE TIMES DAILY 45 tablet 0    omeprazole 20 MG Oral Capsule Delayed Release Take 2 capsules (40 mg  total) by mouth daily. 60 capsule 2    sertraline 100 MG Oral Tab Take 1.5 tablets (150 mg total) by mouth daily. 45 tablet 1    CUSTOM MEDICATION Semaglutide 0.5mg/cyanobalamin   Concentration: 1/ 0.5 mg/mL  Instructions: Inject 50 units/0.5ml into skin q weekly  Dispense: 2.5 mL vial 1 each 3    Cholecalciferol (VITAMIN D3 ULTRA POTENCY) 1.25 MG (96746 UT) Oral Tab Take 2,000 Int'l Units/day by mouth daily.      MYRBETRIQ 50 MG Oral Tablet 24 Hr Take 1 tablet (50 mg total) by mouth daily.      Oxybutynin Chloride ER 15 MG Oral Tablet 24 Hr Take 1 tablet (15 mg total) by mouth daily. 90 tablet 6     No current facility-administered medications on file prior to visit.

## 2025-03-28 ENCOUNTER — TELEMEDICINE (OUTPATIENT)
Dept: INTERNAL MEDICINE CLINIC | Facility: CLINIC | Age: 50
End: 2025-03-28
Payer: MEDICAID

## 2025-03-28 DIAGNOSIS — F32.A ANXIETY AND DEPRESSION: ICD-10-CM

## 2025-03-28 DIAGNOSIS — F41.9 ANXIETY AND DEPRESSION: ICD-10-CM

## 2025-03-28 DIAGNOSIS — E66.9 OBESITY (BMI 30-39.9): ICD-10-CM

## 2025-03-28 DIAGNOSIS — M54.9 CHRONIC NECK AND BACK PAIN: ICD-10-CM

## 2025-03-28 DIAGNOSIS — M54.2 CHRONIC NECK AND BACK PAIN: ICD-10-CM

## 2025-03-28 DIAGNOSIS — Z51.81 ENCOUNTER FOR THERAPEUTIC DRUG MONITORING: Primary | ICD-10-CM

## 2025-03-28 DIAGNOSIS — G89.29 CHRONIC NECK AND BACK PAIN: ICD-10-CM

## 2025-03-28 PROCEDURE — 99213 OFFICE O/P EST LOW 20 MIN: CPT | Performed by: NURSE PRACTITIONER

## 2025-03-28 RX ORDER — TOPIRAMATE 50 MG/1
50 TABLET, FILM COATED ORAL 2 TIMES DAILY
Qty: 180 TABLET | Refills: 1 | Status: SHIPPED | OUTPATIENT
Start: 2025-03-28

## 2025-03-28 NOTE — PATIENT INSTRUCTIONS
Next steps:  1.  Fill your prescribed medication and take as discussed and prescribed: compound semagltuide and topamax   2.  Schedule a personal nutrition consultation with one of our registered dieticians     Please try to work on the following dietary changes:    1.  Drink water with meals and throughout the day, cut down on soda and/or juice if consumed. Consider flavored water options like Bubbly, Spindrift, Hint and Susan.  2.  Eat breakfast daily and focus on having protein with each meal, examples include: greek yogurt, cottage cheese, hard boiled egg, whole grain toast with peanut butter.   3.  Reduce refined carbohydrates and sugars which includes items such as sweets, as well as rice, pasta, and bread and make sure to choose whole grain options when having them with just 1 serving per meal about the size of your inner palm.  4.  Consume non starchy veggies daily working towards making them a good 50% of your daily food intake. Add them to lunch and dinner consistently.  5.  Start a daily probiotic: VSL#3 is recommended, (order on line at www.vsl3.com). Take 1 capsule daily with water for 30 days, then reduce to 1 every other day (this will reduce the cost). Capsules can be left out for 2 weeks, but then must be refrigerated.      Please download ailin My Fitness Pal, LoseIt! Or Net Diary to monitor daily dietary intake and you will be able to see if you are eating the right amount of calories or too much or too little which would hinder weight loss. Additionally this will help to see your daily carbohydrate and protein intake. When you set the ailin up choose 1-2 lbs/week as a goal.  Keeping a paper food journal is an option as well to remain accountable for your choices- this is the start to mindful eating! A low calorie diet has been consistently shown to support weight loss.     Continue or start exercising to help establish a routine. If not already exercising begin with 1 day and progress as able with  long-term goal of 30 minutes 5 days a week at a minimum.     Meditation daily can help manage and control stress. Chronic stress can make weight loss difficult.  Exercising is one way to help with stress, but meditation using the CALM Joanne or another comparable alternative can be done in your home or place of work with little time commitment. This Joanne can also help work on behavior change and improve sleep. Check out the segment under Calm Masterclass and listen to The 4 Pillars of Health. A great way to begin learning about the foundation of lifestyle with practical tips to use in your every day.     Check out www.yourweightmatters.org blog for continued daily support and education along this weight loss journey!    Patient Resources:     Personal Training/Fitness Classes/Health Coaching     [facility] @ [email or web address] Full fitness center with group fitness and personal training. Discount available as client of [facility].  Health Coaching and Personal Training with Shaylee Hutton at our [facility]- individual weekly coaching with option to add personal training and small group fitness classes targeted at weight loss- [phone] and/or email @ [email or web address]  360FIT [place] [email or web address] Group Fitness [phone] and/or email Brandi at [email or web address]  [facility] Fitness Centers with multiple locations: Summify (www.Alios BioPharma), Eat The Doctor kinetic Fitness (www.[email or web address].[email or web address]), Fit Body Bootcamp (www.REACH HealthbodybootMolecularMDp.[email or web address]), Captalis Fitness (www.CloudDock), The Exercise  (www.exercisecoach.[place])     Online Fitness  Fitness  on Utube  Fit in 10 DVD series- www.bhwwb22QKF.com  Sit and Be Fit - Chair exercise series Www.sitandbefit.org  Hip Hop Fit with Tyrone Meng at www.hiphopfit.net     Apps for on the Go Fitness  [place]ton 7 Minute  Workout (orange box with white 7) - free on the go HIIT training joanne  Peloton Joanne @ www.[email or web address]     Nutrition Trackers and Tools  LoseIT! And My Fitness Pal apps and on line for tracking nutrition  NOOM - virtual health coaching  FitFoundation (healthy meals on the go) in [place] @ www.[email or web address]  Bistro MD @ www.bistrGeneral Fusiond.com and Ruojxg76 (keto and low carb plans recommended) @ www.[email or web address].com, Metabolic Meals @ www.WomaiMetabolicMeals.com - individual prepared meals to go  Gobble, Blue Apron, Home , Every Plate, Sunbasket- on line meal delivery programs for preparation at home  Meal Village in [place] for homemade meals to go @ www.mealvillage.[email or web address]  Diet Doctor @ www.dietdoctor.[email or web address] - low carb swaps  YummEndavo [employer] Communications - meal prep and planning joanne (www.yummly.com)     Stress Management/Behavior/Mindful Eating  CALM meditation joanne (www.calm.com)  Headspace  Am I Hungry? Mindful eating virtual  joanne  Www.yourweightmatters.org - Obesity Action Coalition sponsored Blog posts daily  Motivation joanne (black box with white \")- daily supportive messages sent to your phone     Books/Video Education/Podcasts  Mindless Eating by Adrian Zimmerman  Why We Get Sick by Simba Hawk (a book about insulin resistance)  Atomic Habits by Nemesio Fermin (a book about taking small steps to promote greater behavior change)   Can't Hurt Me by Enmanuel Hanna (a book exploring the power of discipline in achieving your goals)  The End of Dieting: How to Live for Life by Dr. Huseyin Cornejo M.D. or listen to The [place] Podcast Episode 63: Understanding \"Nutritarian\" Eating w/Dr. Huseyin Cornejo  Your Body in Balance: The New Science of Food, Hormones, and Health by Dr. Lalit Kat  The Menopause Diet Plan by Yanni Do and Paulette Rubalcava  The Complete Guide to fasting by Dr. Palm  Sugar, Salt & Fat by Yaritza Tobin, Ph.D, R.D.  Weight Loss Surgery Will Not Treat Food Addiction by Tiera Hoffman Ph.D  The Game Changers- [place] News Network  Documentary on plant based nutrition  Fed Up - documentary about obesity (Free on Utube)  The Truth About Sugar - documentary on sugar (Free on Utube, https://youtu.be/9X2sqjkNB0u)  The Dr. Larsen T5 Wellness Plan by Dr. Tito Larsen MD  Fitlosophy Fitspiration - journal to better health (found at [employer] in fitness aisle)  What Happened to You?- a look at the impact trauma has on behavior written by Emily Willingham and Dr. Rudy Basurto  Whole Again by Geronimo Mueller - discovering your true self after trauma  Marlin Harper talk on Anpro21, The Call to Courage  Podcasts: The Exam Room by the Physician's Committee, Nutrition Facts by Dr. Rosado    We are here to support you with weight loss, but please remember that you still need your primary care provider for your routine health maintenance.

## 2025-04-03 ENCOUNTER — PATIENT MESSAGE (OUTPATIENT)
Dept: FAMILY MEDICINE CLINIC | Facility: CLINIC | Age: 50
End: 2025-04-03

## 2025-04-03 DIAGNOSIS — N39.0 RECURRENT UTI: ICD-10-CM

## 2025-04-03 DIAGNOSIS — R30.0 DYSURIA: Primary | ICD-10-CM

## 2025-04-04 ENCOUNTER — NURSE ONLY (OUTPATIENT)
Dept: FAMILY MEDICINE CLINIC | Facility: CLINIC | Age: 50
End: 2025-04-04
Payer: MEDICAID

## 2025-04-04 DIAGNOSIS — N39.0 RECURRENT UTI: ICD-10-CM

## 2025-04-04 DIAGNOSIS — R30.0 DYSURIA: ICD-10-CM

## 2025-04-04 LAB
BILIRUB UR QL STRIP.AUTO: NEGATIVE
COLOR UR AUTO: YELLOW
GLUCOSE UR STRIP.AUTO-MCNC: NORMAL MG/DL
KETONES UR STRIP.AUTO-MCNC: NEGATIVE MG/DL
LEUKOCYTE ESTERASE UR QL STRIP.AUTO: 250
NITRITE UR QL STRIP.AUTO: NEGATIVE
PH UR STRIP.AUTO: 7.5 [PH] (ref 5–8)
PROT UR STRIP.AUTO-MCNC: 30 MG/DL
RBC UR QL AUTO: NEGATIVE
SP GR UR STRIP.AUTO: 1.02 (ref 1–1.03)
UROBILINOGEN UR STRIP.AUTO-MCNC: NORMAL MG/DL
WBC #/AREA URNS AUTO: >50 /HPF

## 2025-04-04 PROCEDURE — 81001 URINALYSIS AUTO W/SCOPE: CPT | Performed by: FAMILY MEDICINE

## 2025-04-04 PROCEDURE — 87086 URINE CULTURE/COLONY COUNT: CPT | Performed by: FAMILY MEDICINE

## 2025-04-04 PROCEDURE — 87186 SC STD MICRODIL/AGAR DIL: CPT | Performed by: FAMILY MEDICINE

## 2025-04-04 PROCEDURE — 87077 CULTURE AEROBIC IDENTIFY: CPT | Performed by: FAMILY MEDICINE

## 2025-04-07 ENCOUNTER — OFFICE VISIT (OUTPATIENT)
Dept: NEUROLOGY | Facility: CLINIC | Age: 50
End: 2025-04-07
Payer: MEDICAID

## 2025-04-07 VITALS
RESPIRATION RATE: 16 BRPM | SYSTOLIC BLOOD PRESSURE: 114 MMHG | HEART RATE: 92 BPM | BODY MASS INDEX: 27.64 KG/M2 | HEIGHT: 63 IN | WEIGHT: 156 LBS | DIASTOLIC BLOOD PRESSURE: 80 MMHG | OXYGEN SATURATION: 99 %

## 2025-04-07 DIAGNOSIS — M48.02 SPINAL STENOSIS IN CERVICAL REGION: Primary | ICD-10-CM

## 2025-04-07 DIAGNOSIS — R51.9 FACIAL PAIN: ICD-10-CM

## 2025-04-07 PROCEDURE — 99204 OFFICE O/P NEW MOD 45 MIN: CPT | Performed by: OTHER

## 2025-04-07 RX ORDER — OXCARBAZEPINE 150 MG/1
150 TABLET, FILM COATED ORAL 2 TIMES DAILY
Qty: 60 TABLET | Refills: 2 | Status: SHIPPED | OUTPATIENT
Start: 2025-04-07

## 2025-04-07 RX ORDER — LINEZOLID 600 MG/1
600 TABLET, FILM COATED ORAL 2 TIMES DAILY
COMMUNITY
Start: 2025-04-07 | End: 2025-04-17

## 2025-04-07 RX ORDER — ASCORBIC ACID 500 MG
500 TABLET ORAL 2 TIMES DAILY
COMMUNITY
Start: 2025-04-07

## 2025-04-07 RX ORDER — METHENAMINE HIPPURATE 1000 MG/1
1 TABLET ORAL 2 TIMES DAILY
COMMUNITY
Start: 2025-04-07

## 2025-04-07 RX ORDER — ADHESIVE TAPE 3"X 2.3 YD
TAPE, NON-MEDICATED TOPICAL AS DIRECTED
COMMUNITY

## 2025-04-07 NOTE — PROGRESS NOTES
Patient is here with son today  Patient stated the left side of her face has a painful sensation   Patient stated her left shoulder has been in pain for a month  She sated its hard for her to reach behind herself  The base of her neck to her tailbone is painful.  She stated she is not allowed to have injections.

## 2025-04-07 NOTE — H&P
Neurology H&P    Sarah Triplett Patient Status:  No patient class for patient encounter    1975 MRN [id number]   Location [facility], [facility] Attending No att. providers found   Hosp Day # 0 PCP Jeff Montanez MD     Subjective:  Sarah Triplett is a(n) 49 year old female medical history significant for cervical, thoracic, and lumbar laminectomies and fusions, cervical radiculopathy, neuropathy, R ulnar transposition surgery, depression, chronic pain, and more.  She comes to the neurology clinic for evaluation of pain. She states that she has had on and off L shoulder pain and reduced ROM for about a month. She also reports that she has been having L lower facial pains. She states that she has a pain in her neck as well. She has pain that radiates into the L shoulder. She has seen other neurologists and surgeons in the past. She states that she has seen a pain specialist and has had cervical spinal injections and due to previous abscess in the neck is not able to have any further steroid injections. She states a couple of weeks ago she started to get an achy pain in the L jaw. This radiates form the L ear. It is not exacerbated by chewing or brushing her teeth etc.  It also radiates down the neck into the shoulder. She states that she is on gabapentin 600mg TID. Her pain is worse in the mornings. She also gets pain in the R hip if she sleeps on that the R side. She has been on this for a long time. She stil has pain in the RUE as well and reports that she has had CTS release surgery and an ulnar surgery in the LUE as well. She has had muscle wasting in the hands for years. She has weakness in the hands as well. She states that she can not sew with a needle and thread any longer.     Current Medications:  Current Outpatient Medications   Medication Sig Dispense Refill    CUSTOM MEDICATION Semaglutide 1mg/cyanobalamin   Concentration: 5/ 0.5 mg/mL  Instructions:  Inject 20 units/0.2ml into skin q weekly  Dispense: 2.5 mL vial 3 each 0    topiramate 50 MG Oral Tab Take 1 tablet (50 mg total) by mouth 2 (two) times daily. 180 tablet 1    methylPREDNISolone (MEDROL) 4 MG Oral Tablet Therapy Pack As directed. 21 tablet 0    SERTRALINE 100 MG Oral Tab Take 1.5 tablets (150 mg total) by mouth daily. 45 tablet 0    traZODone 50 MG Oral Tab Take 1 tablet (50 mg total) by mouth nightly as needed for Sleep. AT BEDTIME 30 tablet 0    GABAPENTIN 600 MG Oral Tab TAKE ONE TABLET BY MOUTH THREE TIMES DAILY 45 tablet 0    omeprazole 20 MG Oral Capsule Delayed Release Take 2 capsules (40 mg total) by mouth daily. 60 capsule 2    CUSTOM MEDICATION Semaglutide 0.5mg/cyanobalamin   Concentration: 1/ 0.5 mg/mL  Instructions: Inject 50 units/0.5ml into skin q weekly  Dispense: 2.5 mL vial 1 each 3    Cholecalciferol (VITAMIN D3 ULTRA POTENCY) 1.25 MG (53554 UT) Oral Tab Take 2,000 Int'l Units/day by mouth daily.      MYRBETRIQ 50 MG Oral Tablet 24 Hr Take 1 tablet (50 mg total) by mouth daily.      Oxybutynin Chloride ER 15 MG Oral Tablet 24 Hr Take 1 tablet (15 mg total) by mouth daily. 90 tablet 6       Problem List:  Patient Active Problem List   Diagnosis    Lumbar radiculopathy    Lumbar disc herniation    Infective otitis externa, unspecified    Thoracic or lumbosacral neuritis or radiculitis, unspecified    Brachial neuritis or radiculitis NOS    Disorders of bursae and tendons in shoulder region, unspecified    Degeneration of lumbar or lumbosacral intervertebral disc    Sciatica    Anxiety and depression    Chronic pain associated with significant psychosocial dysfunction    Cervicalgia    Degeneration of cervical intervertebral disc    Spinal stenosis in cervical region    Cervical spondylosis without myelopathy    Hx of lumbosacral spine surgery    Hx of cervical spine surgery    Gastroesophageal reflux disease    Neuropathy    Neurogenic bladder    Chronic neck and back pain    Wrist  weakness    Acquired deformity of spine    Urge incontinence of urine    Anemia    Encounter for therapeutic drug monitoring    Obesity (BMI 30-39.9)    Carpal tunnel syndrome of left wrist    Cubital tunnel syndrome on left    Iron deficiency anemia    Left leg numbness    Monoparesis of upper extremity (HCC)    Osteoarthritis of carpometacarpal (CMC) joint of left thumb    Ulnar neuropathy of right upper extremity    NATALYA (stress urinary incontinence, female)    Iron deficiency anemia secondary to inadequate dietary iron intake    Bladder fistula    Dysuria    Cystitis    Urinary urgency       PMHx:  Past Medical History:    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)    Acute bronchitis    Anxiety state    Arthritis    Back pain    Back problem    DDD (degenerative disc disease), cervical    c2-c7 fusions    DDD (degenerative disc disease), lumbar    L4-L5    DDD (degenerative disc disease), lumbosacral    S1-S2    Depression    Esophageal reflux    Fibromyalgia    History of blood transfusion    Long term current use of opiate analgesic    Neuropathy    Opioid dependence (HCC)    Osteoarthritis    Other and unspecified alcohol dependence, unspecified drinking behavior    Shortness of breath    Tachycardia, unspecified    Urine incontinence    Visual impairment       PSHx:  Past Surgical History:   Procedure Laterality Date    Appendectomy      Appendectomy      Back surgery      Both cervical/lumbar fusion.    Excis lumbar disk,one level      Hysterectomy      Trang localization wire 1 site right (cpt=19281) Right 01/27/2023    ADH; intraductal papilloma    Needle biopsy right  02/2022    intraductal papilloma    Other surgical history      gallbladder    Other surgical history      Removal gallbladder      Tubal ligation      Tubal ligation  2004       SocHx:  Social History     Socioeconomic History    Marital status: Single   Tobacco Use    Smoking status: Former     Current packs/day: 0.00     Average  packs/day: 1 pack/day for 35.0 years (35.0 ttl pk-yrs)     Types: Cigarettes     Start date: 12/30/1986     Quit date: 12/30/2021     Years since quitting: 3.2     Passive exposure: Past    Smokeless tobacco: Never   Vaping Use    Vaping status: Never Used   Substance and Sexual Activity    Alcohol use: Not Currently     Comment: pt states she drinks every once in a while. Pt drinks a pint of vodka    Drug use: Never   Social History Narrative    ** Merged History Encounter **          Social Drivers of Health     Food Insecurity: Low Risk  (6/19/2022)    Received from [facility]    Food Insecurity     Have there been times that your food ran out, and you didn't have money to get more?: No     Are there times that you worry that this might happen?: No   Transportation Needs: High Risk (6/19/2022)    Received from [facility]    Transportation Needs     Do you have trouble getting transportation to medical appointments?: Yes     How do you normally get to and from your appointments?: Family/Friend   Housing Stability: Low Risk  (6/19/2022)    Received from [facility]    Housing Stability     Are you concerned about having a safe and reliable place to live?: No       Family History:  Family History   Problem Relation Age of Onset    Anxiety Mother     Breast Cancer Mother     Other ([other]) Daughter     Other ([other]) Son     Anxiety Maternal Aunt     Depression Maternal Aunt     Other (lung cancer) Maternal Aunt     Other (lung cancer) Maternal Uncle            ROS:  10 point ROS completed and was negative, except for pertinent positive and negatives stated in subjective.    Objective/Physical Exam:    Vital Signs:  Last menstrual period 02/11/2014.    Gen: Awake and in no apparent distress  HEENT: moist mucus membranes  Neck: Supple  Cardiovascular:  Regular rate and rhythm, no murmur  Pulm: CTAB  GI: non-tender, normal bowel sounds  Skin: normal, dry  Extremities: No clubbing or cyanosis      Neurologic:   MENTAL STATUS: alert, ox3, normal attention, language and fund of knowledge.      CRANIAL NERVES II to XII: PERRLA, no ptosis or diplopia, EOM intact, facial sensation intact, strong eye closure, face is symmetric, no dysarthria, tongue midline,  no tongue fasciculations or atrophy, strong shoulder shrug.    MOTOR EXAMINATION:  MSK:  RUE: Delt:  5/5, Bi: 5/5, Tri: 5/5, : 5/5, interossei: 3/5,   LUE: Delt:  4+/5, Bi: 5/5, Tri: 5/5, : 5/5, interossei: 4/5,    RLE: HF: 4-/5, KE: 5/5, KF: 4/5, DF: 5/5, PF: 5/5   LLE:  HF: 4-/5, KE: 4/5, KF: 4/5, DF: 5/5, PF: 5/5   Normal Tone  No Fasiculations      SENSORY EXAMINATION:  UE: intact to light touch, pinprick intact  LE: intact to light touch, pinprick intact    COORDINATION:  No dysmetria, or intention tremors     REFLEXES: 3+ at biceps, 3+ brachioradialis, 2+ at patella     GAIT: mildly stooped posture, limping gait, unable to toe or heel walk        Labs:       Imaging:  MRI C spine 8/25/2021  CONCLUSION:  Fusion at C3-C7 is noted.  No high-grade spinal canal stenosis.       MRI T spine 8/25/21  CONCLUSION:  Sequelae of laminectomies at T1-T4 are noted.  No significant spinal canal or neural foraminal stenosis.     MRI L spine 8/25/2  CONCLUSION:       1. At the postoperative site, L4-S1, there is no spinal canal stenosis.      2. There is severe spinal canal stenosis at L3-L4 due to broad-based disc bulge, ligamentum flavum thickening and facet hypertrophic changes.       EMG/NCS 1/7/23  Impression:     ABNORMAL STUDY     1.  There are isolated findings of a RIGHT ULNAR NEUROPATHY.  This is most likely at the deep palmar branch in the hand, although chronic changes from an ulnar neuropathy at the elbow may have similar findings.       2.  There is evidence of RIGHT CERVICAL RADICULOPATHY effecting the C6  and possibly C5 nerve roots.       3.  There was NO evidence of a more wide spread large fiber neuropathy and NO findings of myopathy.     Compared to the prior EMG/NCV of 12/16/21 there have been no substantial changes.    Assessment:  This is a 50 y/o female with complex past medical and surgical history including fusions at multiple levels throughout the cervical thoracic and lumbar spine, bilateral ulnar nerve transposition's and left CTS release surgery.  She has chronic pain in her neck and arms as well.  Her exam as noted above today.  She does have muscle wasting in the intrinsic muscles of the right hand.  Has a contracture of the right fifth digit as well.  She states that she is not able to see a pain specialist any longer and not able to have injections due to previous abscess in the cervical spine.  She seen several other doctors in the past for chronic pain including surgeons pain specialist and neurologist.  She has had 2 fairly recent extensive EMGs and those reports are noted above today.  She is already on gabapentin 600 mg 3 times daily and is on sertraline 150 mg daily for depression and anxiety.  At this time I can start oxcarbazepine 150 mg twice daily for possible neuropathic pain.  I will avoid Cymbalta at this time due to her dose of sertraline.  I did discuss possibly repeating an EMG nerve conduction study of the upper extremities however she declines at this time.  Order MRI of the cervical spine to reevaluate for any hardware issues or worsening spinal stenosis.  The left jaw pain may be related to a trigeminal neuralgia.  It does however seem to radiate she states from her ear and go into the left V3 distribution as well as down the left side of her neck and into her shoulder.  This could also be due to to her previous cervical stenosis and she does have a fusion from C3-C7.  As for the left shoulder she states that she has a reduced range of motion which has been present for some time.   She may need to see an orthopedic surgeon for further evaluation of this.  Will start with the MRI of the cervical spine and can consider getting image of the shoulder as well following this.      Plan:  L facial pain  - MRI C spine  - Start Oxcarbazepine 150mg BID  - Already on gabapentin 600mg TID  - On sertraline 150mg Qday so will avoid Cymbalta for now    2. Spinal stenosis and LUE pain  - Declines any repeat EMG/NCS  - MRi C spine  - Start oxcarbazepine 150mg BID    Talha Vasquez, DO  Neurology

## 2025-04-07 NOTE — PATIENT INSTRUCTIONS
Refill policies:    Allow 2-3 business days for refills; controlled substances may take longer.  Contact your pharmacy at least 5 days prior to running out of medication and have them send an electronic request or submit request through the “request refill” option in your [employer] account.  Refills are not addressed on weekends; covering physicians do not authorize routine medications on weekends.  No narcotics or controlled substances are refilled after noon on Fridays or by on call physicians.  By law, narcotics must be electronically prescribed.  A 30 day supply with no refills is the maximum allowed.  If your prescription is due for a refill, you may be due for a follow up appointment.  To best provide you care, patients receiving routine medications need to be seen at least once a year.  Patients receiving narcotic/controlled substance medications need to be seen at least once every 3 months.  In the event that your preferred pharmacy does not have the requested medication in stock (e.g. Backordered), it is your responsibility to find another pharmacy that has the requested medication available.  We will gladly send a new prescription to that pharmacy at your request.    Scheduling Tests:    If your physician has ordered radiology tests such as MRI or CT scans, please contact Central Scheduling at [phone] right away to schedule the test.  Once scheduled, the [facility] Centralized Referral Team will work with your insurance carrier to obtain pre-certification or prior authorization.  Depending on your insurance carrier, approval may take 3-10 days.  It is highly recommended patients assure they have received an authorization before having a test performed.  If test is done without insurance authorization, patient may be responsible for the entire amount billed.      Precertification and Prior Authorizations:  If your physician has recommended that you have a procedure or additional testing performed the [facility]  Centralized Referral Team will contact your insurance carrier to obtain pre-certification or prior authorization.    You are strongly encouraged to contact your insurance carrier to verify that your procedure/test has been approved and is a COVERED benefit.  Although the [facility] Centralized Referral Team does its due diligence, the insurance carrier gives the disclaimer that \"Although the procedure is authorized, this does not guarantee payment.\"    Ultimately the patient is responsible for payment.   Thank you for your understanding in this matter.  Paperwork Completion:  If you require FMLA or disability paperwork for your recovery, please make sure to either drop it off or have it faxed to our office at [phone]. Be sure the form has your name and date of birth on it.  The form will be faxed to our Forms Department and they will complete it for you.  There is a 25$ fee for all forms that need to be filled out.  Please be aware there is a 10-14 day turnaround time.  You will need to sign a release of information (DANIELLE) form if your paperwork does not come with one.  You may call the Forms Department with any questions at [phone].  Their fax number is [phone].

## 2025-04-11 ENCOUNTER — TELEPHONE (OUTPATIENT)
Dept: NEUROLOGY | Facility: CLINIC | Age: 50
End: 2025-04-11

## 2025-04-11 ENCOUNTER — TELEPHONE (OUTPATIENT)
Dept: FAMILY MEDICINE CLINIC | Facility: CLINIC | Age: 50
End: 2025-04-11

## 2025-04-11 NOTE — TELEPHONE ENCOUNTER
Patient's name and  verified     Patient didn't take Oxcarbazepine  it this morning okay per Dr Vasquez. Dr Vasquez office stated she is okay to hold off until Monday. Dr Vasquez office said it is not from the medication   Patient is dizzy, weak and exhausted.  See Note   Future Appointments   Date Time Provider Department Center   2025 11:40 AM Laquita Hercules APRN [facility] [place]     Patient notified and verbalized an understanding

## 2025-04-11 NOTE — TELEPHONE ENCOUNTER
Patient contacting stating that the MD started a new medication, OXcarbazepine 150 MG Oral Tab, and her insurance company sent her a BP machine to monitor her BP three times daily. She states that her BP is reading low. She is requesting a call from clinical staff to review this. Please contact.

## 2025-04-11 NOTE — TELEPHONE ENCOUNTER
Spoke to patient regarding her BP. She states her BP was 88/63 1 hour after first taking the oxcarbazepine. She states then she took again at 11 the same day and was 107 systolic.    Her new BP cuff is hooked to and Iphone that sends the information to a review center. When her BP is low she gets a call from the review company.     Advised patient it might not be the medication and she should reach out to her primary care provider since her BP is low even without the medication.     She did not take oxcarbazepine today and will hold until Monday to see if this has any effect on her BP and call her primary care provider.

## 2025-04-11 NOTE — TELEPHONE ENCOUNTER
PATIENT CALLING-  RECENTLY STARTED OXcarbazepine 150 MG Oral Tab PER DR FERRO. PATIENT STATES BP HAS BEEN LOW- READINGS BELOW.    4/9/25- 88/59  4/10/25- 107/76 @ 5: 30 AM, 87/63 @ 11 AM, 119/81 @ 4 PM.   4/11/25 AM- 107/59    PATIENT STATES SHE IS WEAK, FATIGUED AND DIZZY. OCCASIONAL HEART PALPITATIONS. PER DR FERRO THIS IS NOT RELATED TO MEDICATION.

## 2025-04-11 NOTE — TELEPHONE ENCOUNTER
Patient's name and  verified   Patient informed to go to ER  Patient notified and verbalized an understanding

## 2025-04-14 NOTE — TELEPHONE ENCOUNTER
Called pt and per signed HIPAA form ok to leave VM on verified number.  Advised of below and advised to seek ER/UC or PCP care if low BP continues.    Requested call back to office to confirm receipt of above message.

## 2025-04-14 NOTE — TELEPHONE ENCOUNTER
Oxcarbazepine is not usually associated with low blood pressure. If she feels that the oxcarbazepine is causing her BP to be low she can stop it for a week or two and see if the reading improve.

## 2025-04-21 ENCOUNTER — PATIENT MESSAGE (OUTPATIENT)
Dept: NEUROLOGY | Facility: CLINIC | Age: 50
End: 2025-04-21

## 2025-04-21 DIAGNOSIS — M48.00 SPINAL STENOSIS, UNSPECIFIED SPINAL REGION: Primary | ICD-10-CM

## 2025-04-21 NOTE — TELEPHONE ENCOUNTER
Last office visit 4/7/25--     She is already on gabapentin 600 mg 3 times daily and is on sertraline 150 mg daily for depression and anxiety.  At this time I can start oxcarbazepine 150 mg twice daily for possible neuropathic pain.  I will avoid Cymbalta at this time due to her dose of sertraline.  I did discuss possibly repeating an EMG nerve conduction study of the upper extremities however she declines at this time.  Order MRI of the cervical spine to reevaluate for any hardware issues or worsening spinal stenosis.  The left jaw pain may be related to a trigeminal neuralgia.  It does however seem to radiate she states from her ear and go into the left V3 distribution as well as down the left side of her neck and into her shoulder.  This could also be due to to her previous cervical stenosis and she does have a fusion from C3-C7.  As for the left shoulder she states that she has a reduced range of motion which has been present for some time.  She may need to see an orthopedic surgeon for further evaluation of this.  Will start with the MRI of the cervical spine and can consider getting image of the shoulder as well following this.        Plan:  L facial pain  - MRI C spine  - Start Oxcarbazepine 150mg BID  - Already on gabapentin 600mg TID  - On sertraline 150mg Qday so will avoid Cymbalta for now     2. Spinal stenosis and LUE pain  - Declines any repeat EMG/NCS  - MRi C spine  - Start oxcarbazepine 150mg BID

## 2025-04-21 NOTE — TELEPHONE ENCOUNTER
I think it is reasonable to see an orthopedist.  She has had multiple surgeries and not certain what is going to be able to offer her for chronic pain.  She certainly could see an orthopedic spine surgeon.  Continue oxcarbazepine and if it is causing any significant side effects.  Follow-up after MRI.

## 2025-04-22 ENCOUNTER — TELEPHONE (OUTPATIENT)
Dept: ORTHOPEDICS CLINIC | Facility: CLINIC | Age: 50
End: 2025-04-22

## 2025-04-22 DIAGNOSIS — M54.2 NECK PAIN: Primary | ICD-10-CM

## 2025-04-22 NOTE — TELEPHONE ENCOUNTER
Patient scheduled for neck pain.    Future Appointments   Date Time Provider Department Center   4/28/2025  1:20 PM Castro Verdugo MD [facility] [place]     Patient is aware to arrive 15-20 mins early for possible imaging.     Please advise if imaging is needed.

## 2025-04-22 NOTE — TELEPHONE ENCOUNTER
Future Appointments   Date Time Provider Department Center   4/28/2025 12:50 PM NAP XR RM1 [facility] X[facility] [place]   4/28/2025  1:20 PM Castro Verdugo MD [facility]

## 2025-04-23 RX ORDER — SERTRALINE HYDROCHLORIDE 100 MG/1
150 TABLET, FILM COATED ORAL DAILY
Qty: 45 TABLET | Refills: 0 | Status: SHIPPED | OUTPATIENT
Start: 2025-04-23

## 2025-04-23 RX ORDER — OMEPRAZOLE 20 MG/1
40 CAPSULE, DELAYED RELEASE ORAL DAILY
Qty: 60 CAPSULE | Refills: 0 | Status: SHIPPED | OUTPATIENT
Start: 2025-04-23

## 2025-04-23 NOTE — TELEPHONE ENCOUNTER
Omeprazole Dr 20 Mg Cap Nort     Gastrointestional Medication Protocol [id number]/23/2025 09:27 AM   Protocol Details In person appointment or virtual visit in the past 12 mos or appointment in next 3 mos    Medication is active on med list      LOV 3/27/2025  Last Px: 2/27/2024  Last refill on 1/30/2025, for #60, with 2 refills    Future Appointments   Date Time Provider Department Center   4/28/2025 12:50 PM NAP XR RM1 N[facility] XRAY [facility] [place]   4/28/2025  1:20 PM Castro Verdugo MD [facility] [place]   6/4/2025  3:00 PM [facility] MR RM2 (1.5T WIDE) [facility]   6/16/2025 11:20 AM Rosy Biggs APRN [facility] [address]   7/23/2025 11:00 AM Talha Vasquez DO [facility] [place]     Sertraline Hydrochloride 100 Mg Tab Nort     Pt failed refill protocol for the following reasons:    Psychiatric Non-Scheduled (Anti-Anxiety) [id number]/23/2025 09:27 AM   Protocol Details Depression Screening completed within the past 12 months    In person appointment or virtual visit in the past 6 mos or appointment in next 3 mos    Medication is active on med list      Last refill: 3/27/2025, for #45, 0 refill    Forward to Dr. Lorna Montanez, please advise on refills. Thank you.

## 2025-04-28 ENCOUNTER — HOSPITAL ENCOUNTER (OUTPATIENT)
Dept: GENERAL RADIOLOGY | Age: 50
Discharge: HOME | End: 2025-04-28
Attending: STUDENT IN AN ORGANIZED HEALTH CARE EDUCATION/TRAINING PROGRAM
Payer: MEDICAID

## 2025-04-28 ENCOUNTER — OFFICE VISIT (OUTPATIENT)
Dept: ORTHOPEDICS CLINIC | Facility: CLINIC | Age: 50
End: 2025-04-28
Payer: MEDICAID

## 2025-04-28 DIAGNOSIS — M25.512 LEFT SHOULDER PAIN, UNSPECIFIED CHRONICITY: ICD-10-CM

## 2025-04-28 DIAGNOSIS — M54.2 NECK PAIN: ICD-10-CM

## 2025-04-28 DIAGNOSIS — M25.512 LEFT SHOULDER PAIN, UNSPECIFIED CHRONICITY: Primary | ICD-10-CM

## 2025-04-28 PROCEDURE — 73030 X-RAY EXAM OF SHOULDER: CPT | Performed by: STUDENT IN AN ORGANIZED HEALTH CARE EDUCATION/TRAINING PROGRAM

## 2025-04-28 PROCEDURE — 99204 OFFICE O/P NEW MOD 45 MIN: CPT | Performed by: STUDENT IN AN ORGANIZED HEALTH CARE EDUCATION/TRAINING PROGRAM

## 2025-04-28 PROCEDURE — 72050 X-RAY EXAM NECK SPINE 4/5VWS: CPT | Performed by: STUDENT IN AN ORGANIZED HEALTH CARE EDUCATION/TRAINING PROGRAM

## 2025-04-28 NOTE — H&P
[facility] - ORTHOPEDICS  [address], [address], [place] [address]  [address] [place] [id number]  [phone]     NEW PATIENT VISIT - HISTORY AND PHYSICAL EXAMINATION     Name: Sarah Triplett   MRN: [id number]  Date: 04/28/25       CC: neck pain    REFERRED BY: Dr. Vasquez    HPI:   Sarah Triplett is a very pleasant 49 year old female who presents today for evaluation of neck pain.  Patient has complex medical and surgical history with decompression and fusion from C2 to pelvis.  Patient has chronic pain and multiple peripheral nerve decompression surgeries.  Pain is currently localized to left shoulder and also associated with pain in the neck, occipital region has seen multiple pain providers as well as neurologist in the past.  No new neurologic symptoms    Prior spine surgery: none.    Bowel and bladder symptoms: absent.    The patient has not had issues with balance and/or hand dexterity problems such as changes in penmanship or the use of buttons or zippers.    Treatment up to this time has included:    Evaluation: PCP, other spine surgeon, and other [facility] provider  NSAIDS: have worked well  Narcotic use: None  Physical therapy: previously  Spinal injections: None      PMH:   Past Medical History[1]    PAST SURGICAL HX:  Past Surgical History[2]    FAMILY HX:  Family History[3]    ALLERGIES:  Amoxicillin-pot clavulanate, Nitrofurantoin monohydrate macrocrystals [nitrofurantoin], Potassium clavulanate, and Augmentin [amoclan]    MEDICATIONS: Current Medications[4]    ROS: A comprehensive 14 point review of systems was performed and was negative aside from the aforementioned per history of present illness.    SOCIAL HX:  Social History     Tobacco Use    Smoking status: Former     Current packs/day: 0.00     Average packs/day: 1 pack/day for 35.0 years (35.0 ttl pk-yrs)     Types: Cigarettes     Start date: 12/30/1986     Quit date: 12/30/2021     Years since  quitting: 3.3     Passive exposure: Past    Smokeless tobacco: Never   Substance Use Topics    Alcohol use: Not Currently     Comment: pt states she drinks every once in a while. Pt drinks a pint of vodka         PE:   There were no vitals filed for this visit.  Estimated body mass index is 27.63 kg/m² as calculated from the following:    Height as of 4/7/25: 5' 3\" (1.6 m).    Weight as of 4/7/25: 156 lb (70.8 kg).    Physical Exam  Constitutional:       Appearance: Normal appearance.   HENT:      Head: Normocephalic and atraumatic.   Eyes:      Extraocular Movements: Extraocular movements intact.   Cardiovascular:      Pulses: Normal pulses. Skin warm and well perfused.  Pulmonary:      Effort: Pulmonary effort is normal. No respiratory distress.   Skin:     General: Skin is warm.   Psychiatric:         Mood and Affect: Mood normal.     Spine Exam:    Normal gait without difficulty  Level shoulders and hips in even stance    Limited C-spine ROM    UE Strength: 5/5 D Bi Tri WE FDS IO  UE Sensation: normal in C5-T1 distribution  UE reflexes: normal    Radiographic Examination/Diagnostics:  XR personally viewed, independently interpreted and radiology report was reviewed.  X-ray of the cervical spine demonstrates extensive fusion with anterior and posterior reconstruction.  No evidence of hardware failure  X-ray of the left shoulder demonstrates moderate AC joint arthritis    IMPRESSION: Sarah Triplett is a 49 year old female with complex surgical history including CT of the pelvis decompression and fusion presenting with chronic neck pain, left shoulder pain.  Patient may have concurrent shoulder pathology and    PLAN:   - Agree w/ cervical MRI and instructed patient to follow up after MRI  - Consider left shoulder injection for diagnostic and therapeutic purposes    FOLLOW-UP:  We will see her back in follow-up in after updated images, or sooner if any problems arise. Patient understands and agrees with plan.        Castro Verdugo MD  Orthopedic Spine Surgeon  [facility]   [address] [place] [address], [address], [place], [place] [address] [place] [address].[facility]  [email or web address]  t: [phone]   f: [phone]        This note was dictated using Dragon software.  While it was briefly proofread prior to completion, some grammatical, spelling, and word choice errors due to dictation may still occur.             [1]   Past Medical History:   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)    Acute bronchitis    Anxiety state    Arthritis    Back pain    Back problem    DDD (degenerative disc disease), cervical    c2-c7 fusions    DDD (degenerative disc disease), lumbar    L4-L5    DDD (degenerative disc disease), lumbosacral    S1-S2    Depression    Esophageal reflux    Fibromyalgia    History of blood transfusion    Long term current use of opiate analgesic    Neuropathy    Opioid dependence (HCC)    Osteoarthritis    Other and unspecified alcohol dependence, unspecified drinking behavior    Shortness of breath    Tachycardia, unspecified    Urine incontinence    Visual impairment   [2]   Past Surgical History:  Procedure Laterality Date    Appendectomy      Appendectomy      Back surgery      Both cervical/lumbar fusion.    Excis lumbar disk,one level      Hysterectomy      Trang localization wire 1 site right (cpt=19281) Right 01/27/2023    ADH; intraductal papilloma    Needle biopsy right  02/2022    intraductal papilloma    Other surgical history      gallbladder    Other surgical history      Removal gallbladder      Tubal ligation      Tubal ligation  2004   [3]   Family History  Problem Relation Age of Onset    Anxiety Mother     Breast Cancer Mother     Other ([other]) Daughter     Other ([other]) Son     Anxiety Maternal Aunt     Depression Maternal Aunt     Other (lung cancer) Maternal Aunt     Other (lung cancer) Maternal Uncle    [4]   Current Outpatient Medications    Medication Sig Dispense Refill    OMEPRAZOLE 20 MG Oral Capsule Delayed Release TAKE 2 CAPSULES BY MOUTH EVERY DAY 60 capsule 0    SERTRALINE 100 MG Oral Tab Take 1 AND 1/2 tablets (150 mg total) by mouth daily. 45 tablet 0    Cranberry (RA CRANBERRY) 500 MG Oral Cap Take by mouth As Directed.      ascorbic acid 500 MG Oral Tab Take 1 tablet (500 mg total) by mouth 2 (two) times daily.      methenamine 1 g Oral Tab Take 1 tablet (1 g total) by mouth 2 (two) times daily.      OXcarbazepine 150 MG Oral Tab Take 1 tablet (150 mg total) by mouth 2 (two) times daily. 60 tablet 2    CUSTOM MEDICATION Semaglutide 1mg/cyanobalamin   Concentration: 5/ 0.5 mg/mL  Instructions: Inject 20 units/0.2ml into skin q weekly  Dispense: 2.5 mL vial 3 each 0    topiramate 50 MG Oral Tab Take 1 tablet (50 mg total) by mouth 2 (two) times daily. 180 tablet 1    methylPREDNISolone (MEDROL) 4 MG Oral Tablet Therapy Pack As directed. (Patient not taking: Reported on 4/7/2025) 21 tablet 0    traZODone 50 MG Oral Tab Take 1 tablet (50 mg total) by mouth nightly as needed for Sleep. AT BEDTIME 30 tablet 0    GABAPENTIN 600 MG Oral Tab TAKE ONE TABLET BY MOUTH THREE TIMES DAILY 45 tablet 0    CUSTOM MEDICATION Semaglutide 0.5mg/cyanobalamin   Concentration: 1/ 0.5 mg/mL  Instructions: Inject 50 units/0.5ml into skin q weekly  Dispense: 2.5 mL vial 1 each 3    Cholecalciferol (VITAMIN D3 ULTRA POTENCY) 1.25 MG (79917 UT) Oral Tab Take 2,000 Int'l Units/day by mouth daily.      MYRBETRIQ 50 MG Oral Tablet 24 Hr Take 1 tablet (50 mg total) by mouth daily.      Oxybutynin Chloride ER 15 MG Oral Tablet 24 Hr Take 1 tablet (15 mg total) by mouth daily. 90 tablet 6

## 2025-05-05 RX ORDER — TRAZODONE HYDROCHLORIDE 50 MG/1
50 TABLET ORAL NIGHTLY PRN
Qty: 30 TABLET | Refills: 0 | Status: SHIPPED | OUTPATIENT
Start: 2025-05-05

## 2025-05-05 NOTE — TELEPHONE ENCOUNTER
Trazodone Hydrochloride 50 Mg Tab Zydu     No refill protocol for this medication.    Last refill: 3/13/2025, for #30, 0 refill  Last Visit: 3/27/2025  Last Px: 2/27/2024  Next Visit:   Future Appointments   Date Time Provider Department Center   6/4/2025  3:00 PM  MR RM2 (1.5T WIDE) [facility]   6/16/2025 11:20 AM Rosy Biggs APRN [facility] [address]   7/23/2025 11:00 AM Tal[facility] Vasquez [facility]         Forward to Dr. Lorna Montanez, please advise on refills. Thanks.

## 2025-05-18 ENCOUNTER — PATIENT MESSAGE (OUTPATIENT)
Dept: FAMILY MEDICINE CLINIC | Facility: CLINIC | Age: 50
End: 2025-05-18

## 2025-05-19 RX ORDER — OMEPRAZOLE 20 MG/1
40 CAPSULE, DELAYED RELEASE ORAL DAILY
Qty: 60 CAPSULE | Refills: 0 | Status: SHIPPED | OUTPATIENT
Start: 2025-05-19

## 2025-05-19 RX ORDER — GABAPENTIN 600 MG/1
600 TABLET ORAL 3 TIMES DAILY
Qty: 270 TABLET | Refills: 0
Start: 2025-05-19 | End: 2025-08-17

## 2025-05-19 NOTE — TELEPHONE ENCOUNTER
Requested Renewals     Name from pharmacy: Omeprazole Dr 20 Mg Cap Nort         Will file in chart as: OMEPRAZOLE 20 MG Oral Capsule Delayed Release    Sig: TAKE 2 CAPSULES BY MOUTH EVERY DAY    Disp: 60 capsule    Refills: 0    Start: 5/18/2025    Class: Normal    Non-formulary    Last ordered: 3 weeks ago (4/23/2025) by Jeff Montanez MD    Last refill: 4/23/2025    Rx #: [id number]    Gastrointestional Medication Protocol Mrtoxm1305/18/2025 09:40 AM   Protocol Details In person appointment or virtual visit in the past 12 mos or appointment in next 3 mos    Medication is active on med list      To be filled at: [id number] - [place], [place] - [address] [facility] [phone], [phone]

## 2025-05-19 NOTE — TELEPHONE ENCOUNTER
Please see patient's MCM    No fax received from [employer] at this time - awaiting fax    Refill request:  LOV 03/27/25  AWV 02/27/24  Last refill on 02/04/25, for #45 tabs, with 0 refills  GABAPENTIN 600 MG Oral Tab     Future Appointments   Date Time Provider Department Center   6/4/2025  3:00 PM  MR RM2 (1.5T WIDE) [facility]   6/16/2025 11:20 AM Rosy Biggs APRN [facility] [address]   7/23/2025 11:00 AM Talha Vasquez DO [facility] [place]     Order(s) pending, please review. Thank you.

## 2025-05-27 RX ORDER — SERTRALINE HYDROCHLORIDE 100 MG/1
150 TABLET, FILM COATED ORAL DAILY
Qty: 45 TABLET | Refills: 0 | OUTPATIENT
Start: 2025-05-27

## 2025-05-27 NOTE — TELEPHONE ENCOUNTER
Psychiatric Non-Scheduled (Anti-Anxiety) Wosgth4405/26/2025 11:58 AM   Protocol Details Depression Screening completed within the past 12 months    In person appointment or virtual visit in the past 6 mos or appointment in next 3 mos    Medication is active on med list          Last refill:   SERTRALINE 100 MG Oral Tab 45 tablet 0 4/23/2025     Last Visit: 3/27/25    Next Visit:   Future Appointments   Date Time Provider Department Center   6/4/2025  3:00 PM  MR RM2 (1.5T WIDE) [facility]   6/16/2025 11:20 AM Rosy Biggs APRN [facility] [address]   7/23/2025 11:00 AM Talha Vasquez DO [facility]         Forward to Dr. Olson  please advise on refills. Thanks.

## 2025-06-02 ENCOUNTER — OFFICE VISIT (OUTPATIENT)
Dept: FAMILY MEDICINE CLINIC | Facility: CLINIC | Age: 50
End: 2025-06-02
Payer: MEDICAID

## 2025-06-02 ENCOUNTER — TELEPHONE (OUTPATIENT)
Dept: NEUROLOGY | Facility: CLINIC | Age: 50
End: 2025-06-02

## 2025-06-02 ENCOUNTER — PATIENT MESSAGE (OUTPATIENT)
Dept: FAMILY MEDICINE CLINIC | Facility: CLINIC | Age: 50
End: 2025-06-02

## 2025-06-02 VITALS
OXYGEN SATURATION: 100 % | TEMPERATURE: 98 F | RESPIRATION RATE: 16 BRPM | HEIGHT: 63 IN | HEART RATE: 94 BPM | DIASTOLIC BLOOD PRESSURE: 62 MMHG | BODY MASS INDEX: 27.46 KG/M2 | WEIGHT: 155 LBS | SYSTOLIC BLOOD PRESSURE: 100 MMHG

## 2025-06-02 DIAGNOSIS — M54.16 LUMBAR RADICULOPATHY: ICD-10-CM

## 2025-06-02 DIAGNOSIS — R42 DIZZINESS: ICD-10-CM

## 2025-06-02 DIAGNOSIS — Z91.89 AT INCREASED RISK OF BREAST CANCER: ICD-10-CM

## 2025-06-02 DIAGNOSIS — Z00.00 ANNUAL PHYSICAL EXAM: Primary | ICD-10-CM

## 2025-06-02 DIAGNOSIS — K21.9 GASTROESOPHAGEAL REFLUX DISEASE, UNSPECIFIED WHETHER ESOPHAGITIS PRESENT: Primary | ICD-10-CM

## 2025-06-02 DIAGNOSIS — Z12.31 ENCOUNTER FOR SCREENING MAMMOGRAM FOR MALIGNANT NEOPLASM OF BREAST: ICD-10-CM

## 2025-06-02 DIAGNOSIS — G62.9 NEUROPATHY: ICD-10-CM

## 2025-06-02 DIAGNOSIS — Z80.3 FAMILY HISTORY OF BREAST CANCER: ICD-10-CM

## 2025-06-02 DIAGNOSIS — K21.9 GASTROESOPHAGEAL REFLUX DISEASE, UNSPECIFIED WHETHER ESOPHAGITIS PRESENT: ICD-10-CM

## 2025-06-02 DIAGNOSIS — M54.2 CERVICALGIA: ICD-10-CM

## 2025-06-02 DIAGNOSIS — G47.9 DIFFICULTY SLEEPING: ICD-10-CM

## 2025-06-02 DIAGNOSIS — M50.30 DEGENERATION OF CERVICAL INTERVERTEBRAL DISC: ICD-10-CM

## 2025-06-02 PROCEDURE — 99215 OFFICE O/P EST HI 40 MIN: CPT | Performed by: NURSE PRACTITIONER

## 2025-06-02 RX ORDER — OMEPRAZOLE 20 MG/1
20 CAPSULE, DELAYED RELEASE ORAL
Qty: 180 CAPSULE | Refills: 1 | Status: SHIPPED | OUTPATIENT
Start: 2025-06-02

## 2025-06-02 RX ORDER — ESTRADIOL 0.1 MG/G
1 CREAM VAGINAL
COMMUNITY
Start: 2025-04-16

## 2025-06-02 RX ORDER — TRAZODONE HYDROCHLORIDE 50 MG/1
50 TABLET ORAL NIGHTLY PRN
Qty: 90 TABLET | Refills: 1 | Status: SHIPPED | OUTPATIENT
Start: 2025-06-02

## 2025-06-02 RX ORDER — SERTRALINE HYDROCHLORIDE 100 MG/1
150 TABLET, FILM COATED ORAL DAILY
Qty: 135 TABLET | Refills: 1 | Status: SHIPPED | OUTPATIENT
Start: 2025-06-02

## 2025-06-02 NOTE — PROGRESS NOTES
CHIEF COMPLAINT:    Chief Complaint   Patient presents with    Physical       HISTORY OF PRESENT ILLNESS:    Sarah Triplett is a 49 year old female who has a history of multiple health conditions presents today, June 02, 2025, for an annual physical    - NUTRITION:  Follows a regular diet.  Drinks approximately 48oz of water a day.   - SLEEP:  Sleeps throughout the night.  Wakes up feeling rested.   - SAFETY:  Reports feeling safe at home.  Ambulates with a cane.   - PHYSICAL ACTIVITY/SOCIAL/HOBBIES:  Achieves less than 2.5hr of exercise a week   - GOAL:  Sarah would like to improve health overall by controlling neck pain    Sarah also reports low blood pressure readings and intermittent dizziness.  Denies chest pain or palpitations.    IMMUNIZATIONS:  Declines today, will have [place] send us records    CERVICAL CANCER SCREENING:  Hysterectomy, uterus and cervix removed    BREAST CANCER SCREENING: Mammogram ordered.  Family history, mother.  MRI completed 05/28/2024 - negative and Bi-Rads 2    COLON CANCER SCREENING:  Completed with colonoscopy and egd with [facility] 11/10/2022, repeat in 10 years, colonoscopy due 11/10/2032    Neurology - Dr. Christina Palencia, due for follow-up July 2025,   Ortho - Dr. Castro Verdugo, for left shoulder pain and history of spinal stenosis in cervical region  Urology - Dr. Castelan, Dr. Walsh, ANAT Correa, overactive bladder, recurrent UTI, vesicovaginal fistula.  Continue following with Dr. Walsh and ANAT Correa  Infectious disease - for recurrent UTIs  Breast specialist - mom with history of breast cancer - patient would like breast MRI and continue following with Dr. Mulligan to discuss if estradiol is a safe medication for her due to family history of breast cancer.        Problem List[1]     ALLERGIES:  Allergies[2]    CURRENT MEDICATIONS:  Current Medications[3]    MEDICAL HISTORY:  Past Medical History[4]  Past Surgical History[5]  Family History[6]  Family Status   Relation  Status    Mo Alive    Fa Alive    [place] (Not Specified)    Son (Not Specified)    Mat Aunt     Maternal Unc      Short Social Hx on File[7]    ROS:    GENERAL:  Denies fever or chills  RESPIRATORY:  Denies difficulty breathing  CARDIAC:  +Low blood pressure readings at home with some dizziness.  Denies chest pain with exertion  GI:  Denies blood in stool  :  Denies blood in urine  NEURO:  Denies recent falls  MSKL:  +joint stiffness or pain  SKIN:  Denies rashes  PSYCH: Denies thoughts of self harm or harming others    VITALS:    Ht 5' 3\" (1.6 m)   Wt 155 lb (70.3 kg)   LMP 2014   BMI 27.46 kg/m²     Ideal body weight: 52.4 kg (115 lb 8.3 oz)  Adjusted ideal body weight: 59.6 kg (131 lb 5 oz)  Wt Readings from Last 3 Encounters:   25 155 lb (70.3 kg)   25 156 lb (70.8 kg)   25 157 lb (71.2 kg)     BP Readings from Last 3 Encounters:   25 114/80   25 110/74   24 98/59     Reviewed by Laquita Hercules MS, APRN, FNP-BC    PHYSICAL EXAM:    Constitutional:       Appearance: Normal appearance.  Sitting upright on exam table.  Well developed, well nourished, and in no acute distress.  HENT:      Head: Facial features symmetric. Normocephalic and atraumatic.      Right Ear: Canal clear without erythema or drainage.  TM clear and intact, neutral in position.      Left Ear: Canal clear without erythema or drainage.  TM clear and intact, neutral in position.      Nose: Nose normal.      Mouth/Throat: Mucous membranes are moist.  Uvula rises midline.  Eyes:      Extraocular Movements: Extraocular movements intact.      Conjunctiva/sclera: Conjunctivae normal. Sclera anicteric         Pupils: Pupils are equal, round, and reactive to light.   Neck:     Neck is supple. Trachea is midline.  No masses.      No obvious lympadenopathy with palpation of submandibular, pre/posterior auricular, anterior/posterior cervical, occipital, and supraclavicular nodes.  Cardiovascular:       Heart sounds: Regular rate and rhythm without murmur.     BLE without edema.  Pulmonary:      Effort: Pulmonary effort is normal.      Breath sounds: Lungs clear throughout.     No cough or wheezing.  Abdominal:      General: Abdomen is nondistended, bowel sounds normoactive, soft, nontender.  No organomegaly.  Musculoskeletal:         General: Normal range of motion. Strength of extremities are equal bilaterally.     Range of motion without limitations.  Skin:     General: Skin is warm and dry.      Coloration: Skin is without jaundice     Findings: No bruising or rashes  Neurological:      General: No focal deficit present. Speech is clear and organized.     Mental Status: Alert and oriented to person, place, and time.      Sensory: Sensation is intact.      Motor: Motor function is intact. Movements are smooth and controlled without ataxia.     Coordination: Coordination is intact. Coordination normal.      Gait: Gait steady with use of cane.     Deep Tendon Reflexes: Reflexes 2+ bilaterally.  Psychiatric:         Mood and Affect: Mood normal.         Behavior: Behavior normal.         Thought Content: Thought content normal.         Judgment: Judgment normal.     ASSESSMENT & PLAN:    1. Annual physical exam  + dizziness, further testing ordered  - TSH and Free T4; Future  - Lipid Panel; Future  - TSH and Free T4  - Lipid Panel    2. Encounter for screening mammogram for malignant neoplasm of breast  Fhx of breast cancer, mother  - [facility] 2D+3D SCREENING BILAT (CPT=77067/90203); Future  - MRI BREAST (W+WO) W/CAD BILAT (CPT=77049); Future    3. Family history of breast cancer  Mother  - MRI BREAST (W+WO) W/CAD BILAT (CPT=77049); Future  - Surg Onc Breast Referral - In Network    4. At increased risk of breast cancer  Fhx of breast cancer, mother  - MRI BREAST (W+WO) W/CAD BILAT (CPT=77049); Future  - Surg Onc Breast Referral - In Network    5. Lumbar radiculopathy  Continue rx  - gabapentin 400 MG Oral  Tab; Take 1 tablet (400 mg total) by mouth 2 (two) times daily.  Dispense: 180 tablet; Refill: 1    6. Degeneration of cervical intervertebral disc  Continue rx  - gabapentin 400 MG Oral Tab; Take 1 tablet (400 mg total) by mouth 2 (two) times daily.  Dispense: 180 tablet; Refill: 1    7. Neuropathy  Continue rx  - gabapentin 400 MG Oral Tab; Take 1 tablet (400 mg total) by mouth 2 (two) times daily.  Dispense: 180 tablet; Refill: 1    8. Cervicalgia  Continue rx  - gabapentin 400 MG Oral Tab; Take 1 tablet (400 mg total) by mouth 2 (two) times daily.  Dispense: 180 tablet; Refill: 1    9. Gastroesophageal reflux disease, unspecified whether esophagitis present  Stable  Follow with GI team  - omeprazole 20 MG Oral Capsule Delayed Release; Take 1 capsule (20 mg total) by mouth 2 (two) times daily before meals.  Dispense: 180 capsule; Refill: 1    10. Difficulty sleeping  - traZODone 50 MG Oral Tab; Take 1 tablet (50 mg total) by mouth nightly as needed for Sleep.  Dispense: 90 tablet; Refill: 1    11. Dizziness  - CARD ECHO 2D DOPPLER (CPT=93306); Future           [1]   Patient Active Problem List  Diagnosis    Lumbar radiculopathy    Lumbar disc herniation    Infective otitis externa, unspecified    Thoracic or lumbosacral neuritis or radiculitis, unspecified    Brachial neuritis or radiculitis NOS    Disorders of bursae and tendons in shoulder region, unspecified    Degeneration of lumbar or lumbosacral intervertebral disc    Sciatica    Anxiety and depression    Chronic pain associated with significant psychosocial dysfunction    Cervicalgia    Degeneration of cervical intervertebral disc    Spinal stenosis in cervical region    Cervical spondylosis without myelopathy    Hx of lumbosacral spine surgery    Hx of cervical spine surgery    Gastroesophageal reflux disease    Neuropathy    Neurogenic bladder    Chronic neck and back pain    Wrist weakness    Acquired deformity of spine    Urge incontinence of urine     Anemia    Encounter for therapeutic drug monitoring    Obesity (BMI 30-39.9)    Carpal tunnel syndrome of left wrist    Cubital tunnel syndrome on left    Iron deficiency anemia    Left leg numbness    Monoparesis of upper extremity (HCC)    Osteoarthritis of carpometacarpal (CMC) joint of left thumb    Ulnar neuropathy of right upper extremity    NATALYA (stress urinary incontinence, female)    Iron deficiency anemia secondary to inadequate dietary iron intake    Bladder fistula    Dysuria    Cystitis    Urinary urgency    Facial pain   [2]   Allergies  Allergen Reactions    Amoxicillin-Pot Clavulanate ITCHING, HIVES and ANGIOEDEMA    Nitrofurantoin Monohydrate Macrocrystals [Nitrofurantoin] ITCHING, NAUSEA ONLY and DIZZINESS    Potassium Clavulanate ITCHING    Augmentin [Amoclan] SWELLING   [3]   Current Outpatient Medications   Medication Sig Dispense Refill    estradiol 0.1 MG/GM Vaginal Cream Place 1 g vaginally twice a week.      OMEPRAZOLE 20 MG Oral Capsule Delayed Release TAKE 2 CAPSULES BY MOUTH EVERY DAY 60 capsule 0    TRAZODONE 50 MG Oral Tab Take 1 tablet (50 mg total) by mouth nightly as needed for Sleep. 30 tablet 0    SERTRALINE 100 MG Oral Tab Take 1 AND 1/2 tablets (150 mg total) by mouth daily. 45 tablet 0    Cranberry (RA CRANBERRY) 500 MG Oral Cap Take by mouth As Directed.      ascorbic acid 500 MG Oral Tab Take 1 tablet (500 mg total) by mouth 2 (two) times daily.      OXcarbazepine 150 MG Oral Tab Take 1 tablet (150 mg total) by mouth 2 (two) times daily. 60 tablet 2    topiramate 50 MG Oral Tab Take 1 tablet (50 mg total) by mouth 2 (two) times daily. 180 tablet 1    GABAPENTIN 600 MG Oral Tab TAKE ONE TABLET BY MOUTH THREE TIMES DAILY (Patient taking differently: Take 0.5 tablets (300 mg total) by mouth in the morning and 0.5 tablets (300 mg total) in the evening and 0.5 tablets (300 mg total) before bedtime.) 45 tablet 0    Cholecalciferol (VITAMIN D3 ULTRA POTENCY) 1.25 MG (44100 UT) Oral  Tab Take 2,000 Int'l Units/day by mouth daily.      MYRBETRIQ 50 MG Oral Tablet 24 Hr Take 1 tablet (50 mg total) by mouth daily.      Oxybutynin Chloride ER 15 MG Oral Tablet 24 Hr Take 1 tablet (15 mg total) by mouth daily. 90 tablet 6    methenamine 1 g Oral Tab Take 1 tablet (1 g total) by mouth 2 (two) times daily.      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)    Acute bronchitis    Anxiety state    Arthritis    Back pain    Back problem    DDD (degenerative disc disease), cervical    c2-c7 fusions    DDD (degenerative disc disease), lumbar    L4-L5    DDD (degenerative disc disease), lumbosacral    S1-S2    Depression    Esophageal reflux    Fibromyalgia    History of blood transfusion    Long term current use of opiate analgesic    Neuropathy    Opioid dependence (HCC)    Osteoarthritis    Other and unspecified alcohol dependence, unspecified drinking behavior    Shortness of breath    Tachycardia, unspecified    Urine incontinence    Visual impairment   [5]   Past Surgical History:  Procedure Laterality Date    Appendectomy      Appendectomy      Back surgery      Both cervical/lumbar fusion.    Excis lumbar disk,one level      Hysterectomy      Trang localization wire 1 site right (cpt=19281) Right 01/27/2023    ADH; intraductal papilloma    Needle biopsy right  02/2022    intraductal papilloma    Other surgical history      gallbladder    Other surgical history      Removal gallbladder      Tubal ligation      Tubal ligation  2004   [6]   Family History  Problem Relation Age of Onset    Anxiety Mother     Breast Cancer Mother     Other  ([other]) Daughter     Other ([other]) Son     Anxiety Maternal Aunt     Depression Maternal Aunt     Other (lung cancer) Maternal Aunt     Other (lung cancer) Maternal Uncle    [7]   Social History  Socioeconomic History    Marital status: Single   Tobacco Use    Smoking status: Former     Current packs/day: 0.00     Average packs/day: 1 pack/day for 35.0 years (35.0 ttl pk-yrs)     Types: Cigarettes     Start date: 12/30/1986     Quit date: 12/30/2021     Years since quitting: 3.4     Passive exposure: Past    Smokeless tobacco: Never   Vaping Use    Vaping status: Never Used   Substance and Sexual Activity    Alcohol use: Not Currently     Comment: pt states she drinks every once in a while. Pt drinks a pint of vodka    Drug use: Never   Other Topics Concern    Caffeine Concern Yes     Comment: Tea    Exercise No   Social History Narrative    ** Merged History Encounter **          Social Drivers of Health     Food Insecurity: Low Risk  (6/19/2022)    Received from [facility]    Food Insecurity     Have there been times that your food ran out, and you didn't have money to get more?: No     Are there times that you worry that this might happen?: No   Transportation Needs: High Risk (6/19/2022)    Received from [facility]    Transportation Needs     Do you have trouble getting transportation to medical appointments?: Yes     How do you normally get to and from your appointments?: Family/Friend   Housing Stability: Low Risk  (6/19/2022)    Received from [facility]    Housing Stability     Are you concerned about having a safe and reliable place to live?: No

## 2025-06-02 NOTE — TELEPHONE ENCOUNTER
Spoke with patient who is questioning why MRI was denied. Upon review denial states        An imaging study was asked for. We cannot approve this request because:  Imaging can be done when your spine surgery was performed more than six months  ago. The details sent to us do not show this applies to you.  Imaging requires six weeks of provider directed treatment to be completed. This must  have been completed in the past three months without improved symptoms     LAST CERVICAL MRI 8/25/21  Cervical Xray done 4/28/25 showed broken screws and hardware.   CERVICAL FUSION 2015, 2017  Last Physical therapy failed due to increased pain, Xray then completed which showed the broken hardware. Physical therapy is not indicated due to broken hardware.     MRI is scheduled for 3pm 6/4/25 and patient intends to keep MRI.   Please contact patient when approved.       Message routed to shaunae team as high priority.

## 2025-06-02 NOTE — TELEPHONE ENCOUNTER
Can we help find patient GI specialist that accepts medicaid replacement?  Traveling is a barrier to health; however, uncertain of any closer GI groups.  If within our system will likely need to travel to [facility]

## 2025-06-02 NOTE — TELEPHONE ENCOUNTER
Pt received message ins denied her MRI c-spine ordered by Provider for 6/4. Transferring call to RN for for clarification what she needs to do.

## 2025-06-03 NOTE — TELEPHONE ENCOUNTER
Jermaine MICHELE takes Medicaid    New referral order placed    MCM sent to patient  Notify me if not read by 06/09/25

## 2025-06-03 NOTE — TELEPHONE ENCOUNTER
Called [facility] and discussed case with TAWNYA Avina, .  Additional information given and MRI cervical spine approved.      [id number] approved. [facility] completion site.  Approved starting today 6/3/2025 and expires on 7/18/2025.     #[id number]

## 2025-06-04 ENCOUNTER — HOSPITAL ENCOUNTER (OUTPATIENT)
Dept: MRI IMAGING | Facility: HOSPITAL | Age: 50
Discharge: HOME | End: 2025-06-04
Attending: Other
Payer: MEDICAID

## 2025-06-04 DIAGNOSIS — R51.9 FACIAL PAIN: ICD-10-CM

## 2025-06-04 DIAGNOSIS — M48.02 SPINAL STENOSIS IN CERVICAL REGION: ICD-10-CM

## 2025-06-04 PROCEDURE — 72156 MRI NECK SPINE W/O & W/DYE: CPT | Performed by: OTHER

## 2025-06-04 PROCEDURE — A9575 INJ GADOTERATE MEGLUMI 0.1ML: HCPCS | Performed by: OTHER

## 2025-06-04 RX ORDER — GADOTERATE MEGLUMINE 376.9 MG/ML
15 INJECTION INTRAVENOUS
Status: COMPLETED | OUTPATIENT
Start: 2025-06-04 | End: 2025-06-04

## 2025-06-04 RX ADMIN — GADOTERATE MEGLUMINE 15 ML: 376.9 INJECTION INTRAVENOUS at 16:34:00

## 2025-06-09 ENCOUNTER — MED REC SCAN ONLY (OUTPATIENT)
Dept: FAMILY MEDICINE CLINIC | Facility: CLINIC | Age: 50
End: 2025-06-09

## 2025-06-10 ENCOUNTER — HOSPITAL ENCOUNTER (OUTPATIENT)
Dept: MAMMOGRAPHY | Age: 50
Discharge: HOME | End: 2025-06-10
Attending: NURSE PRACTITIONER
Payer: MEDICAID

## 2025-06-10 DIAGNOSIS — Z12.31 ENCOUNTER FOR SCREENING MAMMOGRAM FOR MALIGNANT NEOPLASM OF BREAST: ICD-10-CM

## 2025-06-10 PROCEDURE — 77063 BREAST TOMOSYNTHESIS BI: CPT | Performed by: NURSE PRACTITIONER

## 2025-06-10 PROCEDURE — 77067 SCR MAMMO BI INCL CAD: CPT | Performed by: NURSE PRACTITIONER

## 2025-06-12 ENCOUNTER — OFFICE VISIT (OUTPATIENT)
Dept: ORTHOPEDICS CLINIC | Facility: CLINIC | Age: 50
End: 2025-06-12
Payer: MEDICAID

## 2025-06-12 DIAGNOSIS — M54.2 NECK PAIN: Primary | ICD-10-CM

## 2025-06-12 PROCEDURE — 99214 OFFICE O/P EST MOD 30 MIN: CPT | Performed by: STUDENT IN AN ORGANIZED HEALTH CARE EDUCATION/TRAINING PROGRAM

## 2025-06-12 NOTE — PROGRESS NOTES
- Repeat EGD in one year - due 11/10/2023    - Repeat Colonoscopy in 10 years for screening purposes - due 11/10/2032    Per Aline, [facility] - Lei Massey on 11/10/2022 10:55:22 AM

## 2025-06-13 NOTE — PROGRESS NOTES
[facility] - ORTHOPEDICS  [address], [address] [address]  [address] [place] [id number]  [phone]     FOLLOW-UP PATIENT VISIT    Name: Sarah Triplett   MRN: [id number]  Date: 6/12/2025     CC: neck and shoulder pain      INTERVAL HISTORY:   Sarha Triplett is a 49 year old female  follow-up patient whom I have been treating conservatively. Patient returns today for reevaluation of neck and shoulder pain.     Patient has complex medical and surgical history with decompression and fusion from C2 to pelvis. Patient has chronic pain and multiple peripheral nerve decompression surgeries. Pain is currently localized to left shoulder and also associated with pain in the neck, occipital region has seen multiple pain providers as well as neurologist in the past. No new neurologic symptoms     Patient completed MRI, but limited by motion artifact.     Bowel and bladder symptoms: absent.    The patient has not had issues with balance and/or hand dexterity problems such as changes in penmanship or the use of buttons or zippers.    ROS: No fever/chills or other constitutional issues.      PE:   There were no vitals filed for this visit.  Estimated body mass index is 27.46 kg/m² as calculated from the following:    Height as of 6/2/25: 5' 3\" (1.6 m).    Weight as of 6/2/25: 155 lb (70.3 kg).    On physical examination, she is awake, alert and oriented x 3 and in no acute distress. Mood, affect and language are normal. She appears well developed and well nourished.  She walks without a nonantalgic, nonmyelopathic, non-Trendelenburg gait. Motor strength testing of the upper extremities shows 5/5 strength in bilateral deltoids, biceps, triceps, FDS, interosseus.   Sensation is intact to light touch C5-T1 distributions bilaterally. Reflexes normal. Negative Atwood's bilaterally     Radiographic Examination/Diagnostics:  MRI personally viewed, independently interpreted  and radiology report was reviewed.  MRI demonstrates no significant canal or foraminal stenosis      IMPRESSION: Sarah Triplett is a 49 year old female with complex surgical history including C2 to the pelvis decompression and fusion presenting with chronic neck pain, left shoulder pain. No significant compression seen on MRI. No surgical intervention recommended    PLAN:   - Referred patient to pain clinic for continued medical management       Castro Verdugo MD  Orthopedic Spine Surgeon  [facility]   [address] [place] [address].[facility]  [email or web address]  t: [phone]   f: [phone]        This note was dictated using Dragon software.  While it was briefly proofread prior to completion, some grammatical, spelling, and word choice errors due to dictation may still occur.

## 2025-07-02 ENCOUNTER — PATIENT MESSAGE (OUTPATIENT)
Dept: FAMILY MEDICINE CLINIC | Facility: CLINIC | Age: 50
End: 2025-07-02

## 2025-07-02 NOTE — TELEPHONE ENCOUNTER
To be the most seamless, infectious disease should place order for urine culture since they are the ones already treating/managing this    A copy of their order can be taken to our reference lab     If I order the urine culture I need more details - which would require more time/a visit with an available provider

## 2025-07-08 DIAGNOSIS — M48.00 SPINAL STENOSIS, UNSPECIFIED SPINAL REGION: Primary | ICD-10-CM

## 2025-07-08 LAB
CHOL/HDLC RATIO: 3.7 (CALC)
CHOLESTEROL, TOTAL: 194 MG/DL
HDL CHOLESTEROL: 53 MG/DL
LDL-CHOLESTEROL: 119 MG/DL (CALC)
NON-HDL CHOLESTEROL: 141 MG/DL (CALC)
T4, FREE: 1 NG/DL (ref 0.8–1.8)
TRIGLYCERIDES: 117 MG/DL
TSH: 1 MIU/L

## 2025-07-08 RX ORDER — OXCARBAZEPINE 150 MG/1
150 TABLET, FILM COATED ORAL 2 TIMES DAILY
Qty: 180 TABLET | Refills: 1 | Status: SHIPPED | OUTPATIENT
Start: 2025-07-08

## 2025-07-08 NOTE — TELEPHONE ENCOUNTER
Medication: OXCARBAZEPINE 150 MG Oral Tab      Date of last refill: 04/07/2025 (#60/2)  [place]     Last office visit: 04/07/2025  Due back to clinic per last office note:  Around 07/07/2025  Date next office visit scheduled:    Future Appointments   Date Time Provider Department Center   7/11/2025 11:00 AM Dariusz Perez PA [facility] [place]   7/15/2025 11:00 AM Beau Manzo DO [facility] [address]           Last OV note recommendation:    Assessment:  This is a 50 y/o female with complex past medical and surgical history including fusions at multiple levels throughout the cervical thoracic and lumbar spine, bilateral ulnar nerve transposition's and left CTS release surgery.  She has chronic pain in her neck and arms as well.  Her exam as noted above today.  She does have muscle wasting in the intrinsic muscles of the right hand.  Has a contracture of the right fifth digit as well.  She states that she is not able to see a pain specialist any longer and not able to have injections due to previous abscess in the cervical spine.  She seen several other doctors in the past for chronic pain including surgeons pain specialist and neurologist.  She has had 2 fairly recent extensive EMGs and those reports are noted above today.  She is already on gabapentin 600 mg 3 times daily and is on sertraline 150 mg daily for depression and anxiety.  At this time I can start oxcarbazepine 150 mg twice daily for possible neuropathic pain.  I will avoid Cymbalta at this time due to her dose of sertraline.  I did discuss possibly repeating an EMG nerve conduction study of the upper extremities however she declines at this time.  Order MRI of the cervical spine to reevaluate for any hardware issues or worsening spinal stenosis.  The left jaw pain may be related to a trigeminal neuralgia.  It does however seem to radiate she states from her ear and go into the left V3 distribution  as well as down the left side of her neck and into her shoulder.  This could also be due to to her previous cervical stenosis and she does have a fusion from C3-C7.  As for the left shoulder she states that she has a reduced range of motion which has been present for some time.  She may need to see an orthopedic surgeon for further evaluation of this.  Will start with the MRI of the cervical spine and can consider getting image of the shoulder as well following this.        Plan:  L facial pain  - MRI C spine  - Start Oxcarbazepine 150mg BID  - Already on gabapentin 600mg TID  - On sertraline 150mg Qday so will avoid Cymbalta for now     2. Spinal stenosis and LUE pain  - Declines any repeat EMG/NCS  - MRi C spine  - Start oxcarbazepine 150mg BID     Talha Vasquez, DO  Neurology

## 2025-07-11 ENCOUNTER — OFFICE VISIT (OUTPATIENT)
Dept: PAIN CLINIC | Facility: CLINIC | Age: 50
End: 2025-07-11
Payer: MEDICAID

## 2025-07-11 VITALS — HEART RATE: 102 BPM | SYSTOLIC BLOOD PRESSURE: 118 MMHG | OXYGEN SATURATION: 96 % | DIASTOLIC BLOOD PRESSURE: 76 MMHG

## 2025-07-11 DIAGNOSIS — M79.18 MYOFASCIAL PAIN: ICD-10-CM

## 2025-07-11 DIAGNOSIS — Z98.1 HISTORY OF FUSION OF CERVICAL SPINE: Primary | ICD-10-CM

## 2025-07-11 PROCEDURE — 99214 OFFICE O/P EST MOD 30 MIN: CPT | Performed by: PHYSICIAN ASSISTANT

## 2025-07-11 NOTE — PATIENT INSTRUCTIONS
Refill policies:    Allow 2-3 business days for refills; controlled substances may take longer.  Contact your pharmacy at least 5 days prior to running out of medication and have them send an electronic request or submit request through the “request refill” option in your [employer] account.  Refills are not addressed on weekends; covering physicians do not authorize routine medications on weekends.  No narcotics or controlled substances are refilled after noon on Fridays or by on call physicians.  By law, narcotics must be electronically prescribed.  A 30 day supply with no refills is the maximum allowed.  If your prescription is due for a refill, you may be due for a follow up appointment.  To best provide you care, patients receiving routine medications need to be seen at least once a year.  Patients receiving narcotic/controlled substance medications need to be seen at least once every 3 months.  In the event that your preferred pharmacy does not have the requested medication in stock (e.g. Backordered), it is your responsibility to find another pharmacy that has the requested medication available.  We will gladly send a new prescription to that pharmacy at your request.    Scheduling Tests:    If your physician has ordered radiology tests such as MRI or CT scans, please contact Central Scheduling at [phone] right away to schedule the test.  Once scheduled, the [facility] Centralized Referral Team will work with your insurance carrier to obtain pre-certification or prior authorization.  Depending on your insurance carrier, approval may take 3-10 days.  It is highly recommended patients assure they have received an authorization before having a test performed.  If test is done without insurance authorization, patient may be responsible for the entire amount billed.      Precertification and Prior Authorizations:  If your physician has recommended that you have a procedure or additional testing performed the [facility]  Centralized Referral Team will contact your insurance carrier to obtain pre-certification or prior authorization.    You are strongly encouraged to contact your insurance carrier to verify that your procedure/test has been approved and is a COVERED benefit.  Although the [facility] Centralized Referral Team does its due diligence, the insurance carrier gives the disclaimer that \"Although the procedure is authorized, this does not guarantee payment.\"    Ultimately the patient is responsible for payment.   Thank you for your understanding in this matter.  Paperwork Completion:  If you require FMLA or disability paperwork for your recovery, please make sure to either drop it off or have it faxed to our office at [phone]. Be sure the form has your name and date of birth on it.  The form will be faxed to our Forms Department and they will complete it for you.  There is a 25$ fee for all forms that need to be filled out.  Please be aware there is a 10-14 day turnaround time.  You will need to sign a release of information (DANIELLE) form if your paperwork does not come with one.  You may call the Forms Department with any questions at [phone].  Their fax number is [phone].

## 2025-07-11 NOTE — PROGRESS NOTES
Patient: Sarah Triplett  Medical Record Number: [id number]  Site: [facility]  Referring Physician:  Castro Verdugo  PCP: Dr. Montanez    Dear Dr. Verdugo:    Thank you very much for requesting this consultation. I had the opportunity to evaluate and initiate care for your patient today, as per your request.    HISTORY OF CHIEF COMPLAINT:      Sarah Triplett is a 49 year old female, who complains of diffuse pain, here to discuss neck and radiating B trapezius pain.    Patient is here today at the request of spine surgery with pain in above-described distribution that began atraumatically decades ago.  She had actually been seen by our clinic for years ago with Dr. Powers.  She had described multiple surgeries, and had been provided with a short course of baclofen and NSAIDs, leading up to another surgery with a physician in [facility].  In the postoperative window in 2022, had been managed with opiates, though has long since weaned off of them.    After surgery, she has had good relief, and for a time, states that \"it was great.\"  Unfortunately, over the past year, has developed return of neck pain.  She did not return to [facility], and instead, was seen by Dr. Verdugo.  She was sent for MRI C spine, and was told there is nothing further he would suggest from a surgical perspective, and was sent here for other options.      VAS Pain Score:  9/10    Hand Dominance: right  Loss of dexterity: No  Dropping things: No    Aggravating Factors: Relieving Factors:   Lifting   Maintaining one position for prolonged time  Changing positions      Past Treatment Attempted/Patient’s Response:  As above     Past Medical History[1]   Past Surgical History[2]   Family History[3]   Social Hx on file[4]   Current Medications:  Current Medications[5]     Functional Assessment: Patient reports that they are able to complete all of their ADL's such as eating, bathing, using the toilet, dressing and getting up from a bed or a chair  independently.    Work History:  The patient currently is on disability.    REVIEW OF SYSTEMS:   10 point review of systems is otherwise negative,unless otherwise in HPI.      Radiology/Lab Test Reviewed:  MRI C spine:    CONCLUSION:    1. Extensive prior fusion change in cervical spine is noted above.   2. Within the limits of this study there is no significant central canal or foraminal stenosis.   3. Assessment of cord signal is particularly limited although there is no gross evidence of cord signal abnormality or abnormal enhancement.  There is no abnormal expansion of cord or loss of cord volume.     CBC:    Lab Results   Component Value Date    WBC 6.4 03/27/2025    WBC 5.0 09/04/2024    WBC 8.3 07/06/2024   No results found for: \"HEMOGLOBIN\"  Lab Results   Component Value Date    .0 03/27/2025    .0 09/04/2024    .0 07/06/2024       PHYSICAL EXAMIMATION   PHYSICAL EXAMINATION: Sarah Triplett is a 49 year old female who is observed sitting comfortably on a chair in the exam room alert and oriented times three. She looks consistent with her stated age.    Ht Readings from Last 1 Encounters:   06/02/25 63\"     Wt Readings from Last 1 Encounters:   06/02/25 155 lb (70.3 kg)     The patient is well developed, well nourished, normal body habitus, well muscled. She moves independently from sitting to standing with ease.       Coordination:  Well coordinated; able to engage in rapid alternating movements bilateral upper extremities    Tandem Walk: Able    ROM Cervical Spine:  See chart below:  Motion Right (+ or -) Left (+ or -)   Cervical flexion + +   Cervical extension + +   Cervical lateral bending + +   Cervical rotation + +     Integument:  Skin over area of cervical spine intact; no erythema, rashes, excoriations, lesions noted    Palpation:  See chart below:  Palpation of Right (+ or -) Left (+ or -)   Cervical Facets - -   Thoracic Facets - -   Paraspinal + +   Trapezius + +   Rhomboid  + +   Occipital - -       Tests:  Test Right (+ or -) Left (+ or -)   Spurling - -   Atwood’s Test     Clonus       HEAD/NECK: Head is normocephalic, neck supple  EYES: EOMI, ALVARO  LYMPH EXAM: There is no lymph edema in either lower extremity.  VASCULAR EXAM: Radial pulses are normal bilaterally, with good distal perfusion. No clubbing or cyanosis.  HEART: normal, regular, S1 and S2  LUNGS: CTA  MUSCULOSKELETAL: Smooth, pain-free ROM to bilateral shoulders,elbows, wrists and digits.    Do you have any known blood/bleeding disorders?  No  Does patient currently take blood thinners?   None  Does patient currently take any antibiotics?   No    Patient is currently on pain medications:  No  Reason pain medications are prescribed: N/A  Pain medications are prescribed by: N/A  [place] Prescription Monitoring review: N/A  DIRE: N/A  Treatment decision: N/A    MEDICAL DECISION MAKING:   Impression: Chronic cervical pain, myofascial pain, history of cervical fusion    Neck and bilateral trap/rhomboid pain with a history of multiple spine surgeries.  I do not have images of her entire spine, records would indicate she is fused from C2 through the sacrum, with the most recent surgical procedure being performed through [facility] in 2022.  Afterwards, did have meaningful postoperative pain, though as this improved weaned off of her postoperative medications, and was doing great for a time.  Over the last year has had return of neck and radiating bilateral trapezius/rhomboid pain, for which she is followed by neurology.  She had been sent to spine surgery, and given her extensive surgery, no additional surgery was recommended.  Sent for further options.    On exam, symptoms identically reproduced to palpation of the bilateral cervical paraspinal musculature as well as bilateral trapezius and rhomboid musculature.  She has reduction in range of motion of the cervical spine, with pain in all planes of motion.     We did discuss  options, and given the extent of her surgery cervical injections would obviously not be an option.  That said, there is a significant portion of this pain that does seem to be myofascial in origin, and can certainly offer her trigger point injections.  Risks and benefits were reviewed in detail, and we will proceed with bilateral cervical paraspinal, trapezius and rhomboid TPI at her earliest convenience.  If helpful, though briefly can repeat procedure versus consideration of acupuncture.    Plan: Bilateral cervical, trapezius, rhomboid TPI.  Follow-up 2 to 3 weeks.      The patient indicates understanding of these issues and agrees to the plan.      Thank you very much.     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)    Acute bronchitis    Anxiety state    Arthritis    Back pain    Back problem    DDD (degenerative disc disease), cervical    c2-c7 fusions    DDD (degenerative disc disease), lumbar    L4-L5    DDD (degenerative disc disease), lumbosacral    S1-S2    Depression    Esophageal reflux    Fibromyalgia    History of blood transfusion    Long term current use of opiate analgesic    Neuropathy    Opioid dependence (HCC)    Osteoarthritis    Other and unspecified alcohol dependence, unspecified drinking behavior    Shortness of breath    Tachycardia, unspecified    Urine incontinence    Visual impairment   [2]   Past Surgical History:  Procedure Laterality Date    Appendectomy      Appendectomy      Back surgery      Both cervical/lumbar fusion.    Excis lumbar disk,one level      Hysterectomy      Trang localization wire 1 site right (cpt=19281) Right 01/27/2023    ADH; intraductal papilloma    Needle biopsy right  02/2022    intraductal papilloma    Other surgical history      gallbladder    Other surgical history      Removal gallbladder      Tubal ligation      Tubal ligation  2004   [3]   Family History  Problem  Relation Age of Onset    Anxiety Mother     Breast Cancer Mother 76    Other ([other]) Daughter     Other ([other]) Son     Anxiety Maternal Aunt     Depression Maternal Aunt     Other (lung cancer) Maternal Aunt     Other (lung cancer) Maternal Uncle    [4]   Social History  Socioeconomic History    Marital status: Single   Tobacco Use    Smoking status: Former     Current packs/day: 0.00     Average packs/day: 1 pack/day for 35.0 years (35.0 ttl pk-yrs)     Types: Cigarettes     Start date: 12/30/1986     Quit date: 12/30/2021     Years since quitting: 3.5     Passive exposure: Past    Smokeless tobacco: Never   Vaping Use    Vaping status: Never Used   Substance and Sexual Activity    Alcohol use: Not Currently     Comment: pt states she drinks every once in a while. Pt drinks a pint of vodka    Drug use: Never   Other Topics Concern    Caffeine Concern Yes     Comment: Tea    Exercise No   [5]   Current Outpatient Medications   Medication Sig Dispense Refill    OXcarbazepine 150 MG Oral Tab Take 1 tablet (150 mg total) by mouth 2 (two) times daily. 180 tablet 1    estradiol 0.1 MG/GM Vaginal Cream Place 1 g vaginally twice a week.      gabapentin 400 MG Oral Tab Take 1 tablet (400 mg total) by mouth 2 (two) times daily. 180 tablet 1    omeprazole 20 MG Oral Capsule Delayed Release Take 1 capsule (20 mg total) by mouth 2 (two) times daily before meals. 180 capsule 1    sertraline 100 MG Oral Tab Take 1.5 tablets (150 mg total) by mouth daily. 135 tablet 1    traZODone 50 MG Oral Tab Take 1 tablet (50 mg total) by mouth nightly as needed for Sleep. 90 tablet 1    Cranberry (RA CRANBERRY) 500 MG Oral Cap Take by mouth As Directed.      ascorbic acid 500 MG Oral Tab Take 1 tablet (500 mg total) by mouth 2 (two) times daily.      methenamine 1 g Oral Tab Take 1 tablet (1 g total) by mouth 2 (two) times daily.      CUSTOM MEDICATION Semaglutide 1mg/cyanobalamin   Concentration: 5/ 0.5 mg/mL  Instructions: Inject 20  units/0.2ml into skin q weekly  Dispense: 2.5 mL vial 3 each 0    topiramate 50 MG Oral Tab Take 1 tablet (50 mg total) by mouth 2 (two) times daily. 180 tablet 1    CUSTOM MEDICATION Semaglutide 0.5mg/cyanobalamin   Concentration: 1/ 0.5 mg/mL  Instructions: Inject 50 units/0.5ml into skin q weekly  Dispense: 2.5 mL vial 1 each 3    Cholecalciferol (VITAMIN D3 ULTRA POTENCY) 1.25 MG (00311 UT) Oral Tab Take 2,000 Int'l Units/day by mouth daily.      MYRBETRIQ 50 MG Oral Tablet 24 Hr Take 1 tablet (50 mg total) by mouth daily.      Oxybutynin Chloride ER 15 MG Oral Tablet 24 Hr Take 1 tablet (15 mg total) by mouth daily. 90 tablet 6

## 2025-07-11 NOTE — PROGRESS NOTES
Location of Pain: pt c/o bilateral cervical pain     Date Pain Began: Chronic condition, hx of cervical fusion       Work Related:   No        Receiving Work Comp/Disability:   No    Numeric Rating Scale:  Pain at Present:  8-9/10                                                                                                                        Minimum Pain:   7  Maximum Pain  10    Distribution of Pain:    bilateral    Quality of Pain:   sharp/stabbing and throbbing    Origin of Pain:    Lifting    Aggravating Factors:    Sitting, Standing, and Walking    Past Treatments for Current Pain Condition:   Other Naproxen 500 mg and Norco 5-325 mg    Prior diagnostic testing for your pain:  6/04/2025 MRI SPINE CERVICAL, 4/28/2025 XR Cervical Spine

## 2025-07-15 ENCOUNTER — TELEPHONE (OUTPATIENT)
Dept: PHYSICAL THERAPY | Age: 50
End: 2025-07-15

## 2025-07-15 ENCOUNTER — OFFICE VISIT (OUTPATIENT)
Facility: CLINIC | Age: 50
End: 2025-07-15
Payer: MEDICAID

## 2025-07-15 VITALS — BODY MASS INDEX: 27.46 KG/M2 | HEIGHT: 63 IN | WEIGHT: 155 LBS

## 2025-07-15 DIAGNOSIS — M75.100 INTERNAL ROTATION OF SHOULDER LAG SIGN: ICD-10-CM

## 2025-07-15 DIAGNOSIS — Z98.1 ARTHRODESIS STATUS: ICD-10-CM

## 2025-07-15 DIAGNOSIS — M24.812 INTERNAL DERANGEMENT OF LEFT SHOULDER: Primary | ICD-10-CM

## 2025-07-15 DIAGNOSIS — M19.012 PRIMARY OSTEOARTHRITIS OF LEFT SHOULDER: ICD-10-CM

## 2025-07-15 PROCEDURE — 99204 OFFICE O/P NEW MOD 45 MIN: CPT | Performed by: FAMILY MEDICINE

## 2025-07-15 NOTE — H&P
Sports Medicine Clinic Note    Subjective:    Chief Complaint: Left shoulder pain    History: 49-year-old female with a history of complete spinal fusion from C3 to the sacrum presents with progressive left shoulder pain over the past few months. She reports severe discomfort, particularly when reaching behind or lifting overhead. The pain has significantly affected her daily activities, including quilting and caring for her dog. She denies prior shoulder surgery or diabetes. She has not undergone physical therapy specifically for the shoulder. Notable history includes prior spinal cord abscess with paralysis and chronic neck pain due to spinal fusion.    Objective:    Left Shoulder Examination:    Inspection: No visible swelling, erythema, or deformity noted.  Palpation: Tenderness over the subacromial space and acromioclavicular joint.  Range of Motion: Severely restricted active and passive motion, especially with external rotation and reaching behind.  Neurovascular: Intact distally. No evidence of neuromuscular compromise.  Special Tests: Positive internal rotation lag sign. Positive drop arm test. Pain reproduced with Neer and Duque-Yuval impingement signs. Negative scarf test. No gross instability.    Diagnostic Tests:    Radiographs of the left shoulder demonstrate mild to moderate acromioclavicular joint arthropathy. The glenohumeral joint is unremarkable. No acute osseous abnormalities or soft tissue swelling were observed.    Assessment:    Possible rotator cuff tear of the left shoulder.  Left acromioclavicular joint osteoarthritis.  Chronic pain and biomechanical alterations due to complete spinal fusion from C3 to sacrum.  History of spinal cord abscess with prior paralysis.    Plan:    Additional Workup: MRI of the left shoulder ordered to evaluate for rotator cuff pathology.  Therapy: Initiate physical therapy for shoulder mobilization and range of motion exercises.  Medications: NSAIDs and  Acetaminophen as needed for pain relief.  Bracing/Casting: None indicated at this time.  Procedures: Consider subacromial corticosteroid injection if MRI does not demonstrate full-thickness tear.  Activity Recommendations: Avoid overhead lifting and activities that exacerbate symptoms. Encourage gentle mobility exercises as tolerated.    Follow-Up: Tentatively scheduled once MRI of the left shoulder is completed.      Beau Manzo DO, CAQSM   Primary Care Sports Medicine

## 2025-07-17 ENCOUNTER — TELEPHONE (OUTPATIENT)
Dept: PAIN CLINIC | Facility: CLINIC | Age: 50
End: 2025-07-17

## 2025-07-17 NOTE — TELEPHONE ENCOUNTER
Contacted patient informed that her insurance did not require pre certification for TPI, offered patient to schedule injection. Patient VU and agreed to schedule TPI.     Scheduled patient for In Office -Bilateral Cervical,Trapezius,Rhomboid TPI on 07/28/25 @ 09:00 AM.  
Prior authorization request completed for: Bilateral Cervical,Trapezius,Rhomboid TPI   Authorization #No Prior Authorization Required - confirmed on [employer] portal.  Reference Number : [id number]  Transaction ID : [id number]    Authorization dates: n/a  CPT codes approved: 27070  Number of visits/dates of service approved: 1  Physician: Mani  Location: In Office     Patient can be scheduled. Routed to Navigator.   
no

## 2025-07-18 ENCOUNTER — TELEPHONE (OUTPATIENT)
Dept: PHYSICAL THERAPY | Age: 50
End: 2025-07-18

## 2025-07-24 ENCOUNTER — TELEPHONE (OUTPATIENT)
Dept: PHYSICAL THERAPY | Age: 50
End: 2025-07-24

## 2025-07-25 DIAGNOSIS — E66.9 OBESITY (BMI 30-39.9): ICD-10-CM

## 2025-07-25 DIAGNOSIS — Z51.81 ENCOUNTER FOR THERAPEUTIC DRUG MONITORING: ICD-10-CM

## 2025-07-28 ENCOUNTER — NURSE ONLY (OUTPATIENT)
Dept: PAIN CLINIC | Facility: CLINIC | Age: 50
End: 2025-07-28
Payer: MEDICAID

## 2025-07-28 ENCOUNTER — OFFICE VISIT (OUTPATIENT)
Dept: PAIN CLINIC | Facility: CLINIC | Age: 50
End: 2025-07-28
Payer: MEDICAID

## 2025-07-28 VITALS
WEIGHT: 150 LBS | HEART RATE: 87 BPM | OXYGEN SATURATION: 98 % | DIASTOLIC BLOOD PRESSURE: 60 MMHG | SYSTOLIC BLOOD PRESSURE: 98 MMHG | BODY MASS INDEX: 27 KG/M2

## 2025-07-28 DIAGNOSIS — M79.18 MYOFASCIAL PAIN: Primary | ICD-10-CM

## 2025-07-28 DIAGNOSIS — Z98.1 HISTORY OF FUSION OF CERVICAL SPINE: Primary | ICD-10-CM

## 2025-07-28 DIAGNOSIS — M79.18 MYOFASCIAL PAIN: ICD-10-CM

## 2025-07-28 PROCEDURE — 20553 NJX 1/MLT TRIGGER POINTS 3/>: CPT | Performed by: ANESTHESIOLOGY

## 2025-07-28 RX ORDER — BUPIVACAINE HYDROCHLORIDE 5 MG/ML
18 INJECTION, SOLUTION EPIDURAL; INTRACAUDAL; PERINEURAL ONCE
Status: COMPLETED | OUTPATIENT
Start: 2025-07-28 | End: 2025-07-28

## 2025-07-28 RX ORDER — TRIAMCINOLONE ACETONIDE 40 MG/ML
80 INJECTION, SUSPENSION INTRA-ARTICULAR; INTRAMUSCULAR ONCE
Status: COMPLETED | OUTPATIENT
Start: 2025-07-28 | End: 2025-07-28

## 2025-07-28 NOTE — PATIENT INSTRUCTIONS
Refill policies:    Allow 2-3 business days for refills; controlled substances may take longer.  Contact your pharmacy at least 5 days prior to running out of medication and have them send an electronic request or submit request through the “request refill” option in your [employer] account.  Refills are not addressed on weekends; covering physicians do not authorize routine medications on weekends.  No narcotics or controlled substances are refilled after noon on Fridays or by on call physicians.  By law, narcotics must be electronically prescribed.  A 30 day supply with no refills is the maximum allowed.  If your prescription is due for a refill, you may be due for a follow up appointment.  To best provide you care, patients receiving routine medications need to be seen at least once a year.  Patients receiving narcotic/controlled substance medications need to be seen at least once every 3 months.  In the event that your preferred pharmacy does not have the requested medication in stock (e.g. Backordered), it is your responsibility to find another pharmacy that has the requested medication available.  We will gladly send a new prescription to that pharmacy at your request.    Scheduling Tests:    If your physician has ordered radiology tests such as MRI or CT scans, please contact Central Scheduling at [phone] right away to schedule the test.  Once scheduled, the [facility] Centralized Referral Team will work with your insurance carrier to obtain pre-certification or prior authorization.  Depending on your insurance carrier, approval may take 3-10 days.  It is highly recommended patients assure they have received an authorization before having a test performed.  If test is done without insurance authorization, patient may be responsible for the entire amount billed.      Precertification and Prior Authorizations:  If your physician has recommended that you have a procedure or additional testing performed the [facility]  Centralized Referral Team will contact your insurance carrier to obtain pre-certification or prior authorization.    You are strongly encouraged to contact your insurance carrier to verify that your procedure/test has been approved and is a COVERED benefit.  Although the [facility] Centralized Referral Team does its due diligence, the insurance carrier gives the disclaimer that \"Although the procedure is authorized, this does not guarantee payment.\"    Ultimately the patient is responsible for payment.   Thank you for your understanding in this matter.  Paperwork Completion:  If you require FMLA or disability paperwork for your recovery, please make sure to either drop it off or have it faxed to our office at [phone]. Be sure the form has your name and date of birth on it.  The form will be faxed to our Forms Department and they will complete it for you.  There is a 25$ fee for all forms that need to be filled out.  Please be aware there is a 10-14 day turnaround time.  You will need to sign a release of information (DANIELLE) form if your paperwork does not come with one.  You may call the Forms Department with any questions at [phone].  Their fax number is [phone].

## 2025-07-28 NOTE — PROGRESS NOTES
Patient presents in office today for Bilateral Cervical,Trapezius,Rhomboid TPI    Current pain level reported = 7/10    Last reported dose of Gabapentin was this morning       Narcotic Contract renewal n/a    Urine Drug screen n/a

## 2025-07-28 NOTE — PROCEDURES
Date of procedure: July 28, 2025    Preop diagnosis: Bilateral cervical myofascial pain    Postop diagnosis: Same    Procedure: Bilateral cervical TPI    Surgeon: Enmanuel Singleton    Anesthesia: None    EBL: 0    Indication: Patient is a 49-year-old with a history of cervical to lumbar fusion with myofascial neck pain    Procedural detail: Informed consent obtained.  Timeout done.  Skin with the cervical spine prepped in the usual sterile fashion.  Subsequently, a 27-gauge needle was used to deliver a total mixture of 80 mg of triamcinolone with 18 cc of 0.5% bupivacaine into multiple trigger points.  Muscles injected include the trapezius, rhomboid and levator scapula.  Patient tolerated procedure well.    Enmanuel Singleton MD

## 2025-07-28 NOTE — PROGRESS NOTES
Timeout completed prior to procedure @ 1699.  Participants present for timeout:  Dr. Singleton, RN Amara , and patient.

## 2025-08-16 ENCOUNTER — HOSPITAL ENCOUNTER (OUTPATIENT)
Dept: MRI IMAGING | Facility: HOSPITAL | Age: 50
Discharge: HOME | End: 2025-08-16
Attending: FAMILY MEDICINE

## 2025-08-16 DIAGNOSIS — M75.100 INTERNAL ROTATION OF SHOULDER LAG SIGN: ICD-10-CM

## 2025-08-16 DIAGNOSIS — M24.812 INTERNAL DERANGEMENT OF LEFT SHOULDER: ICD-10-CM

## 2025-08-16 DIAGNOSIS — M19.012 PRIMARY OSTEOARTHRITIS OF LEFT SHOULDER: ICD-10-CM

## 2025-08-16 PROCEDURE — 73221 MRI JOINT UPR EXTREM W/O DYE: CPT | Performed by: FAMILY MEDICINE

## 2025-08-28 ENCOUNTER — TELEPHONE (OUTPATIENT)
Dept: FAMILY MEDICINE CLINIC | Facility: CLINIC | Age: 50
End: 2025-08-28

## 2025-08-28 DIAGNOSIS — D22.9 ATYPICAL NEVI: Primary | ICD-10-CM

## (undated) DIAGNOSIS — M96.1 FAILED BACK SURGICAL SYNDROME: ICD-10-CM

## (undated) DIAGNOSIS — M54.16 LUMBAR RADICULITIS: ICD-10-CM

## (undated) DIAGNOSIS — N64.52 NIPPLE DISCHARGE IN FEMALE: Primary | ICD-10-CM

## (undated) DIAGNOSIS — D24.9 INTRADUCTAL PAPILLOMA OF BREAST, UNSPECIFIED LATERALITY: ICD-10-CM

## (undated) DIAGNOSIS — R13.10 DYSPHAGIA, UNSPECIFIED TYPE: Primary | ICD-10-CM

## (undated) DEVICE — GLOVE SURG 8 PREMIERPRO LF NATURAL PF TXTR SMTH STRL

## (undated) DEVICE — 3M™ STERI-DRAPE™ INSTRUMENT POUCH 1018: Brand: STERI-DRAPE™

## (undated) DEVICE — GOWN SURG XL L3 NONREINFORCE SET IN SLV STRL LF DISP BLUE

## (undated) DEVICE — SPONGE SURG 3X.5IN CTTND ABS PRCS CUT RADOPQ PATTIE STRL LF

## (undated) DEVICE — SYSTEM SPNL UNID RODS ROBOTIC AST PT SPC

## (undated) DEVICE — CLEANER ESURG TIP 5X5CM DEVON LG ADH BACK CAUT PLSHR RADOPQ

## (undated) DEVICE — ELECTRODE PT RTN C30- LB 9FT CORD NONIRRITATE NONSENSITIZE

## (undated) DEVICE — UNDERPAD 23X36 LIGHT CHUX

## (undated) DEVICE — NEEDLE BIOPSY 11GA 6IN RZR SHARP BVL ADPR TPR DIST TIP LTWT

## (undated) DEVICE — APPLICATOR CTN WD 6IN FBRTP STRL LF

## (undated) DEVICE — POUCH SPEC RTRVL 34.5CM 29.5CM 10MM ERG HNDL LONG CYL TUBE

## (undated) DEVICE — GLOVE SURG 7.5 PREMIERPRO LF NATURAL PF TXTR SMTH STRL

## (undated) DEVICE — BIT DRILL NAVIGATED INFINITY STRL LF DISP OCCIPITOCERVICAL

## (undated) DEVICE — TOOL 9CM MIDAS REX LGND 3MM DSCT CARBIDE METAL

## (undated) DEVICE — DRAPE .75 SHT FNFLD 76X52IN SURG CNVRT STRL LF DISP TIBURON

## (undated) DEVICE — SUTURE ETHILON 3-0 FSL 30IN MONO NABSB BLK 1671H

## (undated) DEVICE — LIGHT HANDLE

## (undated) DEVICE — TUBING SMKEVC 10FT 78IN INTGR WAND ADPR SPNG GRD 8IN STRL

## (undated) DEVICE — SEALER ESURG .226IN .137IN AQUAMANTYS 6 30D .236IN SPACE BP

## (undated) DEVICE — MEGADYNE E-Z CLEAN BLADE 2.75"

## (undated) DEVICE — POWDER ABS SURGFM PORCINE GELTN 1GM

## (undated) DEVICE — SYRINGE 50ML GRAD N-PYRG DEHP-FR PVC FREE STRL MED LF DISP

## (undated) DEVICE — GLOVE SURG 7 PROTEXIS LF BLUE PF SMTH BEAD CUFF INTLK STRL

## (undated) DEVICE — SPONGE SURG .5X.5IN CTTND ABS PRCS CUT RADOPQ PATTIE STRL LF

## (undated) DEVICE — SLEEVE KENDALL SCD EXPRESS MED

## (undated) DEVICE — TIP APL 13CM MALLEABLE STRL LF DISP

## (undated) DEVICE — HEMOCLIP HORIZON MED 002200

## (undated) DEVICE — Device

## (undated) DEVICE — GLOVE SURG 8 PROTEXIS PI LF CRM PF BEAD CUFF STRL NEU-THERA

## (undated) DEVICE — GLOVE SURG 6.5 PROTEXIS LF CRM PF BEAD CUFF STRL PLISPRN

## (undated) DEVICE — SOL NACL IRRIG 0.9% 1000ML BTL

## (undated) DEVICE — TOWEL SURG OR 17X30IN BLUE

## (undated) DEVICE — DRESSING SURG 12X8IN NADH DERMACEA STRL LF DISP

## (undated) DEVICE — BLADE BN MILL FINE 3.2MM STRL LF DISP

## (undated) DEVICE — SPHERE STEALTH STATION 4-PACK

## (undated) DEVICE — PAD,ABDOMINAL,8"X7.5",ST,LF,20/BX: Brand: MEDLINE INDUSTRIES, INC.

## (undated) DEVICE — COVER EQUIPMENT C ARM

## (undated) DEVICE — TOOL 14CM MIDAS REX LGND 3MM MATCH HEAD FLUTE DSCT STRL LF

## (undated) DEVICE — GLOVE SURG 6.5 PREMIERPRO LF NATURAL PF TXTR SMTH STRL

## (undated) DEVICE — SUTURE VICRYL 2-0 CP-2 18IN CNTRL RELS BRAID 8 STRN COAT ABS J762D

## (undated) DEVICE — DRESSING TRANS 4.75X4IN ADH HPOAL WTPRF TEGADERM PU STD STRL

## (undated) DEVICE — SPONGE LAP 18X18IN STRL

## (undated) DEVICE — POUCH INST 11X7IN 2 ADH STRIP 2 CMPRT STRL STRDRP PLASTIC

## (undated) DEVICE — DRESSING WND 14X4IN 7 DAY ANTIMICROBIAL ISLAND SLVRLN SLVR

## (undated) DEVICE — GLOVE SURG 8 PROTEXIS LF BLUE PF SMTH BEAD CUFF INTLK STRL

## (undated) DEVICE — TRAP MUCUS PLASTIC SCR CAP TUBE ID LBL STRL LF CLR

## (undated) DEVICE — SPONGE SURG 3X1IN CTTND ABS PRCS CUT RADOPQ PATTIE STRL LF

## (undated) DEVICE — SPONGE ABS THK10MM 12.5X8CM PORCINE GELTN STRL DISP SURGFM

## (undated) DEVICE — HOLDER TUBE REMOVABLE TCH FASTEN STRAP DEVON STRL LF DISP

## (undated) DEVICE — DRAPE 2 INCS FILM ANTIMICROBIAL 23X17IN SURG IOBAN STRL

## (undated) DEVICE — DRAIN INCS 7MM CWV AXM SIL FLAT CLOT STOP LF

## (undated) DEVICE — STERILE POLYISOPRENE POWDER-FREE SURGICAL GLOVES: Brand: PROTEXIS

## (undated) DEVICE — SPONGE GAUZE 2X2IN CTN 8 PLY WOVEN FOLD EDGE XRAY DTCT STRL

## (undated) DEVICE — GLOVE SURG 8 PROTEXIS LF CRM PF BEAD CUFF STRL PLISPRN 12IN

## (undated) DEVICE — STAPLER SKIN 3.9X6.9MM WIDE 35 CNT FX HEAD MLDIR RELS STRL

## (undated) DEVICE — GOWN SURG LG L3 NONREINFORCE SET IN SLV STRL LF DISP BLUE

## (undated) DEVICE — BULB 100CC SIL DRN LTWT LOW LVL SCT WND DRN STRL LF DISP

## (undated) DEVICE — SUT SILK 2-0 FS 685G

## (undated) DEVICE — HEMOCLIP HORIZON SM 001200

## (undated) DEVICE — DEVICE TRCLSN PDS STRATAFIX SYMMETRIC PDS + 1 CTX 45CM ABS

## (undated) DEVICE — SET INTPLS COAX HFL TIP SCT TUBE HNDPC STRL LF DISP

## (undated) DEVICE — BREAST-HERNIA-PORT CDS-LF: Brand: MEDLINE INDUSTRIES, INC.

## (undated) DEVICE — TRAY CATH CMP CR LUBRI-SIL IC STLK SURESTEP 16FR FOLEY URMTR

## (undated) DEVICE — DRAPE PACK CHEST & U BAR

## (undated) DEVICE — SUTURE ETHILON MTPS 3-0 PS2 18IN MONO NABSB BLK 1669H

## (undated) DEVICE — STAPLER SKIN 4.1X6.5MM 35 WIDE STPL CRTDG LF APPOSE ULC DISP

## (undated) DEVICE — TOWEL OR BLU 16X26IN 4PK

## (undated) DEVICE — PEN SURGMRK DEVON SKIN DISP REG TIP RULER CAP GENTIAN VIOL

## (undated) DEVICE — SOLUTION IRR 1000ML 0.9% NACL PLASTIC POUR BTL ISTNC N-PYRG

## (undated) DEVICE — SUTURE VICRYL 0 CT1 18IN CNTRL RELS BRAID 8 STRN COAT ABS J840D

## (undated) DEVICE — SYSTEM NEG PRSS PREVENA + 150ML 125 MMHG CNSTR NS LF DISP

## (undated) DEVICE — DEVICE TRCLSN PDS STRATAFIX SYMMETRIC PDS + 0 CT1 45CM ABS

## (undated) DEVICE — GLOVE SURG 7.5 PROTEXIS LF CRM PF SMTH BEAD CUFF STRL

## (undated) DEVICE — REMOVER STPL PRM SKIN METAL PLASTIC TIP

## (undated) DEVICE — KIT DRSG 90CM PREVENA + CSTM DISP

## (undated) DEVICE — GOWN,SIRUS,FABRIC-REINFORCED,LARGE: Brand: MEDLINE

## (undated) DEVICE — SUT MONOCRYL 4-0 PS-2 Y496G

## (undated) DEVICE — SUT VICRYL 3-0 SH J416H

## (undated) DEVICE — BIT DRILL 2.4MM STRL LF DISP

## (undated) DEVICE — TUBING SCT 2FT 5MM FRZR MALE CNCT CLAMP STRL DISP

## (undated) DEVICE — GLOVE SURG 6.5 PROTEXIS LF BLUE PF SMTH BEAD CUFF INTLK STRL

## (undated) DEVICE — DRAPE,TAPE STRIPS,STERILE: Brand: MEDLINE

## (undated) NOTE — Clinical Note
Thank you for referring Justin Squires to the [facility]. I met with her in consultation today via person. I have ordered labs, referred for a nutrition consultation with our dietician. She was started on Metformin ER for medication therapy and will follow-up with me in about 8 weeks.

## (undated) NOTE — LETTER
Sarah Triplett   [address] [facility] [facility] [address]           Dear Sarah Triplett     Our records indicate that you have outstanding lab work and or testing that was ordered for you and has not yet been completed:  Lab Frequency Next Occurrence   Vitamin D [E] Once 02/19/2024      To provide you with the best possible care, please complete these orders at your earliest convenience. If you have recently completed these orders please disregard this letter.     If you have any questions please call the office at [phone].     Thank you,     [facility]

## (undated) NOTE — LETTER
Marjackerika Arechiga Rech   [address] [facility] [address]           Dear Jermaine Cortez     Our records indicate that you have outstanding lab work and or testing that was ordered for you and has not yet been completed:  Lab Frequency Next Occurrence   US BREAST RIGHT COMPLETE ([id number]) Once 06/02/2022      To provide you with the best possible care, please complete these orders at your earliest convenience. If you have recently completed these orders please disregard this letter. If you have any questions please call the office at [phone]. Please call Central Scheduling at [phone] to schedule your above testing.     Thank you,     [facility]

## (undated) NOTE — LETTER
Patient Name: Sarah Triplett        : 1975       Medical Record #: [id number]    CONSENT FOR PROCEDURES/SEDATION    Date: 2025       Time: 7:44 AM        1. I authorize the performance upon Sarah Triplett the following:    ____BILATERAL CERVICAL, TRAPEZIUS ,RHOMBOID TPI_____    2. I authorize Dr. Singleton (and whomever is designated as the doctor’s assistant), to perform the above mentioned procedures.    3. If any unforeseen conditions arise during this procedure calling for additional procedures, operations, or medications (including anesthesia and blood transfusion), I  further request and authorize the doctor to do whatever he/she deems advisable in my interest.    4. I consent to the taking and reproduction of any photographs in the course of this procedure for professional purposes.    5. I consent to the administration of such sedation as may be considered necessary or advisable by the physician responsible for this service, with the exception of  _____________________________.    6. I have been informed by my doctor of the nature and purpose of this procedure/sedation, possible alternative methods of treatment, risk involved and possible complications.      Signature of Patient:  ___________________________    Signature of person authorized to consent for patient: Relationship to patient:  ___________________________    ___________________    Witness: ____________________     Date: ______________    Provider: ____________________     Date: ______________